# Patient Record
Sex: MALE | Race: WHITE | HISPANIC OR LATINO | Employment: PART TIME | URBAN - METROPOLITAN AREA
[De-identification: names, ages, dates, MRNs, and addresses within clinical notes are randomized per-mention and may not be internally consistent; named-entity substitution may affect disease eponyms.]

---

## 2017-01-04 ENCOUNTER — OFFICE VISIT (OUTPATIENT)
Dept: INTERNAL MEDICINE | Facility: CLINIC | Age: 42
End: 2017-01-04
Payer: COMMERCIAL

## 2017-01-04 ENCOUNTER — OFFICE VISIT (OUTPATIENT)
Dept: OPTOMETRY | Facility: CLINIC | Age: 42
End: 2017-01-04
Payer: COMMERCIAL

## 2017-01-04 VITALS
HEART RATE: 109 BPM | RESPIRATION RATE: 16 BRPM | BODY MASS INDEX: 33.49 KG/M2 | DIASTOLIC BLOOD PRESSURE: 69 MMHG | SYSTOLIC BLOOD PRESSURE: 133 MMHG | WEIGHT: 233.94 LBS | HEIGHT: 70 IN

## 2017-01-04 DIAGNOSIS — R55 SYNCOPE, UNSPECIFIED SYNCOPE TYPE: Primary | ICD-10-CM

## 2017-01-04 DIAGNOSIS — K52.9 CHRONIC DIARRHEA: ICD-10-CM

## 2017-01-04 DIAGNOSIS — H52.4 BILATERAL PRESBYOPIA: ICD-10-CM

## 2017-01-04 DIAGNOSIS — H04.123 BILATERAL DRY EYES: Primary | ICD-10-CM

## 2017-01-04 DIAGNOSIS — K42.9 UMBILICAL HERNIA WITHOUT OBSTRUCTION AND WITHOUT GANGRENE: ICD-10-CM

## 2017-01-04 DIAGNOSIS — Z76.89 ESTABLISHING CARE WITH NEW DOCTOR, ENCOUNTER FOR: ICD-10-CM

## 2017-01-04 DIAGNOSIS — Z00.00 ANNUAL PHYSICAL EXAM: ICD-10-CM

## 2017-01-04 PROCEDURE — 99214 OFFICE O/P EST MOD 30 MIN: CPT | Mod: S$GLB,,, | Performed by: HOSPITALIST

## 2017-01-04 PROCEDURE — 93005 ELECTROCARDIOGRAM TRACING: CPT | Mod: S$GLB,,, | Performed by: HOSPITALIST

## 2017-01-04 PROCEDURE — 1159F MED LIST DOCD IN RCRD: CPT | Mod: S$GLB,,, | Performed by: HOSPITALIST

## 2017-01-04 PROCEDURE — 99999 PR PBB SHADOW E&M-EST. PATIENT-LVL IV: CPT | Mod: PBBFAC,,, | Performed by: HOSPITALIST

## 2017-01-04 PROCEDURE — 99999 PR PBB SHADOW E&M-EST. PATIENT-LVL II: CPT | Mod: PBBFAC,,, | Performed by: OPTOMETRIST

## 2017-01-04 PROCEDURE — 93010 ELECTROCARDIOGRAM REPORT: CPT | Mod: S$GLB,,, | Performed by: INTERNAL MEDICINE

## 2017-01-04 PROCEDURE — 92004 COMPRE OPH EXAM NEW PT 1/>: CPT | Mod: S$GLB,,, | Performed by: OPTOMETRIST

## 2017-01-04 RX ORDER — TRAMADOL HYDROCHLORIDE 100 MG/1
100 TABLET, EXTENDED RELEASE ORAL DAILY PRN
COMMUNITY
End: 2017-05-01

## 2017-01-04 RX ORDER — GABAPENTIN 100 MG/1
100 CAPSULE ORAL DAILY PRN
COMMUNITY
End: 2017-04-24

## 2017-01-04 NOTE — PROGRESS NOTES
"Subjective:       Patient ID: Sanchez Drake is a 41 y.o. male.    Chief Complaint: Establish Care    HPI Comments: 40 y/o M with Traumatic brain injury in 2010 (with no neurologic consequences) presents for establishment of care. Patient also has several concerns as outlined below.     ESTABLISHMENT OF CARE:     Obesity screen: BMI 33  HTN screen: no hx  Lipid Screen : no lipid problems in the past  Diabetes screen : no hx   Alcohol Misuse: denies   Tobacco use: former smoker, quit 5 yrs ago  Vaccines: Influenza - deferred. Tetanus - 2015    SYNCOPE    Patient notes >5 yr hx of syncope that occurs during episodes of excessive laughing. Episodes have been becoming more frequent and more severe in quality. Most recent one occurred 2 weeks ago, and the one prior to that 1 mo ago. Patient notes "floating objects" with diaphoresis at first, that then progress to LOC. Wife witnessed these episodes and notes shaking of patient's head. Short-lived episodes, couple of mins. Associated with some SOB, but no CP or palpitations. Patient has no recollection after he losses consciousness, but there is no post-ictal state/confusion. Deny any tongue biting or loss of bladder/ bowel function. Denies any HA, CP, palpitations. No personal or fam hx of seizures, no hx of arrhythmias. As noted above, pt suffered TBI in 2010 after a metal object fell on him. Did rehab for 18 mo. No residual neuro deficits after that. However, patient states that syncopal episodes associated with laughter likely started after the TBI.     DIARRHEA    First started in 10/2016. Both watery stool and loose stool. 7-8 times per day. Stool becoming more pale in color with intermittent blood. However, wife and pt state that his anus has been becoming irritated and blood may be related to irritation. + diarrhea at night. No significant improvement with NPO. No exacerbation with certain foods. No recent outside travel (personally he is from [place] Denies personal hx or fam hx of IBS or IBD.     UMBILICAL HERNIA    Small, no pain/ distress. Patient would like referral to surgery.     Review of Systems   Constitutional: Negative for activity change, appetite change, fatigue, fever and unexpected weight change.   Cardiovascular: Negative for chest pain, palpitations and leg swelling.   Gastrointestinal: Positive for blood in stool and diarrhea. Negative for abdominal distention, abdominal pain, constipation, nausea, rectal pain and vomiting.   Genitourinary: Negative.    Musculoskeletal: Positive for back pain. Negative for arthralgias and myalgias.        Chronic but well controlled with PRN meds   Skin: Negative.    Neurological: Positive for syncope and light-headedness. Negative for dizziness, tremors, weakness, numbness and headaches.       Past Medical History   Diagnosis Date    Chronic back pain     TBI (traumatic brain injury) 2010     History reviewed. No pertinent past surgical history.    History reviewed. No pertinent family history.     Objective:       Visit Vitals    /69 (BP Location: Right arm, Patient Position: Sitting)    Pulse 109    Resp 16    Ht 5' 10" (1.778 m)    Wt 106.1 kg (233 lb 14.5 oz)    BMI 33.56 kg/m2     Physical Exam   Constitutional: He is oriented to person, place, and time. He appears well-developed and well-nourished. No distress.   HENT:   Head: Normocephalic and atraumatic.   Right Ear: External ear normal.   Left Ear: External ear normal.   Nose: Nose normal.   Mouth/Throat: Oropharynx is clear and moist. No oropharyngeal exudate.   Eyes: EOM are normal. Pupils are equal, round, and reactive to light. No scleral icterus.   Neck: Normal range of motion. Neck supple. No tracheal deviation present. No thyromegaly present.   Cardiovascular: Normal rate, regular rhythm, normal heart sounds and intact distal pulses.  Exam reveals no gallop and no friction rub.    No murmur heard.  Pulmonary/Chest: Effort " normal and breath sounds normal. No respiratory distress. He has no wheezes. He has no rales.   Abdominal: Soft. Bowel sounds are normal. He exhibits no distension. There is no tenderness. There is no rebound and no guarding. A hernia is present.   Small umbilical reducible hernia    Stool occult (-) but no stool was appreciated on rectal exam, anus with no bleeding or irritation    Musculoskeletal: Normal range of motion. He exhibits no edema or deformity.   Lymphadenopathy:     He has no cervical adenopathy.   Neurological: He is alert and oriented to person, place, and time. No cranial nerve deficit.   Skin: Skin is warm. No rash noted. He is not diaphoretic. No erythema. No pallor.   Psychiatric: He has a normal mood and affect.         In office EKG   Vent rate 89, ME int 142, QRS 84, QTc 435 ; sinus rhythm     Assessment:       1. Syncope, unspecified syncope type    2. Chronic diarrhea    3. Umbilical hernia without obstruction and without gangrene    4. Establishing care with new doctor, encounter for    5. Annual physical exam        Plan:       1. Syncope, unspecified syncope type  - ddx: vasovagal syncope vs situational syncope (exacerbated by laughter) vs neurologic syncope (given the hx of TBI). Less clinical suspicion for seizure episodes. Less clinical suspicion for cardiac etiology- such as arrhythmia given normal EKG or structural heart abnormalities given normal PE  - in office EKG unremarkable   - Ambulatory Referral to Neurology - for further eval  - deferring Head/ Brain imaging until Neurology eval  - recommended avoidance of triggers (excessive laughter)     2. Chronic diarrhea  - ddx: r/u celiac vs inflammatory diarrhea vs secretory diarrhea vs IBS vs IBD. Less concern for infectious proccess  - WBC, Stool; Future  - Fecal fat, qualitative; Future  - Electrolyte and Osmolality Panel, Feces; Future  - Occult blood x 1, stool; Future   - Ambulatory Referral to Gastroenterology  - TISSUE  TRANSGLUTAMINASE, IGA; Future    3. Umbilical hernia without obstruction and without gangrene  - Ambulatory Referral to General Surgery    4. Establishing care with new doctor, encounter for  - CBC auto differential; Future - eval for anemia in setting of some rectal bleeding  - Comprehensive metabolic panel; Future  - Lipid panel; Future    5. Annual physical exam  - CBC auto differential; Future  - Comprehensive metabolic panel; Future  - Lipid panel; Future    F/u in 3 mo    Discussed with Dr Nj Carbajal MD  Internal Medicine PGY-2  Pager [phone]

## 2017-01-04 NOTE — PATIENT INSTRUCTIONS
Thank you very much for coming in for your eye examination. It was a pleasure working with you today. Below is some information you might find helpful.     You can start wearing +1.00 or +1.25 over the counter reading glasses, whenever you feel like your eyes are getting tired.     PRESBYOPIA    Presbyopia is a condition in which the lens of the eye loses its ability to focus. The condition is associated with aging and is progressive (gets worse). People who have presbyopia have difficulty seeing objects close-up.    Causes, Incidence, and Risk Factors:  The focusing power of the eye depends on the elasticity of the lens. This elasticity is gradually lost as people age. The result is a slow decrease in the ability of the eye to focus on nearby objects.    People usually notice the condition around age 45, when they realize that they need to hold reading materials further away in order to focus on them. Presbyopia is a natural part of the aging process and affects everyone.    Symptoms:  * Decreased focusing ability for near objects   * Eyestrain   * Headache     Treatment:  Presbyopia can be corrected with glasses or contact lenses. In some cases, the addition of bifocals to an existing lens prescription is sufficient. As the ability to focus up close worsens, the prescription needs to be changed accordingly.    Around the age of 65, the eyes have usually lost most of the elasticity needed to focus up close. However, it will still be possible to read with the help of the appropriate prescription. Even so, you may find it necessary to hold reading materials further away, and you may require larger print and more light to read by.    People who do not need glasses for distance vision may only need half glasses or reading glasses.    Expectations (Prognosis):   Vision can be corrected with glasses or contact lenses.    Complications:  If uncorrected, progressive vision difficulty can cause problems with driving,  lifestyle, or work.    Prevention:   There is no proven prevention for presbyopia.    Lilibeth Lee, OD

## 2017-01-04 NOTE — MR AVS SNAPSHOT
[facility]  [address]  [facility] [address]  Phone: [phone]                  Sanchez AraujoLomeli   2017 9:40 AM   Office Visit    Description:  Male : 1975   Provider:  Lilibeth Lee OD   Department:  [facility]           Reason for Visit     Eye Exam                To Do List           Future Appointments        Provider Department Dept Phone    2017 9:40 AM Lilibeth Lee OD [facility] [phone]    2017 2:00 PM Liliam Carbajal MD [facility] [phone]      Goals (5 Years of Data)     None      Follow-Up and Disposition     Return in about 1 year (around 2018) for annual eye examination.      [facility] On Call     [facility] On Call Nurse [facility] Line -  Assistance  Registered nurses in the [facility] On Call Center provide clinical advisement, health education, appointment booking, and other advisory services.  Call for this free service at [phone].             Medications           Message regarding Medications     Verify the changes and/or additions to your medication regime listed below are the same as discussed with your clinician today.  If any of these changes or additions are incorrect, please notify your healthcare provider.             Verify that the below list of medications is an accurate representation of the medications you are currently taking.  If none reported, the list may be blank. If incorrect, please contact your healthcare provider. Carry this list with you in case of emergency.           Current Medications     baclofen (LIORESAL) 10 MG tablet Take 1 tablet (10 mg total) by mouth 3 (three) times daily as needed (muscle spasms).    gabapentin (NEURONTIN) 100 MG capsule Take 100 mg by mouth 2 (two) times daily.    tramadol (ULTRAM-ER) 100 MG Tb24 Take 100 mg by mouth once daily.           Clinical Reference Information           Allergies as of 2017     No  Known Allergies      Immunizations Administered on Date of Encounter - 1/4/2017     None      Instructions    Thank you very much for coming in for your eye examination. It was a pleasure working with you today. Below is some information you might find helpful.     You can start wearing +1.00 or +1.25 over the counter reading glasses, whenever you feel like your eyes are getting tired.     PRESBYOPIA    Presbyopia is a condition in which the lens of the eye loses its ability to focus. The condition is associated with aging and is progressive (gets worse). People who have presbyopia have difficulty seeing objects close-up.    Causes, Incidence, and Risk Factors:  The focusing power of the eye depends on the elasticity of the lens. This elasticity is gradually lost as people age. The result is a slow decrease in the ability of the eye to focus on nearby objects.    People usually notice the condition around age 45, when they realize that they need to hold reading materials further away in order to focus on them. Presbyopia is a natural part of the aging process and affects everyone.    Symptoms:  * Decreased focusing ability for near objects   * Eyestrain   * Headache     Treatment:  Presbyopia can be corrected with glasses or contact lenses. In some cases, the addition of bifocals to an existing lens prescription is sufficient. As the ability to focus up close worsens, the prescription needs to be changed accordingly.    Around the age of 65, the eyes have usually lost most of the elasticity needed to focus up close. However, it will still be possible to read with the help of the appropriate prescription. Even so, you may find it necessary to hold reading materials further away, and you may require larger print and more light to read by.    People who do not need glasses for distance vision may only need half glasses or reading glasses.    Expectations (Prognosis):   Vision can be corrected with glasses or contact  lenses.    Complications:  If uncorrected, progressive vision difficulty can cause problems with driving, lifestyle, or work.    Prevention:   There is no proven prevention for presbyopia.    Lilibeth Lee, RAFIQ

## 2017-01-04 NOTE — PROGRESS NOTES
HPI     Patient here today for comprehensive eye exam     Patient complains of when feeling tired and blurred near vision after a   few minutes of looking at cell phone (viewing distance about 40cm) x 3   months. These symptoms are not noticed with any other near tasks.    (+)drops naphcon 1 time a week for when eyes feel tired and look red    (-)pain  (-)flashes  (+)floaters OU has had for years none recently   (-)diplopia    Diabetic no  LBS n/a  No results found for: HGBA1C    OCULAR HISTORY  Last Eye Exam over 10 years ago   (-)eye surgery   (-)eye injury   (-)diagnosed or treated for any eye conditions or diseases none     FAMILY HISTORY  (-)Glaucoma none   (-)Macular Degeneration none          Last edited by Lilibeth Lee, OD on 1/4/2017  9:43 AM.         Assessment /Plan     For exam results, see Encounter Report.    Bilateral dry eyes   Recommended artificial tears prn, up to QID OU. Discontinue Naphcon.    Bilateral presbyopia   Explained nature of accommodation and presbyopia. Recommended pt start OTC readers prn. Distance glasses not needed.      RTC 1 yr

## 2017-01-04 NOTE — MR AVS SNAPSHOT
[facility] - [facility]  [address]  [facility] [address]  Phone: [phone]  Fax: [phone]                  Sanchez Pinoquez   2017 2:00 PM   Office Visit    Description:  Male : 1975   Provider:  Liliam Carbajal MD   Department:  [facility] - Internal Medicine           Reason for Visit     Establish Care           Diagnoses this Visit        Comments    Syncope, unspecified syncope type    -  Primary     Chronic diarrhea         Umbilical hernia without obstruction and without gangrene         Establishing care with new doctor, encounter for         Annual physical exam                To Do List           Goals (5 Years of Data)     None      Follow-Up and Disposition     Return in about 3 months (around 2017).      [facility] On Call     [facility] On Call Nurse Care Line -  Assistance  Registered nurses in the [facility] On Call Center provide clinical advisement, health education, appointment booking, and other advisory services.  Call for this free service at [phone].             Medications           Message regarding Medications     Verify the changes and/or additions to your medication regime listed below are the same as discussed with your clinician today.  If any of these changes or additions are incorrect, please notify your healthcare provider.             Verify that the below list of medications is an accurate representation of the medications you are currently taking.  If none reported, the list may be blank. If incorrect, please contact your healthcare provider. Carry this list with you in case of emergency.           Current Medications     gabapentin (NEURONTIN) 100 MG capsule Take 100 mg by mouth daily as needed.     tramadol (ULTRAM-ER) 100 MG Tb24 Take 100 mg by mouth daily as needed.     baclofen (LIORESAL) 10 MG tablet Take 1 tablet (10 mg total) by mouth 3 (three) times daily as needed (muscle spasms).           Clinical Reference  "Information           Vital Signs - Last Recorded  Most recent update: 1/4/2017  1:41 PM by Augusta Connolly MA    BP Pulse Resp Ht Wt BMI    133/69 (BP Location: Right arm, Patient Position: Sitting) 109 16 5' 10" (1.778 m) 106.1 kg (233 lb 14.5 oz) 33.56 kg/m2      Blood Pressure          Most Recent Value    BP  133/69      Allergies as of 1/4/2017     No Known Allergies      Immunizations Administered on Date of Encounter - 1/4/2017     None      Orders Placed During Today's Visit      Normal Orders This Visit    Ambulatory Referral to Gastroenterology     Ambulatory Referral to General Surgery     Ambulatory Referral to Neurology     IN OFFICE EKG 12-LEAD (to [place])     Future Labs/Procedures Expected by Expires    CBC auto differential  1/4/2017 3/5/2018    Comprehensive metabolic panel  1/4/2017 3/5/2018    Electrolyte and Osmolality Panel, Feces  1/4/2017 3/5/2018    Fecal fat, qualitative  1/4/2017 3/5/2018    Lipid panel  1/4/2017 3/5/2018    Occult blood x 1, stool  1/4/2017 1/4/2018    TISSUE TRANSGLUTAMINASE, IGA  1/4/2017 3/5/2018    WBC, Stool  1/4/2017 1/4/2018      "

## 2017-01-05 ENCOUNTER — LAB VISIT (OUTPATIENT)
Dept: LAB | Facility: HOSPITAL | Age: 42
End: 2017-01-05
Attending: HOSPITALIST
Payer: COMMERCIAL

## 2017-01-05 DIAGNOSIS — Z00.00 ANNUAL PHYSICAL EXAM: ICD-10-CM

## 2017-01-05 DIAGNOSIS — K52.9 CHRONIC DIARRHEA: ICD-10-CM

## 2017-01-05 DIAGNOSIS — Z76.89 ESTABLISHING CARE WITH NEW DOCTOR, ENCOUNTER FOR: ICD-10-CM

## 2017-01-05 LAB
ALBUMIN SERPL BCP-MCNC: 4 G/DL
ALP SERPL-CCNC: 78 U/L
ALT SERPL W/O P-5'-P-CCNC: 52 U/L
ANION GAP SERPL CALC-SCNC: 9 MMOL/L
AST SERPL-CCNC: 35 U/L
BASOPHILS # BLD AUTO: 0.07 K/UL
BASOPHILS NFR BLD: 1 %
BILIRUB SERPL-MCNC: 0.8 MG/DL
BUN SERPL-MCNC: 20 MG/DL
CALCIUM SERPL-MCNC: 8.7 MG/DL
CHLORIDE SERPL-SCNC: 106 MMOL/L
CHOLEST/HDLC SERPL: 6 {RATIO}
CO2 SERPL-SCNC: 23 MMOL/L
CREAT SERPL-MCNC: 0.9 MG/DL
DIFFERENTIAL METHOD: NORMAL
EOSINOPHIL # BLD AUTO: 0.1 K/UL
EOSINOPHIL NFR BLD: 1.5 %
ERYTHROCYTE [DISTWIDTH] IN BLOOD BY AUTOMATED COUNT: 13.8 %
EST. GFR  (AFRICAN AMERICAN): >60 ML/MIN/1.73 M^2
EST. GFR  (NON AFRICAN AMERICAN): >60 ML/MIN/1.73 M^2
GLUCOSE SERPL-MCNC: 91 MG/DL
HCT VFR BLD AUTO: 43.3 %
HDL/CHOLESTEROL RATIO: 16.6 %
HDLC SERPL-MCNC: 151 MG/DL
HDLC SERPL-MCNC: 25 MG/DL
HGB BLD-MCNC: 14.9 G/DL
LDLC SERPL CALC-MCNC: 101.8 MG/DL
LYMPHOCYTES # BLD AUTO: 2.8 K/UL
LYMPHOCYTES NFR BLD: 41.6 %
MCH RBC QN AUTO: 30.3 PG
MCHC RBC AUTO-ENTMCNC: 34.4 %
MCV RBC AUTO: 88 FL
MONOCYTES # BLD AUTO: 0.4 K/UL
MONOCYTES NFR BLD: 6.4 %
NEUTROPHILS # BLD AUTO: 3.3 K/UL
NEUTROPHILS NFR BLD: 49.2 %
NONHDLC SERPL-MCNC: 126 MG/DL
PLATELET # BLD AUTO: 211 K/UL
PMV BLD AUTO: 9.9 FL
POTASSIUM SERPL-SCNC: 4 MMOL/L
PROT SERPL-MCNC: 7.5 G/DL
RBC # BLD AUTO: 4.92 M/UL
SODIUM SERPL-SCNC: 138 MMOL/L
TRIGL SERPL-MCNC: 121 MG/DL
WBC # BLD AUTO: 6.75 K/UL
WBC #/AREA STL HPF: NORMAL /[HPF]

## 2017-01-05 PROCEDURE — 36415 COLL VENOUS BLD VENIPUNCTURE: CPT

## 2017-01-05 PROCEDURE — 80053 COMPREHEN METABOLIC PANEL: CPT

## 2017-01-05 PROCEDURE — 89055 LEUKOCYTE ASSESSMENT FECAL: CPT

## 2017-01-05 PROCEDURE — 80061 LIPID PANEL: CPT

## 2017-01-05 PROCEDURE — 85025 COMPLETE CBC W/AUTO DIFF WBC: CPT

## 2017-01-05 PROCEDURE — 83516 IMMUNOASSAY NONANTIBODY: CPT

## 2017-01-06 ENCOUNTER — TELEPHONE (OUTPATIENT)
Dept: INTERNAL MEDICINE | Facility: CLINIC | Age: 42
End: 2017-01-06

## 2017-01-06 ENCOUNTER — OFFICE VISIT (OUTPATIENT)
Dept: NEUROLOGY | Facility: CLINIC | Age: 42
End: 2017-01-06
Payer: COMMERCIAL

## 2017-01-06 VITALS
BODY MASS INDEX: 33.49 KG/M2 | WEIGHT: 233.94 LBS | HEIGHT: 70 IN | DIASTOLIC BLOOD PRESSURE: 68 MMHG | HEART RATE: 75 BPM | SYSTOLIC BLOOD PRESSURE: 106 MMHG

## 2017-01-06 DIAGNOSIS — R55 TRANSIENT LOSS OF CONSCIOUSNESS: Primary | ICD-10-CM

## 2017-01-06 LAB — TTG IGA SER IA-ACNC: 7 UNITS

## 2017-01-06 PROCEDURE — 99999 PR PBB SHADOW E&M-EST. PATIENT-LVL III: CPT | Mod: PBBFAC,,, | Performed by: PSYCHIATRY & NEUROLOGY

## 2017-01-06 PROCEDURE — 1159F MED LIST DOCD IN RCRD: CPT | Mod: S$GLB,,, | Performed by: PSYCHIATRY & NEUROLOGY

## 2017-01-06 PROCEDURE — 99205 OFFICE O/P NEW HI 60 MIN: CPT | Mod: S$GLB,,, | Performed by: PSYCHIATRY & NEUROLOGY

## 2017-01-06 NOTE — MR AVS SNAPSHOT
[facility]  [address] [facility] [address] [address]  Phone: [phone]  Fax: [phone]                  Sanchez Drake   2017 1:00 PM   Office Visit    Description:  Male : 1975   Provider:  Cornelius Gallego MD   Department:  [facility] - Neurology           Diagnoses this Visit        Comments    Transient loss of consciousness    -  Primary            To Do List           Future Appointments        Provider Department Dept Phone    2017 8:00 AM Abena Abbott MD [facility] Gastroenterology [phone]    2017 9:40 AM Cornelius Gallego MD [facility] Neurology [phone]    2017 11:15 AM Parminder Britton MD [facility] General Surgery [phone]    4/3/2017 3:30 PM Liliam Carbajal MD [facility] Internal Medicine [phone]      Goals (5 Years of Data)     None      [facility] On Call     [facility] On Call Nurse Care Line -  Assistance  Registered nurses in the [facility] On Call Center provide clinical advisement, health education, appointment booking, and other advisory services.  Call for this free service at [phone].             Medications           Message regarding Medications     Verify the changes and/or additions to your medication regime listed below are the same as discussed with your clinician today.  If any of these changes or additions are incorrect, please notify your healthcare provider.             Verify that the below list of medications is an accurate representation of the medications you are currently taking.  If none reported, the list may be blank. If incorrect, please contact your healthcare provider. Carry this list with you in case of emergency.           Current Medications     baclofen (LIORESAL) 10 MG tablet Take 1 tablet (10 mg total) by mouth 3 (three) times daily as needed (muscle spasms).    gabapentin (NEURONTIN) 100 MG capsule Take 100 mg by mouth daily as needed.     tramadol (ULTRAM-ER) 100 MG Tb24  "Take 100 mg by mouth daily as needed.            Clinical Reference Information           Vital Signs - Last Recorded  Most recent update: 1/6/2017 12:59 PM by Vivi Montes MA    BP Pulse Ht Wt BMI    106/68 75 5' 10" (1.778 m) 106.1 kg (233 lb 14.5 oz) 33.56 kg/m2      Blood Pressure          Most Recent Value    BP  106/68      Allergies as of 1/6/2017     No Known Allergies      Immunizations Administered on Date of Encounter - 1/6/2017     None      Orders Placed During Today's Visit     Future Labs/Procedures Expected by Expires    MRI Brain Without Contrast  1/6/2017 1/6/2018    EEG,extended monitoring,41-60 minutes  As directed 1/6/2018      "

## 2017-01-06 NOTE — TELEPHONE ENCOUNTER
Called patient regarding his labs. No signs of anemia or diabetes. HDL is low at 25, recommended diet and exercise. Stool studies are pending, however, preliminary show no WBCs in stool. Will notify patient of other stool studies when those are final    Liliam Carbajal MD  Internal Medicine PGY-2  Pager [phone]

## 2017-01-06 NOTE — LETTER
January 6, 2017      Liliam Carbajal MD  [address]  [facility] [address]           [facility]  [address]  [facility] [address]  Phone: [phone]  Fax: [phone]          Patient: Sanchez Drake   MR Number: [id number]   YOB: 1975   Date of Visit: 1/6/2017       Dear Dr. Liliam Carbajal:    Thank you for referring Sanchez Drake to me for evaluation. Attached you will find relevant portions of my assessment and plan of care.    If you have questions, please do not hesitate to call me. I look forward to following Sanchez Drake along with you.    Sincerely,    Cornelius Gallego MD    Enclosure  CC:  No Recipients    If you would like to receive this communication electronically, please contact [email or web address] or [phone] to request more information on [employer] access.    For providers and/or their staff who would like to refer a patient to [facility], please contact us through our one-stop-shop provider referral line, [facility] [place], at [phone].    If you feel you have received this communication in error or would no longer like to receive these types of communications, please e-mail [email or web address]

## 2017-01-06 NOTE — PROGRESS NOTES
Subjective:      Patient ID: Sanchez Drake is a 41 y.o. male.    HPI Comments: 41 year old man with mild TBI presents for episodes of syncope.    TBI occurred in 2010 when a prop wall fell on him, knocking him to the floor and causing him to hit his head on concrete.  He had loc for a few minutes and received sutures for scalp laceration but no fracture or hospital admit.  He had 18 months of PT for back and neck pain with gradual improvement over time, no surgeries, never noted any cognitive issues.  He does not take any medications on a daily basis at this time.  Since then he has noticed that he becomes light headed with very strong laughter, unsure if lightheaded with laughter before this.  About two months ago he had first of two episodes of loss of consciousness during strong laughter.  Wife reports eyes mostly closed and body limp, he will be minimally responsive for a few seconds without any confusion following, no incontinence.      The following portions of the patient's history were reviewed and updated as appropriate: allergies, current medications, past family history, past medical history, past social history, past surgical history and problem list.    Review of Systems   Constitutional: Negative for chills, fatigue and fever.   HENT: Negative for congestion, ear pain, facial swelling, hearing loss, tinnitus, trouble swallowing and voice change.    Eyes: Negative for photophobia and visual disturbance.   Respiratory: Negative for cough and shortness of breath.    Cardiovascular: Negative for chest pain and palpitations.   Gastrointestinal: Positive for diarrhea. Negative for constipation, nausea and vomiting.   Endocrine: Negative for cold intolerance and heat intolerance.   Genitourinary: Negative for difficulty urinating, frequency and urgency.   Musculoskeletal: Negative for back pain and myalgias.   Skin: Negative for color change.   Neurological: Positive for syncope. Negative for  dizziness, weakness, numbness and headaches.   Hematological: Negative for adenopathy.   Psychiatric/Behavioral: Negative for decreased concentration and dysphoric mood.       Objective:   Neurologic Exam     Mental Status   Oriented to person, place, and time.   Level of consciousness: alert    Cranial Nerves     CN II   Visual fields full to confrontation.     CN III, IV, VI   Pupils are equal, round, and reactive to light.  Extraocular motions are normal.     CN V   Facial sensation intact.     CN VII   Facial expression full, symmetric.     CN IX, X   Palate: symmetric    CN XI   Right trapezius strength: normal  Left trapezius strength: normal    CN XII   Tongue: not atrophic    Motor Exam   Muscle bulk: normal  Overall muscle tone: normal    Strength   Right neck flexion: 5/5  Left neck flexion: 5/5  Right neck extension: 5/5  Left neck extension: 5/5  Right deltoid: 5/5  Left deltoid: 5/5  Right biceps: 5/5  Left biceps: 5/5  Right triceps: 5/5  Left triceps: 5/5  Right wrist flexion: 5/5  Left wrist flexion: 5/5  Right wrist extension: 5/5  Left wrist extension: 5/5  Right interossei: 5/5  Left interossei: 5/5  Right abdominals: 5/5  Left abdominals: 5/5  Right iliopsoas: 5/5  Left iliopsoas: 5/5  Right quadriceps: 5/5  Left quadriceps: 5/5  Right hamstrin/5  Left hamstrin/5  Right glutei: 5/5  Left glutei: 5/5  Right anterior tibial: 5/5  Left anterior tibial: 5/5  Right posterior tibial: 5/5  Left posterior tibial: 5/5  Right peroneal: 5/5  Left peroneal: 5/5  Right gastroc: 5/5  Left gastroc: 5/5    Sensory Exam   Light touch normal.   Pinprick normal.     Gait, Coordination, and Reflexes     Gait  Gait: normal    Coordination   Romberg: negative  Finger to nose coordination: normal  Heel to shin coordination: normal  Tandem walking coordination: normal    Tremor   Resting tremor: absent    Reflexes   Right brachioradialis: 1+  Left brachioradialis: 1+  Right biceps: 1+  Left biceps: 1+  Right  triceps: 1+  Left triceps: 1+  Right patellar: 2+  Left patellar: 2+  Right achilles: 2+  Left achilles: 2+      Physical Exam   Constitutional: He is oriented to person, place, and time. He appears well-developed and well-nourished.   HENT:   Head: Normocephalic and atraumatic.   Eyes: EOM are normal. Pupils are equal, round, and reactive to light.   Neck: Normal range of motion.   Cardiovascular: Normal rate and regular rhythm.    Pulmonary/Chest: Effort normal and breath sounds normal.   Abdominal: Soft. Bowel sounds are normal.   Neurological: He is oriented to person, place, and time. He has a normal Finger-Nose-Finger Test, a normal Heel to Shin Test, a normal Romberg Test and a normal Tandem Gait Test. Gait normal.   Reflex Scores:       Tricep reflexes are 1+ on the right side and 1+ on the left side.       Bicep reflexes are 1+ on the right side and 1+ on the left side.       Brachioradialis reflexes are 1+ on the right side and 1+ on the left side.       Patellar reflexes are 2+ on the right side and 2+ on the left side.       Achilles reflexes are 2+ on the right side and 2+ on the left side.  Skin: Skin is warm and dry.   Psychiatric: He has a normal mood and affect.   Nursing note and vitals reviewed.      Assessment:     41 year old man with mild TBI presents for episodes of syncope in the context of strong laughter.  Etiology most likely vasovagal with possible exacerbation due to relative dehydration in the setting of recent diarrhea.  Given his history, I would prefer to rule out structural lesion that would predispose to autonomic dysfunction but he may benefit from cardiac evaluation if symptoms continue.    Plan:     -  MRI brain and EEG  -  Return in 6 weeks

## 2017-01-13 ENCOUNTER — OFFICE VISIT (OUTPATIENT)
Dept: CARDIOLOGY | Facility: CLINIC | Age: 42
End: 2017-01-13
Payer: COMMERCIAL

## 2017-01-13 VITALS
BODY MASS INDEX: 33.29 KG/M2 | HEART RATE: 79 BPM | HEIGHT: 70 IN | WEIGHT: 232.56 LBS | DIASTOLIC BLOOD PRESSURE: 66 MMHG | SYSTOLIC BLOOD PRESSURE: 126 MMHG

## 2017-01-13 DIAGNOSIS — R55 SYNCOPE, UNSPECIFIED SYNCOPE TYPE: Primary | ICD-10-CM

## 2017-01-13 DIAGNOSIS — K52.9 CHRONIC DIARRHEA: ICD-10-CM

## 2017-01-13 PROCEDURE — 99203 OFFICE O/P NEW LOW 30 MIN: CPT | Mod: S$GLB,,, | Performed by: INTERNAL MEDICINE

## 2017-01-13 PROCEDURE — 1159F MED LIST DOCD IN RCRD: CPT | Mod: S$GLB,,, | Performed by: INTERNAL MEDICINE

## 2017-01-13 PROCEDURE — 99999 PR PBB SHADOW E&M-EST. PATIENT-LVL III: CPT | Mod: PBBFAC,,, | Performed by: INTERNAL MEDICINE

## 2017-01-13 NOTE — Clinical Note
Thank you for referring Sanchez Drake for evaluation of syncope. Please see my note for details of this encounter. If you have any questions, please contact me.  Thank you again for the referral.

## 2017-01-13 NOTE — MR AVS SNAPSHOT
[facility] Card[facility]  [address]  [place] [address]  Phone: [phone]                  Sanchez Drake   2017 4:00 PM   Office Visit    Description:  Male : 1975   Provider:  Wilder Julio MD   Department:  [facility] Cardiology           Reason for Visit     Loss of Consciousness           Diagnoses this Visit        Comments    Syncope, unspecified syncope type    -  Primary     Chronic diarrhea                To Do List           Future Appointments        Provider Department Dept Phone    2017 6:45 AM [facility] MRI1 [facility] [phone]    2017 9:00 AM NEURODIAGNOSTICS, APPT [facility] [phone]    2017 8:00 AM Abena Abbott MD [facility] - Gastroenterology [phone]    2017 9:40 AM Cornelius Gallego MD [facility] - Neurology [phone]    2017 11:15 AM Parminder Britton MD [facility] - General Surgery [phone]      Goals (5 Years of Data)     None      Follow-Up and Disposition     Return if symptoms worsen or fail to improve.      [facility]     [facility] -  Assistance  Registered nurses in the [facility] provide clinical advisement, health education, appointment booking, and other advisory services.  Call for this free service at [phone].             Medications           Message regarding Medications     Verify the changes and/or additions to your medication regime listed below are the same as discussed with your clinician today.  If any of these changes or additions are incorrect, please notify your healthcare provider.             Verify that the below list of medications is an accurate representation of the medications you are currently taking.  If none reported, the list may be blank. If incorrect, please contact your healthcare provider. Carry this list with you in case of emergency.           Current Medications     baclofen  "(LIORESAL) 10 MG tablet Take 1 tablet (10 mg total) by mouth 3 (three) times daily as needed (muscle spasms).    gabapentin (NEURONTIN) 100 MG capsule Take 100 mg by mouth daily as needed.     tramadol (ULTRAM-ER) 100 MG Tb24 Take 100 mg by mouth daily as needed.            Clinical Reference Information           Vital Signs - Last Recorded  Most recent update: 1/13/2017  3:39 PM by Gena Haskins MA    BP Pulse Ht Wt BMI    126/66 (BP Location: Left arm, Patient Position: Sitting, BP Method: Automatic) 79 5' 10" (1.778 m) 105.5 kg (232 lb 9.4 oz) 33.37 kg/m2      Blood Pressure          Most Recent Value    Right Arm BP - Sitting  128/69    Left Arm BP - Sitting  126/66    BP  126/66      Allergies as of 1/13/2017     No Known Allergies      Immunizations Administered on Date of Encounter - 1/13/2017     None      "

## 2017-01-13 NOTE — PROGRESS NOTES
Chart has been dictated using voice recognition software.  It is not been reviewed carefully for any transcriptional errors due to this technology.   Subjective:   Patient ID:  Sanchez Drake is a 41 y.o. male who presents for evaluation of Loss of Consciousness      HPI: Patient presents for evaluation of syncope.  When patient laughs hard, he gets lightheaded and occasionally will have syncope, have a couple of seconds of tonic-clonic movement, and then wake up.  Will immediately regain normal thinking but has no recollection of what happened.  Patient denies any chest discomfort on exertion or at rest. Had chest pain last year that was not cardiac in origin.   Patient denies any dyspnea at rest or on exertion, orthopnea, PND, or edema.  Patient denies any palpitations.  No h/o heart disease.  Had traumatic brain injury 6 1/2 years ago.     Cardiac risk factors: HDL cholesterol less than 35, tobacco use    Past Medical History   Diagnosis Date    Chronic back pain     TBI (traumatic brain injury) 2010       History reviewed. No pertinent past surgical history.    Social History   Substance Use Topics    Smoking status: Former Smoker     Quit date: 1/1/2007    Smokeless tobacco: Never Used    Alcohol use Yes      Comment: occasional         Current Outpatient Prescriptions:     baclofen (LIORESAL) 10 MG tablet, Take 1 tablet (10 mg total) by mouth 3 (three) times daily as needed (muscle spasms)., Disp: 60 tablet, Rfl: 1    gabapentin (NEURONTIN) 100 MG capsule, Take 100 mg by mouth daily as needed. , Disp: , Rfl:     tramadol (ULTRAM-ER) 100 MG Tb24, Take 100 mg by mouth daily as needed. , Disp: , Rfl:     Review of patient's allergies indicates:  No Known Allergies    Review of Systems   Constitution: Positive for weight gain (40 pounds over past year). Negative for weight loss.   HENT: Negative for nosebleeds.    Eyes: Negative for vision loss in left eye and vision loss in right eye.  "  Cardiovascular: Negative for claudication.        As above   Respiratory: Negative for hemoptysis, shortness of breath, snoring, sputum production and wheezing.    Endocrine: Negative for polydipsia and polyuria.   Hematologic/Lymphatic: Does not bruise/bleed easily.   Musculoskeletal: Negative for myalgias.   Gastrointestinal: Positive for diarrhea and hematochezia. Negative for change in bowel habit, hematemesis, melena, nausea and vomiting.   Genitourinary: Negative for hematuria.   Neurological: Positive for aphonia. Negative for focal weakness and numbness.     Objective:   Physical Exam   Constitutional: He is oriented to person, place, and time. He appears well-developed and well-nourished.   /66 (BP Location: Left arm, Patient Position: Sitting, BP Method: Automatic)  Pulse 79  Ht 5' 10" (1.778 m)  Wt 105.5 kg (232 lb 9.4 oz)  BMI 33.37 kg/m2     Neck: Neck supple. No JVD present. Carotid bruit is not present.   Cardiovascular: Normal rate, regular rhythm, normal heart sounds and intact distal pulses.  Exam reveals no gallop and no friction rub.    No murmur heard.  Pulmonary/Chest: Effort normal and breath sounds normal. He has no wheezes. He has no rales.   Abdominal: Soft. Bowel sounds are normal. He exhibits no abdominal bruit. There is no hepatomegaly. There is no tenderness.   Musculoskeletal: He exhibits no edema.   Neurological: He is alert and oriented to person, place, and time. He has normal strength.   Skin: No cyanosis. Nails show no clubbing.       Lab Results   Component Value Date    WBC 6.75 01/05/2017    HGB 14.9 01/05/2017    HCT 43.3 01/05/2017    MCV 88 01/05/2017     01/05/2017       Lab Results   Component Value Date     01/05/2017    K 4.0 01/05/2017    BUN 20 01/05/2017    CREATININE 0.9 01/05/2017    GLU 91 01/05/2017    CHOL 151 01/05/2017    HDL 25 (L) 01/05/2017    LDLCALC 101.8 01/05/2017    TRIG 121 01/05/2017    CHOLHDL 16.6 (L) 01/05/2017    HGB 14.9 " 01/05/2017    HCT 43.3 01/05/2017     01/05/2017    INR 1.0 12/18/2015     ECG (04-Jan-2017): Normal sinus rhythm and was a normal ECG.   Assessment:     1. Syncope, unspecified syncope type    2. Chronic diarrhea      Patient has syncope of unknown etiology.  It is unlikely that his syncope is caused by a significant arrhythmia, unless the laughter is in some way creating increased vagotonia and the patient become severely bradycardic.  However, would suggest completing the neurologic workup at this time.  If no other cause for syncope becomes evident, referred patient to Dr. Villalpando for more detailed evaluation of the cardio-neurologic interaction.  Plan:     Sanchez was seen today for loss of consciousness.    Diagnoses and all orders for this visit:    Syncope, unspecified syncope type    Chronic diarrhea      Patient to return as needed if neurologic exam does not reveal cause of syncopal episodes area

## 2017-01-25 ENCOUNTER — HOSPITAL ENCOUNTER (OUTPATIENT)
Dept: NEUROLOGY | Facility: CLINIC | Age: 42
Discharge: HOME | End: 2017-01-25
Payer: COMMERCIAL

## 2017-01-25 ENCOUNTER — HOSPITAL ENCOUNTER (OUTPATIENT)
Dept: RADIOLOGY | Facility: HOSPITAL | Age: 42
Discharge: HOME | End: 2017-01-25
Attending: PSYCHIATRY & NEUROLOGY
Payer: COMMERCIAL

## 2017-01-25 DIAGNOSIS — R55 TRANSIENT LOSS OF CONSCIOUSNESS: ICD-10-CM

## 2017-01-25 PROCEDURE — 95819 PR EEG,W/AWAKE & ASLEEP RECORD: ICD-10-PCS | Mod: S$GLB,,, | Performed by: PSYCHIATRY & NEUROLOGY

## 2017-01-25 PROCEDURE — 70551 MRI BRAIN STEM W/O DYE: CPT | Mod: TC

## 2017-01-25 PROCEDURE — 95819 EEG AWAKE AND ASLEEP: CPT | Mod: S$GLB,,, | Performed by: PSYCHIATRY & NEUROLOGY

## 2017-01-25 PROCEDURE — 70551 MRI BRAIN STEM W/O DYE: CPT | Mod: 26,,, | Performed by: RADIOLOGY

## 2017-01-25 NOTE — PROCEDURES
ROUTINE ELECTROENCEPHALOGRAM REPORT      Sanchez Lorenzo  [id number]  1975    DATE OF SERVICE: 01/25/17    REQUESTING PROVIDER: Cornelius Gallego MD    REASON FOR CONSULT: 42yo M with hx of mild TBI, being evaluated for episodes of syncope    PRIOR EEG: none    MEDICATIONS:   Current Outpatient Prescriptions   Medication Sig    baclofen (LIORESAL) 10 MG tablet Take 1 tablet (10 mg total) by mouth 3 (three) times daily as needed (muscle spasms).    gabapentin (NEURONTIN) 100 MG capsule Take 100 mg by mouth daily as needed.     tramadol (ULTRAM-ER) 100 MG Tb24 Take 100 mg by mouth daily as needed.      No current facility-administered medications for this encounter.      METHODOLOGY   Electroencephalographic (EEG) recording is with electrodes placed according to the International 10-20 placement system.  Thirty two (32) channels of digital signal (sampling rate of 512/sec) including T1 and T2 was simultaneously recorded from the scalp and may include  EKG, EMG, and/or eye monitors.  Recording band pass was 0.1 to 512 hz.  Digital video recording of the patient is simultaneously recorded with the EEG.  The patient is instructed report clinical symptoms which may occur during the recording session.  EEG and video recording is stored and archived in digital format. Activation procedures which include photic stimulation, hyperventilation and instructing patients to perform simple task are done in selected patients.    The EEG is displayed on a monitor screen and can be reviewed using different montages.  Computer assisted analysis is employed to detect spike and electrographic seizure activity.   The entire record is submitted for computer analysis.  The entire recording is visually reviewed and the times identified by computer analysis as being spikes or seizures are reviewed again.  Compresses spectral analysis (CSA) is also performed on the activity recorded from each individual channel.  This is displayed  as a power display of frequencies from 0 to 30 Hz over time.   The CSA is reviewed looking for asymmetries in power between homologous areas of the scalp and then compared with the original EEG recording.     [employer] software was also utilized in the review of this study.  This software suite analyzes the EEG recording in multiple domains.  Coherence and rhythmicity is computed to identify EEG sections which may contain organized seizures.  Each channel undergoes analysis to detect presence of spike and sharp waves which have special and morphological characteristic of epileptic activity.  The routine EEG recording is converted from spacial into frequency domain.  This is then displayed comparing homologous areas to identify areas of significant asymmetry.  Algorithm to identify non-cortically generated artifact is used to separate eye movement, EMG and other artifact from the EEG    EEG FINGINGS  Background activity:   The background rhythm was characterized by 10-11 Hz alpha activity with a 11 Hz posterior dominant alpha rhythm at 30-70 microvolts.   Symmetry and continuity: The background was continuous and symmetric    Sleep:   Normal sleep transients including sleep spindles, K complexes, vertex waves and POSTS were seen.    Activation procedures:   Hyperventilation was performed with no abnormalities seen  Photic stimulation was performed with no abnormalities seen    Abnormal activity:   No epileptiform discharges, periodic discharges, lateralized rhythmic delta activity or electrographic seizures were seen.    IMPRESSION:   This is a normal awake and asleep routine EEG.    A normal EEG does not rule out seizures/epilepsy.    CLINICAL CORRELATION IS RECOMMENDED.    Onelia Murphy MD, CYNDI().  Neurology-Epilepsy.

## 2017-01-31 ENCOUNTER — TELEPHONE (OUTPATIENT)
Dept: GASTROENTEROLOGY | Facility: CLINIC | Age: 42
End: 2017-01-31

## 2017-02-01 ENCOUNTER — TELEPHONE (OUTPATIENT)
Dept: NEUROLOGY | Facility: CLINIC | Age: 42
End: 2017-02-01

## 2017-02-06 DIAGNOSIS — R55 SYNCOPE, UNSPECIFIED SYNCOPE TYPE: Primary | ICD-10-CM

## 2017-02-09 ENCOUNTER — HOSPITAL ENCOUNTER (EMERGENCY)
Facility: HOSPITAL | Age: 42
Discharge: HOME | End: 2017-02-09
Attending: EMERGENCY MEDICINE
Payer: COMMERCIAL

## 2017-02-09 ENCOUNTER — OFFICE VISIT (OUTPATIENT)
Dept: SURGERY | Facility: CLINIC | Age: 42
End: 2017-02-09
Payer: COMMERCIAL

## 2017-02-09 ENCOUNTER — PATIENT MESSAGE (OUTPATIENT)
Dept: ELECTROPHYSIOLOGY | Facility: CLINIC | Age: 42
End: 2017-02-09

## 2017-02-09 VITALS
DIASTOLIC BLOOD PRESSURE: 70 MMHG | TEMPERATURE: 99 F | BODY MASS INDEX: 30.49 KG/M2 | WEIGHT: 213 LBS | HEART RATE: 62 BPM | HEIGHT: 70 IN | RESPIRATION RATE: 14 BRPM | SYSTOLIC BLOOD PRESSURE: 112 MMHG | OXYGEN SATURATION: 100 %

## 2017-02-09 VITALS
BODY MASS INDEX: 30.49 KG/M2 | HEIGHT: 70 IN | HEART RATE: 82 BPM | DIASTOLIC BLOOD PRESSURE: 69 MMHG | WEIGHT: 213 LBS | TEMPERATURE: 98 F | SYSTOLIC BLOOD PRESSURE: 108 MMHG

## 2017-02-09 DIAGNOSIS — K42.9 UMBILICAL HERNIA WITHOUT OBSTRUCTION AND WITHOUT GANGRENE: Primary | ICD-10-CM

## 2017-02-09 LAB
ALBUMIN SERPL BCP-MCNC: 4.5 G/DL
ALP SERPL-CCNC: 95 U/L
ALT SERPL W/O P-5'-P-CCNC: 51 U/L
ANION GAP SERPL CALC-SCNC: 10 MMOL/L
AST SERPL-CCNC: 39 U/L
BASOPHILS # BLD AUTO: 0.03 K/UL
BASOPHILS NFR BLD: 0.3 %
BILIRUB SERPL-MCNC: 1 MG/DL
BUN SERPL-MCNC: 19 MG/DL
CALCIUM SERPL-MCNC: 9.4 MG/DL
CHLORIDE SERPL-SCNC: 106 MMOL/L
CO2 SERPL-SCNC: 24 MMOL/L
CREAT SERPL-MCNC: 1 MG/DL
DIFFERENTIAL METHOD: ABNORMAL
EOSINOPHIL # BLD AUTO: 0 K/UL
EOSINOPHIL NFR BLD: 0.3 %
ERYTHROCYTE [DISTWIDTH] IN BLOOD BY AUTOMATED COUNT: 13.4 %
EST. GFR  (AFRICAN AMERICAN): >60 ML/MIN/1.73 M^2
EST. GFR  (NON AFRICAN AMERICAN): >60 ML/MIN/1.73 M^2
GLUCOSE SERPL-MCNC: 119 MG/DL
HCT VFR BLD AUTO: 46 %
HGB BLD-MCNC: 16.3 G/DL
INR PPP: 1
LACTATE SERPL-SCNC: 1.1 MMOL/L
LYMPHOCYTES # BLD AUTO: 1.6 K/UL
LYMPHOCYTES NFR BLD: 15.8 %
MCH RBC QN AUTO: 31.2 PG
MCHC RBC AUTO-ENTMCNC: 35.4 %
MCV RBC AUTO: 88 FL
MONOCYTES # BLD AUTO: 0.4 K/UL
MONOCYTES NFR BLD: 4.2 %
NEUTROPHILS # BLD AUTO: 8.1 K/UL
NEUTROPHILS NFR BLD: 79.1 %
PLATELET # BLD AUTO: 207 K/UL
PMV BLD AUTO: 10 FL
POTASSIUM SERPL-SCNC: 4.1 MMOL/L
PROT SERPL-MCNC: 8.1 G/DL
PROTHROMBIN TIME: 10.8 SEC
RBC # BLD AUTO: 5.23 M/UL
SODIUM SERPL-SCNC: 140 MMOL/L
WBC # BLD AUTO: 10.26 K/UL

## 2017-02-09 PROCEDURE — 99285 EMERGENCY DEPT VISIT HI MDM: CPT | Mod: ,,, | Performed by: EMERGENCY MEDICINE

## 2017-02-09 PROCEDURE — 99203 OFFICE O/P NEW LOW 30 MIN: CPT | Mod: S$GLB,,, | Performed by: SURGERY

## 2017-02-09 PROCEDURE — 83605 ASSAY OF LACTIC ACID: CPT

## 2017-02-09 PROCEDURE — 96375 TX/PRO/DX INJ NEW DRUG ADDON: CPT

## 2017-02-09 PROCEDURE — 80053 COMPREHEN METABOLIC PANEL: CPT

## 2017-02-09 PROCEDURE — 25500020 PHARM REV CODE 255: Performed by: EMERGENCY MEDICINE

## 2017-02-09 PROCEDURE — 96374 THER/PROPH/DIAG INJ IV PUSH: CPT

## 2017-02-09 PROCEDURE — 99999 PR PBB SHADOW E&M-EST. PATIENT-LVL IV: CPT | Mod: PBBFAC,,, | Performed by: SURGERY

## 2017-02-09 PROCEDURE — 63600175 PHARM REV CODE 636 W HCPCS: Performed by: STUDENT IN AN ORGANIZED HEALTH CARE EDUCATION/TRAINING PROGRAM

## 2017-02-09 PROCEDURE — 85610 PROTHROMBIN TIME: CPT

## 2017-02-09 PROCEDURE — 99284 EMERGENCY DEPT VISIT MOD MDM: CPT | Mod: 25

## 2017-02-09 PROCEDURE — 96361 HYDRATE IV INFUSION ADD-ON: CPT

## 2017-02-09 PROCEDURE — 85025 COMPLETE CBC W/AUTO DIFF WBC: CPT

## 2017-02-09 PROCEDURE — 99285 EMERGENCY DEPT VISIT HI MDM: CPT

## 2017-02-09 PROCEDURE — 25000003 PHARM REV CODE 250: Performed by: STUDENT IN AN ORGANIZED HEALTH CARE EDUCATION/TRAINING PROGRAM

## 2017-02-09 RX ORDER — HYDROMORPHONE HYDROCHLORIDE 1 MG/ML
1 INJECTION, SOLUTION INTRAMUSCULAR; INTRAVENOUS; SUBCUTANEOUS
Status: COMPLETED | OUTPATIENT
Start: 2017-02-09 | End: 2017-02-09

## 2017-02-09 RX ORDER — DIPHENHYDRAMINE HYDROCHLORIDE 50 MG/ML
25 INJECTION INTRAMUSCULAR; INTRAVENOUS
Status: DISCONTINUED | OUTPATIENT
Start: 2017-02-09 | End: 2017-02-09

## 2017-02-09 RX ORDER — ONDANSETRON 2 MG/ML
4 INJECTION INTRAMUSCULAR; INTRAVENOUS
Status: COMPLETED | OUTPATIENT
Start: 2017-02-09 | End: 2017-02-09

## 2017-02-09 RX ADMIN — HYDROMORPHONE HYDROCHLORIDE 1 MG: 1 INJECTION, SOLUTION INTRAMUSCULAR; INTRAVENOUS; SUBCUTANEOUS at 08:02

## 2017-02-09 RX ADMIN — SODIUM CHLORIDE 1000 ML: 0.9 INJECTION, SOLUTION INTRAVENOUS at 08:02

## 2017-02-09 RX ADMIN — ONDANSETRON 4 MG: 2 INJECTION INTRAMUSCULAR; INTRAVENOUS at 08:02

## 2017-02-09 RX ADMIN — IOHEXOL 100 ML: 350 INJECTION, SOLUTION INTRAVENOUS at 09:02

## 2017-02-09 NOTE — ED PROVIDER NOTES
"Encounter Date: 2/9/2017    SCRIBE #1 NOTE: I, Lise Osei, am scribing for, and in the presence of,  Dr. Gastelum. I have scribed the following portions of the note - the Resident attestation.       History     Chief Complaint   Patient presents with    Abdominal Pain     "He started with abd pain at 6am and now the pain is severe and he is chivering" per wife. Pt states "I can't take the pain". Vomit x 2. Abd hernia history "and it is so painful"     Review of patient's allergies indicates:  No Known Allergies  HPI Comments: 40 y/o male w/ hx of umbilical hernia, back pain presents for severe sudden onset abdominal pain at 6AM this morning. Pt reports pain at the site of umbilical hernia and worst w/ movement. Pt reports normally being able to push his hernia back in but was unable to this morning. Denies any recent illness. Last BM yesterday. 2x episode of vomiting this morning. No recent fever. No prior episodes.     The history is provided by the patient.     Past Medical History   Diagnosis Date    Chronic back pain     Hernia     TBI (traumatic brain injury) 2010     Past Medical History Pertinent Negatives   Diagnosis Date Noted    COPD (chronic obstructive pulmonary disease) 2/9/2017    Coronary artery disease 2/9/2017    Deep vein thrombosis 2/9/2017    Diabetes mellitus type I 2/9/2017    Diabetes mellitus, type 2 2/9/2017    Hyperlipidemia 2/9/2017    Hypothyroidism 2/9/2017     History reviewed. No pertinent past surgical history.  Family History   Problem Relation Age of Onset    Diabetes Mother     Heart attack Neg Hx     Heart disease Neg Hx     Hypertension Neg Hx      Social History   Substance Use Topics    Smoking status: Former Smoker     Quit date: 1/1/2007    Smokeless tobacco: Never Used    Alcohol use Yes      Comment: occasional     Review of Systems   Constitutional: Negative for fever.   HENT: Negative for sore throat.    Respiratory: Negative for shortness of breath.  "   Cardiovascular: Negative for chest pain.   Gastrointestinal: Positive for abdominal pain.   Genitourinary: Negative for dysuria.   Musculoskeletal: Negative for back pain.   Skin: Negative for rash.   Neurological: Negative for weakness.   Hematological: Does not bruise/bleed easily.       Physical Exam   Initial Vitals   BP Pulse Resp Temp SpO2   02/09/17 0814 02/09/17 0814 02/09/17 0814 02/09/17 0814 02/09/17 0814   142/85 59 26 97.8 °F (36.6 °C) 99 %     Physical Exam    Nursing note and vitals reviewed.  Constitutional: He appears well-developed and well-nourished.   Moderate distress.    HENT:   Head: Normocephalic and atraumatic.   Eyes: Pupils are equal, round, and reactive to light.   Neck: Normal range of motion. Neck supple.   Cardiovascular: Normal rate, regular rhythm and normal heart sounds. Exam reveals no gallop and no friction rub.    No murmur heard.  Pulmonary/Chest: Breath sounds normal. No respiratory distress. He has no wheezes. He has no rhonchi. He has no rales.   Abdominal: He exhibits no distension.   Tenderness to palpation of the periumbilical area. Umbilical hernia that cannot be reduced easily.    Musculoskeletal: He exhibits no edema.   Neurological: He is alert and oriented to person, place, and time. He has normal strength. No sensory deficit.   Skin: Skin is warm and dry. No rash noted.         ED Course   Procedures  Labs Reviewed   CBC W/ AUTO DIFFERENTIAL - Abnormal; Notable for the following:        Result Value    MCH 31.2 (*)     Gran # 8.1 (*)     Gran% 79.1 (*)     Lymph% 15.8 (*)     All other components within normal limits   COMPREHENSIVE METABOLIC PANEL - Abnormal; Notable for the following:     Glucose 119 (*)     ALT 51 (*)     All other components within normal limits   PROTIME-INR   LACTIC ACID, PLASMA         Imaging Results         CT Abdomen Pelvis With Contrast (Final result) Result time:  02/09/17 10:41:17    Final result by Param Hurley MD (02/09/17 10:41:17)     Impression:      1. A small focal region of mesenteric stranding is seen just deep to the fat-containing umbilical hernia.  Appearance is nonspecific and may be secondary to reduction of the hernia or mild inflammation.  No evidence of  obstruction of the small and large bowel.    2.  Nonspecific enhancement of the rectal mucosa which may be secondary to infectious or inflammatory proctitis.    3.  Left paracentral disc extrusion at L5/S1, similar in appearance to prior MRI of 1/2015.  ______________________________________     Electronically signed by resident: ADORE MILLER MD  Date:     02/09/17  Time:    10:23            As the supervising and teaching physician, I personally reviewed the images and resident's interpretation and I agree with the findings.            Electronically signed by: FRANC HENSON MD  Date:     02/09/17  Time:    10:41     Narrative:    Procedure comments: The patient was surveyed from the lung bases through the pelvis after the administration of 100 cc Omni 350 IV contrast as well as oral contrast and data was reconstructed for coronal, sagittal, and axial images.    Comparison: MRI lumbar spine 01/12/2015.    Findings:  The lung bases demonstrate trace dependent atelectasis. No pleural effusion is seen.      The visualized portions of the heart appear normal.    The liver is normal in size and attenuation.  No focal hepatic abnormality is seen.  The gallbladder is unremarkable.  No intrahepatic or extrahepatic biliary ductal dilatation.    The stomach, spleen, pancreas, and adrenal glands are unremarkable.    The kidneys are normal in size and location.  They concentrate and excrete contrast appropriately.  No hydronephrosis is seen.  The ureters appear normal in course and caliber without evidence of ureteral dilatation. The urinary bladder and prostate are unremarkable.    The visualized loops of small and large bowel show no evidence of obstruction or inflammation.  The appendix is  unremarkable.  There is nonspecific mucosal enhancement of the rectum.  A small region of mesenteric stranding is seen deep to the umbilical hernia, without associated inflammatory changes of adjacent loops of bowel.  No ascites, free fluid, or intraperitoneal free air is noted.  There is no evidence of lymph node enlargement in the abdomen or pelvis.    The abdominal aorta is normal in course and caliber without significant atherosclerotic calcifications.    The osseous structures demonstrate no evidence of acute fracture or osseous destructive process.  Again noted is a left paracentral disc extrusion at L5/S1, similar in appearance to prior MRI lumbar spine of 1/2015.    The extraperitoneal soft tissues reveal a small fat-containing umbilical hernia.                   Medical Decision Making:   History:   Old Medical Records: I decided to obtain old medical records.  Independently Interpreted Test(s):   I have ordered and independently interpreted X-rays - see prior notes.  Clinical Tests:   Lab Tests: Ordered and Reviewed  Radiological Study: Ordered and Reviewed       APC / Resident Notes:   42 yo male w/ hx of umbilical hernia presents for evaluation of abdominal pain since this morning. No recent illness. No fever. Last BM yesterday. Moderate distress. VSS. RRR. LCTAB. Abd soft tender at umbilical area. Non reducible hernia. Ddx: Incarcerated hernia, appendicitis, SBO, constipation and perforation. Will treat w/ dilaudid, fluids,  and give zofran. Will attempt to reduce hernia when pt is comfortable. Will evaluate w/ CBC, CMP, Lactic acid, and CT abd  Kashif Nation MD  [facility] PGY1  02/09/2017 8:49 AM    Update 9:23 AM After pain medication, ice and trendelenburg position pt's hernia was reduced. Pt awaiting results of work up  Kashif Nation MD  [facility] PGY1  02/09/2017 9:24 AM    Update 10:55 AM Pt's  WBC not elevated. Lactic acid not elevated. No gross electrolyte abnormality. CT demonstrates some  mesenteric stranding. Will consult surgery for recommendations.  Kashif Nation MD  [facility] PGY1  02/09/2017 10:56 AM    Update 11:14 AM Discussed case w/ general surgery who recommended f/u in clinic next week. Most likely represents umbilical hernia. Return precautions given. Pt understands and agrees w/ treatment plan. Safe for discharge.  Kashif Nation MD  [facility] PGY1  02/09/2017 11:14 AM                 Scribe Attestation:   Scribe #1: I performed the above scribed service and the documentation accurately describes the services I performed. I attest to the accuracy of the note.    Attending Attestation:   Physician Attestation Statement for Resident:  As the supervising MD   Physician Attestation Statement: I have personally seen and examined this patient.   I agree with the above history. -: 41 year old  man presents for evaluation of severe central abdominal pain noted this morning.    As the supervising MD I agree with the above PE.    As the supervising MD I agree with the above treatment, course, plan, and disposition.  I was personally present during the critical portions of the procedure(s) performed by the resident and was immediately available in the ED to provide services and assistance as needed during the entire procedure.  I have reviewed and agree with the residents interpretation of the following: lab data and CT scans.          Physician Attestation for Scribe:  Physician Attestation Statement for Scribe #1: I, Dr. Gastelum, reviewed documentation, as scribed by Lise Osei in my presence, and it is both accurate and complete.                 ED Course     Clinical Impression:   The encounter diagnosis was Umbilical hernia without obstruction and without gangrene.    Disposition:   Disposition: Discharged  Condition: Stable       Kashif Nation MD  Resident  02/09/17 [id number]       Richard Gastelum MD  02/13/17 1010

## 2017-02-09 NOTE — PROGRESS NOTES
History & Physical    SUBJECTIVE:     History of Present Illness:  Patient is a 41 y.o. male presents with umbilical hernia.  He has noticed a bulge in his navel since last October.  After exercising today he noticed pain in the area and now the bulge is not reducible and the area is tender.  He does heavy lifting at work.  He went to the ER today and they sent him to my clinic.      Chief Complaint   Patient presents with    Consult     umbilical hernia       Review of patient's allergies indicates:  No Known Allergies    Current Outpatient Prescriptions   Medication Sig Dispense Refill    baclofen (LIORESAL) 10 MG tablet Take 1 tablet (10 mg total) by mouth 3 (three) times daily as needed (muscle spasms). 60 tablet 1    gabapentin (NEURONTIN) 100 MG capsule Take 100 mg by mouth daily as needed.       tramadol (ULTRAM-ER) 100 MG Tb24 Take 100 mg by mouth daily as needed.        No current facility-administered medications for this visit.        Past Medical History   Diagnosis Date    Chronic back pain     Hernia     TBI (traumatic brain injury) 2010     History reviewed. No pertinent past surgical history.  Family History   Problem Relation Age of Onset    Diabetes Mother     Heart attack Neg Hx     Heart disease Neg Hx     Hypertension Neg Hx      Social History   Substance Use Topics    Smoking status: Former Smoker     Quit date: 1/1/2007    Smokeless tobacco: Never Used    Alcohol use Yes      Comment: occasional        Review of Systems:  Review of Systems   Constitutional: Positive for unexpected weight change. Negative for fever.        Dieting he has lost 15 pounds   Respiratory: Negative for cough, chest tightness and shortness of breath.    Cardiovascular: Negative for chest pain.   Gastrointestinal: Negative for constipation, diarrhea, nausea and vomiting.   Genitourinary: Negative for difficulty urinating and dysuria.   Skin: Negative for rash and wound.   Neurological: Negative for  "seizures and headaches.   Hematological: Does not bruise/bleed easily.       OBJECTIVE:     Vital Signs (Most Recent)  Temp: 98.3 °F (36.8 °C) (02/09/17 1350)  Pulse: 82 (02/09/17 1350)  BP: 108/69 (02/09/17 1350)  5' 10" (1.778 m)  96.6 kg (213 lb)     Physical Exam:  Physical Exam   Constitutional: He is oriented to person, place, and time. He appears well-developed and well-nourished.   Neck: Normal range of motion. Neck supple.   Cardiovascular: Normal rate, regular rhythm and normal heart sounds.    Pulmonary/Chest: Effort normal and breath sounds normal.   Abdominal: Soft. Normal appearance and bowel sounds are normal. There is tenderness in the periumbilical area.       Neurological: He is alert and oriented to person, place, and time.   Skin: Skin is warm and dry.   Psychiatric: He has a normal mood and affect. His behavior is normal. Judgment and thought content normal.   Vitals reviewed.      Laboratory  CBC: Reviewed  CMP: Reviewed    Diagnostic Results:  CT: Reviewed    ASSESSMENT/PLAN:     Symptomatic umbilical hernia    PLAN:Plan     Open repair with possible mesh.  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       "

## 2017-02-09 NOTE — ED NOTES
Pt identifiers checked and correct.    LOC: The patient is awake, alert and aware of environment with an appropriate affect, the patient is oriented x 3 and speaking appropriately.   APPEARANCE: Patient resting comfortably and in no acute distress, patient is clean and well groomed, patient's clothing is properly fastened.   SKIN: The skin is warm and dry, color consistent with ethnicity, patient has normal skin turgor and moist mucus membranes, skin intact, no breakdown or bruising noted.   MUSCULOSKELETAL: Patient moving all extremities spontaneously, no obvious swelling or deformities noted.   RESPIRATORY: Airway is open and patent, respirations are spontaneous, patient has a normal effort and rate, no accessory muscle use noted.   CARDIAC: Patient has a normal rate and regular rhythm, no periphreal edema noted, capillary refill < 3 seconds.   ABDOMEN: Guarding and Firm and tender to palpation, + distention noted, active bowel sounds present in all four quadrants.   NEUROLOGIC: PERRL, 3 mm bilaterally, eyes open spontaneously, behavior appropriate to situation, follows commands, facial expression symmetrical, bilateral hand grasp equal and even, purposeful motor response noted, normal sensation in all extremities when touched with a finger.

## 2017-02-09 NOTE — DISCHARGE INSTRUCTIONS
Hernia (Adult)    A hernia can happen when there is a weakness or defect in the wall of the abdomen or groin. Intestines or nearby tissues may move from their usual location and push through the weakness in the wall. This can cause a hernia (bulge) you may see or feel.  Causes and Risk Factors   A hernia may be present at birth. Or it may be caused by the wear and tear of daily living. Certain factors can make a hernia more likely. These can include:  · Heavy lifting  · Straining, whether from lifting, movement, or constipation  · Chronic cough  · Injury to the abdominal wall  · Excess weight  · Pregnancy  · Prior surgery  · Older age  · Family history of hernia  Symptoms  Symptoms of a hernia may come on suddenly. Or they may appear slowly over time. Some common symptoms include:  · Bulge in the groin area, around the navel, or in the scrotum (the bulge may get bigger when you stand and go away when you lie down)  · Pain or pressure around the bulge  · Pain during activities such as lifting, coughing, or sneezing  · A feeling of weakness or pressure in the groin  · Pain or swelling in the scrotum  Types of hernias  There are different types of hernia. The type you have depends on its location:  · Inguinal: This type is in the groin or scrotum. It is more common in men.  · Femoral: This type is in the groin, upper thigh (where the leg bends), or labia. It is more common in women.  · Ventral: This type is in the abdominal wall.  · Umbilical: This type occurs around the navel (belly button).  · Incisional: This type occurs at the site of a previous surgery.  The condition of the hernia can help determine how urgently it needs to be treated.  · Reducible: It goes back in by itself, or it can be pushed back in.  · Irreducible: It cant be pushed back in.  · Incarcerated/Strangulated: The intestine is trapped (incarcerated). If this happens, you wont be able to push the bulge back in. If the incarcerated hernia isnt  treated, it may become strangulated. This means the area loses blood supply and the tissue may die. This requires emergency surgery! Treatment is needed right away!  In most cases, a hernia will not heal on its own. Surgery is usually needed to repair the defect in the abdominal wall or groin. Youll be told more about surgery, if needed.  If your symptoms are not severe, treatment may sometimes be delayed. In such cases, regular follow-up visits with the provider will be needed. Youll be asked to keep track of your symptoms and to watch for signs of more serious problems. You may also be given guidelines similar to the home care instructions below.  Home Care  To help keep a hernia from getting worse, you may be advised to:  · Avoid heavy lifting and straining as directed.  · Take steps to prevent constipation, such as eating more fiber and drinking more water. This may help reduce straining that can occur when having a bowel movement. Reducing straining may help keep your symptoms from getting worse.  · Maintain a healthy weight or lose excess weight. This can help reduce strain on abdominal muscles and tissues.  · Stop smoking. This can help prevent coughing that may also strain abdominal muscles and tissues.  Follow-up care  Follow up with your healthcare provider, or as directed. If imaging tests were done, they will be reviewed a doctor. You will be told the results and any new findings that may affect your care.  When to seek medical advice  Call your healthcare provider right away if any of these occur:  · Hernia hardens, swells, or grows larger  · Hernia can no longer be pushed back in  · Pain moves to the lower right abdomen (just below the waistline), or spreads to the back  Call 911  Call 911 right away if any of these occur:  · Nausea and vomiting  · Severe pain, redness, or tenderness in the area near the hernia  · Pain worsens quickly and doesnt get better  · Inability to have a bowel movement or  pass gas  · Fever of 100.4°F (38°C) or higher  · Trouble breathing  · Fainting  · Rapid heart rate  · Vomiting blood  · Large amounts of blood in stool  Date Last Reviewed: 6/9/2015  © 2036-6895 [employer]. [address] [place] [address], [place] [address]. All rights reserved. This information is not intended as a substitute for professional medical care. Always follow your healthcare professional's instructions.

## 2017-02-09 NOTE — LETTER
[facility] - General Surgery  [address]  [place] [address]  Phone: [phone] February 9, 2017      Liliam Carbajal MD  [address] [facility] [address]    Patient: Sanchez Lorenzo   MR Number: [id number]   YOB: 1975   Date of Visit: 2/9/2017     Dear Dr. Carbajal:    Thank you for referring Sanchez Lorenzo to me for evaluation. Below are the relevant portions of my assessment and plan of care.    Patient is a 41-year-old male presents with symptomatic umbilical hernia.    PLAN:  Open repair with possible mesh. 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    If you have questions, please do not hesitate to call me. I look forward to following Sanchez along with you.    Sincerely,      Nomi Robb MD   Section [facility] - General, Laparoscopic, Bariatric  Acute Care and Oncologic Surgery   - Surgical Weight Loss Program  [facility]    SOL/fransico

## 2017-02-09 NOTE — ED AVS SNAPSHOT
[facility]  [address] [address]               Sanchez Lorenzo   2017  8:15 AM   ED    Description:  Male : 1975   Department:  [facility]           Your Care was Coordinated By:     Provider Role From To    Richard Gastelum MD Attending Provider 17 0829 --    Kashif Nation MD Resident 17 08 --      Reason for Visit     Abdominal Pain           Diagnoses this Visit        Comments    Umbilical hernia without obstruction and without gangrene    -  Primary       ED Disposition     ED Disposition Condition Comment    Discharge             To Do List           Follow-up Information     Follow up with [facility].    Specialty:  Emergency Medicine    Why:  If symptoms worsen    Contact information:    [address] [facility] [id number] [phone]        Follow up with [facility]. Call in 1 week.    Specialty:  Surgery    Why:  Please make an appointment for evaluation of hernia    Contact information:    [address] [facility] [id number] [phone]      [facility]     [facility] -  Assistance  Registered nurses in the [facility] provide clinical advisement, health education, appointment booking, and other advisory services.  Call for this free service at [phone].             Medications           Message regarding Medications     Verify the changes and/or additions to your medication regime listed below are the same as discussed with your clinician today.  If any of these changes or additions are incorrect, please notify your healthcare provider.        These medications were administered today        Dose Freq    sodium chloride 0.9% bolus 1,000 mL 1,000 mL ED 1 Time    Sig: Inject 1,000 mLs into the vein ED 1 Time.    Class: Normal    Route: Intravenous    hydromorphone (PF)  "injection 1 mg 1 mg ED 1 Time    Sig: Inject 1 mL (1 mg total) into the vein ED 1 Time.    Class: Normal    Route: Intravenous    ondansetron injection 4 mg 4 mg ED 1 Time    Sig: Inject 4 mg into the vein ED 1 Time.    Class: Normal    Route: Intravenous    omnipaque 350 iohexol 100 mL 100 mL IMG once as needed    Sig: Inject 100 mLs into the vein ONCE PRN for contrast.    Class: Normal    Route: Intravenous           Verify that the below list of medications is an accurate representation of the medications you are currently taking.  If none reported, the list may be blank. If incorrect, please contact your healthcare provider. Carry this list with you in case of emergency.           Current Medications     baclofen (LIORESAL) 10 MG tablet Take 1 tablet (10 mg total) by mouth 3 (three) times daily as needed (muscle spasms).    gabapentin (NEURONTIN) 100 MG capsule Take 100 mg by mouth daily as needed.     tramadol (ULTRAM-ER) 100 MG Tb24 Take 100 mg by mouth daily as needed.            Clinical Reference Information           Your Vitals Were     BP Pulse Temp Resp Height Weight    142/85 (BP Location: Left arm, Patient Position: Sitting) 59 97.8 °F (36.6 °C) (Oral) 26 5' 10" (1.778 m) 96.6 kg (213 lb)    SpO2 BMI             99% 30.56 kg/m2         Allergies as of 2/9/2017     No Known Allergies      Immunizations Administered on Date of Encounter - 2/9/2017     None      ED Micro, Lab, POCT     Start Ordered       Status Ordering Provider    02/09/17 0831 02/09/17 0831  Protime-INR  STAT      Final result     02/09/17 0831 02/09/17 0831  Lactic acid, plasma  STAT      Final result     02/09/17 0830 02/09/17 0831  CBC auto differential  STAT      Final result     02/09/17 0830 02/09/17 0831  Comprehensive metabolic panel  STAT      Final result       ED Imaging Orders     Start Ordered       Status Ordering Provider    02/09/17 0835 02/09/17 0835  CT Abdomen Pelvis With Contrast  1 time imaging      Final result  "        Discharge Instructions         Hernia (Adult)    A hernia can happen when there is a weakness or defect in the wall of the abdomen or groin. Intestines or nearby tissues may move from their usual location and push through the weakness in the wall. This can cause a hernia (bulge) you may see or feel.  Causes and Risk Factors   A hernia may be present at birth. Or it may be caused by the wear and tear of daily living. Certain factors can make a hernia more likely. These can include:  · Heavy lifting  · Straining, whether from lifting, movement, or constipation  · Chronic cough  · Injury to the abdominal wall  · Excess weight  · Pregnancy  · Prior surgery  · Older age  · Family history of hernia  Symptoms  Symptoms of a hernia may come on suddenly. Or they may appear slowly over time. Some common symptoms include:  · Bulge in the groin area, around the navel, or in the scrotum (the bulge may get bigger when you stand and go away when you lie down)  · Pain or pressure around the bulge  · Pain during activities such as lifting, coughing, or sneezing  · A feeling of weakness or pressure in the groin  · Pain or swelling in the scrotum  Types of hernias  There are different types of hernia. The type you have depends on its location:  · Inguinal: This type is in the groin or scrotum. It is more common in men.  · Femoral: This type is in the groin, upper thigh (where the leg bends), or labia. It is more common in women.  · Ventral: This type is in the abdominal wall.  · Umbilical: This type occurs around the navel (belly button).  · Incisional: This type occurs at the site of a previous surgery.  The condition of the hernia can help determine how urgently it needs to be treated.  · Reducible: It goes back in by itself, or it can be pushed back in.  · Irreducible: It cant be pushed back in.  · Incarcerated/Strangulated: The intestine is trapped (incarcerated). If this happens, you wont be able to push the bulge back  in. If the incarcerated hernia isnt treated, it may become strangulated. This means the area loses blood supply and the tissue may die. This requires emergency surgery! Treatment is needed right away!  In most cases, a hernia will not heal on its own. Surgery is usually needed to repair the defect in the abdominal wall or groin. Youll be told more about surgery, if needed.  If your symptoms are not severe, treatment may sometimes be delayed. In such cases, regular follow-up visits with the provider will be needed. Youll be asked to keep track of your symptoms and to watch for signs of more serious problems. You may also be given guidelines similar to the home care instructions below.  Home Care  To help keep a hernia from getting worse, you may be advised to:  · Avoid heavy lifting and straining as directed.  · Take steps to prevent constipation, such as eating more fiber and drinking more water. This may help reduce straining that can occur when having a bowel movement. Reducing straining may help keep your symptoms from getting worse.  · Maintain a healthy weight or lose excess weight. This can help reduce strain on abdominal muscles and tissues.  · Stop smoking. This can help prevent coughing that may also strain abdominal muscles and tissues.  Follow-up care  Follow up with your healthcare provider, or as directed. If imaging tests were done, they will be reviewed a doctor. You will be told the results and any new findings that may affect your care.  When to seek medical advice  Call your healthcare provider right away if any of these occur:  · Hernia hardens, swells, or grows larger  · Hernia can no longer be pushed back in  · Pain moves to the lower right abdomen (just below the waistline), or spreads to the back  Call 911  Call 911 right away if any of these occur:  · Nausea and vomiting  · Severe pain, redness, or tenderness in the area near the hernia  · Pain worsens quickly and doesnt get  better  · Inability to have a bowel movement or pass gas  · Fever of 100.4°F (38°C) or higher  · Trouble breathing  · Fainting  · Rapid heart rate  · Vomiting blood  · Large amounts of blood in stool  Date Last Reviewed: 6/9/2015  © 1611-1066 [employer]. [address] [place] [address]. All rights reserved. This information is not intended as a substitute for professional medical care. Always follow your healthcare professional's instructions.          Your Scheduled Appointments     Feb 15, 2017  8:00 AM CST   EKG with EKG, APPT   Tam Manning - EKG ([facility] )    [address]  [place] [address] [phone]            Feb 15, 2017  8:40 AM CST   Consult with MD Tam Kent luis - Arrhythmia ([facility] )    [address]  [place] [address] [phone]            Feb 27, 2017  8:00 AM CST   New Patient with GHASSAN Hare - Gastroenterology ([facility] )    [address]  [place] [address] [phone]            Feb 27, 2017  9:40 AM CST   Consult with MD Tam Carlin luis - Neurology ([facility] )    [address]  [place] [address] [phone]            Feb 27, 2017 11:15 AM CST   New Patient with Parminder Britton MD   [facility] - General Surgery ([facility] )    [address]  [place] [address] [phone]              Smoking Cessation     If you would like to quit smoking:   You may be eligible for free services if you are a [place] resident and started smoking cigarettes before September 1, 1988.  Call the [employer]oking [employer]essation [employer] ([employer]) toll free at (030) 852-1045 or (717) 101-5657.   Call 4-800-QUIT-NOW if you do not meet the above criteria.             [facility] complies with applicable Federal civil rights laws and does not discriminate on the basis of race, color, national origin, age, disability, or sex.         Language Assistance Services     ATTENTION: Language assistance services are available, free of charge. Please call [phone].      ATENCIÓN: Si habla samyañol, tiene a oscar disposición servicios gratuitos de asistencia lingüística. Llame al [phone].     CHÚ Ý: N?u b?n nói Ti?ng Vi?t, có các d?ch v? h? tr? ngôn ng? mi?n phí dành cho b?n. G?i s? [phone].

## 2017-02-10 ENCOUNTER — TELEPHONE (OUTPATIENT)
Dept: SURGERY | Facility: CLINIC | Age: 42
End: 2017-02-10

## 2017-02-10 NOTE — TELEPHONE ENCOUNTER
Notified pt wife- surgery is scheduled for 1230 on 2/13/14. he needs to arrive to the 2nd floor [facility] in the hospital @ 1030. he should not eat or drink anything after midnight. Pt verbalizes understanding. Post op appointment reminder mailed.

## 2017-02-10 NOTE — TELEPHONE ENCOUNTER
----- Message from Cynthia Bowden sent at 2/10/2017  8:22 AM CST -----  Contact: Wife/yanni  Yanni  Called and stated they wants to know when to report for surgery and wants further instructions, Please call and advise. Yanni @ [phone] . Thanks :)

## 2017-02-11 ENCOUNTER — ANESTHESIA EVENT (OUTPATIENT)
Dept: SURGERY | Facility: HOSPITAL | Age: 42
End: 2017-02-11
Payer: COMMERCIAL

## 2017-02-12 NOTE — ANESTHESIA PREPROCEDURE EVALUATION
02/11/2017  Pre-operative evaluation for Procedure(s) (LRB):  REPAIR-HERNIA-UMBILICAL-OPEN w/ mesh (N/A)    Sanchez Lorenzo is a 41 y.o. male with a hx of chronic low back pain and TBI following a work incident several years ago, syncope with laughter pending work-up by cardiology, and umbilical hernia who is coming in for repair.     No previous airways on file.    Patient Active Problem List   Diagnosis    Chronic low back pain    Intervertebral disc extrusion (Left L5-S1)    Neural foraminal stenosis of lumbar spine (bilateral L5-S1, L>R)    Left lumbar radiculopathy    Transient loss of consciousness    Chronic diarrhea    Umbilical hernia without obstruction and without gangrene       Review of patient's allergies indicates:  No Known Allergies    No current facility-administered medications on file prior to encounter.      Current Outpatient Prescriptions on File Prior to Encounter   Medication Sig Dispense Refill    baclofen (LIORESAL) 10 MG tablet Take 1 tablet (10 mg total) by mouth 3 (three) times daily as needed (muscle spasms). 60 tablet 1    gabapentin (NEURONTIN) 100 MG capsule Take 100 mg by mouth daily as needed.       tramadol (ULTRAM-ER) 100 MG Tb24 Take 100 mg by mouth daily as needed.          No past surgical history on file.    Social History     Social History    Marital status:      Spouse name: N/A    Number of children: N/A    Years of education: N/A     Occupational History    self employed      Social History Main Topics    Smoking status: Former Smoker     Quit date: 1/1/2007    Smokeless tobacco: Never Used    Alcohol use Yes      Comment: occasional    Drug use: No    Sexual activity: Yes     Partners: Female     Other Topics Concern    Not on file     Social History Narrative    No narrative on file         Vital Signs Range (Last 24H):          CBC:   Recent Labs      02/09/17   0831   WBC  10.26   RBC  5.23   HGB  16.3   HCT  46.0   PLT  207   MCV  88   MCH  31.2*   MCHC  35.4       CMP:   Recent Labs      02/09/17   0831   NA  140   K  4.1   CL  106   CO2  24   BUN  19   CREATININE  1.0   GLU  119*   CALCIUM  9.4   ALBUMIN  4.5   PROT  8.1   ALKPHOS  95   ALT  51*   AST  39   BILITOT  1.0       INR  Recent Labs      02/09/17   0831   INR  1.0     EKG:  Vent. Rate : 089 BPM     Atrial Rate : 089 BPM     P-R Int : 142 ms          QRS Dur : 084 ms      QT Int : 358 ms       P-R-T Axes : 051 041 039 degrees     QTc Int : 435 ms    Normal sinus rhythm  Normal ECG  When compared with ECG of 18-DEC-2015 07:09,  No significant change was found  Confirmed by Wilder Julio MD ([id number]) on 1/5/2017 9:15:16 PM    Referred By:  DR LOPEZ           Confirmed By:Wilder Julio MD    2D Echo:  - None on file      [facility] Anesthesia Evaluation    I have reviewed the Patient Summary Reports.    I have reviewed the Nursing Notes.   I have reviewed the Medications.     Review of Systems  Anesthesia Hx:  No previous Anesthesia    EENT/Dental:EENT/Dental Normal   Cardiovascular:  Cardiovascular Normal     Pulmonary:  Pulmonary Normal    Renal/:  Renal/ Normal     Neurological:   Neuromuscular Disease,        Physical Exam  General:  Obesity, Well nourished    Airway/Jaw/Neck:  Airway Findings: Mouth Opening: Normal Tongue: Normal  General Airway Assessment: Adult  Mallampati: II  Improves to II with phonation.  TM Distance: Normal, at least 6 cm      Dental:  Dental Findings: In tact   Chest/Lungs:  Chest/Lungs Findings: Clear to auscultation     Heart/Vascular:  Heart Findings: Rate: Normal  Rhythm: Regular Rhythm  Sounds: Normal        Mental Status:  Mental Status Findings:  Cooperative, Alert and Oriented         Anesthesia Plan  Type of Anesthesia, risks & benefits discussed:  Anesthesia Type:  general  Patient's Preference:   Intra-op Monitoring Plan:   Intra-op Monitoring  Plan Comments:   Post Op Pain Control Plan:   Post Op Pain Control Plan Comments:   Induction:   IV  Beta Blocker:  Patient is not currently on a Beta-Blocker (No further documentation required).       Informed Consent: Patient understands risks and agrees with Anesthesia plan.  Questions answered. Anesthesia consent signed with patient.  ASA Score: 2     Day of Surgery Review of History & Physical:    H&P update referred to the surgeon.         Ready For Surgery From Anesthesia Perspective.

## 2017-02-13 ENCOUNTER — ANESTHESIA (OUTPATIENT)
Dept: SURGERY | Facility: HOSPITAL | Age: 42
End: 2017-02-13
Payer: COMMERCIAL

## 2017-02-13 ENCOUNTER — HOSPITAL ENCOUNTER (OUTPATIENT)
Facility: HOSPITAL | Age: 42
Discharge: HOME | End: 2017-02-13
Attending: SURGERY | Admitting: SURGERY
Payer: COMMERCIAL

## 2017-02-13 VITALS
TEMPERATURE: 98 F | SYSTOLIC BLOOD PRESSURE: 125 MMHG | HEART RATE: 70 BPM | BODY MASS INDEX: 30.49 KG/M2 | WEIGHT: 213 LBS | RESPIRATION RATE: 15 BRPM | DIASTOLIC BLOOD PRESSURE: 81 MMHG | HEIGHT: 70 IN | OXYGEN SATURATION: 98 %

## 2017-02-13 DIAGNOSIS — K42.9 UMBILICAL HERNIA: ICD-10-CM

## 2017-02-13 PROCEDURE — 25000003 PHARM REV CODE 250: Performed by: SURGERY

## 2017-02-13 PROCEDURE — 36000706: Performed by: SURGERY

## 2017-02-13 PROCEDURE — 63600175 PHARM REV CODE 636 W HCPCS

## 2017-02-13 PROCEDURE — 25000003 PHARM REV CODE 250: Performed by: ANESTHESIOLOGY

## 2017-02-13 PROCEDURE — 71000015 HC POSTOP RECOV 1ST HR: Performed by: SURGERY

## 2017-02-13 PROCEDURE — 88302 TISSUE EXAM BY PATHOLOGIST: CPT | Performed by: PATHOLOGY

## 2017-02-13 PROCEDURE — 88302 TISSUE EXAM BY PATHOLOGIST: CPT | Mod: 26,,, | Performed by: PATHOLOGY

## 2017-02-13 PROCEDURE — 27000221 HC OXYGEN, UP TO 24 HOURS

## 2017-02-13 PROCEDURE — 94761 N-INVAS EAR/PLS OXIMETRY MLT: CPT

## 2017-02-13 PROCEDURE — 36000707: Performed by: SURGERY

## 2017-02-13 PROCEDURE — 63600175 PHARM REV CODE 636 W HCPCS: Performed by: ANESTHESIOLOGY

## 2017-02-13 PROCEDURE — 37000008 HC ANESTHESIA 1ST 15 MINUTES: Performed by: SURGERY

## 2017-02-13 PROCEDURE — 63600175 PHARM REV CODE 636 W HCPCS: Performed by: NURSE ANESTHETIST, CERTIFIED REGISTERED

## 2017-02-13 PROCEDURE — 49585 PR REPAIR UMBILICAL HERN,5+Y/O,REDUC: CPT | Mod: ,,, | Performed by: SURGERY

## 2017-02-13 PROCEDURE — 71000033 HC RECOVERY, INTIAL HOUR: Performed by: SURGERY

## 2017-02-13 PROCEDURE — 37000009 HC ANESTHESIA EA ADD 15 MINS: Performed by: SURGERY

## 2017-02-13 PROCEDURE — D9220A PRA ANESTHESIA: Mod: CRNA,,, | Performed by: NURSE ANESTHETIST, CERTIFIED REGISTERED

## 2017-02-13 PROCEDURE — 25000003 PHARM REV CODE 250

## 2017-02-13 PROCEDURE — D9220A PRA ANESTHESIA: Mod: ANES,,, | Performed by: ANESTHESIOLOGY

## 2017-02-13 PROCEDURE — 71000039 HC RECOVERY, EACH ADD'L HOUR: Performed by: SURGERY

## 2017-02-13 RX ORDER — SODIUM CHLORIDE 0.9 % (FLUSH) 0.9 %
3 SYRINGE (ML) INJECTION EVERY 8 HOURS
Status: DISCONTINUED | OUTPATIENT
Start: 2017-02-13 | End: 2017-02-13 | Stop reason: HOSPADM

## 2017-02-13 RX ORDER — SUCCINYLCHOLINE CHLORIDE 20 MG/ML
INJECTION INTRAMUSCULAR; INTRAVENOUS
Status: DISCONTINUED | OUTPATIENT
Start: 2017-02-13 | End: 2017-02-13

## 2017-02-13 RX ORDER — SODIUM CHLORIDE 9 MG/ML
INJECTION, SOLUTION INTRAVENOUS CONTINUOUS PRN
Status: DISCONTINUED | OUTPATIENT
Start: 2017-02-13 | End: 2017-02-13

## 2017-02-13 RX ORDER — MIDAZOLAM HYDROCHLORIDE 1 MG/ML
INJECTION, SOLUTION INTRAMUSCULAR; INTRAVENOUS
Status: DISCONTINUED | OUTPATIENT
Start: 2017-02-13 | End: 2017-02-13

## 2017-02-13 RX ORDER — PROPOFOL 10 MG/ML
VIAL (ML) INTRAVENOUS
Status: DISCONTINUED | OUTPATIENT
Start: 2017-02-13 | End: 2017-02-13

## 2017-02-13 RX ORDER — OXYCODONE AND ACETAMINOPHEN 5; 325 MG/1; MG/1
TABLET ORAL
Qty: 51 TABLET | Refills: 0 | Status: SHIPPED | OUTPATIENT
Start: 2017-02-13 | End: 2017-05-04

## 2017-02-13 RX ORDER — LIDOCAINE HCL/PF 100 MG/5ML
SYRINGE (ML) INTRAVENOUS
Status: DISCONTINUED | OUTPATIENT
Start: 2017-02-13 | End: 2017-02-13

## 2017-02-13 RX ORDER — SODIUM CHLORIDE 0.9 % (FLUSH) 0.9 %
3 SYRINGE (ML) INJECTION
Status: DISCONTINUED | OUTPATIENT
Start: 2017-02-13 | End: 2017-02-13 | Stop reason: HOSPADM

## 2017-02-13 RX ORDER — POLYETHYLENE GLYCOL 3350 17 G/17G
17 POWDER, FOR SOLUTION ORAL DAILY
Qty: 119 G | Refills: 0 | Status: SHIPPED | OUTPATIENT
Start: 2017-02-13 | End: 2017-02-15

## 2017-02-13 RX ORDER — BUPIVACAINE HYDROCHLORIDE 2.5 MG/ML
INJECTION, SOLUTION EPIDURAL; INFILTRATION; INTRACAUDAL
Status: DISCONTINUED | OUTPATIENT
Start: 2017-02-13 | End: 2017-02-13 | Stop reason: HOSPADM

## 2017-02-13 RX ORDER — OXYCODONE HYDROCHLORIDE 5 MG/1
10 TABLET ORAL EVERY 4 HOURS PRN
Status: DISCONTINUED | OUTPATIENT
Start: 2017-02-13 | End: 2017-02-13 | Stop reason: HOSPADM

## 2017-02-13 RX ORDER — HYDROMORPHONE HYDROCHLORIDE 1 MG/ML
0.2 INJECTION, SOLUTION INTRAMUSCULAR; INTRAVENOUS; SUBCUTANEOUS EVERY 5 MIN PRN
Status: DISCONTINUED | OUTPATIENT
Start: 2017-02-13 | End: 2017-02-13 | Stop reason: HOSPADM

## 2017-02-13 RX ORDER — FENTANYL CITRATE 50 UG/ML
INJECTION, SOLUTION INTRAMUSCULAR; INTRAVENOUS
Status: DISCONTINUED | OUTPATIENT
Start: 2017-02-13 | End: 2017-02-13

## 2017-02-13 RX ORDER — OXYCODONE HYDROCHLORIDE 5 MG/1
TABLET ORAL
Status: COMPLETED
Start: 2017-02-13 | End: 2017-02-13

## 2017-02-13 RX ORDER — LIDOCAINE HYDROCHLORIDE 40 MG/ML
SOLUTION TOPICAL
Status: DISCONTINUED | OUTPATIENT
Start: 2017-02-13 | End: 2017-02-13

## 2017-02-13 RX ORDER — DIPHENHYDRAMINE HYDROCHLORIDE 50 MG/ML
25 INJECTION INTRAMUSCULAR; INTRAVENOUS EVERY 6 HOURS PRN
Status: DISCONTINUED | OUTPATIENT
Start: 2017-02-13 | End: 2017-02-13 | Stop reason: HOSPADM

## 2017-02-13 RX ORDER — ROCURONIUM BROMIDE 10 MG/ML
INJECTION, SOLUTION INTRAVENOUS
Status: DISCONTINUED | OUTPATIENT
Start: 2017-02-13 | End: 2017-02-13

## 2017-02-13 RX ORDER — ONDANSETRON 2 MG/ML
4 INJECTION INTRAMUSCULAR; INTRAVENOUS EVERY 12 HOURS PRN
Status: DISCONTINUED | OUTPATIENT
Start: 2017-02-13 | End: 2017-02-13 | Stop reason: HOSPADM

## 2017-02-13 RX ORDER — ONDANSETRON 2 MG/ML
INJECTION INTRAMUSCULAR; INTRAVENOUS
Status: DISCONTINUED | OUTPATIENT
Start: 2017-02-13 | End: 2017-02-13

## 2017-02-13 RX ORDER — HYDROMORPHONE HYDROCHLORIDE 1 MG/ML
INJECTION, SOLUTION INTRAMUSCULAR; INTRAVENOUS; SUBCUTANEOUS
Status: COMPLETED
Start: 2017-02-13 | End: 2017-02-13

## 2017-02-13 RX ORDER — OXYCODONE HYDROCHLORIDE 5 MG/1
5 TABLET ORAL EVERY 4 HOURS PRN
Status: DISCONTINUED | OUTPATIENT
Start: 2017-02-13 | End: 2017-02-13 | Stop reason: HOSPADM

## 2017-02-13 RX ADMIN — OXYCODONE HYDROCHLORIDE 10 MG: 5 TABLET ORAL at 06:02

## 2017-02-13 RX ADMIN — PROPOFOL 150 MG: 10 INJECTION, EMULSION INTRAVENOUS at 04:02

## 2017-02-13 RX ADMIN — HYDROMORPHONE HYDROCHLORIDE 0.2 MG: 1 INJECTION, SOLUTION INTRAMUSCULAR; INTRAVENOUS; SUBCUTANEOUS at 07:02

## 2017-02-13 RX ADMIN — LIDOCAINE HYDROCHLORIDE 3 ML: 40 SPRAY LARYNGEAL; TRANSTRACHEAL at 04:02

## 2017-02-13 RX ADMIN — SUCCINYLCHOLINE CHLORIDE 140 MG: 20 INJECTION, SOLUTION INTRAMUSCULAR; INTRAVENOUS at 04:02

## 2017-02-13 RX ADMIN — MIDAZOLAM HYDROCHLORIDE 2 MG: 1 INJECTION, SOLUTION INTRAMUSCULAR; INTRAVENOUS at 04:02

## 2017-02-13 RX ADMIN — LIDOCAINE HYDROCHLORIDE 50 MG: 20 INJECTION, SOLUTION INTRAVENOUS at 04:02

## 2017-02-13 RX ADMIN — Medication 2 G: at 04:02

## 2017-02-13 RX ADMIN — FENTANYL CITRATE 100 MCG: 50 INJECTION, SOLUTION INTRAMUSCULAR; INTRAVENOUS at 05:02

## 2017-02-13 RX ADMIN — ONDANSETRON 4 MG: 2 INJECTION INTRAMUSCULAR; INTRAVENOUS at 05:02

## 2017-02-13 RX ADMIN — ROCURONIUM BROMIDE 5 MG: 10 INJECTION, SOLUTION INTRAVENOUS at 04:02

## 2017-02-13 RX ADMIN — PROPOFOL 50 MG: 10 INJECTION, EMULSION INTRAVENOUS at 05:02

## 2017-02-13 RX ADMIN — FENTANYL CITRATE 100 MCG: 50 INJECTION, SOLUTION INTRAMUSCULAR; INTRAVENOUS at 04:02

## 2017-02-13 RX ADMIN — SODIUM CHLORIDE: 0.9 INJECTION, SOLUTION INTRAVENOUS at 04:02

## 2017-02-13 NOTE — IP AVS SNAPSHOT
[facility]  [address]  [facility] [address]  Phone: [phone]           Patient Discharge Instructions     Our goal is to set you up for success. This packet includes information on your condition, medications, and your home care. It will help you to care for yourself so you don't get sicker and need to go back to the hospital.     Please ask your nurse if you have any questions.        There are many details to remember when preparing to leave the hospital. Here is what you will need to do:    1. Take your medicine. If you are prescribed medications, review your Medication List in the following pages. You may have new medications to  at the pharmacy and others that you'll need to stop taking. Review the instructions for how and when to take your medications. Talk with your doctor or nurses if you are unsure of what to do.     2. Go to your follow-up appointments. Specific follow-up information is listed in the following pages. Your may be contacted by a transition nurse or clinical provider about future appointments. Be sure we have all of the phone numbers to reach you, if needed. Please contact your provider's office if you are unable to make an appointment.     3. Watch for warning signs. Your doctor or nurse will give you detailed warning signs to watch for and when to call for assistance. These instructions may also include educational information about your condition. If you experience any of warning signs to your health, call your doctor.               [facility]  Unless otherwise directed by your provider, please contact [facility], our nurse care line that is available for 24/7 assistance.     [phone] (toll-free)    Registered nurses in the [facility] provide clinical advisement, health education, appointment booking, and other advisory services.                    ** Verify the list of medication(s) below is accurate and up  to date. Carry this with you in case of emergency. If your medications have changed, please notify your healthcare provider.             Medication List      START taking these medications        Additional Info                      oxycodone-acetaminophen 5-325 mg per tablet   Commonly known as:  PERCOCET   Quantity:  51 tablet   Refills:  0    Instructions:  Take one to two tablets by mouth every four hours as needed for pain     Begin Date    AM    Noon    PM    Bedtime       polyethylene glycol 17 gram/dose powder   Commonly known as:  GLYCOLAX   Quantity:  119 g   Refills:  0   Dose:  17 g    Instructions:  Take 17 g by mouth once daily.     Begin Date    AM    Noon    PM    Bedtime         CONTINUE taking these medications        Additional Info                      gabapentin 100 MG capsule   Commonly known as:  NEURONTIN   Refills:  0   Dose:  100 mg    Instructions:  Take 100 mg by mouth daily as needed.     Begin Date    AM    Noon    PM    Bedtime       tramadol 100 MG Tb24   Commonly known as:  ULTRAM-ER   Refills:  0   Dose:  100 mg    Instructions:  Take 100 mg by mouth daily as needed.     Begin Date    AM    Noon    PM    Bedtime         STOP taking these medications     baclofen 10 MG tablet   Commonly known as:  LIORESAL            Where to Get Your Medications      These medications were sent to [facility]/pharmacy #[id number] - [place] - [address]  [address] [place] [address]     Phone:  [phone]     polyethylene glycol 17 gram/dose powder         You can get these medications from any pharmacy     Bring a paper prescription for each of these medications     oxycodone-acetaminophen 5-325 mg per tablet                  Please bring to all follow up appointments:    1. A copy of your discharge instructions.  2. All medicines you are currently taking in their original bottles.  3. Identification and insurance card.    Please arrive 15 minutes ahead of scheduled appointment time.    Please  call 24 hours in advance if you must reschedule your appointment and/or time.        Your Scheduled Appointments     Feb 15, 2017  7:30 AM CST   EKG with EKG, APPT   Tam Kerri - EKG ([facility] )    [address]  [place] [id number] [phone]            Feb 15, 2017  8:00 AM CST   Consult with MD Tam Kent - Arrhythmia ([facility] )    [address]  [place] [id number] [phone]            Feb 23, 2017  7:45 AM CST   Post OP with MD Tam Stevenson - General Surgery ([facility] )    [address]  [place] [id number] [phone]            Feb 27, 2017  8:00 AM CST   New Patient with GHASSAN Hare - Gastroenterology ([facility] )    [address]  [place] [id number] [phone]              Follow-up Information     Follow up with Nomi Robb MD. Schedule an appointment as soon as possible for a visit in 2 weeks.    Specialties:  General Surgery, Bariatrics    Why:  For wound re-check    Contact information:    [address] [facility] [id number] [phone]          Discharge Instructions     Future Orders    Activity as tolerated     Call MD for:  difficulty breathing, headache or visual disturbances     Call MD for:  extreme fatigue     Call MD for:  hives     Call MD for:  persistent dizziness or light-headedness     Call MD for:  persistent nausea and vomiting     Call MD for:  redness, tenderness, or signs of infection (pain, swelling, redness, odor or green/yellow discharge around incision site)     Call MD for:  severe uncontrolled pain     Call MD for:  temperature >100.4     Diet general     Questions:    Total calories:      Fat restriction, if any:      Protein restriction, if any:      Na restriction, if any:      Fluid restriction:      Additional restrictions:      Lifting restrictions     Comments:    No lifting greater than 10 pounds for 6 weeks  from day of surgery.  No pushing/pulling such as vacuuming or raking.  No straining, avoid constipation and take stool softeners as described and laxatives as needed.  No driving while on narcotics and until you can react quickly without pain.    Remove dressing in 48 hours     Comments:    Steri strips will fall off on their own.    Shower on day dressing removed (No bath)         Discharge Instructions         Hernia (Adult)    A hernia can happen when there is a weakness or defect in the wall of the abdomen or groin. Intestines or nearby tissues may move from their usual location and push through the weakness in the wall. This can cause a hernia (bulge) you may see or feel.  Causes and Risk Factors   A hernia may be present at birth. Or it may be caused by the wear and tear of daily living. Certain factors can make a hernia more likely. These can include:  · Heavy lifting  · Straining, whether from lifting, movement, or constipation  · Chronic cough  · Injury to the abdominal wall  · Excess weight  · Pregnancy  · Prior surgery  · Older age  · Family history of hernia  Symptoms  Symptoms of a hernia may come on suddenly. Or they may appear slowly over time. Some common symptoms include:  · Bulge in the groin area, around the navel, or in the scrotum (the bulge may get bigger when you stand and go away when you lie down)  · Pain or pressure around the bulge  · Pain during activities such as lifting, coughing, or sneezing  · A feeling of weakness or pressure in the groin  · Pain or swelling in the scrotum  Types of hernias  There are different types of hernia. The type you have depends on its location:  · Inguinal: This type is in the groin or scrotum. It is more common in men.  · Femoral: This type is in the groin, upper thigh (where the leg bends), or labia. It is more common in women.  · Ventral: This type is in the abdominal wall.  · Umbilical: This type occurs around the navel (belly button).  · Incisional:  This type occurs at the site of a previous surgery.  The condition of the hernia can help determine how urgently it needs to be treated.  · Reducible: It goes back in by itself, or it can be pushed back in.  · Irreducible: It cant be pushed back in.  · Incarcerated/Strangulated: The intestine is trapped (incarcerated). If this happens, you wont be able to push the bulge back in. If the incarcerated hernia isnt treated, it may become strangulated. This means the area loses blood supply and the tissue may die. This requires emergency surgery! Treatment is needed right away!  In most cases, a hernia will not heal on its own. Surgery is usually needed to repair the defect in the abdominal wall or groin. Youll be told more about surgery, if needed.  If your symptoms are not severe, treatment may sometimes be delayed. In such cases, regular follow-up visits with the provider will be needed. Youll be asked to keep track of your symptoms and to watch for signs of more serious problems. You may also be given guidelines similar to the home care instructions below.  Home Care  To help keep a hernia from getting worse, you may be advised to:  · Avoid heavy lifting and straining as directed.  · Take steps to prevent constipation, such as eating more fiber and drinking more water. This may help reduce straining that can occur when having a bowel movement. Reducing straining may help keep your symptoms from getting worse.  · Maintain a healthy weight or lose excess weight. This can help reduce strain on abdominal muscles and tissues.  · Stop smoking. This can help prevent coughing that may also strain abdominal muscles and tissues.    Follow-up care  Follow up with your healthcare provider, or as directed. If imaging tests were done, they will be reviewed a doctor. You will be told the results and any new findings that may affect your care.    When to seek medical advice  Call your healthcare provider right away if any of  "these occur:  · Hernia hardens, swells, or grows larger  · Hernia can no longer be pushed back in  · Pain moves to the lower right abdomen (just below the waistline), or spreads to the back    Call 911  Call 911 right away if any of these occur:  · Nausea and vomiting  · Severe pain, redness, or tenderness in the area near the hernia  · Pain worsens quickly and doesnt get better  · Inability to have a bowel movement or pass gas  · Fever of 100.4°F (38°C) or higher  · Trouble breathing  · Fainting  · Rapid heart rate  · Vomiting blood  · Large amounts of blood in stool              Primary Diagnosis     Your primary diagnosis was:  Hernia      Admission Information     Date & Time Provider Department CSN    2/13/2017 10:32 AM Nomi Robb MD [facility] [id number]      Care Providers     Provider Role Specialty Primary office phone    Nomi Robb MD Attending Provider General Surgery [phone]    Nomi Robb MD Surgeon  General Surgery [phone]      Your Vitals Were     BP Pulse Temp Resp Height Weight    115/70 57 97 °F (36.1 °C) (Oral) 12 5' 10" (1.778 m) 96.6 kg (213 lb)    SpO2 BMI             96% 30.56 kg/m2         Recent Lab Values     No lab values to display.      Pending Labs     Order Current Status    Specimen to Pathology - Surgery Collected (02/13/17 1720)      Allergies as of 2/13/2017     No Known Allergies      Advance Directives     An advance directive is a document which, in the event you are no longer able to make decisions for yourself, tells your healthcare team what kind of treatment you do or do not want to receive, or who you would like to make those decisions for you.  If you do not currently have an advance directive, [facility] encourages you to create one.  For more information call:  [phone]), [phone] ([phone]),  or log on to [email or web address].        Smoking Cessation     If you would like to quit " smoking:   You may be eligible for free services if you are a [place] resident and started smoking cigarettes before September 1, 1988.  Call the Smoking Cessation [employer] (SCT) toll free at (644) 059-3832 or (204) 818-9328.   Call 1-800-QUIT-NOW if you do not meet the above criteria.            Language Assistance Services     ATTENTION: Language assistance services are available, free of charge. Please call [phone].      ATENCIÓN: Si habla español, tiene a oscar disposición servicios gratuitos de asistencia lingüística. Llame al [phone].     CHÚ Ý: N?u b?n nói Ti?ng Vi?t, có các d?ch v? h? tr? ngôn ng? mi?n phí dành cho b?n. G?i s? [phone].         [facility] complies with applicable Federal civil rights laws and does not discriminate on the basis of race, color, national origin, age, disability, or sex.

## 2017-02-13 NOTE — OP NOTE
2/13/2017    PRE-OP DIAGNOSIS: Umbilical hernia without obstruction and without gangrene [K42.9]      POST-OP DIAGNOSIS: Post-Op Diagnosis Codes:     * Umbilical hernia without obstruction and without gangrene [K42.9]    Procedure(s):  REPAIR-HERNIA-UMBILICAL-OPEN    SURGEON: Surgeon(s) and Role:     * Lilibeth Santana MD - Resident - Assisting     * Nomi Robb MD - Primary    ANESTHESIA: General    ESTIMATED BLOOD LOSS: 5 mL.     FINDINGS: A subcentimeter diamter umbilical hernia defect repaired with suture     SPECIMENS:   Specimens     Start     Ordered    02/13/17 1720  Specimen to Pathology - Surgery  Once      02/13/17 1720           COMPLICATIONS: None.     INDICATIONS:  Sanchez Lomeli is a 41 y.o. male referred to General Surgery Clinic with a symptomatic umbilical hernia. Patient desired repair. We offered a repair and patient agreed to proceed. Patient did sign informed consent and expressed his understanding of the risks and benefits of surgery.     OPERATIVE PROCEDURE: The patient was identified in preoperative holding, brought back to the Operating Room. Patient was placed supine on the operating table and padded appropriately. Monitors were applied and a smooth induction of general endotracheal anesthesia. Patient abdomen was shaved with electric clippers and prepped and draped in the standard sterile surgical fashion. A timeout was performed and all team members present, agreed this was the correct procedure on the correct patient. We also confirmed administration of appropriate preoperative antibiotics.     Marcaine was administered. A vertical skin incision was made through the umbilicus with the scalpel. Subcutaneous tissue was divided with Bovie electrocautery and this dissection was carried down to the anterior abdominal wall fascia. This left us with an easily reducible umbilical hernia defect. We measured it at less than a centimeter in diameter. Bovie electrocautery was  used to take down a few adhesions of omentum from the anterior abdominal wall around the defect. We decided to repair it with two figure of eight 0 prolene. The site was irrigated and good hemostasis was obtained. The base of the umbilical stalk was tacked down to the fascia with a single buried interrupted 2-0 Vicryl suture. Zoraida fascia was reapproximated with buried interrupted 3-0 Vicryl sutures and the skin was closed with a running subcuticular 4-0 Vicryl suture. A sterile dressing was applied. The patient was extubated in the Operating Room and transported to the Recovery Room in stable condition. All sponge, instrument and needle counts were correct at the end of the case. Staff was present and scrubbed for the entire procedure.

## 2017-02-13 NOTE — BRIEF OP NOTE
[facility]  Brief Operative Note     SUMMARY     Surgery Date: 2/13/2017     Surgeon(s) and Role:     * Lilibeth Santana MD - Resident - Assisting     * Nomi Robb MD - Primary        Pre-op Diagnosis:  Umbilical hernia without obstruction and without gangrene [K42.9]    Post-op Diagnosis:  Post-Op Diagnosis Codes:     * Umbilical hernia without obstruction and without gangrene [K42.9]    Procedure(s) (LRB):  REPAIR-HERNIA-UMBILICAL-OPEN w/ mesh (N/A)    Anesthesia: General    Description of the findings of the procedure: Subcentimeter umbilical hernia repaired primarily    Findings/Key Components: as above    Estimated Blood Loss: * No values recorded between 2/13/2017  5:05 PM and 2/13/2017  5:30 PM *         Specimens:   Specimen (12h ago through future)    Start     Ordered    02/13/17 1720  Specimen to Pathology - Surgery  Once     Comments:  Pre-op Diagnosis: Umbilical hernia without obstruction and without gangrene [K42.9]    Post-op Diagnosis: same    Procedure(s):  REPAIR-HERNIA-UMBILICAL-OPEN w/ mesh     Number of specimens: 1    Name of specimens: 1. Hernia sac and contents, permanent in formalin    02/13/17 1720          Discharge Note    SUMMARY     Admit Date: 2/13/2017    Discharge Date and Time:  02/13/2017 5:31 PM    Hospital Course (synopsis of major diagnoses, care, treatment, and services provided during the course of the hospital stay): Pt tolerated procedure well and had an uncomplicated post op course.       Final Diagnosis: Post-Op Diagnosis Codes:     * Umbilical hernia without obstruction and without gangrene [K42.9]    Disposition: Home or Self Care    Follow Up/Patient Instructions:     Medications:  Reconciled Home Medications:   Current Discharge Medication List      START taking these medications    Details   oxycodone-acetaminophen (PERCOCET) 5-325 mg per tablet Take one to two tablets by mouth every four hours as needed for pain  Qty: 51 tablet, Refills:  0      polyethylene glycol (GLYCOLAX) 17 gram/dose powder Take 17 g by mouth once daily.  Qty: 119 g, Refills: 0         CONTINUE these medications which have NOT CHANGED    Details   gabapentin (NEURONTIN) 100 MG capsule Take 100 mg by mouth daily as needed.       tramadol (ULTRAM-ER) 100 MG Tb24 Take 100 mg by mouth daily as needed.          STOP taking these medications       baclofen (LIORESAL) 10 MG tablet Comments:   Reason for Stopping:               Discharge Procedure Orders  Diet general     Activity as tolerated     Shower on day dressing removed (No bath)     Lifting restrictions   Order Comments: No lifting greater than 10 pounds for 6 weeks from day of surgery.  No pushing/pulling such as vacuuming or raking.  No straining, avoid constipation and take stool softeners as described and laxatives as needed.  No driving while on narcotics and until you can react quickly without pain.     Call MD for:  temperature >100.4     Call MD for:  persistent nausea and vomiting     Call MD for:  severe uncontrolled pain     Call MD for:  difficulty breathing, headache or visual disturbances     Call MD for:  redness, tenderness, or signs of infection (pain, swelling, redness, odor or green/yellow discharge around incision site)     Call MD for:  hives     Call MD for:  persistent dizziness or light-headedness     Call MD for:  extreme fatigue     Remove dressing in 48 hours   Order Comments: Steri strips will fall off on their own.       Follow-up Information     Follow up with Nomi Robb MD. Schedule an appointment as soon as possible for a visit in 2 weeks.    Specialties:  General Surgery, Bariatrics    Why:  For wound re-check    Contact information:    [address]  [facility] [address]  [phone]

## 2017-02-14 ENCOUNTER — PATIENT MESSAGE (OUTPATIENT)
Dept: SURGERY | Facility: CLINIC | Age: 42
End: 2017-02-14

## 2017-02-14 NOTE — DISCHARGE INSTRUCTIONS
Hernia (Adult)    A hernia can happen when there is a weakness or defect in the wall of the abdomen or groin. Intestines or nearby tissues may move from their usual location and push through the weakness in the wall. This can cause a hernia (bulge) you may see or feel.  Causes and Risk Factors   A hernia may be present at birth. Or it may be caused by the wear and tear of daily living. Certain factors can make a hernia more likely. These can include:  · Heavy lifting  · Straining, whether from lifting, movement, or constipation  · Chronic cough  · Injury to the abdominal wall  · Excess weight  · Pregnancy  · Prior surgery  · Older age  · Family history of hernia  Symptoms  Symptoms of a hernia may come on suddenly. Or they may appear slowly over time. Some common symptoms include:  · Bulge in the groin area, around the navel, or in the scrotum (the bulge may get bigger when you stand and go away when you lie down)  · Pain or pressure around the bulge  · Pain during activities such as lifting, coughing, or sneezing  · A feeling of weakness or pressure in the groin  · Pain or swelling in the scrotum  Types of hernias  There are different types of hernia. The type you have depends on its location:  · Inguinal: This type is in the groin or scrotum. It is more common in men.  · Femoral: This type is in the groin, upper thigh (where the leg bends), or labia. It is more common in women.  · Ventral: This type is in the abdominal wall.  · Umbilical: This type occurs around the navel (belly button).  · Incisional: This type occurs at the site of a previous surgery.  The condition of the hernia can help determine how urgently it needs to be treated.  · Reducible: It goes back in by itself, or it can be pushed back in.  · Irreducible: It cant be pushed back in.  · Incarcerated/Strangulated: The intestine is trapped (incarcerated). If this happens, you wont be able to push the bulge back in. If the incarcerated hernia isnt  treated, it may become strangulated. This means the area loses blood supply and the tissue may die. This requires emergency surgery! Treatment is needed right away!  In most cases, a hernia will not heal on its own. Surgery is usually needed to repair the defect in the abdominal wall or groin. Youll be told more about surgery, if needed.  If your symptoms are not severe, treatment may sometimes be delayed. In such cases, regular follow-up visits with the provider will be needed. Youll be asked to keep track of your symptoms and to watch for signs of more serious problems. You may also be given guidelines similar to the home care instructions below.  Home Care  To help keep a hernia from getting worse, you may be advised to:  · Avoid heavy lifting and straining as directed.  · Take steps to prevent constipation, such as eating more fiber and drinking more water. This may help reduce straining that can occur when having a bowel movement. Reducing straining may help keep your symptoms from getting worse.  · Maintain a healthy weight or lose excess weight. This can help reduce strain on abdominal muscles and tissues.  · Stop smoking. This can help prevent coughing that may also strain abdominal muscles and tissues.    Follow-up care  Follow up with your healthcare provider, or as directed. If imaging tests were done, they will be reviewed a doctor. You will be told the results and any new findings that may affect your care.    When to seek medical advice  Call your healthcare provider right away if any of these occur:  · Hernia hardens, swells, or grows larger  · Hernia can no longer be pushed back in  · Pain moves to the lower right abdomen (just below the waistline), or spreads to the back    Call 911  Call 911 right away if any of these occur:  · Nausea and vomiting  · Severe pain, redness, or tenderness in the area near the hernia  · Pain worsens quickly and doesnt get better  · Inability to have a bowel movement  or pass gas  · Fever of 100.4°F (38°C) or higher  · Trouble breathing  · Fainting  · Rapid heart rate  · Vomiting blood  · Large amounts of blood in stool

## 2017-02-14 NOTE — ANESTHESIA RELEASE NOTE
"Anesthesia Release from PACU Note    Patient: Sanchez Lomeli    Procedure(s) Performed: Procedure(s) (LRB):  REPAIR-HERNIA-UMBILICAL-OPEN w/ mesh (N/A)    Anesthesia type: general    Post pain: Adequate analgesia    Post assessment: no apparent anesthetic complications    Last Vitals:   Visit Vitals    /78    Pulse (!) 57    Temp 35.9 °C (96.6 °F) (Axillary)    Resp 12    Ht 5' 10" (1.778 m)    Wt 96.6 kg (213 lb)    SpO2 97%    BMI 30.56 kg/m2       Post vital signs: stable    Level of consciousness: awake, alert  and oriented    Nausea/Vomiting: no nausea/no vomiting    Complications: none    Airway Patency: patent    Respiratory: unassisted, spontaneous ventilation, room air    Cardiovascular: stable and blood pressure at baseline    Hydration: euvolemic  "

## 2017-02-14 NOTE — ANESTHESIA POSTPROCEDURE EVALUATION
"Anesthesia Post Evaluation    Patient: Sanchez Lomeli    Procedure(s) Performed: Procedure(s) (LRB):  REPAIR-HERNIA-UMBILICAL-OPEN w/ mesh (N/A)    Final Anesthesia Type: general  Patient location during evaluation: PACU  Patient participation: Yes- Able to Participate  Level of consciousness: awake and alert and oriented  Post-procedure vital signs: reviewed and stable  Pain management: adequate  Airway patency: patent  PONV status at discharge: No PONV  Anesthetic complications: no      Cardiovascular status: blood pressure returned to baseline and hemodynamically stable  Respiratory status: spontaneous ventilation, unassisted and room air  Hydration status: euvolemic  Follow-up not needed.        Visit Vitals    /78    Pulse (!) 57    Temp 36.1 °C (97 °F) (Oral)    Resp 12    Ht 5' 10" (1.778 m)    Wt 96.6 kg (213 lb)    SpO2 97%    BMI 30.56 kg/m2       Pain/Tasha Score: Pain Assessment Performed: Yes (2/13/2017  5:49 PM)  Presence of Pain: non-verbal indicators absent (2/13/2017  5:49 PM)  Pain Rating Prior to Med Admin: 4 (2/13/2017  6:32 PM)  Tasha Score: 8 (2/13/2017  5:49 PM)      "

## 2017-02-15 ENCOUNTER — INITIAL CONSULT (OUTPATIENT)
Dept: ELECTROPHYSIOLOGY | Facility: CLINIC | Age: 42
End: 2017-02-15
Payer: COMMERCIAL

## 2017-02-15 ENCOUNTER — HOSPITAL ENCOUNTER (OUTPATIENT)
Dept: CARDIOLOGY | Facility: CLINIC | Age: 42
Discharge: HOME | End: 2017-02-15
Payer: COMMERCIAL

## 2017-02-15 VITALS
SYSTOLIC BLOOD PRESSURE: 123 MMHG | HEART RATE: 60 BPM | WEIGHT: 213 LBS | BODY MASS INDEX: 30.49 KG/M2 | HEIGHT: 70 IN | DIASTOLIC BLOOD PRESSURE: 73 MMHG

## 2017-02-15 DIAGNOSIS — R55 SITUATIONAL SYNCOPE: Primary | ICD-10-CM

## 2017-02-15 DIAGNOSIS — R55 SYNCOPE, UNSPECIFIED SYNCOPE TYPE: ICD-10-CM

## 2017-02-15 PROCEDURE — 99999 PR PBB SHADOW E&M-EST. PATIENT-LVL III: CPT | Mod: PBBFAC,,, | Performed by: INTERNAL MEDICINE

## 2017-02-15 PROCEDURE — 99245 OFF/OP CONSLTJ NEW/EST HI 55: CPT | Mod: S$GLB,,, | Performed by: INTERNAL MEDICINE

## 2017-02-15 PROCEDURE — 93000 ELECTROCARDIOGRAM COMPLETE: CPT | Mod: S$GLB,,, | Performed by: INTERNAL MEDICINE

## 2017-02-15 NOTE — LETTER
February 18, 2017      Cornelius Gallego MD  [address]  [facility] [address]           [facility] - Arrhythmia  [address]  [facility] [address]  Phone: [phone]  Fax: [phone]          Patient: Sanchez Lomeli   MR Number: [id number]   YOB: 1975   Date of Visit: 2/15/2017       Dear Dr. Cornelius Gallego:    Thank you for referring Sanchez Lomeli to me for evaluation. Attached you will find relevant portions of my assessment and plan of care.    If you have questions, please do not hesitate to call me. I look forward to following Sanchez Lomeli along with you.    Sincerely,    Zana Villalpando MD    Enclosure  CC:  No Recipients    If you would like to receive this communication electronically, please contact [email or web address] or [phone] to request more information on [employer] access.    For providers and/or their staff who would like to refer a patient to [facility], please contact us through our one-stop-shop provider referral line, [facility], at [phone].    If you feel you have received this communication in error or would no longer like to receive these types of communications, please e-mail [email or web address]

## 2017-02-15 NOTE — PROGRESS NOTES
"  Subjective:   Patient ID:  Sanchez Lomeli is a 41 y.o. male     Chief complaint:Loss of Consciousness      HPI  From Dr Gallego's last note (amended in bold by details I just added during the interview today with karely and his wife):  TBI occurred in 2010 when a prop wall fell on him, knocking him to the floor and causing him to hit his head on concrete. He had loc for a few minutes and received sutures for scalp laceration but no fracture or hospital admit. He had 18 months of PT for back and neck pain with gradual improvement over time, no surgeries, never noted any cognitive issues. He does not take any medications on a daily basis at this time.  In the past, even prior to 2010,  he has noticed that he becomes light headed with very strong laughter, unsure if lightheaded with laughter before this. About two months ago he had first of two episodes of loss of consciousness during strong laughter. Wife reports eyes mostly closed and body limp,he tilted to the side - he was sitting- and started twitching for a few secs,  he will be minimally responsive for a few seconds without any confusion following, no incontinence. This does not happen with other triggers (like coughing, roughhousing with his kids, tight collars,needles). Heat does make hm dizzy a bit when he works outdoors for a while. He has been pulling on the side of the road for several years now when he laughs. He usually has warnings ("sees stars") but not before the 2 syncopal spells. He in face had been laughing only for 5 to 10 sec. He generally also feels a bit dyspneic when  He laughs hard -- not these 2 times. 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   Once at age 14 -15 when he had chicken pox-- he was standing at a an am meeting and he passed out.  No smoking , minimal alcohol -- maybe a beer a month.   Had umbilical hernia repair on Monday. No dizziness after.  I have reviewed the actual image of the ECG tracing obtained today and it shows " NSR with normal intervals-- rate 60 bpm.     Current Outpatient Prescriptions   Medication Sig    gabapentin (NEURONTIN) 100 MG capsule Take 100 mg by mouth daily as needed.     oxycodone-acetaminophen (PERCOCET) 5-325 mg per tablet Take one to two tablets by mouth every four hours as needed for pain    tramadol (ULTRAM-ER) 100 MG Tb24 Take 100 mg by mouth daily as needed.      No current facility-administered medications for this visit.      Review of Systems   Constitution: Negative for decreased appetite, weakness, malaise/fatigue, weight gain and weight loss.   HENT: Negative for headaches.    Eyes: Negative for blurred vision.   Cardiovascular: Positive for syncope. Negative for chest pain, claudication, cyanosis, dyspnea on exertion, irregular heartbeat, leg swelling, near-syncope, orthopnea and palpitations.   Respiratory: Negative for cough, shortness of breath, sleep disturbances due to breathing, snoring and wheezing.    Endocrine: Negative for heat intolerance.   Hematologic/Lymphatic: Does not bruise/bleed easily.   Musculoskeletal: Negative for muscle weakness and myalgias.   Gastrointestinal: Negative for melena, nausea and vomiting.   Genitourinary: Negative for nocturia.   Neurological: Positive for tremors. Negative for excessive daytime sleepiness, dizziness and light-headedness.   Psychiatric/Behavioral: Negative for depression, memory loss and substance abuse. The patient does not have insomnia and is not nervous/anxious.        Objective:   Physical Exam   Constitutional: He is oriented to person, place, and time. He appears well-developed and well-nourished.   HENT:   Head: Normocephalic and atraumatic.   Right Ear: External ear normal.   Left Ear: External ear normal.   Eyes: Conjunctivae are normal. Pupils are equal, round, and reactive to light. Right eye exhibits no discharge. Left eye exhibits no discharge. Right conjunctiva is not injected. Left conjunctiva has no hemorrhage.   Neck:  "Neck supple. No JVD present. No thyromegaly present.   Cardiovascular: Normal rate, regular rhythm, normal heart sounds and intact distal pulses.  PMI is not displaced.  Exam reveals no gallop, no friction rub, no midsystolic click and no opening snap.    No murmur heard.  Pulses:       Carotid pulses are 2+ on the right side, and 2+ on the left side.       Radial pulses are 2+ on the right side, and 2+ on the left side.        Dorsalis pedis pulses are 2+ on the right side, and 2+ on the left side.        Posterior tibial pulses are 2+ on the right side, and 2+ on the left side.   Pulmonary/Chest: Effort normal and breath sounds normal. No respiratory distress. He has no wheezes. He has no rales. He exhibits no tenderness.   Abdominal: Soft. Normal appearance. He exhibits no pulsatile liver. There is no hepatomegaly. There is no tenderness. There is no rebound and no guarding.   Musculoskeletal: Normal range of motion. He exhibits no edema or tenderness.        Right knee: He exhibits no swelling.        Left knee: He exhibits no swelling.        Right ankle: He exhibits no swelling.        Left ankle: He exhibits no swelling.        Right lower leg: He exhibits no swelling.        Left lower leg: He exhibits no swelling.        Right foot: There is no swelling.        Left foot: There is no swelling.   Neurological: He is alert and oriented to person, place, and time. He has normal strength and normal reflexes. No cranial nerve deficit. Coordination normal.   Skin: Skin is warm, dry and intact. No rash noted. He is not diaphoretic. No cyanosis.   Psychiatric: He has a normal mood and affect. His behavior is normal.   Nursing note and vitals reviewed.    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 The former is more likley and is treated simply with avoidance techniques (we discussed those with pat and wife today) . The latter, would benefit from a PPM implant. Will eval with a beeper/Zio patch  1. Situational syncope        Plan:    Will try to reproduce symptoms during monitoring   CSM in clinic was neg  Orders Placed This Encounter   Procedures    Holter Monitor - 3-14 Day Adult (zio patch)     Standing Status:   Future     Standing Expiration Date:   2/18/2018     Return in about 2 months (around 4/15/2017), or if symptoms worsen or fail to improve.  Medications Discontinued During This Encounter   Medication Reason    polyethylene glycol (GLYCOLAX) 17 gram/dose powder Patient no longer taking     New Prescriptions    No medications on file     Modified Medications    No medications on file

## 2017-02-15 NOTE — MR AVS SNAPSHOT
[facility]  [address]  [facility] [address]  Phone: [phone]  Fax: [phone]                  Sanchez Lomeli   2/15/2017 8:00 AM   Initial consult    Description:  Male : 1975   Provider:  Zana Villalpando MD   Department:  [facility]           Reason for Visit     Loss of Consciousness           Diagnoses this Visit        Comments    Transient loss of consciousness    -  Primary            To Do List           Future Appointments        Provider Department Dept Phone    2017 8:00 AM GHASSAN Hare [facility] - Gastroenterology [phone]    3/2/2017 8:15 AM MD Tam [facility] - General Surgery [phone]    4/10/2017 8:30 AM EXTENDED HOLTER, [facility] - Arrhythmia [phone]    2017 8:45 AM EKG, APPT [facility] - [phone]    2017 9:20 AM Zana Villalpando MD [facility] - Arrhythmia [phone]      Goals (5 Years of Data)     None      [facility] On Call     [facility] On Call Nurse Care Line -  Assistance  Registered nurses in the [facility] On Call Center provide clinical advisement, health education, appointment booking, and other advisory services.  Call for this free service at [phone].             Medications           Message regarding Medications     Verify the changes and/or additions to your medication regime listed below are the same as discussed with your clinician today.  If any of these changes or additions are incorrect, please notify your healthcare provider.        STOP taking these medications     polyethylene glycol (GLYCOLAX) 17 gram/dose powder Take 17 g by mouth once daily.           Verify that the below list of medications is an accurate representation of the medications you are currently taking.  If none reported, the list may be blank. If incorrect, please contact your healthcare provider. Carry this list with you in case of emergency.           Current  "Medications     gabapentin (NEURONTIN) 100 MG capsule Take 100 mg by mouth daily as needed.     oxycodone-acetaminophen (PERCOCET) 5-325 mg per tablet Take one to two tablets by mouth every four hours as needed for pain    tramadol (ULTRAM-ER) 100 MG Tb24 Take 100 mg by mouth daily as needed.            Clinical Reference Information           Your Vitals Were     BP Pulse Height Weight BMI    123/73 60 5' 10" (1.778 m) 96.6 kg (213 lb) 30.56 kg/m2      Blood Pressure          Most Recent Value    BP  123/73      Allergies as of 2/15/2017     No Known Allergies      Immunizations Administered on Date of Encounter - 2/15/2017     None      Language Assistance Services     ATTENTION: Language assistance services are available, free of charge. Please call [phone].      ATENCIÓN: Si hui tyson, tiene a oscar disposición servicios gratuitos de asistencia lingüística. Llame al [phone].     MICHELLE Ý: N?u b?n nói Ti?ng Vi?t, có các d?ch v? h? tr? ngôn ng? mi?n phí dành cho b?n. G?i s? [phone].         [facility] complies with applicable Federal civil rights laws and does not discriminate on the basis of race, color, national origin, age, disability, or sex.        "

## 2017-03-02 ENCOUNTER — OFFICE VISIT (OUTPATIENT)
Dept: SURGERY | Facility: CLINIC | Age: 42
End: 2017-03-02
Payer: COMMERCIAL

## 2017-03-02 VITALS
SYSTOLIC BLOOD PRESSURE: 103 MMHG | BODY MASS INDEX: 29.78 KG/M2 | WEIGHT: 208 LBS | HEIGHT: 70 IN | HEART RATE: 61 BPM | TEMPERATURE: 99 F | DIASTOLIC BLOOD PRESSURE: 66 MMHG

## 2017-03-02 DIAGNOSIS — Z09 POSTOP CHECK: Primary | ICD-10-CM

## 2017-03-02 PROBLEM — K42.9 UMBILICAL HERNIA: Status: RESOLVED | Noted: 2017-02-13 | Resolved: 2017-03-02

## 2017-03-02 PROBLEM — K42.9 UMBILICAL HERNIA WITHOUT OBSTRUCTION AND WITHOUT GANGRENE: Status: RESOLVED | Noted: 2017-01-04 | Resolved: 2017-03-02

## 2017-03-02 PROCEDURE — 99024 POSTOP FOLLOW-UP VISIT: CPT | Mod: S$GLB,,, | Performed by: SURGERY

## 2017-03-02 PROCEDURE — 99999 PR PBB SHADOW E&M-EST. PATIENT-LVL III: CPT | Mod: PBBFAC,,, | Performed by: SURGERY

## 2017-03-02 RX ORDER — GABAPENTIN 300 MG/1
300 CAPSULE ORAL NIGHTLY
Refills: 1 | COMMUNITY
Start: 2017-02-20 | End: 2017-04-24

## 2017-03-02 NOTE — PROGRESS NOTES
"Sanchez Lomeli is a 41 y.o. male patient.   1. Postop check      Past Medical History:   Diagnosis Date    Chronic back pain     Hernia     TBI (traumatic brain injury) 2010     No past surgical history pertinent negatives on file.  Scheduled Meds:  Continuous Infusions:  PRN Meds:    Review of patient's allergies indicates:  No Known Allergies  There are no hospital problems to display for this patient.    Blood pressure 103/66, pulse 61, temperature 98.9 °F (37.2 °C), height 5' 10" (1.778 m), weight 94.3 kg (208 lb).    Subjective S/p primary repair of umbilical hernia with no complaints.  Objective Wound clear, no hernias.   Assessment & Plan He is happy with his procedure.  Light duty one more month and rtc prn.       Nomi Robb MD  3/2/2017  "

## 2017-03-02 NOTE — LETTER
[facility] - General Surgery  [address]  [place] [address]  Phone: [phone] March 2, 2017      Liliam Carbajal MD  [address] [facility] [address]    Patient: Sanchez Lomeli   MR Number: [id number]   YOB: 1975   Date of Visit: 3/2/2017     Dear Dr. Carbajal:    Thank you for referring Sanchez Lomeli to me for evaluation. Below are the relevant portions of my assessment and plan of care.    Sanchez Lomeli is a 41-year-old male patient status post primary repair of umbilical hernia with no complaints.  1. Postop check      PLAN: He is happy with his procedure. Light duty one more month and RTC PRN.     If you have questions, please do not hesitate to call me. I look forward to following Sanchez along with you.    Sincerely,      Nomi Robb MD   Section Head - General, Laparoscopic, Bariatric  Acute Care and Oncologic Surgery   - Surgical Weight Loss Program  [facility]    [email or web address]

## 2017-04-06 ENCOUNTER — PATIENT MESSAGE (OUTPATIENT)
Dept: RESEARCH | Facility: HOSPITAL | Age: 42
End: 2017-04-06

## 2017-04-10 ENCOUNTER — CLINICAL SUPPORT (OUTPATIENT)
Dept: ELECTROPHYSIOLOGY | Facility: CLINIC | Age: 42
End: 2017-04-10
Payer: COMMERCIAL

## 2017-04-10 DIAGNOSIS — R55 SITUATIONAL SYNCOPE: ICD-10-CM

## 2017-04-10 PROCEDURE — 0296T HOLTER MONITOR - 3-14 DAY ADULT: CPT | Mod: ,,, | Performed by: INTERNAL MEDICINE

## 2017-04-10 PROCEDURE — 0298T HOLTER MONITOR - 3-14 DAY ADULT: CPT | Mod: ,,, | Performed by: INTERNAL MEDICINE

## 2017-04-24 ENCOUNTER — HOSPITAL ENCOUNTER (OUTPATIENT)
Dept: RADIOLOGY | Facility: HOSPITAL | Age: 42
Discharge: HOME | End: 2017-04-24
Attending: PHYSICAL MEDICINE & REHABILITATION
Payer: COMMERCIAL

## 2017-04-24 ENCOUNTER — OFFICE VISIT (OUTPATIENT)
Dept: PHYSICAL MEDICINE AND REHAB | Facility: CLINIC | Age: 42
End: 2017-04-24
Payer: COMMERCIAL

## 2017-04-24 VITALS
DIASTOLIC BLOOD PRESSURE: 85 MMHG | SYSTOLIC BLOOD PRESSURE: 124 MMHG | HEART RATE: 105 BPM | BODY MASS INDEX: 30.3 KG/M2 | WEIGHT: 211.63 LBS | HEIGHT: 70 IN

## 2017-04-24 DIAGNOSIS — M54.41 CHRONIC MIDLINE LOW BACK PAIN WITH RIGHT-SIDED SCIATICA: ICD-10-CM

## 2017-04-24 DIAGNOSIS — M51.36 DDD (DEGENERATIVE DISC DISEASE), LUMBAR: ICD-10-CM

## 2017-04-24 DIAGNOSIS — G89.29 CHRONIC MIDLINE LOW BACK PAIN WITH RIGHT-SIDED SCIATICA: Primary | ICD-10-CM

## 2017-04-24 DIAGNOSIS — M54.16 RIGHT LUMBAR RADICULOPATHY: ICD-10-CM

## 2017-04-24 DIAGNOSIS — G89.29 CHRONIC MIDLINE LOW BACK PAIN WITH RIGHT-SIDED SCIATICA: ICD-10-CM

## 2017-04-24 DIAGNOSIS — M54.41 CHRONIC MIDLINE LOW BACK PAIN WITH RIGHT-SIDED SCIATICA: Primary | ICD-10-CM

## 2017-04-24 PROCEDURE — 99214 OFFICE O/P EST MOD 30 MIN: CPT | Mod: S$GLB,,, | Performed by: PHYSICAL MEDICINE & REHABILITATION

## 2017-04-24 PROCEDURE — 72100 X-RAY EXAM L-S SPINE 2/3 VWS: CPT | Mod: 26,,, | Performed by: RADIOLOGY

## 2017-04-24 PROCEDURE — 99999 PR PBB SHADOW E&M-EST. PATIENT-LVL III: CPT | Mod: PBBFAC,,, | Performed by: PHYSICAL MEDICINE & REHABILITATION

## 2017-04-24 PROCEDURE — 1160F RVW MEDS BY RX/DR IN RCRD: CPT | Mod: S$GLB,,, | Performed by: PHYSICAL MEDICINE & REHABILITATION

## 2017-04-24 PROCEDURE — 72100 X-RAY EXAM L-S SPINE 2/3 VWS: CPT | Mod: TC

## 2017-04-24 RX ORDER — MELOXICAM 15 MG/1
15 TABLET ORAL DAILY
Start: 2017-04-24 | End: 2017-09-11 | Stop reason: SDUPTHER

## 2017-04-24 RX ORDER — GABAPENTIN 600 MG/1
600 TABLET ORAL 3 TIMES DAILY
Qty: 90 TABLET | Refills: 2 | Status: SHIPPED | OUTPATIENT
Start: 2017-04-24 | End: 2018-11-27

## 2017-04-24 RX ORDER — METHYLPREDNISOLONE 4 MG/1
TABLET ORAL
Qty: 1 PACKAGE | Refills: 0 | Status: SHIPPED | OUTPATIENT
Start: 2017-04-24 | End: 2017-05-01

## 2017-04-24 RX ORDER — HYDROCODONE BITARTRATE AND ACETAMINOPHEN 10; 325 MG/1; MG/1
1 TABLET ORAL 3 TIMES DAILY PRN
Qty: 90 TABLET | Refills: 0 | Status: SHIPPED | OUTPATIENT
Start: 2017-04-24 | End: 2017-05-19 | Stop reason: SDUPTHER

## 2017-04-24 NOTE — PROGRESS NOTES
Subjective:       Patient ID: Sanchez Lomeli is a 41 y.o. male.    Chief Complaint: No chief complaint on file.    HPI    HISTORY OF PRESENT ILLNESS:  Mr. Lomeli is a 41-year-old  male who is   followed up in the Physical Medicine Clinic for chronic low back pain with   history of lumbar radiculopathy.  His last visit to the clinic was on   02/13/2017.  He did not have any significant radicular pain.  He was maintained   on meloxicam, p.r.n. baclofen and p.r.n. tramadol.    The patient is coming today to the clinic due to recent exacerbation of his back   pain.  About three weeks ago, the patient's back pain got worse.  He denies any   preceding trauma, injury or heavy lifting.  It got progressively worse.  He had   to take the last week off from work.    His back pain is in the lower lumbar spine and in his right upper buttocks   region.  He has occasional shooting pain to the right foot with numbness and   tingling.  He also has occasional similar sensations in his testicular areas.    His pain is aggravated by prolonged sitting or standing.  It is better with   lying flat on his back.  His maximum pain is 10/10 and minimum 3-4/10.  Today,   it is 8-9/10.  The patient denies any significant clear weakness, but does drag   his right foot at times due to pain.  He denies any bowel or bladder   incontinence.    He is currently taking meloxicam 15 mg p.o. daily.  He takes gabapentin 300 mg   capsules, three capsules twice per day.  He takes hydrocodone/APAP 7.5/325   p.r.n., usually three times per day.  He reports suboptimal relief with this   regimen.      MS/HN  dd: 04/24/2017 16:46:38 (CDT)  td: 04/25/2017 11:04:22 (CDT)  Doc ID   #[id number]  Job ID #[id number]    CC:           Review of Systems   Eyes: Negative for visual disturbance.   Respiratory: Negative for shortness of breath.    Cardiovascular: Negative for chest pain.   Gastrointestinal: Negative for blood in stool, constipation, nausea and  vomiting.   Genitourinary: Negative for difficulty urinating.   Musculoskeletal: Positive for back pain. Negative for arthralgias (knees), gait problem and neck pain.   Neurological: Negative for dizziness and headaches.   Psychiatric/Behavioral: Positive for sleep disturbance. Negative for behavioral problems.       Objective:      Physical Exam   Constitutional: He appears well-developed and well-nourished. No distress.   HENT:   Head: Normocephalic and atraumatic.   Neck: Normal range of motion.   Musculoskeletal:   BLE:  ROM:full.  Strength:      RLE: 5-/5 at hip flexion, 5- knee extension, 5 ankle DF/PF, EHL.     LLE:  5/5 at hip flexion, knee extension, ankle DF/PF, EHL.  Sensation to pinprick:     RLE: intact.      LLE: intact.   SLR:      RLE: -ve at 70 deg.      LLE: -ve at 70 deg.   MARK:     RLE: -ve.      LLE: -ve.    +ve mild tenderness over lumbar spine.    Heel walking: WNL.  Toe walking: WNL.  Gait: antalgic on RLE.   Neurological: He is alert.   Skin: Skin is warm. No rash noted.   Psychiatric: He has a normal mood and affect. His behavior is normal.   Vitals reviewed.            Assessment:       1. Chronic midline low back pain with right-sided sciatica    2. DDD (degenerative disc disease), lumbar    3. Right lumbar radiculopathy        Plan:     - X-Ray Lumbar Spine Ap And Lateral; Future  - Start methylPREDNISolone (MEDROL DOSEPACK) 4 mg tablet; use as directed  - Continue gabapentin (NEURONTIN) 600 MG tablet; Take 1 tablet (600 mg total) by mouth 3 (three) times daily.  - Increase hydrocodone-acetaminophen 10-325mg (NORCO)  mg Tab; Take 1 tablet by mouth 3 (three) times daily as needed.  - Continue meloxicam (MOBIC) 15 MG tablet; Take 1 tablet (15 mg total) by mouth once daily (after finishing medrol dose pack).  - The patient was encouraged to adopt a regular Home Exercise Program, and provided with printouts.  - Return in about 1 month (around 5/24/2017).      This was a 25 minute  visit, 50% of which was spent educating the patient about the diagnosis and the treatment plan.

## 2017-04-24 NOTE — MR AVS SNAPSHOT
[facility]  [address] [facility] [address]  Phone: [phone]                  Sanchez Lomeli   2017 4:20 PM   Office Visit    Description:  Male : 1975   Provider:  Edward Liu MD   Department:  [facility]           Diagnoses this Visit        Comments    Chronic midline low back pain with right-sided sciatica    -  Primary     DDD (degenerative disc disease), lumbar         Right lumbar radiculopathy                To Do List           Future Appointments        Provider Department Dept Phone    2017 8:45 AM EKG, APPT [facility] [phone]    2017 9:20 AM Zana Villalpando MD [facility] Arrhythmia [phone]    2017 8:00 AM Augustus Stein PA-C [facility] Gastroenterology [phone]    2017 9:30 AM Bhupinder Washington Jr., MD [facility] Urology 4th Floor [phone]      Goals (5 Years of Data)     None      Follow-Up and Disposition     Return in about 1 month (around 2017).       These Medications        Disp Refills Start End    methylPREDNISolone (MEDROL DOSEPACK) 4 mg tablet 1 Package 0 2017 5/15/2017    use as directed    Pharmacy: [facility]/pharmacy #[id number] - [place] - [address] Ph #: [phone]       gabapentin (NEURONTIN) 600 MG tablet 90 tablet 2 2017    Take 1 tablet (600 mg total) by mouth 3 (three) times daily. - Oral    Pharmacy: [facility]/pharmacy #[id number] - [place] - [address] Ph #: [phone]       hydrocodone-acetaminophen 10-325mg (NORCO)  mg Tab 90 tablet 0 2017    Take 1 tablet by mouth 3 (three) times daily as needed. - Oral    Pharmacy: [facility]/pharmacy #[id number] - [place] - [address] Ph #: [phone]       meloxicam (MOBIC) 15 MG tablet   2017     Take 1 tablet (15 mg total) by mouth once daily. - Oral    Pharmacy: [facility]/pharmacy #[id number] - [place] - [address] Ph #: [phone]          [facility]     [facility] - 24/7 Assistance  Unless otherwise directed by your provider, please contact [facility], our nurse care line that is available for 24/7 assistance.     Registered nurses in the [facility] provide: appointment scheduling, clinical advisement, health education, and other advisory services.  Call: [phone] (toll free)               Medications           Message regarding Medications     Verify the changes and/or additions to your medication regime listed below are the same as discussed with your clinician today.  If any of these changes or additions are incorrect, please notify your healthcare provider.        START taking these NEW medications        Refills    methylPREDNISolone (MEDROL DOSEPACK) 4 mg tablet 0    Sig: use as directed    Class: Normal    gabapentin (NEURONTIN) 600 MG tablet 2    Sig: Take 1 tablet (600 mg total) by mouth 3 (three) times daily.    Class: Normal    Route: Oral    hydrocodone-acetaminophen 10-325mg (NORCO)  mg Tab 0    Sig: Take 1 tablet by mouth 3 (three) times daily as needed.    Class: Normal    Route: Oral    meloxicam (MOBIC) 15 MG tablet     Sig: Take 1 tablet (15 mg total) by mouth once daily.    Class: No Print    Route: Oral      STOP taking these medications     gabapentin (NEURONTIN) 100 MG capsule Take 100 mg by mouth daily as needed.     gabapentin (NEURONTIN) 300 MG capsule Take 300 mg by mouth every evening.    hydrocodone-acetaminophen 7.5-325mg (NORCO) 7.5-325 mg per tablet Take 1 tablet by mouth 3 (three) times daily as needed for Pain.    hydrocodone-acetaminophen 5-325mg (NORCO) 5-325 mg per tablet Take 1 tablet by mouth every 4 (four) hours as needed for Pain.    gabapentin (NEURONTIN) 300 MG capsule Take 1 capsule (300 mg total) by mouth every evening.    gabapentin (NEURONTIN) 300 MG capsule Take 1 capsule (300 mg total) by mouth every evening. In 1 week, if no relief, may increase  "dose to twice per day.  In another week, if no relief, may increase dose to 3 times per day.           Verify that the below list of medications is an accurate representation of the medications you are currently taking.  If none reported, the list may be blank. If incorrect, please contact your healthcare provider. Carry this list with you in case of emergency.           Current Medications     gabapentin (NEURONTIN) 600 MG tablet Take 1 tablet (600 mg total) by mouth 3 (three) times daily.    hydrocodone-acetaminophen 10-325mg (NORCO)  mg Tab Take 1 tablet by mouth 3 (three) times daily as needed.    meloxicam (MOBIC) 15 MG tablet Take 1 tablet (15 mg total) by mouth once daily.    methylPREDNISolone (MEDROL DOSEPACK) 4 mg tablet use as directed    oxycodone-acetaminophen (PERCOCET) 5-325 mg per tablet Take one to two tablets by mouth every four hours as needed for pain    tramadol (ULTRAM-ER) 100 MG Tb24 Take 100 mg by mouth daily as needed.            Clinical Reference Information           Your Vitals Were     BP Pulse Height Weight BMI    124/85 105 5' 10" (1.778 m) 96 kg (211 lb 10.3 oz) 30.37 kg/m2      Blood Pressure          Most Recent Value    BP  124/85      Allergies as of 4/24/2017     No Known Allergies      Immunizations Administered on Date of Encounter - 4/24/2017     None      Orders Placed During Today's Visit     Future Labs/Procedures Expected by Expires    X-Ray Lumbar Spine Ap And Lateral  4/24/2017 4/24/2018      Instructions      Neck/Back Pain [General]    Both neck and back pain are usually caused by injury to the muscles or ligaments of the spine. Sometimes the disks that separate each bone of the spine may cause pain by putting pressure on a nearby nerve. Back and neck pain may appear after a sudden twisting/bending force (such as in a car accident), or sometimes after a simple awkward movement. In either case, muscle spasm is often present and adds to the pain.  Acute neck and " back pain usually gets better in one to two weeks. Pain related to disk disease, arthritis in the spinal joints or spinal stenosis (narrowing of the spinal canal) can become chronic and last for months or years.  Home Care:  · FOR NECK PAIN: Use a comfortable pillow that supports the head and keeps the spine in a neutral position. The position of the head should not be tilted forward or backward.  · FOR BACK PAIN: You may need to stay in bed the first few days. But, as soon as possible, begin sitting or walking to avoid problems with prolonged bed rest (muscle weakness, worsening back stiffness and pain, blood clots in the legs).  · When in bed, try to find a position of comfort. A firm mattress is best. Try lying flat on your back with pillows under your knees. You can also try lying on your side with your knees bent up towards your chest and a pillow between your knees.  · Avoid prolonged sitting. This puts more stress on the lower back than standing or walking.  · During the first two days after injury, apply an ICE PACK to the painful area for 20 minutes every 2-4 hours. This will reduce swelling and pain. HEAT (hot shower, hot bath or heating pad) works well for muscle spasm. You can start with ice, then switch to heat after two days. Some patients feel best alternating ice and heat treatments. Use the one method that feels the best to you.  · You may use acetaminophen (Tylenol) or ibuprofen (Motrin, Advil) to control pain, unless another pain medicine was prescribed. [NOTE: If you have chronic liver or kidney disease or ever had a stomach ulcer or GI bleeding, talk with your doctor before using these medicines.]  · Be aware of safe lifting methods and do not lift anything over 15 pounds until all the pain is gone.  Follow Up  with your physician or this facility if your symptoms do not start to improve after one week. Physical therapy or further tests may be needed.  [NOTE: If X-rays were taken, they will be  reviewed by a radiologist. You will be notified of any new findings that may affect your care.]  Get Prompt Medical Attention  if any of the following occur:  · Pain becomes worse or spreads into your arms or legs  · Weakness, numbness or pain in one or both arms or legs  · Loss of bowel or bladder control  · Numbness in the groin area  · Difficulty walking  · Fever of 100.4ºF (38ºC) or higher, or as directed by your healthcare provider  © 9347-2370 [facility], [address] [place] [address]. All rights reserved. This information is not intended as a substitute for professional medical care. Always follow your healthcare professional's instructions.    Back Exercises: Back Press  Do this exercise on your hands and knees. Keep your knees under your hips and your hands under your shoulders. Keep your spine in a neutral position (not arched or sagging). Be sure to maintain your necks natural curve.  · Tighten your abdominal and buttocks muscles to press your back upward. Let your head drop slightly.  · Hold for 5 seconds. Return to starting position.  · Repeat 5 times.     © 6611-7660 [facility], [address] [place] [address]. All rights reserved. This information is not intended as a substitute for professional medical care. Always follow your healthcare professional's instructions.    Back Exercises: Back Release  Do this exercise on your hands and knees. Keep your knees under your hips and your hands under your shoulders. Keep your spine in a neutral position (not arched or sagging). Be sure to maintain your necks natural curve.  · Relax your abdominal and buttocks muscles, lift your head, and let your back sag. Be sure to keep your weight evenly distributed. Dont sit back on your hips.   · Hold for 5 seconds.  · Return to starting position.  · Repeat 5 times.  © 2000-2013 [facility], [address] [place] [address]. All rights reserved. This information is not  intended as a substitute for professional medical care. Always follow your healthcare professional's instructions.    Back Exercises: Bridge  The Bridge exercise strengthens your abdominal, buttocks, and hamstring muscles. This helps keep your back stable and aligned when you walk.  · Lie on the floor with your back and palms flat. Bend your knees. Keep your feet flat on the floor.  · Contract your abdominal and buttocks muscles. Slowly lift your buttocks off the floor until there is a straight line from your knees to your shoulders.  · Hold for 5  seconds. Repeat 10 times.    © 6436-0695 [place] [address]. All rights reserved. This information is not intended as a substitute for professional medical care. Always follow your healthcare professional's instructions.    Back Exercises: Elbow Press    To start, lie face down on your stomach, feet slightly apart, forehead on the floor. Breathe deeply. You should feel comfortable and relaxed in this position.  · Press up on your forearms. Keep your abdomen and hips on the floor.  · Hold for 20 seconds. Lower slowly.  · Repeat 2 times.  · Return to starting position.  © 5964-5159 [place] [address]. All rights reserved. This information is not intended as a substitute for professional medical care. Always follow your healthcare professional's instructions.    Back Exercises: Pelvic Tilt  To start, lie on your back with your knees bent and feet flat on the floor. Dont press your neck or lower back to the floor. Breathe deeply. You should feel comfortable and relaxed in this position.  · Tighten your abdomen and buttocks, and press your lower back toward the floor. This should be a small, subtle movement.  · Hold for 5 seconds. Release.  · Repeat 5 times.         © 2000-2013 [facility], [address] [place] [address]. All rights reserved. This information is not intended as a  substitute for professional medical care. Always follow your healthcare professional's instructions.    Back Exercises: Partial Curl-Ups          To start, lie on your back with your knees bent and feet flat on the floor. Dont press your neck or lower back to the floor. Breathe deeply. You should feel comfortable and relaxed in this position.  · Cross your arms loosely.  · Tighten your abdomen and curl FPC up, keeping your head in line with your shoulders.  · Hold for 5 seconds. Uncurl to lie down.  · Repeat 5 times.   © 6368-0452 [facility], [address] [place] [address]. All rights reserved. This information is not intended as a substitute for professional medical care. Always follow your healthcare professional's instructions.    Back Exercises: Lower Back Stretch                            To start, sit in a chair with your feet flat on the floor. Shift your weight slightly forward to avoid rounding your back. Relax, and keep your ears, shoulders, and hips aligned.  · Sit with your feet well apart.  · Bend forward and touch the floor with the backs of your hands. Relax and let your body drop.  · Hold for 20 seconds. Return to starting position.  · Repeat 2 times.   © 1096-3792 [facility], [address] [place] [address]. All rights reserved. This information is not intended as a substitute for professional medical care. Always follow your healthcare professional's instructions.    Back Exercises: Seated Rotation      To start, sit in a chair with your feet flat on the floor. Shift your weight slightly forward to avoid rounding your back. Relax, and keep your ears, shoulders, and hips aligned.  · Fold your arms, elbows just below shoulder height.  · Turn from the waist with hips forward. Turn your head last.  · Hold for a count of 5. Return to starting position.  · Repeat 5 times on one side. Then switch sides.  © 0092-9177 [facility], [address] [place] [address]  [address]. All rights reserved. This information is not intended as a substitute for professional medical care. Always follow your healthcare professional's instructions.    Back Exercises: Side Stretch  To start, sit in a chair with your feet flat on the floor. Shift your weight slightly forward to avoid rounding your back. Relax. Keep your ears, shoulders, and hips aligned.  · Stretch your right arm overhead.  · Slowly bend to the left. Dont twist your torso.  · Hold for 20 seconds. Return to starting position.  · Repeat 2 times. Then, switch to the other side.  © 2743-3453 [facility], [address] [place] [address]. All rights reserved. This information is not intended as a substitute for professional medical care. Always follow your healthcare professional's instructions         Language Assistance Services     ATTENTION: Language assistance services are available, free of charge. Please call [phone].      ATENCIÓN: Si habla español, tiene a oscar disposición servicios gratuitos de asistencia lingüística. Llame al [phone].     MICHELLE Ý: N?u b?n nói Ti?ng Vi?t, có các d?ch v? h? tr? ngôn ng? mi?n phí dành cho b?n. G?i s? [phone].         [facility] complies with applicable Federal civil rights laws and does not discriminate on the basis of race, color, national origin, age, disability, or sex.

## 2017-04-24 NOTE — PATIENT INSTRUCTIONS
Neck/Back Pain [General]    Both neck and back pain are usually caused by injury to the muscles or ligaments of the spine. Sometimes the disks that separate each bone of the spine may cause pain by putting pressure on a nearby nerve. Back and neck pain may appear after a sudden twisting/bending force (such as in a car accident), or sometimes after a simple awkward movement. In either case, muscle spasm is often present and adds to the pain.  Acute neck and back pain usually gets better in one to two weeks. Pain related to disk disease, arthritis in the spinal joints or spinal stenosis (narrowing of the spinal canal) can become chronic and last for months or years.  Home Care:  · FOR NECK PAIN: Use a comfortable pillow that supports the head and keeps the spine in a neutral position. The position of the head should not be tilted forward or backward.  · FOR BACK PAIN: You may need to stay in bed the first few days. But, as soon as possible, begin sitting or walking to avoid problems with prolonged bed rest (muscle weakness, worsening back stiffness and pain, blood clots in the legs).  · When in bed, try to find a position of comfort. A firm mattress is best. Try lying flat on your back with pillows under your knees. You can also try lying on your side with your knees bent up towards your chest and a pillow between your knees.  · Avoid prolonged sitting. This puts more stress on the lower back than standing or walking.  · During the first two days after injury, apply an ICE PACK to the painful area for 20 minutes every 2-4 hours. This will reduce swelling and pain. HEAT (hot shower, hot bath or heating pad) works well for muscle spasm. You can start with ice, then switch to heat after two days. Some patients feel best alternating ice and heat treatments. Use the one method that feels the best to you.  · You may use acetaminophen (Tylenol) or ibuprofen (Motrin, Advil) to control pain, unless another pain medicine was  prescribed. [NOTE: If you have chronic liver or kidney disease or ever had a stomach ulcer or GI bleeding, talk with your doctor before using these medicines.]  · Be aware of safe lifting methods and do not lift anything over 15 pounds until all the pain is gone.  Follow Up  with your physician or this facility if your symptoms do not start to improve after one week. Physical therapy or further tests may be needed.  [NOTE: If X-rays were taken, they will be reviewed by a radiologist. You will be notified of any new findings that may affect your care.]  Get Prompt Medical Attention  if any of the following occur:  · Pain becomes worse or spreads into your arms or legs  · Weakness, numbness or pain in one or both arms or legs  · Loss of bowel or bladder control  · Numbness in the groin area  · Difficulty walking  · Fever of 100.4ºF (38ºC) or higher, or as directed by your healthcare provider  © 3418-2447 [facility], [address] [place] [address]. All rights reserved. This information is not intended as a substitute for professional medical care. Always follow your healthcare professional's instructions.    Back Exercises: Back Press  Do this exercise on your hands and knees. Keep your knees under your hips and your hands under your shoulders. Keep your spine in a neutral position (not arched or sagging). Be sure to maintain your necks natural curve.  · Tighten your abdominal and buttocks muscles to press your back upward. Let your head drop slightly.  · Hold for 5 seconds. Return to starting position.  · Repeat 5 times.     © 1470-2331 [facility], [address] [place] [address]. All rights reserved. This information is not intended as a substitute for professional medical care. Always follow your healthcare professional's instructions.    Back Exercises: Back Release  Do this exercise on your hands and knees. Keep your knees under your hips and your hands under your shoulders. Keep  your spine in a neutral position (not arched or sagging). Be sure to maintain your necks natural curve.  · Relax your abdominal and buttocks muscles, lift your head, and let your back sag. Be sure to keep your weight evenly distributed. Dont sit back on your hips.   · Hold for 5 seconds.  · Return to starting position.  · Repeat 5 times.  © 1951-2566 [facility] [place] [address]. All rights reserved. This information is not intended as a substitute for professional medical care. Always follow your healthcare professional's instructions.    Back Exercises: Bridge  The Bridge exercise strengthens your abdominal, buttocks, and hamstring muscles. This helps keep your back stable and aligned when you walk.  · Lie on the floor with your back and palms flat. Bend your knees. Keep your feet flat on the floor.  · Contract your abdominal and buttocks muscles. Slowly lift your buttocks off the floor until there is a straight line from your knees to your shoulders.  · Hold for 5  seconds. Repeat 10 times.    © 0055-0175 [place] [address]. All rights reserved. This information is not intended as a substitute for professional medical care. Always follow your healthcare professional's instructions.    Back Exercises: Elbow Press    To start, lie face down on your stomach, feet slightly apart, forehead on the floor. Breathe deeply. You should feel comfortable and relaxed in this position.  · Press up on your forearms. Keep your abdomen and hips on the floor.  · Hold for 20 seconds. Lower slowly.  · Repeat 2 times.  · Return to starting position.  © 0165-1047 [facility], [address] [place] [address]. All rights reserved. This information is not intended as a substitute for professional medical care. Always follow your healthcare professional's instructions.    Back Exercises: Pelvic Tilt  To start, lie on your back with your knees bent and  feet flat on the floor. Dont press your neck or lower back to the floor. Breathe deeply. You should feel comfortable and relaxed in this position.  · Tighten your abdomen and buttocks, and press your lower back toward the floor. This should be a small, subtle movement.  · Hold for 5 seconds. Release.  · Repeat 5 times.         © 8730-9151 [facility], [address] [place] [address]. All rights reserved. This information is not intended as a substitute for professional medical care. Always follow your healthcare professional's instructions.    Back Exercises: Partial Curl-Ups          To start, lie on your back with your knees bent and feet flat on the floor. Dont press your neck or lower back to the floor. Breathe deeply. You should feel comfortable and relaxed in this position.  · Cross your arms loosely.  · Tighten your abdomen and curl senior living up, keeping your head in line with your shoulders.  · Hold for 5 seconds. Uncurl to lie down.  · Repeat 5 times.   © 0297-7400 [facility], [address] [place] [address]. All rights reserved. This information is not intended as a substitute for professional medical care. Always follow your healthcare professional's instructions.    Back Exercises: Lower Back Stretch                            To start, sit in a chair with your feet flat on the floor. Shift your weight slightly forward to avoid rounding your back. Relax, and keep your ears, shoulders, and hips aligned.  · Sit with your feet well apart.  · Bend forward and touch the floor with the backs of your hands. Relax and let your body drop.  · Hold for 20 seconds. Return to starting position.  · Repeat 2 times.   © 0313-1932 [facility], [address] [place] [address]. All rights reserved. This information is not intended as a substitute for professional medical care. Always follow your healthcare professional's instructions.    Back Exercises: Seated Rotation      To  start, sit in a chair with your feet flat on the floor. Shift your weight slightly forward to avoid rounding your back. Relax, and keep your ears, shoulders, and hips aligned.  · Fold your arms, elbows just below shoulder height.  · Turn from the waist with hips forward. Turn your head last.  · Hold for a count of 5. Return to starting position.  · Repeat 5 times on one side. Then switch sides.  © 5510-5076 [facility], [address] [place] [address]. All rights reserved. This information is not intended as a substitute for professional medical care. Always follow your healthcare professional's instructions.    Back Exercises: Side Stretch  To start, sit in a chair with your feet flat on the floor. Shift your weight slightly forward to avoid rounding your back. Relax. Keep your ears, shoulders, and hips aligned.  · Stretch your right arm overhead.  · Slowly bend to the left. Dont twist your torso.  · Hold for 20 seconds. Return to starting position.  · Repeat 2 times. Then, switch to the other side.  © 9815-4430 [facility], [address] [place] [address]. All rights reserved. This information is not intended as a substitute for professional medical care. Always follow your healthcare professional's instructions

## 2017-04-26 ENCOUNTER — HOSPITAL ENCOUNTER (OUTPATIENT)
Dept: CARDIOLOGY | Facility: CLINIC | Age: 42
Discharge: HOME | End: 2017-04-26
Payer: COMMERCIAL

## 2017-04-26 ENCOUNTER — PATIENT MESSAGE (OUTPATIENT)
Dept: ELECTROPHYSIOLOGY | Facility: CLINIC | Age: 42
End: 2017-04-26

## 2017-04-26 ENCOUNTER — OFFICE VISIT (OUTPATIENT)
Dept: ELECTROPHYSIOLOGY | Facility: CLINIC | Age: 42
End: 2017-04-26
Payer: COMMERCIAL

## 2017-04-26 VITALS
BODY MASS INDEX: 30.71 KG/M2 | WEIGHT: 214.5 LBS | HEART RATE: 71 BPM | SYSTOLIC BLOOD PRESSURE: 128 MMHG | HEIGHT: 70 IN | DIASTOLIC BLOOD PRESSURE: 81 MMHG

## 2017-04-26 DIAGNOSIS — R55 SYNCOPE, UNSPECIFIED SYNCOPE TYPE: ICD-10-CM

## 2017-04-26 DIAGNOSIS — R55 SITUATIONAL SYNCOPE: Primary | ICD-10-CM

## 2017-04-26 PROCEDURE — 1160F RVW MEDS BY RX/DR IN RCRD: CPT | Mod: S$GLB,,, | Performed by: INTERNAL MEDICINE

## 2017-04-26 PROCEDURE — 99214 OFFICE O/P EST MOD 30 MIN: CPT | Mod: S$GLB,,, | Performed by: INTERNAL MEDICINE

## 2017-04-26 PROCEDURE — 99999 PR PBB SHADOW E&M-EST. PATIENT-LVL III: CPT | Mod: PBBFAC,,, | Performed by: INTERNAL MEDICINE

## 2017-04-26 PROCEDURE — 93000 ELECTROCARDIOGRAM COMPLETE: CPT | Mod: S$GLB,,, | Performed by: INTERNAL MEDICINE

## 2017-04-26 NOTE — PROGRESS NOTES
Subjective:   Patient ID:  Sanchez Lomeli is a 41 y.o. male     Chief complaint: laugh syncope    HPI  Background:  I saw him for Sx c/w laugh syncope and we gave him a Zio patch to differentiate between pause related laugh syncope and hypotension related laugh syncope as a a mechanism.    Update since then:  Zio patch results not back yet.   I have reviewed the actual image of the ECG tracing obtained today and it shows NSR with normal intervals  He did not have a full episode while he was with the Zio patch.   He did have a laughing episode once but no attendant syncope, N/S.   His gets N/S once or twice a month and he passed out completely only once.     Current Outpatient Prescriptions   Medication Sig    gabapentin (NEURONTIN) 600 MG tablet Take 1 tablet (600 mg total) by mouth 3 (three) times daily.    hydrocodone-acetaminophen 10-325mg (NORCO)  mg Tab Take 1 tablet by mouth 3 (three) times daily as needed.    meloxicam (MOBIC) 15 MG tablet Take 1 tablet (15 mg total) by mouth once daily.    methylPREDNISolone (MEDROL DOSEPACK) 4 mg tablet use as directed    oxycodone-acetaminophen (PERCOCET) 5-325 mg per tablet Take one to two tablets by mouth every four hours as needed for pain    tramadol (ULTRAM-ER) 100 MG Tb24 Take 100 mg by mouth daily as needed.     theophylline 400 mg 24 hr capsule Take 400 mg by mouth once daily.     No current facility-administered medications for this visit.      Review of Systems   Constitution: Negative for decreased appetite, weakness, malaise/fatigue, weight gain and weight loss.   HENT: Negative for headaches.    Eyes: Negative for blurred vision.   Cardiovascular: Negative for chest pain, claudication, cyanosis, dyspnea on exertion, irregular heartbeat, leg swelling, near-syncope, orthopnea and palpitations.   Respiratory: Negative for cough, shortness of breath, sleep disturbances due to breathing, snoring and wheezing.    Endocrine: Negative for  heat intolerance.   Hematologic/Lymphatic: Does not bruise/bleed easily.   Musculoskeletal: Negative for muscle weakness and myalgias.   Gastrointestinal: Negative for melena, nausea and vomiting.   Genitourinary: Negative for nocturia.   Neurological: Positive for dizziness. Negative for excessive daytime sleepiness and light-headedness.        Very little   Psychiatric/Behavioral: Negative for depression, memory loss and substance abuse. The patient does not have insomnia and is not nervous/anxious.        Objective:   Physical Exam   Constitutional: He is oriented to person, place, and time. He appears well-developed and well-nourished.   HENT:   Head: Normocephalic and atraumatic.   Right Ear: External ear normal.   Left Ear: External ear normal.   Eyes: Conjunctivae are normal. Pupils are equal, round, and reactive to light. Right eye exhibits no discharge. Left eye exhibits no discharge. Right conjunctiva is not injected. Left conjunctiva has no hemorrhage.   Neck: Neck supple. No JVD present. No thyromegaly present.   Cardiovascular: Normal rate, regular rhythm, normal heart sounds and intact distal pulses.  PMI is not displaced.  Exam reveals no gallop, no friction rub, no midsystolic click and no opening snap.    No murmur heard.  Pulses:       Carotid pulses are 2+ on the right side, and 2+ on the left side.       Radial pulses are 2+ on the right side, and 2+ on the left side.        Dorsalis pedis pulses are 2+ on the right side, and 2+ on the left side.        Posterior tibial pulses are 2+ on the right side, and 2+ on the left side.   Pulmonary/Chest: Effort normal and breath sounds normal. No respiratory distress. He has no wheezes. He has no rales. He exhibits no tenderness.   Abdominal: Soft. Normal appearance. He exhibits no pulsatile liver. There is no hepatomegaly. There is no tenderness. There is no rebound and no guarding.   Musculoskeletal: Normal range of motion. He exhibits no edema or  "tenderness.        Right knee: He exhibits no swelling.        Left knee: He exhibits no swelling.        Right ankle: He exhibits no swelling.        Left ankle: He exhibits no swelling.        Right lower leg: He exhibits no swelling.        Left lower leg: He exhibits no swelling.        Right foot: There is no swelling.        Left foot: There is no swelling.   Neurological: He is alert and oriented to person, place, and time. He has normal strength and normal reflexes. No cranial nerve deficit. Coordination normal.   Skin: Skin is warm, dry and intact. No rash noted. He is not diaphoretic. No cyanosis.   Psychiatric: He has a normal mood and affect. His behavior is normal.   Nursing note and vitals reviewed.    /81  Pulse 71  Ht 5' 10" (1.778 m)  Wt 97.3 kg (214 lb 8.1 oz)  BMI 30.78 kg/m2     Assessment:      1. Situational syncope        Plan:    We will give him a Rxc trial with aminophylline.  Orders Placed This Encounter   Procedures    THEOPHYLLINE LEVEL     Standing Status:   Future     Standing Expiration Date:   6/25/2018     Return in about 6 months (around 10/26/2017), or if symptoms worsen or fail to improve, for darius cardenas.  There are no discontinued medications.  New Prescriptions    THEOPHYLLINE 400 MG 24 HR CAPSULE    Take 400 mg by mouth once daily.     Modified Medications    No medications on file              "

## 2017-04-26 NOTE — MR AVS SNAPSHOT
[facility]  [address]  [facility] [address]  Phone: [phone]  Fax: [phone]                  Sanchez Lomeli   2017 9:20 AM   Office Visit    Description:  Male : 1975   Provider:  Zana Villalpando MD   Department:  [facility]           Reason for Visit     Loss of Consciousness           Diagnoses this Visit        Comments    Situational syncope    -  Primary            To Do List           Future Appointments        Provider Department Dept Phone    2017 8:00 AM GHASSAN [facility] - Gastroenterology [phone]    2017 9:30 AM MD [facility] - [facility]rology 4th Floor [phone]    5/10/2017 7:15 AM LAB, SAME DAY [facility] [phone]    2017 4:20 PM Edward Liu MD [facility]-Physical Med [facility] Re[facility] [phone]      Goals (5 Years of Data)     None      Follow-Up and Disposition     Return in about 6 months (around 10/26/2017), or if symptoms worsen or fail to improve, for darius cardenas.    Follow-up and Disposition History       These Medications        Disp Refills Start End    theophylline 400 mg 24 hr capsule 30 capsule 11 2017    Take 400 mg by mouth once daily. - Oral    Pharmacy: [id number] - [place] - [address]  #: [phone]         [facility]     [facility] Care Line -  Assistance  Unless otherwise directed by your provider, please contact [facility], our nurse care line that is available for  assistance.     Registered nurses in the [facility] provide: appointment scheduling, clinical advisement, health education, and other advisory services.  Call: [phone] (toll free)               Medications           Message regarding Medications     Verify the changes and/or additions to your medication regime listed below are the same as discussed with your clinician today.  If any  "of these changes or additions are incorrect, please notify your healthcare provider.        START taking these NEW medications        Refills    theophylline 400 mg 24 hr capsule 11    Sig: Take 400 mg by mouth once daily.    Class: Normal    Route: Oral           Verify that the below list of medications is an accurate representation of the medications you are currently taking.  If none reported, the list may be blank. If incorrect, please contact your healthcare provider. Carry this list with you in case of emergency.           Current Medications     gabapentin (NEURONTIN) 600 MG tablet Take 1 tablet (600 mg total) by mouth 3 (three) times daily.    hydrocodone-acetaminophen 10-325mg (NORCO)  mg Tab Take 1 tablet by mouth 3 (three) times daily as needed.    meloxicam (MOBIC) 15 MG tablet Take 1 tablet (15 mg total) by mouth once daily.    methylPREDNISolone (MEDROL DOSEPACK) 4 mg tablet use as directed    oxycodone-acetaminophen (PERCOCET) 5-325 mg per tablet Take one to two tablets by mouth every four hours as needed for pain    theophylline 400 mg 24 hr capsule Take 400 mg by mouth once daily.    tramadol (ULTRAM-ER) 100 MG Tb24 Take 100 mg by mouth daily as needed.            Clinical Reference Information           Your Vitals Were     BP Pulse Height Weight BMI    128/81 71 5' 10" (1.778 m) 97.3 kg (214 lb 8.1 oz) 30.78 kg/m2      Blood Pressure          Most Recent Value    BP  128/81      Allergies as of 4/26/2017     No Known Allergies      Immunizations Administered on Date of Encounter - 4/26/2017     None      Orders Placed During Today's Visit     Future Labs/Procedures Expected by Expires    THEOPHYLLINE LEVEL  5/10/2017 6/25/2018      Language Assistance Services     ATTENTION: Language assistance services are available, free of charge. Please call [phone].      ATENCIÓN: Si habla español, tiene a oscar disposición servicios gratuitos de asistencia lingüística. Llame al " [phone].     MICHELLE Ý: N?u b?n nói Ti?ng Vi?t, có các d?ch v? h? tr? ngôn ng? mi?n phí dành cho b?n. G?i s? [phone].         [facility] complies with applicable Federal civil rights laws and does not discriminate on the basis of race, color, national origin, age, disability, or sex.

## 2017-05-01 ENCOUNTER — OFFICE VISIT (OUTPATIENT)
Dept: GASTROENTEROLOGY | Facility: CLINIC | Age: 42
End: 2017-05-01
Payer: COMMERCIAL

## 2017-05-01 ENCOUNTER — OFFICE VISIT (OUTPATIENT)
Dept: UROLOGY | Facility: CLINIC | Age: 42
End: 2017-05-01
Payer: COMMERCIAL

## 2017-05-01 ENCOUNTER — PATIENT MESSAGE (OUTPATIENT)
Dept: PHYSICAL MEDICINE AND REHAB | Facility: CLINIC | Age: 42
End: 2017-05-01

## 2017-05-01 ENCOUNTER — PATIENT MESSAGE (OUTPATIENT)
Dept: GASTROENTEROLOGY | Facility: CLINIC | Age: 42
End: 2017-05-01

## 2017-05-01 VITALS
WEIGHT: 216.06 LBS | SYSTOLIC BLOOD PRESSURE: 123 MMHG | HEIGHT: 70 IN | BODY MASS INDEX: 30.93 KG/M2 | DIASTOLIC BLOOD PRESSURE: 74 MMHG | HEART RATE: 60 BPM

## 2017-05-01 VITALS
BODY MASS INDEX: 30.93 KG/M2 | WEIGHT: 216.06 LBS | SYSTOLIC BLOOD PRESSURE: 122 MMHG | DIASTOLIC BLOOD PRESSURE: 83 MMHG | HEART RATE: 69 BPM | HEIGHT: 70 IN

## 2017-05-01 DIAGNOSIS — R19.7 DIARRHEA OF PRESUMED INFECTIOUS ORIGIN: ICD-10-CM

## 2017-05-01 DIAGNOSIS — K62.5 RECTAL BLEEDING: Primary | ICD-10-CM

## 2017-05-01 DIAGNOSIS — M51.36 DDD (DEGENERATIVE DISC DISEASE), LUMBAR: ICD-10-CM

## 2017-05-01 DIAGNOSIS — G89.29 CHRONIC MIDLINE LOW BACK PAIN WITH RIGHT-SIDED SCIATICA: Primary | ICD-10-CM

## 2017-05-01 DIAGNOSIS — N47.1 PHIMOSIS: Primary | ICD-10-CM

## 2017-05-01 DIAGNOSIS — K62.89 RECTAL PAIN: ICD-10-CM

## 2017-05-01 DIAGNOSIS — M54.16 RIGHT LUMBAR RADICULOPATHY: ICD-10-CM

## 2017-05-01 DIAGNOSIS — M54.41 CHRONIC MIDLINE LOW BACK PAIN WITH RIGHT-SIDED SCIATICA: Primary | ICD-10-CM

## 2017-05-01 PROCEDURE — 1160F RVW MEDS BY RX/DR IN RCRD: CPT | Mod: S$GLB,,, | Performed by: PHYSICIAN ASSISTANT

## 2017-05-01 PROCEDURE — 1160F RVW MEDS BY RX/DR IN RCRD: CPT | Mod: S$GLB,,, | Performed by: UROLOGY

## 2017-05-01 PROCEDURE — 99204 OFFICE O/P NEW MOD 45 MIN: CPT | Mod: S$GLB,,, | Performed by: PHYSICIAN ASSISTANT

## 2017-05-01 PROCEDURE — 99999 PR PBB SHADOW E&M-EST. PATIENT-LVL III: CPT | Mod: PBBFAC,,, | Performed by: PHYSICIAN ASSISTANT

## 2017-05-01 PROCEDURE — 99999 PR PBB SHADOW E&M-EST. PATIENT-LVL III: CPT | Mod: PBBFAC,,, | Performed by: UROLOGY

## 2017-05-01 PROCEDURE — 99214 OFFICE O/P EST MOD 30 MIN: CPT | Mod: S$GLB,,, | Performed by: UROLOGY

## 2017-05-01 NOTE — LETTER
May 1, 2017      Liliam Carbajal MD  [address] [facility] [address]           [facility] - Gastroenterology  [address]  [facility] [address]  Phone: [phone]  Fax: [phone]          Patient: Sanchez Lomeli   MR Number: [id number]   YOB: 1975   Date of Visit: 5/1/2017       Dear Dr. Liliam Carbajal:    Thank you for referring Sanchez Lomeli to me for evaluation. Attached you will find relevant portions of my assessment and plan of care.    If you have questions, please do not hesitate to call me. I look forward to following Sanchez Lomeli along with you.    Sincerely,    GHASSAN Hare  CC:  No Recipients    If you would like to receive this communication electronically, please contact [email or web address] or [phone] to request more information on [email or web address] access.    For providers and/or their staff who would like to refer a patient to [facility], please contact us through our one-stop-shop provider referral line, [facility], at [phone].    If you feel you have received this communication in error or would no longer like to receive these types of communications, please e-mail [email or web address]

## 2017-05-01 NOTE — PROGRESS NOTES
[facility] Consultation Note    Reason for Consult:  The primary encounter diagnosis was Rectal bleeding. Diagnoses of Diarrhea of presumed infectious origin and Rectal pain were also pertinent to this visit.    PCP:   Liliam Carbajal   [address] [facility] [address]    Referring MD:  Liliam Carbajal Md  [address] [place] [address]    HPI:  This is a 41 y.o. male here for evaluation of diarrhea and rectal bleeding.  He is here today with his wife who helped translate. He does speak some english.  He reports having intermittent diarrhea x 3yrs  Associated abdominal pain after BM, intermittently  Intermittently seeing blood on the tissue with wiping  Intermittent rectal pain after BM  Normal bowel habits for him is 3 BM daily  +gas  No international travel, antibiotics, or food poisoning prior to symptoms starting    Recent went to [place], but symptoms had started prior to this    Food triggers - evaporated milk and whole milk    No Fhx of GI cancers or IBD    Drinks 1 gallon of water  No artifical sweeteners    ROS:  Constitutional: No fevers, chills, no weight loss  ENT: No allergies  CV: No chest pain  Pulm: No cough, No shortness of breath  Ophtho: No vision changes  GI: see HPI  Derm: No rash  Heme: No lymphadenopathy, No bruising  MSK: No arthritis  : No dysuria, No hematuria  Endo: No hot or cold intolerance  Neuro: No syncope, No seizure  Psych: No anxiety, No depression    Medical History:  has a past medical history of Chronic back pain; Chronic diarrhea; Hernia; and TBI (traumatic brain injury) (2010).    Surgical History:  has a past surgical history that includes Hernia repair (2017).    Family History: family history includes Diabetes in his mother. There is no history of Heart attack, Heart disease, Hypertension, Colon cancer, or Esophageal cancer..     Social History:  reports that he quit smoking about 10 years ago. He has never used smokeless  "tobacco. He reports that he drinks alcohol. He reports that he does not use illicit drugs.    Review of patient's allergies indicates:  No Known Allergies    Current Outpatient Prescriptions on File Prior to Visit   Medication Sig Dispense Refill    gabapentin (NEURONTIN) 600 MG tablet Take 1 tablet (600 mg total) by mouth 3 (three) times daily. 90 tablet 2    hydrocodone-acetaminophen 10-325mg (NORCO)  mg Tab Take 1 tablet by mouth 3 (three) times daily as needed. 90 tablet 0    meloxicam (MOBIC) 15 MG tablet Take 1 tablet (15 mg total) by mouth once daily.      oxycodone-acetaminophen (PERCOCET) 5-325 mg per tablet Take one to two tablets by mouth every four hours as needed for pain 51 tablet 0    theophylline 400 mg 24 hr capsule Take 400 mg by mouth once daily. 30 capsule 11    [DISCONTINUED] methylPREDNISolone (MEDROL DOSEPACK) 4 mg tablet use as directed 1 Package 0    [DISCONTINUED] tramadol (ULTRAM-ER) 100 MG Tb24 Take 100 mg by mouth daily as needed.        No current facility-administered medications on file prior to visit.          Objective Findings:    Vital Signs:  /83  Pulse 69  Ht 5' 10" (1.778 m)  Wt 98 kg (216 lb 0.8 oz)  BMI 31 kg/m2  Body mass index is 31 kg/(m^2).    Physical Exam:  General Appearance: Well appearing in no acute distress  Head:   Normocephalic, without obvious abnormality  Eyes:    No scleral icterus  ENT: Neck supple, Lips, mucosa, and tongue normal  Lungs: CTA bilaterally in anterior and posterior fields, no wheezes, no crackles.  Heart:  Regular rate and rhythm, S1, S2 normal, no murmurs heard  Abdomen: Soft, non tender, non distended with positive bowel sounds in all four quadrants. Extremities: no edema  Skin: No rash  Neurologic: AAO x 3      Labs:  Lab Results   Component Value Date    WBC 10.26 02/09/2017    HGB 16.3 02/09/2017    HCT 46.0 02/09/2017     02/09/2017    CHOL 151 01/05/2017    TRIG 121 01/05/2017    HDL 25 (L) 01/05/2017    ALT " 51 (H) 02/09/2017    AST 39 02/09/2017     02/09/2017    K 4.1 02/09/2017     02/09/2017    CREATININE 1.0 02/09/2017    BUN 19 02/09/2017    CO2 24 02/09/2017    INR 1.0 02/09/2017       Imaging:    Endoscopy:    none  Assessment:  1. Rectal bleeding    2. Diarrhea of presumed infectious origin    3. Rectal pain       Intermittent diarrhea and rectal bleeding x 3 yrs.    Recommendations:  1. Schedule colonoscopy to rule out malignancies, ulcers, and sources of bleeding    2. Stool studies to rule out infection    3. Labs to rule out inflammation and thyroid disease    4. Start a probiotic and fiber supplement    5. Advised making note of food triggers    If unremarkable advise avoiding lactose.    Return in about 2 months (around 7/1/2017).      Order summary:  Orders Placed This Encounter    Stool culture    Stool Exam-Ova,Cysts,Parasites    Stool Exam-Ova,Cysts,Parasites    Stool Exam-Ova,Cysts,Parasites    Giardia / Cryptosporidum, EIA    Giardia / Cryptosporidum, EIA    Giardia / Cryptosporidum, EIA    C-reactive protein    TSH    Case request GI: COLONOSCOPY         Thank you so much for allowing me to participate in the care of Sanchez Ordonezjudy Stein PA-C

## 2017-05-01 NOTE — PATIENT INSTRUCTIONS
Start a probiotic    Try starting a fiber supplement, start once daily, then increase on a biweekly basis

## 2017-05-01 NOTE — MR AVS SNAPSHOT
[facility]  [address]  [place] [address]  Phone: [phone]  Fax: [phone]                  Sanchez Lomeli   2017 8:00 AM   Office Visit    Description:  Male : 1975   Provider:  Augustus Stein PA-C   Department:  [facility]           Reason for Visit     GI Problem     Diarrhea           Diagnoses this Visit        Comments    Rectal bleeding    -  Primary     Diarrhea of presumed infectious origin         Colon cancer screening                To Do List           Future Appointments        Provider Department Dept Phone    2017 8:40 AM LAB, APPOINTMENT [place] [facility] [phone]    2017 9:30 AM Bhupinder Washington Jr., MD [facility] 4th Floor [phone]    5/10/2017 7:15 AM LAB, SAME DAY [facility] [phone]    2017 4:20 PM Edward Liu MD [facility] [phone]    7/3/2017 10:00 AM Augustus Stein PA-C [facility] [phone]      Goals (5 Years of Data)     None      [facility]     [facility] Care Line -  Assistance  Unless otherwise directed by your provider, please contact [facility], our nurse care line that is available for  assistance.     Registered nurses in the [facility] provide: appointment scheduling, clinical advisement, health education, and other advisory services.  Call: [phone] (toll free)               Medications           Message regarding Medications     Verify the changes and/or additions to your medication regime listed below are the same as discussed with your clinician today.  If any of these changes or additions are incorrect, please notify your healthcare provider.             Verify that the below list of medications is an accurate representation of the medications you are currently taking.  If none reported, the list may be blank.  "If incorrect, please contact your healthcare provider. Carry this list with you in case of emergency.           Current Medications     gabapentin (NEURONTIN) 600 MG tablet Take 1 tablet (600 mg total) by mouth 3 (three) times daily.    hydrocodone-acetaminophen 10-325mg (NORCO)  mg Tab Take 1 tablet by mouth 3 (three) times daily as needed.    meloxicam (MOBIC) 15 MG tablet Take 1 tablet (15 mg total) by mouth once daily.    methylPREDNISolone (MEDROL DOSEPACK) 4 mg tablet use as directed    oxycodone-acetaminophen (PERCOCET) 5-325 mg per tablet Take one to two tablets by mouth every four hours as needed for pain    theophylline 400 mg 24 hr capsule Take 400 mg by mouth once daily.    tramadol (ULTRAM-ER) 100 MG Tb24 Take 100 mg by mouth daily as needed.            Clinical Reference Information           Your Vitals Were     BP Pulse Height Weight BMI    122/83 69 5' 10" (1.778 m) 98 kg (216 lb 0.8 oz) 31 kg/m2      Blood Pressure          Most Recent Value    BP  122/83      Allergies as of 5/1/2017     No Known Allergies      Immunizations Administered on Date of Encounter - 5/1/2017     None      Orders Placed During Today's Visit      Normal Orders This Visit    Case request GI: COLONOSCOPY     Future Labs/Procedures Expected by Expires    C-reactive protein  5/1/2017 6/30/2018    Giardia / Cryptosporidum, EIA  5/1/2017 5/1/2018    Giardia / Cryptosporidum, EIA  5/1/2017 5/1/2018    Giardia / Cryptosporidum, EIA  5/1/2017 5/1/2018    Stool culture  5/1/2017 5/1/2018    Stool Exam-Ova,Cysts,Parasites  5/1/2017 5/1/2018    Stool Exam-Ova,Cysts,Parasites  5/1/2017 5/1/2018    Stool Exam-Ova,Cysts,Parasites  5/1/2017 5/1/2018      Instructions    Start a probiotic    Try starting a fiber supplement, start once daily, then increase on a biweekly basis       Language Assistance Services     ATTENTION: Language assistance services are available, free of charge. Please call [phone].      ATENCIÓN: Si " habla español, tiene a oscar disposición servicios gratuitos de asistencia lingüística. Lledie al [phone].     CHÚ Ý: N?u b?n nói Ti?ng Vi?t, có các d?ch v? h? tr? ngôn ng? mi?n phí dành cho b?n. G?i s? [phone].         [facility] complies with applicable Federal civil rights laws and does not discriminate on the basis of race, color, national origin, age, disability, or sex.

## 2017-05-01 NOTE — PROGRESS NOTES
Subjective:       Patient ID: Sanchez Lomeli is a 41 y.o. male.    Chief Complaint: testicular/scrotal pain. (annual check up pt had a ct/scan on 02/09/2017.)    HPI patient is here for evaluation of possible circumcision.  He has mild phimosis.  He also has significant curvature of his penis that bothers his wife.  No history of penile trauma.  He is also seeing someone about a herniated disc and he feels that the pain radiates to his no lower urinary tract symptoms.  No fever chills nausea or vomiting.  He is not a diabetic    Past Medical History:   Diagnosis Date    Chronic back pain     Chronic diarrhea     Hernia     TBI (traumatic brain injury) 2010       Past Surgical History:   Procedure Laterality Date    HERNIA REPAIR  2017    umbilical       Family History   Problem Relation Age of Onset    Diabetes Mother     Heart attack Neg Hx     Heart disease Neg Hx     Hypertension Neg Hx     Colon cancer Neg Hx     Esophageal cancer Neg Hx        Social History     Social History    Marital status:      Spouse name: N/A    Number of children: N/A    Years of education: N/A     Occupational History    self employed      Social History Main Topics    Smoking status: Former Smoker     Quit date: 1/1/2007    Smokeless tobacco: Never Used    Alcohol use Yes      Comment: occasional    Drug use: No    Sexual activity: Yes     Partners: Female     Other Topics Concern    Not on file     Social History Narrative       Allergies:  Review of patient's allergies indicates no known allergies.    Medications:    Current Outpatient Prescriptions:     gabapentin (NEURONTIN) 600 MG tablet, Take 1 tablet (600 mg total) by mouth 3 (three) times daily., Disp: 90 tablet, Rfl: 2    hydrocodone-acetaminophen 10-325mg (NORCO)  mg Tab, Take 1 tablet by mouth 3 (three) times daily as needed., Disp: 90 tablet, Rfl: 0    meloxicam (MOBIC) 15 MG tablet, Take 1 tablet (15 mg total) by mouth once  daily., Disp: , Rfl:     theophylline 400 mg 24 hr capsule, Take 400 mg by mouth once daily., Disp: 30 capsule, Rfl: 11    oxycodone-acetaminophen (PERCOCET) 5-325 mg per tablet, Take one to two tablets by mouth every four hours as needed for pain, Disp: 51 tablet, Rfl: 0    Review of Systems   Constitutional: Negative for activity change, appetite change, chills, diaphoresis, fatigue, fever and unexpected weight change.   HENT: Negative for congestion, dental problem, hearing loss, mouth sores, postnasal drip, rhinorrhea, sinus pressure and trouble swallowing.    Eyes: Negative for pain, discharge and itching.   Respiratory: Negative for apnea, cough, choking, chest tightness, shortness of breath and wheezing.    Cardiovascular: Negative for chest pain, palpitations and leg swelling.   Gastrointestinal: Negative for abdominal distention, abdominal pain, anal bleeding, blood in stool, constipation, diarrhea, nausea, rectal pain and vomiting.   Endocrine: Negative for polydipsia and polyuria.   Genitourinary: Negative for decreased urine volume, difficulty urinating, discharge, dysuria, enuresis, flank pain, frequency, genital sores, hematuria, penile pain, penile swelling, scrotal swelling, testicular pain and urgency.   Musculoskeletal: Negative for arthralgias, back pain and myalgias.   Skin: Negative for color change, rash and wound.   Neurological: Negative for dizziness, syncope, speech difficulty, light-headedness and headaches.   Hematological: Negative for adenopathy. Does not bruise/bleed easily.   Psychiatric/Behavioral: Negative for behavioral problems, confusion, hallucinations and sleep disturbance.       Objective:      Physical Exam   Constitutional: He appears well-developed.   HENT:   Head: Normocephalic.   Cardiovascular: Normal rate.    Pulmonary/Chest: Effort normal.   Abdominal: Soft.   Genitourinary: Prostate normal.   Genitourinary Comments: 20 mg per time.  Patient has mild phimosis    I  cannot feel any María's plaques   Neurological: He is alert.   Skin: Skin is warm.     Psychiatric: He has a normal mood and affect.       Assessment:       1. Phimosis        Plan:       Sanchez was seen today for testicular/scrotal pain..    Diagnoses and all orders for this visit:    Phimosis  -     Ambulatory consult to Urology        we'll schedule a circumcision: The patient is ready however like to having see Dr. Binh Hall for possible María's.  If no plaques or abnormalities are found then I'm happy to do circumcision.  I've asked him to take a picture of his

## 2017-05-03 ENCOUNTER — PATIENT MESSAGE (OUTPATIENT)
Dept: PHYSICAL MEDICINE AND REHAB | Facility: CLINIC | Age: 42
End: 2017-05-03

## 2017-05-04 ENCOUNTER — OFFICE VISIT (OUTPATIENT)
Dept: UROLOGY | Facility: CLINIC | Age: 42
End: 2017-05-04
Payer: COMMERCIAL

## 2017-05-04 VITALS
BODY MASS INDEX: 31.25 KG/M2 | HEART RATE: 75 BPM | WEIGHT: 218.25 LBS | SYSTOLIC BLOOD PRESSURE: 149 MMHG | DIASTOLIC BLOOD PRESSURE: 84 MMHG | HEIGHT: 70 IN

## 2017-05-04 DIAGNOSIS — N48.6 PEYRONIE'S DISEASE: Primary | ICD-10-CM

## 2017-05-04 PROCEDURE — 99214 OFFICE O/P EST MOD 30 MIN: CPT | Mod: S$GLB,,, | Performed by: UROLOGY

## 2017-05-04 PROCEDURE — 99999 PR PBB SHADOW E&M-EST. PATIENT-LVL III: CPT | Mod: PBBFAC,,, | Performed by: UROLOGY

## 2017-05-04 PROCEDURE — 1160F RVW MEDS BY RX/DR IN RCRD: CPT | Mod: S$GLB,,, | Performed by: UROLOGY

## 2017-05-04 NOTE — PROGRESS NOTES
CHIEF COMPLAINT:    Mr. Elliot Lomeli is a 41 y.o. male presenting for a consultation at the request of Dr. Washington. Patient presents with peyronie's disease.    PRESENTING ILLNESS:    Sanchez Lomeli is a 41 y.o. male who c/o peyronie's disease.  It has been present for > 5 years.  He has ~ 45-50 degrees of curve to the L.  It is not difficult to penetrate, but sex is painful for his wife.  He denies ED.    He has nocturia x 2-3.  Good FOS.  No hematuria.  No dysuria.     REVIEW OF SYSTEMS:    Patient denies bleeding diathesis, chills, decreased size/force of stream, dysuria, fever, flank pain, frequency or urgency, hematuria, hesitancy, intermittency or feeling of incomplete emptying, stones, stress or urgency incontinence, TB or genitourinary trauma and urethral discharge.  Sanchez Lomeli denies any history of headache, blurred vision, fever, nausea, vomiting, chills, abdominal pain, bleeding per rectum, cough, SOB, recent loss of consciousness, recent mental status changes, seizures, dizziness, or upper or lower extremity weakness.    WILLIAN  1. 5  2. 5  3. 5  4. 5  5. 5      PATIENT HISTORY:    Past Medical History:   Diagnosis Date    Chronic back pain     Chronic diarrhea     Hernia     TBI (traumatic brain injury) 2010       Past Surgical History:   Procedure Laterality Date    HERNIA REPAIR  2017    umbilical       Family History   Problem Relation Age of Onset    Diabetes Mother     Heart attack Neg Hx     Heart disease Neg Hx     Hypertension Neg Hx     Colon cancer Neg Hx     Esophageal cancer Neg Hx        Social History     Social History    Marital status:      Spouse name: N/A    Number of children: N/A    Years of education: N/A     Occupational History    self employed      Social History Main Topics    Smoking status: Former Smoker     Quit date: 1/1/2007    Smokeless tobacco: Never Used    Alcohol use Yes      Comment: occasional    Drug use: No     Sexual activity: Yes     Partners: Female     Other Topics Concern    Not on file     Social History Narrative       Allergies:  Review of patient's allergies indicates no known allergies.    Medications:    Current Outpatient Prescriptions:     gabapentin (NEURONTIN) 600 MG tablet, Take 1 tablet (600 mg total) by mouth 3 (three) times daily., Disp: 90 tablet, Rfl: 2    hydrocodone-acetaminophen 10-325mg (NORCO)  mg Tab, Take 1 tablet by mouth 3 (three) times daily as needed., Disp: 90 tablet, Rfl: 0    meloxicam (MOBIC) 15 MG tablet, Take 1 tablet (15 mg total) by mouth once daily., Disp: , Rfl:     theophylline 400 mg 24 hr capsule, Take 400 mg by mouth once daily., Disp: 30 capsule, Rfl: 11    PHYSICAL EXAMINATION:    The patient generally appears in good health, is appropriately interactive, and is in no apparent distress.     Eyes: anicteric sclerae, moist conjunctivae; no lid-lag; PERRLA     HENT: Atraumatic; oropharynx clear with moist mucous membranes and no mucosal ulcerations;normal hard and soft palate.  No evidence of lymphadenopathy.    Neck: Trachea midline.  No thyromegaly.    Musculoskeletal: No abnormal gait.    Skin: No lesions.    Mental: Cooperative with normal affect.  Is oriented to time, place, and person.    Neuro: Grossly intact.    Chest: Normal inspiratory effort.   No accessory muscles.  No audible wheezes.  Respirations symmetric on inspiration and expiration.    Heart: Regular rhythm.      Abdomen:  Soft, non-tender. No masses or organomegaly. Bladder is not palpable. No evidence of flank discomfort. No evidence of inguinal hernia.    Genitourinary: The penis is not circumcised with a plaque c/w peyronie's on the shaft. The urethral meatus is normal. The testes, epididymides, and cord structures are normal in size and contour bilaterally. The scrotum is normal in size and contour.    Extremities: No clubbing, cyanosis, or edema      LABS:      No results found for: PSA,  PSADIAG, PSATOTAL, PSAFREE, PSAFREEPCT    IMPRESSION:    Encounter Diagnoses   Name Primary?    Peyronie's disease Yes         PLAN:    1. Will get a doppler u/s of the penis and plan for repair. I  have explained the risk, benefits, and alternatives of the procedure in detail. The patient voices understanding and all questions have been answered. The patient agrees to proceed as planned.    2. I spent 25 minutes with the patient of which more than half was spent in direct consultation with the patient in regards to our treatment and plan.       Copy to:

## 2017-05-04 NOTE — PATIENT INSTRUCTIONS
What Is Peyronies Disease?  A slight natural curve to the erect penis is usually normal. But curvature that causes pain and difficulty with intercourse is a problem. The development of painful curvature is called Peyronies disease. Peyronies disease is due to a plaque (scar) that forms inside the penis.    Your Penile Anatomy  The shaft (body) of the penis consists of the corpora cavernosa, two columns of spongy tissue. During an erection, this tissue fills with blood, swells, and becomes rigid, creating an erection. A dense sheath of elastic tissue called the tunica surrounds the corpora. This tissue stretches as the penis becomes erect.    Painful Curvature  Peyronies disease occurs when a plaque (scar) develops on the fibrous sheath of tissue surrounding the corpora. The scar can form on any part of the penis, but often is found on the top or bottom. The scarred area of the tunica loses its elasticity, and so doesnt stretch when the corpora swell. Because the tunica doesnt stretch in that area, the erect penis curves in the direction of the scar.  Possible Causes   No one is sure just what causes the plaque. It may be the result of an injury to the erect penis or a blow to the groin. The plaque may also occur because of a problem with your immune system, which normally helps your body fight disease. Or the plaque may form for other, still unknown, reasons. It is certain, however, that Peyronies disease is not caused by sexually transmitted diseases and is not cancer.    Symptoms of Peyronies Disease  · Curvature of the penis during erection (which may interfere with intercourse)  · Pain during erection  · Soft erections  · Shortening or narrowing of the penis  A hard area is usually felt below the skin of the penis in the area of the plaque.  © 7709-3331 [employer], [address] [place] [address], [place] [address]. All rights reserved. This information is not intended as a substitute for professional  medical care. Always follow your healthcare professional's instructions.

## 2017-05-04 NOTE — MR AVS SNAPSHOT
[facility] - Urology 4th Floor  [address]  [facility] [address]  Phone: [phone]                  Sanchez Lomeli   2017 2:45 PM   Office Visit    Description:  Male : 1975   Provider:  Binh Hlal MD   Department:  [facility] - Urolog 4th Floor           Diagnoses this Visit        Comments    Peyronie's disease    -  Primary            To Do List           Future Appointments        Provider Department Dept Phone    2017 2:45 PM Binh Hall MD [facility] Urolog 4th Floor [phone]    5/10/2017 7:15 AM LAB, SAME DAY [facility] [phone]    2017 4:20 PM Edward Liu MD [facility] [phone]    7/3/2017 10:00 AM Augustus Stein PA-C [facility] - Gastroenterology [phone]      Goals (5 Years of Data)     None      [facility]     [facility] Care [facility] -  Assistance  Unless otherwise directed by your provider, please contact [facility], our nurse care line that is available for  assistance.     Registered nurses in the [facility] provide: appointment scheduling, clinical advisement, health education, and other advisory services.  Call: [phone] (toll free)               Medications           Message regarding Medications     Verify the changes and/or additions to your medication regime listed below are the same as discussed with your clinician today.  If any of these changes or additions are incorrect, please notify your healthcare provider.        STOP taking these medications     oxycodone-acetaminophen (PERCOCET) 5-325 mg per tablet Take one to two tablets by mouth every four hours as needed for pain           Verify that the below list of medications is an accurate representation of the medications you are currently taking.  If none reported, the list may be blank. If incorrect, please contact your healthcare provider. Carry this list with you in  "case of emergency.           Current Medications     gabapentin (NEURONTIN) 600 MG tablet Take 1 tablet (600 mg total) by mouth 3 (three) times daily.    hydrocodone-acetaminophen 10-325mg (NORCO)  mg Tab Take 1 tablet by mouth 3 (three) times daily as needed.    meloxicam (MOBIC) 15 MG tablet Take 1 tablet (15 mg total) by mouth once daily.    theophylline 400 mg 24 hr capsule Take 400 mg by mouth once daily.           Clinical Reference Information           Your Vitals Were     BP Pulse Height Weight BMI    149/84 (BP Location: Right arm, Patient Position: Sitting, BP Method: Automatic) 75 5' 10" (1.778 m) 99 kg (218 lb 4.1 oz) 31.32 kg/m2      Blood Pressure          Most Recent Value    BP  (!)  149/84      Allergies as of 5/4/2017     No Known Allergies      Immunizations Administered on Date of Encounter - 5/4/2017     None      Orders Placed During Today's Visit     Future Labs/Procedures Expected by Expires    Ultrasound of Penis Arterial Venous Flow  As directed 5/4/2018      Instructions    What Is Peyronies Disease?  A slight natural curve to the erect penis is usually normal. But curvature that causes pain and difficulty with intercourse is a problem. The development of painful curvature is called Peyronies disease. Peyronies disease is due to a plaque (scar) that forms inside the penis.    Your Penile Anatomy  The shaft (body) of the penis consists of the corpora cavernosa, two columns of spongy tissue. During an erection, this tissue fills with blood, swells, and becomes rigid, creating an erection. A dense sheath of elastic tissue called the tunica surrounds the corpora. This tissue stretches as the penis becomes erect.    Painful Curvature  Peyronies disease occurs when a plaque (scar) develops on the fibrous sheath of tissue surrounding the corpora. The scar can form on any part of the penis, but often is found on the top or bottom. The scarred area of the tunica loses its elasticity, and " so doesnt stretch when the corpora swell. Because the tunica doesnt stretch in that area, the erect penis curves in the direction of the scar.  Possible Causes   No one is sure just what causes the plaque. It may be the result of an injury to the erect penis or a blow to the groin. The plaque may also occur because of a problem with your immune system, which normally helps your body fight disease. Or the plaque may form for other, still unknown, reasons. It is certain, however, that Peyronies disease is not caused by sexually transmitted diseases and is not cancer.    Symptoms of Peyronies Disease  · Curvature of the penis during erection (which may interfere with intercourse)  · Pain during erection  · Soft erections  · Shortening or narrowing of the penis  A hard area is usually felt below the skin of the penis in the area of the plaque.  © 8555-6043 [employer] [facility], [address] [place] [address]. All rights reserved. This information is not intended as a substitute for professional medical care. Always follow your healthcare professional's instructions.         Language Assistance Services     ATTENTION: Language assistance services are available, free of charge. Please call [phone].      ATENCIÓN: Si habla español, tiene a oscar disposición servicios gratuitos de asistencia lingüística. Llame al [phone].     MICHELLE Ý: N?u b?n nói Ti?ng Vi?t, có các d?ch v? h? tr? ngôn ng? mi?n phí dành cho b?n. G?i s? [phone].         [facility] - Urology 4th Floor complies with applicable Federal civil rights laws and does not discriminate on the basis of race, color, national origin, age, disability, or sex.

## 2017-05-05 ENCOUNTER — LAB VISIT (OUTPATIENT)
Dept: LAB | Facility: HOSPITAL | Age: 42
End: 2017-05-05
Attending: PHYSICIAN ASSISTANT
Payer: COMMERCIAL

## 2017-05-05 ENCOUNTER — TELEPHONE (OUTPATIENT)
Dept: GASTROENTEROLOGY | Facility: CLINIC | Age: 42
End: 2017-05-05

## 2017-05-05 ENCOUNTER — PATIENT MESSAGE (OUTPATIENT)
Dept: PHYSICAL MEDICINE AND REHAB | Facility: CLINIC | Age: 42
End: 2017-05-05

## 2017-05-05 ENCOUNTER — TELEPHONE (OUTPATIENT)
Dept: PHYSICAL MEDICINE AND REHAB | Facility: CLINIC | Age: 42
End: 2017-05-05

## 2017-05-05 DIAGNOSIS — R19.7 DIARRHEA OF PRESUMED INFECTIOUS ORIGIN: ICD-10-CM

## 2017-05-05 PROCEDURE — 87427 SHIGA-LIKE TOXIN AG IA: CPT

## 2017-05-05 PROCEDURE — 87329 GIARDIA AG IA: CPT | Mod: 91

## 2017-05-05 PROCEDURE — 87045 FECES CULTURE AEROBIC BACT: CPT

## 2017-05-05 PROCEDURE — 87046 STOOL CULTR AEROBIC BACT EA: CPT | Mod: 59

## 2017-05-05 PROCEDURE — 87209 SMEAR COMPLEX STAIN: CPT | Mod: 91

## 2017-05-05 NOTE — TELEPHONE ENCOUNTER
----- Message from Selena Gandhi sent at 5/5/2017 11:16 AM CDT -----  Contact: rex (wife) @ [phone]  pts wife is calling concerning the message pt received on the song concerning a mri for pt.  pls call.

## 2017-05-05 NOTE — TELEPHONE ENCOUNTER
Received call from lab (Sudarshan). Third stool specimen received was mislabeled and has been discarded.    New orders will need to be placed.

## 2017-05-07 LAB
CRYPTOSP AG STL QL IA: NEGATIVE
CRYPTOSP AG STL QL IA: NEGATIVE
E COLI SXT1 STL QL IA: NEGATIVE
E COLI SXT2 STL QL IA: NEGATIVE
G LAMBLIA AG STL QL IA: NEGATIVE
G LAMBLIA AG STL QL IA: NEGATIVE

## 2017-05-08 ENCOUNTER — PATIENT MESSAGE (OUTPATIENT)
Dept: GASTROENTEROLOGY | Facility: CLINIC | Age: 42
End: 2017-05-08

## 2017-05-08 LAB
BACTERIA STL CULT: NORMAL
O+P STL TRI STN: NORMAL
O+P STL TRI STN: NORMAL

## 2017-05-09 DIAGNOSIS — K62.5 RECTAL BLEEDING: Primary | ICD-10-CM

## 2017-05-09 RX ORDER — SODIUM, POTASSIUM,MAG SULFATES 17.5-3.13G
SOLUTION, RECONSTITUTED, ORAL ORAL
Qty: 1 BOTTLE | Refills: 0 | Status: ON HOLD | OUTPATIENT
Start: 2017-05-09 | End: 2017-05-29 | Stop reason: HOSPADM

## 2017-05-10 ENCOUNTER — PATIENT MESSAGE (OUTPATIENT)
Dept: GASTROENTEROLOGY | Facility: CLINIC | Age: 42
End: 2017-05-10

## 2017-05-10 ENCOUNTER — TELEPHONE (OUTPATIENT)
Dept: ELECTROPHYSIOLOGY | Facility: CLINIC | Age: 42
End: 2017-05-10

## 2017-05-10 NOTE — TELEPHONE ENCOUNTER
Pt unavailable. I spoke with his wife and we discuss Zio Patch results in detail. She verbalized understanding and denied further questions, needs, and concerns.

## 2017-05-10 NOTE — TELEPHONE ENCOUNTER
----- Message from Nannette Guadarrama RN sent at 5/10/2017 12:05 PM CDT -----      ----- Message -----     From: Zana Villalpando MD     Sent: 5/8/2017   8:07 PM       To: Nannette Guadarrama RN    Please see comments below and call patient.  In general you do not need to route back to me.  Looks OK -- no events

## 2017-05-12 ENCOUNTER — PATIENT MESSAGE (OUTPATIENT)
Dept: ELECTROPHYSIOLOGY | Facility: CLINIC | Age: 42
End: 2017-05-12

## 2017-05-12 ENCOUNTER — PATIENT MESSAGE (OUTPATIENT)
Dept: GASTROENTEROLOGY | Facility: CLINIC | Age: 42
End: 2017-05-12

## 2017-05-12 ENCOUNTER — PATIENT MESSAGE (OUTPATIENT)
Dept: PHYSICAL MEDICINE AND REHAB | Facility: CLINIC | Age: 42
End: 2017-05-12

## 2017-05-15 ENCOUNTER — HOSPITAL ENCOUNTER (OUTPATIENT)
Dept: RADIOLOGY | Facility: HOSPITAL | Age: 42
Discharge: HOME | End: 2017-05-15
Attending: PHYSICAL MEDICINE & REHABILITATION
Payer: COMMERCIAL

## 2017-05-15 DIAGNOSIS — M54.16 RIGHT LUMBAR RADICULOPATHY: ICD-10-CM

## 2017-05-15 DIAGNOSIS — M51.36 DDD (DEGENERATIVE DISC DISEASE), LUMBAR: ICD-10-CM

## 2017-05-15 DIAGNOSIS — M54.41 CHRONIC MIDLINE LOW BACK PAIN WITH RIGHT-SIDED SCIATICA: ICD-10-CM

## 2017-05-15 DIAGNOSIS — G89.29 CHRONIC MIDLINE LOW BACK PAIN WITH RIGHT-SIDED SCIATICA: ICD-10-CM

## 2017-05-15 PROCEDURE — 72148 MRI LUMBAR SPINE W/O DYE: CPT | Mod: 26,,, | Performed by: RADIOLOGY

## 2017-05-15 PROCEDURE — 72148 MRI LUMBAR SPINE W/O DYE: CPT | Mod: TC

## 2017-05-16 ENCOUNTER — PATIENT MESSAGE (OUTPATIENT)
Dept: PHYSICAL MEDICINE AND REHAB | Facility: CLINIC | Age: 42
End: 2017-05-16

## 2017-05-16 DIAGNOSIS — M51.36 DDD (DEGENERATIVE DISC DISEASE), LUMBAR: ICD-10-CM

## 2017-05-16 DIAGNOSIS — M48.061 NEURAL FORAMINAL STENOSIS OF LUMBAR SPINE: ICD-10-CM

## 2017-05-16 DIAGNOSIS — M54.16 RIGHT LUMBAR RADICULOPATHY: ICD-10-CM

## 2017-05-16 DIAGNOSIS — G89.29 CHRONIC MIDLINE LOW BACK PAIN WITH RIGHT-SIDED SCIATICA: Primary | ICD-10-CM

## 2017-05-16 DIAGNOSIS — M54.41 CHRONIC MIDLINE LOW BACK PAIN WITH RIGHT-SIDED SCIATICA: Primary | ICD-10-CM

## 2017-05-16 DIAGNOSIS — M48.061 LUMBAR STENOSIS: ICD-10-CM

## 2017-05-17 ENCOUNTER — PATIENT MESSAGE (OUTPATIENT)
Dept: GASTROENTEROLOGY | Facility: CLINIC | Age: 42
End: 2017-05-17

## 2017-05-18 ENCOUNTER — PATIENT MESSAGE (OUTPATIENT)
Dept: PHYSICAL MEDICINE AND REHAB | Facility: CLINIC | Age: 42
End: 2017-05-18

## 2017-05-19 DIAGNOSIS — M54.41 CHRONIC MIDLINE LOW BACK PAIN WITH RIGHT-SIDED SCIATICA: ICD-10-CM

## 2017-05-19 DIAGNOSIS — G89.29 CHRONIC MIDLINE LOW BACK PAIN WITH RIGHT-SIDED SCIATICA: ICD-10-CM

## 2017-05-19 RX ORDER — HYDROCODONE BITARTRATE AND ACETAMINOPHEN 10; 325 MG/1; MG/1
1 TABLET ORAL 3 TIMES DAILY PRN
Qty: 90 TABLET | Refills: 0 | Status: ON HOLD | OUTPATIENT
Start: 2017-05-19 | End: 2017-07-28 | Stop reason: ALTCHOICE

## 2017-05-19 NOTE — TELEPHONE ENCOUNTER
Oneal Liu,     I have already made my appointment to the pain clinic but unfortunately the soonest they can see me is on May 30th and I have ran out of the pain medicine. Can you please send a refill to my pharmacy of hydrocodon-Acetaminophin ?     Thank you Sanchez       Last filled 4/24/17  Last appointment 4/24/17

## 2017-05-22 ENCOUNTER — PATIENT MESSAGE (OUTPATIENT)
Dept: PHYSICAL MEDICINE AND REHAB | Facility: CLINIC | Age: 42
End: 2017-05-22

## 2017-05-25 ENCOUNTER — HOSPITAL ENCOUNTER (EMERGENCY)
Facility: HOSPITAL | Age: 42
Discharge: HOME | End: 2017-05-25
Attending: EMERGENCY MEDICINE
Payer: OTHER MISCELLANEOUS

## 2017-05-25 ENCOUNTER — TELEPHONE (OUTPATIENT)
Dept: ELECTROPHYSIOLOGY | Facility: CLINIC | Age: 42
End: 2017-05-25

## 2017-05-25 VITALS
WEIGHT: 210 LBS | DIASTOLIC BLOOD PRESSURE: 99 MMHG | RESPIRATION RATE: 16 BRPM | TEMPERATURE: 99 F | SYSTOLIC BLOOD PRESSURE: 139 MMHG | HEART RATE: 83 BPM | BODY MASS INDEX: 30.06 KG/M2 | HEIGHT: 70 IN | OXYGEN SATURATION: 97 %

## 2017-05-25 DIAGNOSIS — Z77.098 CHEMICAL EXPOSURE OF EYE: Primary | ICD-10-CM

## 2017-05-25 LAB — POCT GLUCOSE: 66 MG/DL (ref 70–110)

## 2017-05-25 PROCEDURE — 99283 EMERGENCY DEPT VISIT LOW MDM: CPT | Mod: 25

## 2017-05-25 PROCEDURE — 82962 GLUCOSE BLOOD TEST: CPT

## 2017-05-25 PROCEDURE — 99283 EMERGENCY DEPT VISIT LOW MDM: CPT | Mod: ,,, | Performed by: EMERGENCY MEDICINE

## 2017-05-25 PROCEDURE — 25000003 PHARM REV CODE 250: Performed by: EMERGENCY MEDICINE

## 2017-05-25 RX ORDER — PROPARACAINE HYDROCHLORIDE 5 MG/ML
1 SOLUTION/ DROPS OPHTHALMIC
Status: COMPLETED | OUTPATIENT
Start: 2017-05-25 | End: 2017-05-25

## 2017-05-25 RX ORDER — ERYTHROMYCIN 5 MG/G
OINTMENT OPHTHALMIC
Qty: 1 TUBE | Refills: 0 | Status: SHIPPED | OUTPATIENT
Start: 2017-05-25 | End: 2017-05-30 | Stop reason: ALTCHOICE

## 2017-05-25 RX ADMIN — FLUORESCEIN SODIUM 1 STRIP: 1 STRIP OPHTHALMIC at 12:05

## 2017-05-25 RX ADMIN — PROPARACAINE HYDROCHLORIDE 1 DROP: 5 SOLUTION/ DROPS OPHTHALMIC at 12:05

## 2017-05-25 NOTE — TELEPHONE ENCOUNTER
----- Message from Nannette Guadarrama RN sent at 5/24/2017  5:02 PM CDT -----      ----- Message -----  From: Zana Villalpando MD  Sent: 5/17/2017   9:53 PM  To: Nannette Guadarrama RN    Please see comments below and call patient.  In general you do not need to route back to me.  If he can laugh w/o near syncope, keep same . If still Sxc, would increase dose to 600 a day

## 2017-05-25 NOTE — ED NOTES
Bed: CONS 01  Expected date: 5/25/17  Expected time: 12:27 PM  Means of arrival:   Comments:  Intake 2

## 2017-05-25 NOTE — ED NOTES
"The patient came to the ER today with c/o right eye pain that occurred when a grease or oil splashed in his eye while at work. A paramedic at work flushed the eye and then the pt went to urgent care, from urgent care was referred to ER. Pt reports blurred vision, light sensitivity, and states he still feels "particles inside" his eye.   "

## 2017-05-25 NOTE — ED PROVIDER NOTES
Encounter Date: 5/25/2017    SCRIBE #1 NOTE: I, Cristobal Spear, am scribing for, and in the presence of,  Dr. Doshi . I have scribed the entire note.       History     Chief Complaint   Patient presents with    Eye Problem     Pt had chemical splash to right eye.  Sent from urgent care.      Review of patient's allergies indicates:  No Known Allergies  Time patient was seen by the provider: 12:13 PM    The patient is a 41 y.o. male with hx of: chronic back pain and a hernia that presents to the ED with a complaint of right eye pain. Pt states he was working with a machine at work when the machine exploded and liquid fell into his eye 4 hours ago. He states it feels like particles are inside and that it itches. He was sent to Urgent Care where they flushed his eye with a sterile solution. They determined that it may have been a chemical splash and was told to come to the ED. He endorses light sensitivity, but has no other complaints.       The history is provided by the patient.     Past Medical History:   Diagnosis Date    Chronic back pain     Chronic diarrhea     Hernia     TBI (traumatic brain injury) 2010     Past Surgical History:   Procedure Laterality Date    COLONOSCOPY N/A 5/29/2017    Procedure: COLONOSCOPY;  Surgeon: Sukhi Scanlon MD;  Location: [facility] ([address] [place] [address]);  Service: Endoscopy;  Laterality: N/A;    HERNIA REPAIR  2017    umbilical     Family History   Problem Relation Age of Onset    Diabetes Mother     Heart attack Neg Hx     Heart disease Neg Hx     Hypertension Neg Hx     Colon cancer Neg Hx     Esophageal cancer Neg Hx      Social History   Substance Use Topics    Smoking status: Former Smoker     Quit date: 1/1/2007    Smokeless tobacco: Never Used    Alcohol use Yes      Comment: occasional     Review of Systems   Constitutional: Negative for fever.   HENT: Negative for sore throat.    Eyes: Positive for photophobia, pain, redness and itching.   Respiratory: Negative  for shortness of breath.    Cardiovascular: Negative for chest pain.   Gastrointestinal: Negative for nausea.   Genitourinary: Negative for dysuria.   Musculoskeletal: Negative for back pain.   Skin: Negative for rash.   Neurological: Negative for weakness.   Hematological: Does not bruise/bleed easily.       Physical Exam     Initial Vitals [05/25/17 1131]   BP Pulse Resp Temp SpO2   (!) 139/99 83 16 98.7 °F (37.1 °C) 97 %     Physical Exam    Nursing note and vitals reviewed.  Constitutional: He appears well-developed and well-nourished.   HENT:   Head: Normocephalic and atraumatic.   Eyes: Pupils are equal, round, and reactive to light.   Right eye injected, there is no foreign body seen, fluorescein uptake is negative. There is no injection in left eye.     Neck: Normal range of motion. Neck supple.   Neurological: He is alert and oriented to person, place, and time.   Psychiatric: He has a normal mood and affect. His behavior is normal. Judgment and thought content normal.         ED Course   Procedures  Labs Reviewed   POCT GLUCOSE - Abnormal; Notable for the following:        Result Value    POCT Glucose 66 (*)     All other components within normal limits             Medical Decision Making:   History:   Old Medical Records: I decided to obtain old medical records.  Old Records Summarized: other records.       <> Summary of Records: Pt presents with chronic back pain, a hernia, and chronic diarrhea. Pt has no previous ED visits.   Initial Assessment:   Pt who had an unidentified substance splash into his right eye at work 4 hours ago presents with right eye pain and redness.Fluorescein was negative. Pt was flushed with Luis Enrique lens. Will check PH and have pt follow-up with ophthalmology.             Scribe Attestation:   Scribe #1: I performed the above scribed service and the documentation accurately describes the services I performed. I attest to the accuracy of the note.    Attending Attestation:            Physician Attestation for Scribe:  Physician Attestation Statement for Scribe #1: I, Dr. Doshi, reviewed documentation, as scribed by Cristobal Spear  in my presence, and it is both accurate and complete.         Attending ED Notes:   2:26PM     PH of eye before irrigation was between 7 and 8. Pt was irrigated with 1 liter through Luis Enrique lens. There were small blackish particles that were flushed out. Eye lid was flipped with no evidence of foreign body  Will continue to irrigate until water is clear and have follow up with ophthalmology and discharge with Erythromycin.            ED Course     Clinical Impression:   The encounter diagnosis was Chemical exposure of eye.          Lorrie Doshi MD  05/31/17 0059

## 2017-05-25 NOTE — TELEPHONE ENCOUNTER
Please see previous note. Discussed with Pt in detail. He states he still has issues with near syncope. Will increase Theophylline to 600 mg daily per Dr. Villalpando. Pharmacy confirmed. He is gong to start prescription tomorrow. Denied further questions, needs, and concerns.

## 2017-05-26 ENCOUNTER — OFFICE VISIT (OUTPATIENT)
Dept: OPHTHALMOLOGY | Facility: CLINIC | Age: 42
End: 2017-05-26
Payer: OTHER MISCELLANEOUS

## 2017-05-26 ENCOUNTER — PATIENT MESSAGE (OUTPATIENT)
Dept: OPHTHALMOLOGY | Facility: CLINIC | Age: 42
End: 2017-05-26

## 2017-05-26 DIAGNOSIS — H10.211 ACUTE CHEMICAL CONJUNCTIVITIS OF RIGHT EYE: Primary | ICD-10-CM

## 2017-05-26 PROCEDURE — 99999 PR PBB SHADOW E&M-EST. PATIENT-LVL II: CPT | Mod: PBBFAC,,, | Performed by: OPHTHALMOLOGY

## 2017-05-26 PROCEDURE — 92002 INTRM OPH EXAM NEW PATIENT: CPT | Mod: S$GLB,,, | Performed by: OPHTHALMOLOGY
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
## 2017-05-29 ENCOUNTER — ANESTHESIA EVENT (OUTPATIENT)
Dept: ENDOSCOPY | Facility: HOSPITAL | Age: 42
End: 2017-05-29
Payer: COMMERCIAL

## 2017-05-29 ENCOUNTER — SURGERY (OUTPATIENT)
Age: 42
End: 2017-05-29

## 2017-05-29 ENCOUNTER — ANESTHESIA (OUTPATIENT)
Dept: ENDOSCOPY | Facility: HOSPITAL | Age: 42
End: 2017-05-29
Payer: COMMERCIAL

## 2017-05-29 ENCOUNTER — HOSPITAL ENCOUNTER (OUTPATIENT)
Facility: HOSPITAL | Age: 42
Discharge: HOME | End: 2017-05-29
Attending: INTERNAL MEDICINE | Admitting: INTERNAL MEDICINE
Payer: COMMERCIAL

## 2017-05-29 VITALS
RESPIRATION RATE: 16 BRPM | DIASTOLIC BLOOD PRESSURE: 73 MMHG | HEIGHT: 70 IN | BODY MASS INDEX: 30.06 KG/M2 | HEART RATE: 64 BPM | OXYGEN SATURATION: 97 % | SYSTOLIC BLOOD PRESSURE: 112 MMHG | WEIGHT: 210 LBS | TEMPERATURE: 98 F

## 2017-05-29 VITALS — RESPIRATION RATE: 14 BRPM

## 2017-05-29 DIAGNOSIS — Z12.11 SCREENING FOR COLON CANCER: ICD-10-CM

## 2017-05-29 DIAGNOSIS — Z12.11 COLON CANCER SCREENING: Primary | ICD-10-CM

## 2017-05-29 PROCEDURE — 25000003 PHARM REV CODE 250: Performed by: NURSE ANESTHETIST, CERTIFIED REGISTERED

## 2017-05-29 PROCEDURE — 45380 COLONOSCOPY AND BIOPSY: CPT

## 2017-05-29 PROCEDURE — 37000009 HC ANESTHESIA EA ADD 15 MINS: Performed by: INTERNAL MEDICINE

## 2017-05-29 PROCEDURE — 37000008 HC ANESTHESIA 1ST 15 MINUTES: Performed by: INTERNAL MEDICINE

## 2017-05-29 PROCEDURE — D9220A PRA ANESTHESIA: Mod: ANES,,, | Performed by: ANESTHESIOLOGY

## 2017-05-29 PROCEDURE — D9220A PRA ANESTHESIA: Mod: CRNA,,, | Performed by: NURSE ANESTHETIST, CERTIFIED REGISTERED

## 2017-05-29 PROCEDURE — 45378 DIAGNOSTIC COLONOSCOPY: CPT | Performed by: INTERNAL MEDICINE

## 2017-05-29 PROCEDURE — 88305 TISSUE EXAM BY PATHOLOGIST: CPT | Performed by: PATHOLOGY

## 2017-05-29 PROCEDURE — 25000003 PHARM REV CODE 250: Performed by: INTERNAL MEDICINE

## 2017-05-29 PROCEDURE — 88305 TISSUE EXAM BY PATHOLOGIST: CPT | Mod: 26,,, | Performed by: PATHOLOGY

## 2017-05-29 PROCEDURE — 45380 COLONOSCOPY AND BIOPSY: CPT | Mod: ,,, | Performed by: INTERNAL MEDICINE

## 2017-05-29 PROCEDURE — 63600175 PHARM REV CODE 636 W HCPCS: Performed by: NURSE ANESTHETIST, CERTIFIED REGISTERED

## 2017-05-29 RX ORDER — SODIUM CHLORIDE 9 MG/ML
INJECTION, SOLUTION INTRAVENOUS CONTINUOUS
Status: DISCONTINUED | OUTPATIENT
Start: 2017-05-29 | End: 2017-05-29 | Stop reason: HOSPADM

## 2017-05-29 RX ORDER — PROPOFOL 10 MG/ML
INJECTION, EMULSION INTRAVENOUS
Status: DISCONTINUED | OUTPATIENT
Start: 2017-05-29 | End: 2017-05-29

## 2017-05-29 RX ORDER — LIDOCAINE HCL/PF 100 MG/5ML
SYRINGE (ML) INTRAVENOUS
Status: DISCONTINUED | OUTPATIENT
Start: 2017-05-29 | End: 2017-05-29

## 2017-05-29 RX ORDER — PROPOFOL 10 MG/ML
INJECTION, EMULSION INTRAVENOUS CONTINUOUS PRN
Status: DISCONTINUED | OUTPATIENT
Start: 2017-05-29 | End: 2017-05-29

## 2017-05-29 RX ADMIN — PROPOFOL 100 MG: 10 INJECTION, EMULSION INTRAVENOUS at 01:05

## 2017-05-29 RX ADMIN — PROPOFOL 30 MG: 10 INJECTION, EMULSION INTRAVENOUS at 01:05

## 2017-05-29 RX ADMIN — PROPOFOL 150 MCG/KG/MIN: 10 INJECTION, EMULSION INTRAVENOUS at 01:05

## 2017-05-29 RX ADMIN — SODIUM CHLORIDE: 0.9 INJECTION, SOLUTION INTRAVENOUS at 01:05

## 2017-05-29 RX ADMIN — LIDOCAINE HYDROCHLORIDE 80 MG: 20 INJECTION, SOLUTION INTRAVENOUS at 01:05

## 2017-05-29 NOTE — PATIENT INSTRUCTIONS
Discharge Summary/Instructions for after Colonoscopy with   Biopsy/Polypectomy  Patient Name: Sanchez Lomeli  Patient MRN: [id number]  Patient YOB: 1975  Monday, May 29, 2017  Sukhi Scanlon MD  RESTRICTIONS ON ACTIVITY:  - Do not drive a car or operate machinery until the day after the procedure.      - The following day: return to full activity including work.  - For  3 days: No heavy lifting, straining or running.  - Diet: Eat and drink normally unless instructed otherwise.  TREATMENT FOR COMMON SIDE EFFECTS:  - Mild abdominal pain and bloating or excessive gas: rest, eat lightly and   use a heating pad.  SYMPTOMS TO WATCH FOR AND REPORT TO YOUR PHYSICIAN:  1. Severe abdominal pain.  2. Fever within 24 hours after a procedure.  3. A large amount of rectal bleeding. (A small amount of blood from the   rectum is not serious, especially if hemorrhoids are present.  4. Because air was put into your colon during the procedure, expelling large   amounts of air from your rectum is normal.  5. You may not have a bowel movement for 1-3 days because of the colonoscopy   prep.  This is normal.  6. Go directly to the emergency room if you notice any of the following:   Chills and/or fever over 101   Persistent vomiting   Severe abdominal pain, other than gas cramps   Severe chest pain   Black, tarry stools   Any bleeding - exceeding one tablespoon  Your doctor recommends these additional instructions:  If any biopsies were performed, my office will call you in 5 to 6 business   days with any results.  You have a contact number available for emergencies.  The signs and symptoms   of potential delayed complications were discussed with you.  You may return   to normal activities tomorrow.  Written discharge instructions were   provided to you.   You are being discharged to home.   Resume your previous diet.   Continue your present medications.   We are waiting for your pathology results.   Your physician has  recommended a repeat colonoscopy at age 50 (please   contact the clinic prior to your 50th birthday to schedule) for screening   purposes.  If you have any questions or problems, please call your physician - Sukhi Scanlon MD at Work:  [phone].    LAB RESULTS: [phone]  [facility], EMERGENCY ROOM PHONE NUMBER: [phone]  IF A COMPLICATION OR EMERGENCY SITUATION ARISES AND YOU ARE UNABLE TO REACH   YOUR PHYSICIAN - GO TO THE EMERGENCY ROOM.  Sukhi Scanlon MD  5/29/2017 1:51:49 PM  This report has been verified and signed electronically.

## 2017-05-29 NOTE — ANESTHESIA POSTPROCEDURE EVALUATION
"Anesthesia Post Evaluation    Patient: Sanchez Lomeli    Procedure(s) Performed: Procedure(s) (LRB):  COLONOSCOPY (N/A)    Final Anesthesia Type: general  Patient location during evaluation: PACU  Patient participation: Yes- Able to Participate  Level of consciousness: awake and alert  Post-procedure vital signs: reviewed and stable  Pain management: adequate  Airway patency: patent  PONV status at discharge: No PONV  Anesthetic complications: no      Cardiovascular status: blood pressure returned to baseline and stable  Respiratory status: unassisted  Hydration status: euvolemic  Follow-up not needed.        Visit Vitals  /73   Pulse 64   Temp 36.9 °C (98.4 °F)   Resp 16   Ht 5' 10" (1.778 m)   Wt 95.3 kg (210 lb)   SpO2 97%   BMI 30.13 kg/m²       Pain/Tasha Score: Pain Assessment Performed: Yes (5/29/2017  1:12 PM)  Presence of Pain: denies (5/29/2017  2:00 PM)  Tasha Score: 10 (5/29/2017  2:30 PM)      "

## 2017-05-29 NOTE — INTERVAL H&P NOTE
The patient has been examined and the H&P has been reviewed:    There is no interval changes since last encounter.    Colonoscopy: Diarrhea, Rectal bleeding  Sedation: GA  ASA: Per anesthesia  Mallampati: Per anesthesia      Endoscopy risks, benefits and alternative options discussed and understood by patient/family.          Active Hospital Problems    Diagnosis  POA    Screening for colon cancer [Z12.11]  Not Applicable      Resolved Hospital Problems    Diagnosis Date Resolved POA   No resolved problems to display.

## 2017-05-29 NOTE — ANESTHESIA PREPROCEDURE EVALUATION
05/29/2017  Sanchez Lomeli is a 41 y.o., male.    Anesthesia Evaluation    I have reviewed the Patient Summary Reports.        Review of Systems  Anesthesia Hx:  No problems with previous Anesthesia    Social:  Non-Smoker    Hematology/Oncology:  Hematology Normal   Oncology Normal     EENT/Dental:EENT/Dental Normal   Cardiovascular:  Cardiovascular Normal     Pulmonary:  Pulmonary Normal    Renal/:  Renal/ Normal     Hepatic/GI:  Hepatic/GI Normal    Musculoskeletal:  Musculoskeletal Normal    Neurological:   Neuromuscular Disease, (Lumbar radiculopathy)    Endocrine:  Endocrine Normal    Dermatological:  Skin Normal    Psych:  Psychiatric Normal           Physical Exam  General:  Well nourished    Airway/Jaw/Neck:  Airway Findings: Mouth Opening: Normal Tongue: Normal  General Airway Assessment: Adult  Mallampati: II  Improves to II with phonation.  TM Distance: Normal, at least 6 cm  Jaw/Neck Findings:  Neck ROM: Normal ROM      Dental:  Dental Findings: In tact   Chest/Lungs:  Chest/Lungs Findings: Clear to auscultation, Normal Respiratory Rate     Heart/Vascular:  Heart Findings: Rate: Normal  Rhythm: Regular Rhythm  Sounds: Normal             Anesthesia Plan  Type of Anesthesia, risks & benefits discussed:  Anesthesia Type:  general  Patient's Preference: General  Intra-op Monitoring Plan:   Intra-op Monitoring Plan Comments:   Post Op Pain Control Plan:   Post Op Pain Control Plan Comments:   Induction:   IV  Beta Blocker:  Patient is not currently on a Beta-Blocker (No further documentation required).       Informed Consent: Patient understands risks and agrees with Anesthesia plan.  Questions answered. Anesthesia consent signed with patient.  ASA Score: 2     Day of Surgery Review of History & Physical: I have interviewed and examined the patient. I have reviewed the patient's H&P  dated:  There are no significant changes.          Ready For Surgery From Anesthesia Perspective.

## 2017-05-29 NOTE — H&P (VIEW-ONLY)
[facility] Consultation Note    Reason for Consult:  The primary encounter diagnosis was Rectal bleeding. Diagnoses of Diarrhea of presumed infectious origin and Rectal pain were also pertinent to this visit.    PCP:   Liliam Carbajal   [address] [facility] [address]    Referring MD:  Liliam Carbajal Md  [address] [place] [address]    HPI:  This is a 41 y.o. male here for evaluation of diarrhea and rectal bleeding.  He is here today with his wife who helped translate. He does speak some english.  He reports having intermittent diarrhea x 3yrs  Associated abdominal pain after BM, intermittently  Intermittently seeing blood on the tissue with wiping  Intermittent rectal pain after BM  Normal bowel habits for him is 3 BM daily  +gas  No international travel, antibiotics, or food poisoning prior to symptoms starting    Recent went to [place], but symptoms had started prior to this    Food triggers - evaporated milk and whole milk    No Fhx of GI cancers or IBD    Drinks 1 gallon of water  No artifical sweeteners    ROS:  Constitutional: No fevers, chills, no weight loss  ENT: No allergies  CV: No chest pain  Pulm: No cough, No shortness of breath  Ophtho: No vision changes  GI: see HPI  Derm: No rash  Heme: No lymphadenopathy, No bruising  MSK: No arthritis  : No dysuria, No hematuria  Endo: No hot or cold intolerance  Neuro: No syncope, No seizure  Psych: No anxiety, No depression    Medical History:  has a past medical history of Chronic back pain; Chronic diarrhea; Hernia; and TBI (traumatic brain injury) (2010).    Surgical History:  has a past surgical history that includes Hernia repair (2017).    Family History: family history includes Diabetes in his mother. There is no history of Heart attack, Heart disease, Hypertension, Colon cancer, or Esophageal cancer..     Social History:  reports that he quit smoking about 10 years ago. He has never used smokeless  "tobacco. He reports that he drinks alcohol. He reports that he does not use illicit drugs.    Review of patient's allergies indicates:  No Known Allergies    Current Outpatient Prescriptions on File Prior to Visit   Medication Sig Dispense Refill    gabapentin (NEURONTIN) 600 MG tablet Take 1 tablet (600 mg total) by mouth 3 (three) times daily. 90 tablet 2    hydrocodone-acetaminophen 10-325mg (NORCO)  mg Tab Take 1 tablet by mouth 3 (three) times daily as needed. 90 tablet 0    meloxicam (MOBIC) 15 MG tablet Take 1 tablet (15 mg total) by mouth once daily.      oxycodone-acetaminophen (PERCOCET) 5-325 mg per tablet Take one to two tablets by mouth every four hours as needed for pain 51 tablet 0    theophylline 400 mg 24 hr capsule Take 400 mg by mouth once daily. 30 capsule 11    [DISCONTINUED] methylPREDNISolone (MEDROL DOSEPACK) 4 mg tablet use as directed 1 Package 0    [DISCONTINUED] tramadol (ULTRAM-ER) 100 MG Tb24 Take 100 mg by mouth daily as needed.        No current facility-administered medications on file prior to visit.          Objective Findings:    Vital Signs:  /83  Pulse 69  Ht 5' 10" (1.778 m)  Wt 98 kg (216 lb 0.8 oz)  BMI 31 kg/m2  Body mass index is 31 kg/(m^2).    Physical Exam:  General Appearance: Well appearing in no acute distress  Head:   Normocephalic, without obvious abnormality  Eyes:    No scleral icterus  ENT: Neck supple, Lips, mucosa, and tongue normal  Lungs: CTA bilaterally in anterior and posterior fields, no wheezes, no crackles.  Heart:  Regular rate and rhythm, S1, S2 normal, no murmurs heard  Abdomen: Soft, non tender, non distended with positive bowel sounds in all four quadrants. Extremities: no edema  Skin: No rash  Neurologic: AAO x 3      Labs:  Lab Results   Component Value Date    WBC 10.26 02/09/2017    HGB 16.3 02/09/2017    HCT 46.0 02/09/2017     02/09/2017    CHOL 151 01/05/2017    TRIG 121 01/05/2017    HDL 25 (L) 01/05/2017    ALT " 51 (H) 02/09/2017    AST 39 02/09/2017     02/09/2017    K 4.1 02/09/2017     02/09/2017    CREATININE 1.0 02/09/2017    BUN 19 02/09/2017    CO2 24 02/09/2017    INR 1.0 02/09/2017       Imaging:    Endoscopy:    none  Assessment:  1. Rectal bleeding    2. Diarrhea of presumed infectious origin    3. Rectal pain       Intermittent diarrhea and rectal bleeding x 3 yrs.    Recommendations:  1. Schedule colonoscopy to rule out malignancies, ulcers, and sources of bleeding    2. Stool studies to rule out infection    3. Labs to rule out inflammation and thyroid disease    4. Start a probiotic and fiber supplement    5. Advised making note of food triggers    If unremarkable advise avoiding lactose.    Return in about 2 months (around 7/1/2017).      Order summary:  Orders Placed This Encounter    Stool culture    Stool Exam-Ova,Cysts,Parasites    Stool Exam-Ova,Cysts,Parasites    Stool Exam-Ova,Cysts,Parasites    Giardia / Cryptosporidum, EIA    Giardia / Cryptosporidum, EIA    Giardia / Cryptosporidum, EIA    C-reactive protein    TSH    Case request GI: COLONOSCOPY         Thank you so much for allowing me to participate in the care of Sanchez Ordonezjudy Stein PA-C

## 2017-05-29 NOTE — TRANSFER OF CARE
"Anesthesia Transfer of Care Note    Patient: Sanchez Lomeli    Procedure(s) Performed: Procedure(s) (LRB):  COLONOSCOPY (N/A)    Patient location: GI    Anesthesia Type: general    Transport from OR: Transported from OR on room air with adequate spontaneous ventilation    Post pain: adequate analgesia    Post assessment: no apparent anesthetic complications    Post vital signs: stable    Level of consciousness: sedated, awake, alert and oriented    Nausea/Vomiting: no nausea/vomiting    Complications: none    Transfer of care protocol was followed      Last vitals:   Visit Vitals  /73 (BP Location: Left arm, Patient Position: Lying, BP Method: Automatic)   Pulse 72   Temp 36.9 °C (98.5 °F) (Oral)   Resp 16   Ht 5' 10" (1.778 m)   Wt 95.3 kg (210 lb)   SpO2 98%   BMI 30.13 kg/m²     "

## 2017-05-30 ENCOUNTER — OFFICE VISIT (OUTPATIENT)
Dept: PAIN MEDICINE | Facility: CLINIC | Age: 42
End: 2017-05-30
Attending: ANESTHESIOLOGY
Payer: COMMERCIAL

## 2017-05-30 VITALS
HEIGHT: 70 IN | BODY MASS INDEX: 30.74 KG/M2 | DIASTOLIC BLOOD PRESSURE: 81 MMHG | TEMPERATURE: 99 F | RESPIRATION RATE: 18 BRPM | SYSTOLIC BLOOD PRESSURE: 116 MMHG | WEIGHT: 214.75 LBS | HEART RATE: 84 BPM

## 2017-05-30 DIAGNOSIS — M54.16 LEFT LUMBAR RADICULOPATHY: Primary | ICD-10-CM

## 2017-05-30 DIAGNOSIS — M48.061 NEURAL FORAMINAL STENOSIS OF LUMBAR SPINE: ICD-10-CM

## 2017-05-30 DIAGNOSIS — M47.26 OSTEOARTHRITIS OF SPINE WITH RADICULOPATHY, LUMBAR REGION: ICD-10-CM

## 2017-05-30 PROCEDURE — 99999 PR PBB SHADOW E&M-EST. PATIENT-LVL III: CPT | Mod: PBBFAC,,, | Performed by: ANESTHESIOLOGY

## 2017-05-30 PROCEDURE — 99245 OFF/OP CONSLTJ NEW/EST HI 55: CPT | Mod: S$GLB,,, | Performed by: ANESTHESIOLOGY

## 2017-05-30 NOTE — LETTER
May 30, 2017      Edward Liu MD  [address]  [facility] [address]           [facility]  [address]  [facility] [address]  Phone: [phone]  Fax: [phone]          Patient: Sanchez Lomeli   MR Number: [id number]   YOB: 1975   Date of Visit: 5/30/2017       Dear Dr. Edward Liu:    Thank you for referring Sanchez Lomeli to me for evaluation. Attached you will find relevant portions of my assessment and plan of care.    If you have questions, please do not hesitate to call me. I look forward to following Sanchez Lomeli along with you.    Sincerely,    Emerita Santillan MD    Enclosure  CC:  No Recipients    If you would like to receive this communication electronically, please contact [email or web address] or [phone] to request more information on [email or web address] access.    For providers and/or their staff who would like to refer a patient to [facility], please contact us through our one-stop-shop provider referral line, [facility] [place], at [phone].    If you feel you have received this communication in error or would no longer like to receive these types of communications, please e-mail [email or web address]

## 2017-05-30 NOTE — PROGRESS NOTES
Subjective:      Patient ID: Sanchez Lomeli is a 41 y.o. male.    Chief Complaint: Back Pain (Dr. Liu)    Referred by: Edward Liu MD     Pain Scales  Best: 3/10  Worst: 8/10  Usually: 6/10  Today: 7/10    Back Pain   Pertinent negatives include no chest pain, fever, headaches or weight loss.     Mr. Lomeli is a 41-year-old male who was referred here by Dr. Liu for chronic low back pain with history of lumbar radiculopathy.  His pain began began in 2011 after lifting weights at the gym and started to get worse in 2014. His pain is localized to the lower lumbar spine and in his right upper buttocks region. He denies any trauma to the area and the pain is getting progressively worse. He has occasional shooting pain into the posterior leg, more often on the left than the right, down into the foot with numbness and tingling.  He also has occasional similar sensations in his testicular area. His pain is aggravated by prolonged sitting or standing.  It is better with lying flat on his back.  His maximum pain is 10/10 and minimum 3-4/10.  Today, it is 7/10.  The patient denies any significant clear weakness, but does drag his right foot at times due to pain.  He denies any bowel or bladder incontinence. He takes gabapentin 600mg, 600mg, 1200mg.  He takes hydrocodone/APAP 10/325 p.r.n., usually four times per day.  He reports suboptimal relief with this regimen. He completed PT in 2014 which did not improve his pain. He is no longer taking meloxicam 15 mg p.o. Daily or tramadol.      MRI from 5/3/17 revealed Lumbar spondylosis most prominent at L5-S1 level with central and left paracentral disc extrusion resulting in moderate central canal stenosis and moderate bilateral neuroforaminal narrowing.     Past Medical History:   Diagnosis Date    Chronic back pain     Chronic diarrhea     Hernia     TBI (traumatic brain injury) 2010       Past Surgical History:   Procedure Laterality Date     HERNIA REPAIR  2017    umbilical     Review of patient's allergies indicates:  No Known Allergies    Current Outpatient Prescriptions   Medication Sig Dispense Refill    hydrocodone-acetaminophen 10-325mg (NORCO)  mg Tab Take 1 tablet by mouth 3 (three) times daily as needed. 90 tablet 0    meloxicam (MOBIC) 15 MG tablet Take 1 tablet (15 mg total) by mouth once daily.      theophylline (THEODUR) 300 mg 24 hr capsule Take 2 tablets (600 mg total) daily. 30 capsule 11    gabapentin (NEURONTIN) 600 MG tablet Take 1 tablet (600 mg total) by mouth 3 (three) times daily. 90 tablet 2     No current facility-administered medications for this visit.        Family History   Problem Relation Age of Onset    Diabetes Mother     Heart attack Neg Hx     Heart disease Neg Hx     Hypertension Neg Hx     Colon cancer Neg Hx     Esophageal cancer Neg Hx        Social History     Social History    Marital status:      Spouse name: N/A    Number of children: N/A    Years of education: N/A     Occupational History    self employed      Social History Main Topics    Smoking status: Former Smoker     Quit date: 1/1/2007    Smokeless tobacco: Never Used    Alcohol use Yes      Comment: occasional    Drug use: No    Sexual activity: Yes     Partners: Female     Other Topics Concern    Not on file     Social History Narrative    No narrative on file     Review of Systems   Constitution: Negative for chills, fever, malaise/fatigue, weight gain and weight loss.   HENT: Negative for ear pain, headaches and hoarse voice.    Eyes: Negative for blurred vision, pain and visual disturbance.   Cardiovascular: Negative for chest pain, dyspnea on exertion and irregular heartbeat.   Respiratory: Negative for cough, shortness of breath and wheezing.    Endocrine: Negative for cold intolerance and heat intolerance.   Hematologic/Lymphatic: Negative for adenopathy and bleeding problem. Does not bruise/bleed easily.  "  Skin: Negative for color change, itching and rash.   Musculoskeletal: Positive for back pain and stiffness.   Gastrointestinal: Negative for change in bowel habit, diarrhea, hematemesis, hematochezia, melena and vomiting.   Genitourinary: Negative for flank pain, frequency, hematuria and urgency.   Neurological: Negative for difficulty with concentration, dizziness, loss of balance and seizures.   Psychiatric/Behavioral: Negative for altered mental status, depression and suicidal ideas. The patient is not nervous/anxious.    Allergic/Immunologic: Negative for HIV exposure.         Objective:      /81   Pulse 84   Temp 98.6 °F (37 °C) (Oral)   Resp 18   Ht 5' 10" (1.778 m)   Wt 97.4 kg (214 lb 11.7 oz)   BMI 30.81 kg/m²   Normocephalic.  Atraumatic.  Affect appropriate.  Breathing unlabored.  Extra ocular muscles intact.      General Musculoskeletal Exam   Gait: antalgic     Right Ankle/Foot Exam     Tests   Heel Walk: able to perform  Tiptoe Walk: able to perform    Left Ankle/Foot Exam     Tests   Heel Walk: able to perform  Tiptoe Walk: able to perform  Back (L-Spine & T-Spine) / Neck (C-Spine) Exam     Spinal Sensation   Right Side Sensation  L-Spine Level: normal  Left Side Sensation  L-Spine Level: normal    Back (L-Spine & T-Spine) Tests   Left Side Tests  Straight leg raise:       Supine SLR:  50 degrees      Muscle Strength   Right Lower Extremity   Hip Abduction: 5/5   Hip Flexion: 5/5   Quadriceps:  5/5   Hamstrin/5   Anterior tibial:  5/5/5  Gastrocsoleus:  5/5/5  EHL:  5/5  Left Lower Extremity   Hip Abduction: 5/5   Hip Flexion: 5/5   Quadriceps:  5/5   Hamstrin/5   Anterior tibial:  5/5/5   Gastrocsoleus:  5/5/5  EHL:  5/5    Reflexes     Left Side  Quadriceps:  2+  Achilles:  2+  Babinski Sign:  absent  Ankle Clonus:  absent    Right Side   Quadriceps:  2+  Achilles:  2+  Babinski Sign:  absent  Ankle Clonus:  absent        MRI L-spine 5/3/17  There is straightening of the " normal lumbar lordosis which may be positional.  Lumbar spine alignment is otherwise within normal limits.  The vertebral body heights are well maintained, with no fracture.  No marrow signal abnormality suspicious for an infiltrative process.      The conus is normal in appearance, and terminates at the T12-L1 level.  The adjacent soft tissue structures show no significant abnormalities.      There are findings of multi-level lumbar spondylosis, as below:    L1-L2: There is no focal disc herniation. No significant central canal or neural foraminal narrowing.    L2-L3: There is no focal disc herniation. No significant central canal or neural foraminal narrowing.    L3-L4: There is no focal disc herniation. No significant central canal or neural foraminal narrowing.    L4-L5: Minimal circumferential disc bulge and bilateral facet hypertrophy resulting in mild bilateral neuroforaminal without significant central canal stenosis.    L5-S1: Circumferential disc bulge with superimposed central and left paracentral disc extrusion resulting in moderate central canal stenosis and moderate bilateral neuroforaminal narrowing.     Impression   Lumbar spondylosis most prominent at L5-S1 level with central and left paracentral disc extrusion resulting in moderate central canal stenosis and moderate bilateral neuroforaminal narrowing.      Assessment:       Encounter Diagnoses   Name Primary?    Left lumbar radiculopathy Yes    Neural foraminal stenosis of lumbar spine (bilateral L5-S1, L>R)     Osteoarthritis of spine with radiculopathy, lumbar region          Plan:       Sanchez was seen today for back pain.    Diagnoses and all orders for this visit:    Left lumbar radiculopathy    Neural foraminal stenosis of lumbar spine (bilateral L5-S1, L>R)    Osteoarthritis of spine with radiculopathy, lumbar region         1. L-spine MRI imaging personally reviewed with patient   2. Left L5 and S1 transforaminal HEMA  3. Will plan to  repeat injection if less than 3 months of relief. .   4. Patient advised to seek emergency care if he begins to lose bowel or bladder control.   5. Patient advised on weaning off medications if pain is resolved with injections.     Rony Tripp, DO     [facility], PGY-2      I have personally taken the history and examined this patient and agree with the resident's note as stated above.

## 2017-06-02 ENCOUNTER — DOCUMENTATION ONLY (OUTPATIENT)
Dept: UROLOGY | Facility: HOSPITAL | Age: 42
End: 2017-06-02

## 2017-06-02 ENCOUNTER — TELEPHONE (OUTPATIENT)
Dept: UROLOGY | Facility: CLINIC | Age: 42
End: 2017-06-02

## 2017-06-02 ENCOUNTER — TELEPHONE (OUTPATIENT)
Dept: GASTROENTEROLOGY | Facility: CLINIC | Age: 42
End: 2017-06-02

## 2017-06-02 ENCOUNTER — HOSPITAL ENCOUNTER (OUTPATIENT)
Dept: UROLOGY | Facility: HOSPITAL | Age: 42
Discharge: HOME | End: 2017-06-02
Attending: UROLOGY
Payer: COMMERCIAL

## 2017-06-02 VITALS
HEIGHT: 70 IN | WEIGHT: 218 LBS | HEART RATE: 63 BPM | BODY MASS INDEX: 31.21 KG/M2 | SYSTOLIC BLOOD PRESSURE: 115 MMHG | RESPIRATION RATE: 16 BRPM | DIASTOLIC BLOOD PRESSURE: 69 MMHG | TEMPERATURE: 98 F

## 2017-06-02 DIAGNOSIS — N48.6 PEYRONIE DISEASE: Primary | ICD-10-CM

## 2017-06-02 DIAGNOSIS — N48.6 PEYRONIE'S DISEASE: ICD-10-CM

## 2017-06-02 PROCEDURE — 93980 PENILE VASCULAR STUDY: CPT | Mod: 26,59,, | Performed by: UROLOGY

## 2017-06-02 PROCEDURE — 54235 NJX CORPORA CAVERNOSA RX AGT: CPT

## 2017-06-02 PROCEDURE — 54235 NJX CORPORA CAVERNOSA RX AGT: CPT | Mod: 59,,, | Performed by: UROLOGY

## 2017-06-02 PROCEDURE — 93981 PENILE VASCULAR STUDY: CPT | Mod: 26,59,, | Performed by: UROLOGY

## 2017-06-02 RX ORDER — PAPAVERINE HYDROCHLORIDE 30 MG/ML
90 INJECTION INTRAMUSCULAR; INTRAVENOUS ONCE
Status: COMPLETED | OUTPATIENT
Start: 2017-06-02 | End: 2017-06-02

## 2017-06-02 RX ADMIN — PAPAVERINE HYDROCHLORIDE 90 MG: 30 INJECTION INTRAMUSCULAR; INTRAVENOUS at 11:06

## 2017-06-02 NOTE — PATIENT INSTRUCTIONS
After your Doppler US Procedure/PEP injection:    You may resume your usual diet, medications and activities following the procedure. Some side effects may occur that usually do not need medical attention. These side effects may go away during treatment as your body adjusts to the medicine. Contact your [facility] at [phone] if any of the following side effects continue or become extremely bothersome or if you have any questions.    Bruising or bleeding at place of injection   Burning (mild) along penis   Swelling at place of injection    You may also reach a urology resident on call after regular clinic hours and on weekends at [phone]. Papaverine injected into the penis may cause tingling at the tip of the penis. This is no cause for concern.

## 2017-06-02 NOTE — PROGRESS NOTES
After Elliot Lomeli's doppler, we discussed surgical options for his peyronie's.  We discussed IPP, Xiaflex, plication, and incision and grafting as well as the indications for each.  We discussed these in conjunction with his ultrasound findings.  He was given the chance to ask questions.  Alternative treatments were discussed as well.  He would like to proceed with plication and circumcision.    We discussed the risks and benefits of penile plication for Peyronie's disease.  We discussed penile shortening, ED, infection, pain, failure to improve the curve, and decreased sensation amongst other risks.  He was given the chance to ask questions, and alternatives were discussed.    The patient is scheduled for a circumcision.  The risks and benefits were explained to the patient in detail.  The risks include but are not limited to bleeding, infection, pain,  fistula formation (hole in the urine tube), meatal stenosis (ulceration/scarring at the tip of the penis, skin bridge, removal of too much or too little skin, buried penis, penile swelling of discomfort, infection of incision or bleeding (usually mild) that may result in a hospital treatment.  The Patient was given the alternatives of observation and medical therapy as well. He was given the chance to ask questions.  The patient understands these risks and has agreed to proceed with surgery.

## 2017-06-02 NOTE — PROCEDURES
Date: 06/02/2017     Surgeon: Rafael     Assistant: None     Procedure performed: 1. Doppler ultrasound of the penis (complete and follow up)          2. Intracavernosal injection of pharmacologic agent   Blood loss: None     Specimen: None     Procedure in detail:   The risks, benefits, complications, treatment options, and expected outcomes were discussed with the patient. The patient concurred with the proposed plan, giving informed consent.     In the flaccid state the patient's cavernosal artery was found with the doppler ultrasound. This was first done on the R and then on the L. His peak systolic velocity was 11.0 cm/s on the R and 8.91 cm/s  on the L.  End diastolic velocity was 0.87 cm/s on the R and 0.74 cm on the L.   The resistive index was 0.92 on the R and 0.92 on the L.    Using standard sterile technique, the patient was then injected with 90 mg of papaverine. A full erection was achieved.  He had approximately 50 degree curvature to the L.    The cavernosal arteries were then measured again with the doppler ultrasound. His peak systolic velocity was 15.7  cm/s on the R and 14.5 cm/s on the L. End diastolic velocity was 3.09 cm/s on the R and 3.96 cm/s on the L.   The resistive index was 0.80 on the R and 0.73 on the L.    He tolerated the procedure well without complication. He will be observed to ensure detumescence. He will be discharged home in stable condition and follow up to discuss his treatment options in the next 1-2 weeks.

## 2017-06-02 NOTE — TELEPHONE ENCOUNTER
----- Message from Sukhi Scanlon MD sent at 6/2/2017  4:22 PM CDT -----  Colon biopsies are normal.

## 2017-06-05 ENCOUNTER — TELEPHONE (OUTPATIENT)
Dept: ENDOSCOPY | Facility: HOSPITAL | Age: 42
End: 2017-06-05

## 2017-06-12 ENCOUNTER — TELEPHONE (OUTPATIENT)
Dept: ELECTROPHYSIOLOGY | Facility: CLINIC | Age: 42
End: 2017-06-12

## 2017-06-12 ENCOUNTER — HOSPITAL ENCOUNTER (OUTPATIENT)
Facility: OTHER | Age: 42
Discharge: HOME | End: 2017-06-12
Attending: ANESTHESIOLOGY | Admitting: ANESTHESIOLOGY
Payer: COMMERCIAL

## 2017-06-12 ENCOUNTER — SURGERY (OUTPATIENT)
Age: 42
End: 2017-06-12

## 2017-06-12 VITALS
SYSTOLIC BLOOD PRESSURE: 122 MMHG | RESPIRATION RATE: 18 BRPM | DIASTOLIC BLOOD PRESSURE: 80 MMHG | TEMPERATURE: 98 F | OXYGEN SATURATION: 94 % | WEIGHT: 210 LBS | HEART RATE: 67 BPM | HEIGHT: 70 IN | BODY MASS INDEX: 30.06 KG/M2

## 2017-06-12 DIAGNOSIS — M54.16 LEFT LUMBAR RADICULOPATHY: Primary | ICD-10-CM

## 2017-06-12 DIAGNOSIS — R55 SITUATIONAL SYNCOPE: Primary | ICD-10-CM

## 2017-06-12 DIAGNOSIS — M51.36 DDD (DEGENERATIVE DISC DISEASE), LUMBAR: ICD-10-CM

## 2017-06-12 PROBLEM — M51.369 DDD (DEGENERATIVE DISC DISEASE), LUMBAR: Status: ACTIVE | Noted: 2017-06-12

## 2017-06-12 PROCEDURE — 64483 NJX AA&/STRD TFRM EPI L/S 1: CPT | Performed by: ANESTHESIOLOGY

## 2017-06-12 PROCEDURE — 25000003 PHARM REV CODE 250: Performed by: ANESTHESIOLOGY

## 2017-06-12 PROCEDURE — 25500020 PHARM REV CODE 255: Performed by: ANESTHESIOLOGY

## 2017-06-12 PROCEDURE — 63600175 PHARM REV CODE 636 W HCPCS: Performed by: ANESTHESIOLOGY

## 2017-06-12 PROCEDURE — 64484 NJX AA&/STRD TFRM EPI L/S EA: CPT | Mod: LT,,, | Performed by: ANESTHESIOLOGY

## 2017-06-12 PROCEDURE — 64483 NJX AA&/STRD TFRM EPI L/S 1: CPT | Mod: LT,,, | Performed by: ANESTHESIOLOGY

## 2017-06-12 PROCEDURE — 64484 NJX AA&/STRD TFRM EPI L/S EA: CPT | Performed by: ANESTHESIOLOGY

## 2017-06-12 RX ORDER — LIDOCAINE HYDROCHLORIDE 10 MG/ML
INJECTION, SOLUTION EPIDURAL; INFILTRATION; INTRACAUDAL; PERINEURAL
Status: DISCONTINUED | OUTPATIENT
Start: 2017-06-12 | End: 2017-06-12 | Stop reason: HOSPADM

## 2017-06-12 RX ORDER — SODIUM CHLORIDE 9 MG/ML
500 INJECTION, SOLUTION INTRAVENOUS CONTINUOUS
Status: DISCONTINUED | OUTPATIENT
Start: 2017-06-12 | End: 2017-06-12 | Stop reason: HOSPADM

## 2017-06-12 RX ORDER — DEXAMETHASONE SODIUM PHOSPHATE 100 MG/10ML
INJECTION INTRAMUSCULAR; INTRAVENOUS
Status: DISCONTINUED | OUTPATIENT
Start: 2017-06-12 | End: 2017-06-12 | Stop reason: HOSPADM

## 2017-06-12 RX ADMIN — LIDOCAINE HYDROCHLORIDE 10 ML: 10 INJECTION, SOLUTION EPIDURAL; INFILTRATION; INTRACAUDAL; PERINEURAL at 08:06

## 2017-06-12 RX ADMIN — IOHEXOL 50 ML: 300 INJECTION, SOLUTION INTRAVENOUS at 08:06

## 2017-06-12 RX ADMIN — DEXAMETHASONE SODIUM PHOSPHATE 10 MG: 10 INJECTION INTRAMUSCULAR; INTRAVENOUS at 08:06

## 2017-06-12 RX ADMIN — SODIUM BICARBONATE 5 ML: 0.2 INJECTION, SOLUTION INTRAVENOUS at 08:06

## 2017-06-12 NOTE — OP NOTE
Lumbosacral Transforaminal Epidural Steroid Injection    Date of Service: 06/12/2017    PCP: Liliam Carbajal MD    Time-out taken to identify patient and procedure side prior to starting the procedure.   I attest that I have reviewed the patient's home medications prior to the procedure and no contraindication have been identified. I  re-evaluated the patient after the patient was positioned for the procedure in the procedure room immediately before the procedural time-out. The vital signs are current and represent the current state of the patient which has not significantly changed since the preprocedure assessment.                                                           PROCEDURE: Left L5 and S1 transforaminal epidural steroid injection under fluoroscopy    REASON FOR PROCEDURE: Left radiculopathy    PHYSICIAN: Emerita Santillan MD  ASSISTANTS:CECILLE Roth MD    MEDICATIONS INJECTED:  Preservative-free dexamethasone 10mg, Xylocaine 1% MPF 3-5ml. 3ml per level. Preservative free, sterile normal saline is used to get larger volume as needed.  LOCAL ANESTHETIC INJECTED:  Xylocaine 1% 9ml with Sodium Bicarbonate 1ml. 3ml per site.    SEDATION MEDICATIONS: None  ESTIMATED BLOOD LOSS:  None.    COMPLICATIONS:  None.    TECHNIQUE:   Laying in a prone position, the patient was prepped and draped in the usual sterile fashion using ChloraPrep and fenestrated drape.  The area to be injected was determined under fluoroscopic guidance.  Local anesthetic was given by raising a wheel and going down to the hub of a 27-gauge 1.25 inch needle.  The 3.5inch 22-gauge spinal needle was introduced towards the transverse process of each above named nerve root level.  The needle was walked medially then hinged into the neural foramen.  Omnipaque was injected to confirm appropriate placement and that there was no vascular runoff.  The medication was then injected after applying negative pressure. The patient tolerated the procedure  well.    PAIN BEFORE THE PROCEDURE: 6/10.    PAIN AFTER THE PROCEDURE: 0/10.    The patient was monitored after the procedure.  Patient was given post procedure and discharge instructions to follow at home.  We will see the patient back in two weeks or the patient may call to inform of status. The patient was discharged in a stable condition.

## 2017-06-12 NOTE — DISCHARGE INSTRUCTIONS

## 2017-06-13 NOTE — TELEPHONE ENCOUNTER
----- Message from Maryana Deshpande RN sent at 5/31/2017 10:05 AM CDT -----      ----- Message -----  From: Zana Villalpando MD  Sent: 5/30/2017   8:05 PM  To: Shelbi Craft RN    10 days-- trough  ----- Message -----  From: Shelbi Craft RN  Sent: 5/25/2017   6:07 PM  To: Zana Villalpando MD    Pt is going to start 600 MG of Theophylline tomorrow. When should we repeat level?  ----- Message -----  From: Nannette Guadarrama RN  Sent: 5/24/2017   5:02 PM  To: Shelbi Craft RN        ----- Message -----  From: Zana Villalpando MD  Sent: 5/17/2017   9:53 PM  To: Nannette Guadarrama RN    Please see comments below and call patient.  In general you do not need to route back to me.  If he can laugh w/o near syncope, keep same . If still Sxc, would increase dose to 600 a day

## 2017-06-19 ENCOUNTER — PATIENT MESSAGE (OUTPATIENT)
Dept: PAIN MEDICINE | Facility: CLINIC | Age: 42
End: 2017-06-19

## 2017-06-20 NOTE — TELEPHONE ENCOUNTER
Please review and advise. Treatment plan as follows: . L-spine MRI imaging personally reviewed with patient   2. Left L5 and S1 transforaminal HEMA  3. Will plan to repeat injection if less than 3 months of relief. .   4. Patient advised to seek emergency care if he begins to lose bowel or bladder control.   5. Patient advised on weaning off medications if pain is resolved with injections.     Patient had Left TF HEMA @ L5 and S1 on 06/12/2017

## 2017-06-22 ENCOUNTER — PATIENT MESSAGE (OUTPATIENT)
Dept: PAIN MEDICINE | Facility: OTHER | Age: 42
End: 2017-06-22

## 2017-06-22 ENCOUNTER — TELEPHONE (OUTPATIENT)
Dept: PAIN MEDICINE | Facility: CLINIC | Age: 42
End: 2017-06-22

## 2017-06-22 NOTE — TELEPHONE ENCOUNTER
----- Message from Mery Briseno sent at 6/22/2017 11:24 AM CDT -----  _  1st Request  _  2nd Request  _  3rd Request        Who: patient wife    Why: they are calling to check on the status of pt scheduling a second procedure. They were told that someone from your dept would be contacting him. Please call pt     What Number to Call Back:[phone]    When to Expect a call back: (Before the end of the day)   -- if the call is after 12:00, the call back will be tomorrow.

## 2017-07-09 ENCOUNTER — HOSPITAL ENCOUNTER (EMERGENCY)
Facility: HOSPITAL | Age: 42
Discharge: HOME | End: 2017-07-09
Attending: EMERGENCY MEDICINE
Payer: COMMERCIAL

## 2017-07-09 VITALS
WEIGHT: 225 LBS | RESPIRATION RATE: 18 BRPM | HEIGHT: 70 IN | HEART RATE: 66 BPM | OXYGEN SATURATION: 97 % | BODY MASS INDEX: 32.21 KG/M2 | DIASTOLIC BLOOD PRESSURE: 75 MMHG | TEMPERATURE: 98 F | SYSTOLIC BLOOD PRESSURE: 129 MMHG

## 2017-07-09 DIAGNOSIS — N50.819 TESTICULAR PAIN: Primary | ICD-10-CM

## 2017-07-09 DIAGNOSIS — N20.0 RENAL STONE: ICD-10-CM

## 2017-07-09 LAB
ALBUMIN SERPL BCP-MCNC: 3.9 G/DL
ALP SERPL-CCNC: 86 U/L
ALT SERPL W/O P-5'-P-CCNC: 24 U/L
ANION GAP SERPL CALC-SCNC: 9 MMOL/L
AST SERPL-CCNC: 20 U/L
BACTERIA #/AREA URNS AUTO: ABNORMAL /HPF
BASOPHILS # BLD AUTO: 0.04 K/UL
BASOPHILS NFR BLD: 0.4 %
BILIRUB SERPL-MCNC: 1 MG/DL
BILIRUB UR QL STRIP: NEGATIVE
BUN SERPL-MCNC: 18 MG/DL
CALCIUM SERPL-MCNC: 8.7 MG/DL
CHLORIDE SERPL-SCNC: 109 MMOL/L
CLARITY UR REFRACT.AUTO: ABNORMAL
CO2 SERPL-SCNC: 22 MMOL/L
COLOR UR AUTO: YELLOW
CREAT SERPL-MCNC: 0.9 MG/DL
DIFFERENTIAL METHOD: ABNORMAL
EOSINOPHIL # BLD AUTO: 0.1 K/UL
EOSINOPHIL NFR BLD: 1.3 %
ERYTHROCYTE [DISTWIDTH] IN BLOOD BY AUTOMATED COUNT: 13.1 %
EST. GFR  (AFRICAN AMERICAN): >60 ML/MIN/1.73 M^2
EST. GFR  (NON AFRICAN AMERICAN): >60 ML/MIN/1.73 M^2
GLUCOSE SERPL-MCNC: 101 MG/DL
GLUCOSE UR QL STRIP: NEGATIVE
HCT VFR BLD AUTO: 43.2 %
HGB BLD-MCNC: 15.3 G/DL
HGB UR QL STRIP: ABNORMAL
HYALINE CASTS UR QL AUTO: 0 /LPF
KETONES UR QL STRIP: ABNORMAL
LEUKOCYTE ESTERASE UR QL STRIP: NEGATIVE
LYMPHOCYTES # BLD AUTO: 2.7 K/UL
LYMPHOCYTES NFR BLD: 27.5 %
MCH RBC QN AUTO: 31.8 PG
MCHC RBC AUTO-ENTMCNC: 35.4 %
MCV RBC AUTO: 90 FL
MICROSCOPIC COMMENT: ABNORMAL
MONOCYTES # BLD AUTO: 0.6 K/UL
MONOCYTES NFR BLD: 6.2 %
NEUTROPHILS # BLD AUTO: 6.4 K/UL
NEUTROPHILS NFR BLD: 64.3 %
NITRITE UR QL STRIP: NEGATIVE
PH UR STRIP: 5 [PH] (ref 5–8)
PLATELET # BLD AUTO: 181 K/UL
PMV BLD AUTO: 9.4 FL
POTASSIUM SERPL-SCNC: 3.8 MMOL/L
PROT SERPL-MCNC: 7.2 G/DL
PROT UR QL STRIP: ABNORMAL
RBC # BLD AUTO: 4.81 M/UL
RBC #/AREA URNS AUTO: 27 /HPF (ref 0–4)
SODIUM SERPL-SCNC: 140 MMOL/L
SP GR UR STRIP: 1.02 (ref 1–1.03)
URATE CRY UR QL COMP ASSIST: ABNORMAL
URN SPEC COLLECT METH UR: ABNORMAL
UROBILINOGEN UR STRIP-ACNC: NEGATIVE EU/DL
WBC # BLD AUTO: 9.97 K/UL
WBC #/AREA URNS AUTO: 0 /HPF (ref 0–5)

## 2017-07-09 PROCEDURE — 81001 URINALYSIS AUTO W/SCOPE: CPT

## 2017-07-09 PROCEDURE — 96374 THER/PROPH/DIAG INJ IV PUSH: CPT

## 2017-07-09 PROCEDURE — 99285 EMERGENCY DEPT VISIT HI MDM: CPT | Mod: ,,, | Performed by: PHYSICIAN ASSISTANT

## 2017-07-09 PROCEDURE — 25000003 PHARM REV CODE 250: Performed by: PHYSICIAN ASSISTANT

## 2017-07-09 PROCEDURE — 85025 COMPLETE CBC W/AUTO DIFF WBC: CPT

## 2017-07-09 PROCEDURE — 99285 EMERGENCY DEPT VISIT HI MDM: CPT | Mod: 25

## 2017-07-09 PROCEDURE — 87086 URINE CULTURE/COLONY COUNT: CPT

## 2017-07-09 PROCEDURE — 63600175 PHARM REV CODE 636 W HCPCS: Performed by: PHYSICIAN ASSISTANT

## 2017-07-09 PROCEDURE — 96361 HYDRATE IV INFUSION ADD-ON: CPT

## 2017-07-09 PROCEDURE — 96375 TX/PRO/DX INJ NEW DRUG ADDON: CPT

## 2017-07-09 PROCEDURE — 80053 COMPREHEN METABOLIC PANEL: CPT

## 2017-07-09 PROCEDURE — 81003 URINALYSIS AUTO W/O SCOPE: CPT

## 2017-07-09 PROCEDURE — 96376 TX/PRO/DX INJ SAME DRUG ADON: CPT

## 2017-07-09 PROCEDURE — 25000003 PHARM REV CODE 250

## 2017-07-09 PROCEDURE — 63600175 PHARM REV CODE 636 W HCPCS: Performed by: EMERGENCY MEDICINE

## 2017-07-09 RX ORDER — OXYCODONE AND ACETAMINOPHEN 10; 325 MG/1; MG/1
1 TABLET ORAL EVERY 6 HOURS PRN
Qty: 15 TABLET | Refills: 0 | Status: SHIPPED | OUTPATIENT
Start: 2017-07-09 | End: 2017-07-12 | Stop reason: SDUPTHER

## 2017-07-09 RX ORDER — HYDROMORPHONE HYDROCHLORIDE 1 MG/ML
1 INJECTION, SOLUTION INTRAMUSCULAR; INTRAVENOUS; SUBCUTANEOUS
Status: COMPLETED | OUTPATIENT
Start: 2017-07-09 | End: 2017-07-09

## 2017-07-09 RX ORDER — KETOROLAC TROMETHAMINE 30 MG/ML
10 INJECTION, SOLUTION INTRAMUSCULAR; INTRAVENOUS
Status: COMPLETED | OUTPATIENT
Start: 2017-07-09 | End: 2017-07-09

## 2017-07-09 RX ORDER — OXYCODONE AND ACETAMINOPHEN 10; 325 MG/1; MG/1
1 TABLET ORAL ONCE
Status: COMPLETED | OUTPATIENT
Start: 2017-07-09 | End: 2017-07-09

## 2017-07-09 RX ORDER — HYOSCYAMINE SULFATE 0.12 MG/1
0.12 TABLET SUBLINGUAL
Status: COMPLETED | OUTPATIENT
Start: 2017-07-09 | End: 2017-07-09

## 2017-07-09 RX ORDER — FENTANYL CITRATE 50 UG/ML
25 INJECTION, SOLUTION INTRAMUSCULAR; INTRAVENOUS
Status: COMPLETED | OUTPATIENT
Start: 2017-07-09 | End: 2017-07-09

## 2017-07-09 RX ORDER — ONDANSETRON 2 MG/ML
4 INJECTION INTRAMUSCULAR; INTRAVENOUS
Status: COMPLETED | OUTPATIENT
Start: 2017-07-09 | End: 2017-07-09

## 2017-07-09 RX ORDER — TAMSULOSIN HYDROCHLORIDE 0.4 MG/1
0.4 CAPSULE ORAL DAILY
Qty: 10 CAPSULE | Refills: 0 | Status: ON HOLD | OUTPATIENT
Start: 2017-07-09 | End: 2017-07-28 | Stop reason: SDUPTHER

## 2017-07-09 RX ORDER — OXYCODONE AND ACETAMINOPHEN 10; 325 MG/1; MG/1
1 TABLET ORAL EVERY 6 HOURS PRN
Qty: 18 TABLET | Refills: 0 | Status: SHIPPED | OUTPATIENT
Start: 2017-07-09 | End: 2017-07-09 | Stop reason: SDUPTHER

## 2017-07-09 RX ADMIN — ONDANSETRON 4 MG: 2 INJECTION INTRAMUSCULAR; INTRAVENOUS at 09:07

## 2017-07-09 RX ADMIN — HYOSCYAMINE SULFATE 0.12 MG: 0.12 TABLET ORAL at 03:07

## 2017-07-09 RX ADMIN — FENTANYL CITRATE 25 MCG: 50 INJECTION, SOLUTION INTRAMUSCULAR; INTRAVENOUS at 09:07

## 2017-07-09 RX ADMIN — OXYCODONE HYDROCHLORIDE AND ACETAMINOPHEN 1 TABLET: 10; 325 TABLET ORAL at 06:07

## 2017-07-09 RX ADMIN — KETOROLAC TROMETHAMINE 10 MG: 30 INJECTION, SOLUTION INTRAMUSCULAR at 09:07

## 2017-07-09 RX ADMIN — HYDROMORPHONE HYDROCHLORIDE 1 MG: 1 INJECTION, SOLUTION INTRAMUSCULAR; INTRAVENOUS; SUBCUTANEOUS at 03:07

## 2017-07-09 RX ADMIN — ONDANSETRON 4 MG: 2 INJECTION INTRAMUSCULAR; INTRAVENOUS at 03:07

## 2017-07-09 RX ADMIN — SODIUM CHLORIDE 1000 ML: 0.9 INJECTION, SOLUTION INTRAVENOUS at 09:07

## 2017-07-09 NOTE — ED NOTES
Pt resting with eyes closed, arouses to verbal stimuli, reports relief of back and abd pain, remains with testicular pain rated 2/10.  Wife at bedside, siderails up x 2, call light in reach.

## 2017-07-09 NOTE — HPI
41 year old male with a history of peyronie's disease and chronic testalgia, presents to the ER complaining of acute scrotal pain. Reports nausea, vomiting. Denies hematuria, dysuria, fevers, chills, flank pain. Reports diffuse non-specific abdominal pain. Voiding well without issues, but has not voided since presentation. Takes chronic pain medication at home. No history of UTIs, epididymitis/orchitis, stones, gross hematuria,  malignancy.    Scrotal US performed, normal. Patient currently with no testicular pain or scrotal pain. Only complaint currently is his abdominal pain.

## 2017-07-09 NOTE — ED PROVIDER NOTES
"Encounter Date: 7/9/2017    SCRIBE #1 NOTE: I, Nicole Harper, am scribing for, and in the presence of,  Dr. Gastelum. I have scribed the following portions of the note - the APC attestation.       History     Chief Complaint   Patient presents with    Testicle Pain     41-year-old male with a PMH significant for Peyronie's disease, TBI, chronic back pain, hernia, chronic diarrhea presents to the ED with complaints of testicular pain.  Patient's wife reports that the patient woke up at approximately 2 AM with sudden bilateral testicular pain that has progressively worsened over the past several hours.  He describes the pain as a "squeezing".  Pain radiates to the lower abdomen.  Associated symptoms include nausea and vomiting, chills.  He denies measured fever, dysuria, difficulty urinating, penile discharge.          Review of patient's allergies indicates:  No Known Allergies  Past Medical History:   Diagnosis Date    Chronic back pain     Chronic diarrhea     Hernia     TBI (traumatic brain injury) 2010     Past Surgical History:   Procedure Laterality Date    COLONOSCOPY N/A 5/29/2017    Procedure: COLONOSCOPY;  Surgeon: Sukhi Scanlon MD;  Location: [address]);  Service: Endoscopy;  Laterality: N/A;    HERNIA REPAIR  2017    umbilical     Family History   Problem Relation Age of Onset    Diabetes Mother     Heart attack Neg Hx     Heart disease Neg Hx     Hypertension Neg Hx     Colon cancer Neg Hx     Esophageal cancer Neg Hx      Social History   Substance Use Topics    Smoking status: Former Smoker     Quit date: 1/1/2007    Smokeless tobacco: Never Used    Alcohol use Yes      Comment: occasional     Review of Systems   Constitutional: Positive for chills. Negative for fever.   HENT: Negative for sore throat.    Respiratory: Negative for shortness of breath.    Cardiovascular: Negative for chest pain.   Gastrointestinal: Positive for abdominal pain, nausea and vomiting. "   Genitourinary: Positive for testicular pain. Negative for discharge and scrotal swelling.   Musculoskeletal: Negative for myalgias.   Skin: Negative for rash.   Neurological: Negative for syncope.   Psychiatric/Behavioral: Negative for confusion.       Physical Exam     Initial Vitals [07/09/17 0854]   BP Pulse Resp Temp SpO2   (!) 180/90 66 17 98.9 °F (37.2 °C) 99 %      MAP       120         Physical Exam    Nursing note and vitals reviewed.  Constitutional: He appears well-developed and well-nourished.  Non-toxic appearance. He does not appear ill. No distress.   Patient appears in significant discomfort secondary to pain   HENT:   Head: Normocephalic and atraumatic.   Neck: Normal range of motion. Neck supple.   Cardiovascular: Normal rate and regular rhythm. Exam reveals no gallop, no distant heart sounds and no friction rub.    No murmur heard.  Pulmonary/Chest: Effort normal and breath sounds normal. No accessory muscle usage. No tachypnea. No respiratory distress. He has no decreased breath sounds. He has no wheezes. He has no rhonchi. He has no rales.   Abdominal: Soft. Normal appearance and bowel sounds are normal. He exhibits no distension. There is generalized tenderness.   There is diffuse abdominal tenderness slightly more pronounced in the lower abdomen.   Genitourinary: Penis normal. Right testis shows no swelling. Left testis shows no swelling. Uncircumcised. No discharge found.   Genitourinary Comments: Testicles are high-riding bilaterally.  Mild to moderate tenderness to palpation.  No significant testicular or scrotal swelling.   Musculoskeletal: Normal range of motion.   Neurological: He is alert and oriented to person, place, and time.   Skin: Skin is warm and dry. No rash noted. No pallor.   Psychiatric: He has a normal mood and affect. His behavior is normal. Judgment and thought content normal.         ED Course   Procedures  Labs Reviewed   COMPREHENSIVE METABOLIC PANEL - Abnormal;  Notable for the following:        Result Value    CO2 22 (*)     All other components within normal limits   CBC W/ AUTO DIFFERENTIAL - Abnormal; Notable for the following:     MCH 31.8 (*)     All other components within normal limits   URINALYSIS, REFLEX TO URINE CULTURE - Abnormal; Notable for the following:     Appearance, UA Cloudy (*)     Protein, UA 1+ (*)     Ketones, UA Trace (*)     Occult Blood UA 2+ (*)     All other components within normal limits    Narrative:     Preferred Collection Type->Urine, Clean Catch   URINALYSIS MICROSCOPIC - Abnormal; Notable for the following:     RBC, UA 27 (*)     All other components within normal limits    Narrative:     Preferred Collection Type->Urine, Clean Catch   CULTURE, URINE   CULTURE, URINE    Narrative:     Preferred Collection Type->Urine, Clean Catch  add on [id number] urine culture per Richard Gastelum MD  07/09/2017    17:59          Imaging Results          X-Ray Abdomen AP 1 View (Final result)  Result time 07/09/17 17:50:48    Final result by Harsh Joshua MD (07/09/17 17:50:48)                 Impression:        No evidence of bowel obstruction.  Calcification proximal RIGHT ureter as noted on CT of 7/9/17 at      Electronically signed by: Dr. Harsh Joshua MD  Date:     07/09/17  Time:    17:50              Narrative:    SUPINE ABDOMEN:      Comparison: CT examination of 07/09/2017    Findings:     The bowel gas pattern is non-specific.Calcification identified level of the RIGHT transverse process of L3, elongated, measuring 1.4 x 0.6 cm.  Pelvic phleboliths.  No airspace consolidation at the lung bases.No acute bony abnormality.No abnormal soft tissue masses.                             CT Renal Stone Study ABD Pelvis WO (Final result)  Result time 07/09/17 16:27:33    Final result by Marysol Sanchez MD (07/09/17 16:27:33)                 Impression:      1. 1.5 cm obstructing calculus at the right ureteropelvic junction with associated  "right renal enlargement, hydronephrosis, and perinephric stranding.  2.  2 small nonobstructing left renal calculi.  3.  Stable hepatosplenomegaly.  4.  Stable left paracentral disc extrusion at L5-S1.      Electronically signed by: MALINDA MORELAND MD  Date:     07/09/17  Time:    16:27              Narrative:    CT RENAL STONE STUDY ABD PELVIS WO.  Clinical History provided for exam:" Testicular pain".     Routine CT of the abdomen and pelvis was performed without oral or intravenous contrast for detection of renal stones.  This decreases the sensitivity for detection of lesions of the solid organs, vascular structures, and bowel.      Comparison: 2/9/17.    Findings:  There is mild atelectasis at both lung bases.      The visualized portions of the heart appear normal.    There is a 1.5 cm calculus at the right ureteropelvic junction with resulting right renal enlargement, hydronephrosis, and perinephric stranding.  There are 2 small, 3 mm nonobstructing left renal calculi, one in the midportion and the other at the lower pole.  There is no left-sided hydronephrosis or perinephric stranding.  No focal renal abnormalities are seen.    The liver is mildly enlarged with a maximum sagittal diameter of 19.1 cm.  The spleen is also enlarged with a maximum diameter of 16.3 cm.  There are no focal hepatic or splenic abnormalities. The gallbladder shows no evidence of stones or pericholecystic fluid.  There is no intra-or extrahepatic biliary ductal dilatation.    The stomach, spleen, pancreas, and adrenal glands are unremarkable.    The bladder is not well-distended but its outlines are smooth.  The prostate is not enlarged.    The visualized loops of small and large bowel show no evidence of obstruction or inflammation.  There are a few colonic diverticula.    There is no ascites, free fluid, or intraperitoneal free air. Mesenteric stranding deep to the umbilicus on the previous examination of 2/9/17 is no longer " identified.     There is no evidence of lymph node enlargement in the abdomen or pelvis.    The abdominal aorta is normal in course and caliber without significant atherosclerotic calcification.    The osseous structures demonstrate are intact without evidence of acute fracture or osseous destructive process.  Left paracentral disc extrusion at L5-S1 is unchanged when compared to the previous CT.    The extraperitoneal soft tissues are unremarkable.                             US Scrotum And Testicles (Final result)  Result time 07/09/17 11:14:31    Final result by Marysol Moreland MD (07/09/17 11:14:31)                 Impression:        Small bilateral epididymal head cysts.    Small left-sided hydrocele.    No significant detrimental change since 5/12/14.  ______________________________________     Electronically signed by resident: NIKKY GALAVIZ MD  Date:     07/09/17  Time:    11:07            As the supervising and teaching physician, I personally reviewed the images and resident's interpretation and I agree with the findings.            Electronically signed by: MARYSOL MORELAND MD  Date:     07/09/17  Time:    11:14              Narrative:    Comparison: Ultrasound scrotum and testes 5/12/14.    Findings:    The testicles are normal in size bilaterally with a homogeneous echotexture without focal abnormality. The right testicle measures 4.5 x 2.4 x 3.1 cm and the left testicle measures 4.0 x 2.5 x 3.2 cm. There are small bilateral epididymal head cysts or spermatoceles, measuring 2 mm on the right and 4 mm and 3 mm on the left. There is no evidence of hyperemia within the epididymides or testes. Arterial and venous flow is documented to the testes bilaterally with nothing to suggest torsion.  There is a small left hydrocele.  There is no evidence ofvaricocele bilaterally.                                   Medical Decision Making:   History:   Old Medical Records: I decided to obtain old medical  records.  Differential Diagnosis:   Testicular torsion, epididymitis, orchitis, cellulitis, abscess, varicocele, renal stone  Clinical Tests:   Lab Tests: Ordered and Reviewed  Radiological Study: Ordered and Reviewed  Other:   I have discussed this case with another health care provider.       APC / Resident Notes:   41-year-old male presents for evaluation of bilateral testicular pain of acute onset early this morning. He is hypertensive at 180/90. VItals otherwise WNL. There is diffuse abdominal tenderness slightly more pronounced in the lower abdomen. Testicles seem high-lying, without significant tenderness or swelling.     US of the testicles and scrotum reveals small bilat epididymal cysts and a left small hydrocele, no other acute findings to explain patient's pain. UA with 27 RBCs. CT renal study revealed a 1.5cm obstructing stone with hydronephrosis and perinephric stranding.     Urology was initially consulted prior to US results. They did not feel that patient's pain was related to testicular torsion. However, after CT renal study revealed a large obstructing stone, they were again consulted. They feel that given patient's significant improvement with oral medications in the ED, he is stable for discharge today with close follow up and further management of stone. ED warnings and return precautions given.      I have reviewed the patient's records and discussed this case with my supervising physician.         Scribe Attestation:   Scribe #1: I performed the above scribed service and the documentation accurately describes the services I performed. I attest to the accuracy of the note.    Attending Attestation:     Physician Attestation Statement for NP/PA:   I discussed this assessment and plan of this patient with the NP/PA, but I did not personally examine the patient. The face to face encounter was performed by the NP/PA.    Other NP/PA Attestation Additions:    History of Present Illness: 41 y.o. man  presents for evaluation of worsening testicular pain which began earlier this morning.          Physician Attestation for Scribe:  Physician Attestation Statement for Scribe #1: I, Dr. Gastelum, reviewed documentation, as scribed by Nicole Harper in my presence, and it is both accurate and complete.                 ED Course     Clinical Impression:   The primary encounter diagnosis was Testicular pain. A diagnosis of Renal stone was also pertinent to this visit.    Disposition:   Disposition: Discharged  Condition: Stable                        Maryana Thurston PA-C  07/12/17 1058

## 2017-07-09 NOTE — ED TRIAGE NOTES
"Pt with right flank pain that radiates to bilat testicles.  Pain started suddenly at 2am when pt was sleeping.  Pt states that he has been having back pain related to a "disc problem".  Pt denies recent injury.  Pt denies dysuria.  "

## 2017-07-09 NOTE — SUBJECTIVE & OBJECTIVE
Past Medical History:   Diagnosis Date    Chronic back pain     Chronic diarrhea     Hernia     TBI (traumatic brain injury) 2010       Past Surgical History:   Procedure Laterality Date    COLONOSCOPY N/A 5/29/2017    Procedure: COLONOSCOPY;  Surgeon: Sukhi Scanlon MD;  Location: [facility] ([address] [place] [address]);  Service: Endoscopy;  Laterality: N/A;    HERNIA REPAIR  2017    umbilical       Review of patient's allergies indicates:  No Known Allergies    Family History     Problem Relation (Age of Onset)    Diabetes Mother          Social History Main Topics    Smoking status: Former Smoker     Quit date: 1/1/2007    Smokeless tobacco: Never Used    Alcohol use Yes      Comment: occasional    Drug use: No    Sexual activity: Yes     Partners: Female       Review of Systems   Constitutional: Negative for chills and fever.   HENT: Negative for sore throat and trouble swallowing.    Eyes: Negative for visual disturbance.   Respiratory: Negative for shortness of breath.    Cardiovascular: Negative for chest pain.   Gastrointestinal: Positive for abdominal pain and nausea. Negative for abdominal distention, constipation and diarrhea.   Genitourinary: Positive for testicular pain. Negative for decreased urine volume, dysuria, flank pain, frequency, hematuria, nocturia and penile pain.   Musculoskeletal: Positive for back pain (chronic). Negative for arthralgias.   Skin: Negative for rash.   Neurological: Negative for dizziness.   Hematological: Negative for adenopathy.   Psychiatric/Behavioral: Negative for confusion.       Objective:     Temp:  [97.9 °F (36.6 °C)-98.9 °F (37.2 °C)] 97.9 °F (36.6 °C)  Pulse:  [56-66] 56  Resp:  [16-17] 16  SpO2:  [97 %-99 %] 97 %  BP: (149-180)/(87-92) 149/92     Body mass index is 32.28 kg/m².            Drains          No matching active lines, drains, or airways          Physical Exam   Constitutional: He is oriented to person, place, and time. He appears well-developed and  well-nourished. No distress.   HENT:   Head: Normocephalic and atraumatic.   Eyes: Pupils are equal, round, and reactive to light.   Neck: Normal range of motion.   Cardiovascular: Normal rate.    Pulmonary/Chest: Effort normal. No respiratory distress.   Abdominal: Soft. He exhibits no distension. There is tenderness (mild non-specific, diffuse, no peritoneal signs). There is no rebound, no guarding and no CVA tenderness.   Genitourinary: Penis normal. Right testis shows no mass, no swelling and no tenderness. Right testis is descended. Left testis shows no mass, no swelling and no tenderness. Left testis is descended. Uncircumcised. No phimosis.   Musculoskeletal: Normal range of motion.   Neurological: He is alert and oriented to person, place, and time.   Skin: Skin is warm and dry.         Significant Labs:    BMP:    Recent Labs  Lab 07/09/17  0918      K 3.8      CO2 22*   BUN 18   CREATININE 0.9   CALCIUM 8.7       CBC:    Recent Labs  Lab 07/09/17  0918   WBC 9.97   HGB 15.3   HCT 43.2          Urine Studies: No results for input(s): COLORU, APPEARANCEUA, PHUR, SPECGRAV, PROTEINUA, GLUCUA, KETONESU, BILIRUBINUA, OCCULTUA, NITRITE, UROBILINOGEN, LEUKOCYTESUR, RBCUA, WBCUA, BACTERIA, SQUAMEPITHEL, HYALINECASTS in the last 168 hours.    Invalid input(s): WRIGHTSUR  All pertinent labs results from the past 24 hours have been reviewed.    Significant Imaging:  U/S: I have reviewed all results within the past 24 hours and my personal findings are:  no acute abnormalities, no epididymitis or orchitis, no mass, normal flow. Stable small left hydrocele and bilateral epididymal cysts

## 2017-07-09 NOTE — ED NOTES
Ct called about delays in ct, ct states patient will be taken asap, patient and family updated, plan of care discussed    This patient is scheduled for a follow up with OLMAN Almazan on 3/22/17 with a CTA Head prior at [facility]. I spoke with Irene in  park and she will add this to the discharge instructions. Letter/Form sent

## 2017-07-09 NOTE — ED NOTES
LOC: The patient is awake, alert and aware of environment with an appropriate affect, the patient is oriented x 3 and speaking appropriately (Arabic speaking with broken).  APPEARANCE: Patient appears  uncomfortably, patient is clean and well groomed, patient's clothing is properly fastened.  SKIN: The skin is warm and dry, color consistent with ethnicity, patient has normal skin turgor and moist mucus membranes, skin intact, no breakdown or bruising noted.  MUSCULOSKELETAL: Patient moving all extremities spontaneously, no obvious swelling or deformities noted.  RESPIRATORY: Airway is open and patent, respirations are spontaneous, patient has a normal effort and rate, no accessory muscle use noted.  ABDOMEN: Soft and tender to low abd to palpation, no distention noted.  NEUROLOGIC:  facial expression is symmetrical, patient moving all extremities spontaneously, normal sensation in all extremities when touched with a finger.  Follows all commands appropriately.

## 2017-07-09 NOTE — CONSULTS
[facility]  Urology  Consult Note    Patient Name: Sanchez Lomeli  MRN: [id number]  Admission Date: 7/9/2017  Hospital Length of Stay: 0   Code Status: No Order   Attending Provider: Richard Oliveros MD   Consulting Provider: Jose Luis Barrera MD  Primary Care Physician: Liliam Carbajal MD  Principal Problem:<principal problem not specified>    Inpatient consult to Urology  Consult performed by: JOSE LUIS BARRERA  Consult ordered by: RICHARD OLIVEROS          Subjective:     HPI:  41 year old male with a history of peyronie's disease and chronic testalgia, presents to the ER complaining of acute scrotal pain. Reports nausea, vomiting. Denies hematuria, dysuria, fevers, chills, flank pain. Reports diffuse non-specific abdominal pain. Voiding well without issues, but has not voided since presentation. Takes chronic pain medication at home. No history of UTIs, epididymitis/orchitis, stones, gross hematuria,  malignancy.    Scrotal US performed, normal. Patient currently with no testicular pain or scrotal pain. Only complaint currently is his abdominal pain.    Past Medical History:   Diagnosis Date    Chronic back pain     Chronic diarrhea     Hernia     TBI (traumatic brain injury) 2010       Past Surgical History:   Procedure Laterality Date    COLONOSCOPY N/A 5/29/2017    Procedure: COLONOSCOPY;  Surgeon: Sukhi Scanlon MD;  Location: [address];  Service: Endoscopy;  Laterality: N/A;    HERNIA REPAIR  2017    umbilical       Review of patient's allergies indicates:  No Known Allergies    Family History     Problem Relation (Age of Onset)    Diabetes Mother          Social History Main Topics    Smoking status: Former Smoker     Quit date: 1/1/2007    Smokeless tobacco: Never Used    Alcohol use Yes      Comment: occasional    Drug use: No    Sexual activity: Yes     Partners: Female       Review of Systems   Constitutional: Negative for chills and fever.    HENT: Negative for sore throat and trouble swallowing.    Eyes: Negative for visual disturbance.   Respiratory: Negative for shortness of breath.    Cardiovascular: Negative for chest pain.   Gastrointestinal: Positive for abdominal pain and nausea. Negative for abdominal distention, constipation and diarrhea.   Genitourinary: Positive for testicular pain. Negative for decreased urine volume, dysuria, flank pain, frequency, hematuria, nocturia and penile pain.   Musculoskeletal: Positive for back pain (chronic). Negative for arthralgias.   Skin: Negative for rash.   Neurological: Negative for dizziness.   Hematological: Negative for adenopathy.   Psychiatric/Behavioral: Negative for confusion.       Objective:     Temp:  [97.9 °F (36.6 °C)-98.9 °F (37.2 °C)] 97.9 °F (36.6 °C)  Pulse:  [56-66] 56  Resp:  [16-17] 16  SpO2:  [97 %-99 %] 97 %  BP: (149-180)/(87-92) 149/92     Body mass index is 32.28 kg/m².            Drains          No matching active lines, drains, or airways          Physical Exam   Constitutional: He is oriented to person, place, and time. He appears well-developed and well-nourished. No distress.   HENT:   Head: Normocephalic and atraumatic.   Eyes: Pupils are equal, round, and reactive to light.   Neck: Normal range of motion.   Cardiovascular: Normal rate.    Pulmonary/Chest: Effort normal. No respiratory distress.   Abdominal: Soft. He exhibits no distension. There is tenderness (mild non-specific, diffuse, no peritoneal signs). There is no rebound, no guarding and no CVA tenderness.   Genitourinary: Penis normal. Right testis shows no mass, no swelling and no tenderness. Right testis is descended. Left testis shows no mass, no swelling and no tenderness. Left testis is descended. Uncircumcised. No phimosis.   Musculoskeletal: Normal range of motion.   Neurological: He is alert and oriented to person, place, and time.   Skin: Skin is warm and dry.         Significant Labs:    BMP:    Recent  Labs  Lab 07/09/17 0918      K 3.8      CO2 22*   BUN 18   CREATININE 0.9   CALCIUM 8.7       CBC:    Recent Labs  Lab 07/09/17 0918   WBC 9.97   HGB 15.3   HCT 43.2          Urine Studies: No results for input(s): COLORU, APPEARANCEUA, PHUR, SPECGRAV, PROTEINUA, GLUCUA, KETONESU, BILIRUBINUA, OCCULTUA, NITRITE, UROBILINOGEN, LEUKOCYTESUR, RBCUA, WBCUA, BACTERIA, SQUAMEPITHEL, HYALINECASTS in the last 168 hours.    Invalid input(s): WRIGHTSUR  All pertinent labs results from the past 24 hours have been reviewed.    Significant Imaging:  U/S: I have reviewed all results within the past 24 hours and my personal findings are:  no acute abnormalities, no epididymitis or orchitis, no mass, normal flow. Stable small left hydrocele and bilateral epididymal cysts                    Assessment and Plan:     No notes have been filed under this hospital service.  Service: Urology      VTE Risk Mitigation     None          Thank you for your consult. I will sign off. Please contact us if you have any additional questions.    Jose Luis Garcia MD  Urology  [facility]

## 2017-07-10 NOTE — PROGRESS NOTES
Notified of CT RSS. Patient with 1.5 cm right UPJ stone with hydronephrosis. Pain well controlled with PO pain medication. No indications for urgent stent placement. Patient will follow-up with Dr. Washington for ESWL.    Jose Luis Garcia MD

## 2017-07-11 LAB — BACTERIA UR CULT: NO GROWTH

## 2017-07-12 ENCOUNTER — TELEPHONE (OUTPATIENT)
Dept: UROLOGY | Facility: CLINIC | Age: 42
End: 2017-07-12

## 2017-07-12 ENCOUNTER — OFFICE VISIT (OUTPATIENT)
Dept: UROLOGY | Facility: CLINIC | Age: 42
End: 2017-07-12
Payer: COMMERCIAL

## 2017-07-12 ENCOUNTER — PATIENT MESSAGE (OUTPATIENT)
Dept: UROLOGY | Facility: CLINIC | Age: 42
End: 2017-07-12

## 2017-07-12 VITALS
BODY MASS INDEX: 30.21 KG/M2 | DIASTOLIC BLOOD PRESSURE: 88 MMHG | HEIGHT: 70 IN | SYSTOLIC BLOOD PRESSURE: 140 MMHG | TEMPERATURE: 98 F | WEIGHT: 211 LBS | HEART RATE: 70 BPM

## 2017-07-12 DIAGNOSIS — R11.0 NAUSEA: ICD-10-CM

## 2017-07-12 DIAGNOSIS — R10.9 FLANK PAIN: ICD-10-CM

## 2017-07-12 DIAGNOSIS — N20.0 KIDNEY STONE: Primary | ICD-10-CM

## 2017-07-12 DIAGNOSIS — N50.819 TESTALGIA: ICD-10-CM

## 2017-07-12 LAB
BILIRUB SERPL-MCNC: NORMAL MG/DL
BLOOD URINE, POC: 50
COLOR, POC UA: YELLOW
GLUCOSE UR QL STRIP: NORMAL
KETONES UR QL STRIP: NORMAL
LEUKOCYTE ESTERASE URINE, POC: NORMAL
NITRITE, POC UA: NORMAL
PH, POC UA: 5
PROTEIN, POC: NORMAL
SPECIFIC GRAVITY, POC UA: 1.01
UROBILINOGEN, POC UA: NORMAL

## 2017-07-12 PROCEDURE — 99999 PR PBB SHADOW E&M-EST. PATIENT-LVL IV: CPT | Mod: PBBFAC,,, | Performed by: NURSE PRACTITIONER

## 2017-07-12 PROCEDURE — 81002 URINALYSIS NONAUTO W/O SCOPE: CPT | Mod: S$GLB,,, | Performed by: NURSE PRACTITIONER

## 2017-07-12 PROCEDURE — 99214 OFFICE O/P EST MOD 30 MIN: CPT | Mod: 25,S$GLB,, | Performed by: NURSE PRACTITIONER

## 2017-07-12 RX ORDER — OXYCODONE AND ACETAMINOPHEN 10; 325 MG/1; MG/1
1 TABLET ORAL EVERY 6 HOURS PRN
Qty: 18 TABLET | Refills: 0 | Status: ON HOLD | OUTPATIENT
Start: 2017-07-12 | End: 2017-07-28 | Stop reason: SDUPTHER

## 2017-07-12 RX ORDER — ONDANSETRON 8 MG/1
8 TABLET, ORALLY DISINTEGRATING ORAL EVERY 6 HOURS PRN
Qty: 15 TABLET | Refills: 0 | Status: SHIPPED | OUTPATIENT
Start: 2017-07-12 | End: 2017-09-11

## 2017-07-12 NOTE — PROGRESS NOTES
"Subjective:       Patient ID: Sanchez Lomeli is a 41 y.o. male.    Chief Complaint: Nephrolithiasis (right side)      HPI: Sanchez Lomeli is a 41 y.o. White male who presents today for evaluation and management of kidney stones.  Last seen in the clinic with Dr. Hall and Dr. Washington.     He presented to the ED on 7/9/17 with complaints of bilateral testicular pain.  Patient's wife reports that the patient woke up at approximately 2 AM that morning with sudden bilateral testicular pain that has progressively worsened over the past several hours.  He describes the pain as a "squeezing".  Pain radiates to the lower abdomen.  Associated symptoms include nausea and vomiting, chills.  He denies measured fever, dysuria, difficulty urinating, penile discharge. He was dc'ed with flomax and percocet. CTRSS showed 1.5 cm stone in R UPJ w/ hydro. He was seen with Dr. Garcia at the ER who discussed follow-up with Dr. Washington for ESWL.    The patient reports today his pain is an 11/10. He reports a constant pressing pain to his right upper abdomen that radiates to his right lower back. He takes the percocets every 6 hours that help to ease the pain for that time, but then pain returns when it is time for the next pill. Nothing worsens the pain. He reports chills and nausea. Denies fever and vomiting. No urinary complaints. Denies difficulty urinating, dysuria, and hematuria. Reports constipation. He presents today to discuss scheduling an intervention to remove the stone.     Stone Hx:  Ever seen a urologist before? Yes, Dr. Washington and Dr. Hall  Previous stone episode? no    CTRSS 7/9/17- 1.5 cm obstructing calculus at the right ureteropelvic junction with associated right renal enlargement, hydronephrosis, and perinephric stranding.  2 small nonobstructing left renal calculi.  KUB 7/9/17- Calcification proximal RIGHT ureter     Review of patient's allergies indicates:  No Known Allergies    Current " Outpatient Prescriptions   Medication Sig Dispense Refill    meloxicam (MOBIC) 15 MG tablet Take 1 tablet (15 mg total) by mouth once daily.      oxycodone-acetaminophen (PERCOCET)  mg per tablet Take 1 tablet by mouth every 6 (six) hours as needed for Pain. 18 tablet 0    gabapentin (NEURONTIN) 600 MG tablet Take 1 tablet (600 mg total) by mouth 3 (three) times daily. 90 tablet 2    hydrocodone-acetaminophen 10-325mg (NORCO)  mg Tab Take 1 tablet by mouth 3 (three) times daily as needed. 90 tablet 0    ondansetron (ZOFRAN-ODT) 8 MG TbDL Take 1 tablet (8 mg total) by mouth every 6 (six) hours as needed (nausea). 15 tablet 0    tamsulosin (FLOMAX) 0.4 mg Cp24 Take 1 capsule (0.4 mg total) by mouth once daily. 10 capsule 0     No current facility-administered medications for this visit.        Past Medical History:   Diagnosis Date    Chronic back pain     Chronic diarrhea     Hernia     TBI (traumatic brain injury) 2010       Past Surgical History:   Procedure Laterality Date    COLONOSCOPY N/A 5/29/2017    Procedure: COLONOSCOPY;  Surgeon: Sukhi Scanlon MD;  Location: [address]);  Service: Endoscopy;  Laterality: N/A;    HERNIA REPAIR  2017    umbilical       Family History   Problem Relation Age of Onset    Diabetes Mother     Heart attack Neg Hx     Heart disease Neg Hx     Hypertension Neg Hx     Colon cancer Neg Hx     Esophageal cancer Neg Hx          Review of Systems     Review of Systems   Constitutional: Positive for chills. Negative for fever.   HENT: Negative for facial swelling.    Eyes: Positive for redness. Negative for visual disturbance.   Respiratory: Negative for chest tightness and shortness of breath.    Cardiovascular: Negative for chest pain and leg swelling.   Gastrointestinal: Positive for abdominal pain, constipation and nausea. Negative for vomiting.   Genitourinary: Positive for flank pain and testicular pain. Negative for decreased urine volume,  difficulty urinating, dysuria, frequency, hematuria and urgency.   Musculoskeletal: Negative for gait problem.   Neurological: Negative for dizziness, weakness and headaches.   Psychiatric/Behavioral: Negative for agitation and confusion.         Objective:     Vitals:    07/12/17 0808   BP: (!) 140/88   Pulse: 70   Temp: 98 °F (36.7 °C)     Physical Exam     Physical Exam   Nursing note and vitals reviewed.  Constitutional: He is oriented to person, place, and time. He appears well-developed and well-nourished.   HENT:   Head: Normocephalic.   Eyes: Conjunctivae are normal.   Neck: Neck supple.   Cardiovascular: Normal rate.    Pulmonary/Chest: Effort normal.   Abdominal: Soft. He exhibits no distension and no mass. There is tenderness. There is guarding. There is no rebound.   Musculoskeletal: Normal range of motion.   Neurological: He is alert and oriented to person, place, and time.   Skin: Skin is warm and dry.     Psychiatric: He has a normal mood and affect. His behavior is normal. Judgment and thought content normal.      UA today was + blood and no infection. Spec grav 1.015 and ph 5.     Lab Results   Component Value Date    CREATININE 0.9 07/09/2017     Lab Results   Component Value Date    EGFRNONAA >60.0 07/09/2017     Lab Results   Component Value Date    ESTGFRAFRICA >60.0 07/09/2017         Assessment:       1. Kidney stone    2. Testalgia    3. Nausea    4. Flank pain        Plan:     Sanchez was seen today for nephrolithiasis.    Diagnoses and all orders for this visit:    Kidney stone  -     POCT urine dipstick without microscope  -     oxycodone-acetaminophen (PERCOCET)  mg per tablet; Take 1 tablet by mouth every 6 (six) hours as needed for Pain.    Testalgia    Nausea  -     ondansetron (ZOFRAN-ODT) 8 MG TbDL; Take 1 tablet (8 mg total) by mouth every 6 (six) hours as needed (nausea).    Flank pain  -     oxycodone-acetaminophen (PERCOCET)  mg per tablet; Take 1 tablet by mouth every 6  (six) hours as needed for Pain.      Discussed kidney stones.  Diet modifications; educational materials.  Continue good water intake of at least 2.5 liters daily;   Discussed CTRSS and KUB  Discussed side effects, indications, and MOA for zofran and percocet. Prescription sent to the pharmacy and percocet handed to the pt. Pt verbalized understanding.  Will discuss with Dr. Washington and notify wife after discussion.      ++Call his wife Yanni to schedule and after discussion with Dr. Washington    Get prompt medical attention if any of the following occur:  · Severe pain that returns and not relieved by pain medicines  · Repeated vomiting or unable to keep down fluids  · Weakness, dizziness or fainting  · Fever of 101.4ºF (38ºC) or higher, or as directed by your healthcare provider  · Blood clots in urine  · Foul smelling or cloudy urine  · Unable to pass urine for 8 hours or increasing bladder pressure  ·   I encouraged him or any of him family members to call or email me with questions and/or concerns.

## 2017-07-12 NOTE — PATIENT INSTRUCTIONS
Kidney Stone, Passed    A kidney stone (nephrolithiasis) starts as tiny crystals that form inside the kidney where urine is made. Most kidney stones enlarge to about 1/8 to 1/4 inch in size before leaving the kidney and moving toward the bladder. The sharp, cramping pain and nausea/vomiting you had was the stone moving through the ureter (the narrow tube joining the kidney to the bladder). Once the stone reaches your bladder, the pain stops. Pain may start again as the stone passes through the bladder and out through the urethra. There are 4 types of kidney stones. Eighty percent are calcium stones--mostly calcium oxalate but also some with calcium phosphate. The other 3 types include uric acid stones, struvite stones (from a preceding infection), and rarely, cystine stones.  Home care  The following guidelines will help you care for yourself at home:  · Drink plenty of fluids. This increases urine flow and reduces the chance that a new stone will form. Healthy adults (no heart/liver/kidney disease) who have had a kidney stone should drink 12, 8-ounce glasses of fluids per day. Most of this should be water. The goal is to produce 1.5 to 2 quarts of almost colorless urine per 24 hours.  · Unless another NSAID (non-steroidal anti-inflammatory drug) was given, you may take ibuprofen or naproxen in addition to any narcotic pain medicine your healthcare provider prescribes. If you have chronic liver or kidney disease or ever had a stomach ulcer or GI bleeding, talk with your healthcare provider before using these medicines.  · Collecting the stone: If you were given a strainer, urinate into a jar then pour the urine through the strainer and into the toilet. Keep doing this for 24 hours after your pain stops. Save any stone that you find in the strainer and bring it to your healthcare provider for analysis. If you do not collect a stone, a 24-hour urine specimen can be done at a later time by your healthcare provider to  figure out the cause of your stone.  Prevention  Each year, there is a chance that a new stone will form (50% chance over the next 5 to 7 years). The risk is highest if you have a family history of kidney stones or have certain chronic illnesses such as hypertension, obesity, or diabetes. However, there are lifestyle and dietary changes that you can make to reduce the risk of getting another kidney stone.  Most kidney stones are made of calcium. The following advice can help you prevent calcium stones. If you dont know the type of stone you have, follow this advice until the healthcare provider determines the cause of your stone.  Things that help:  · The most important thing you can do is to drink plenty of fluids each day, as described above.  · Certain foods, such as wheat, rice, rye, barley, and beans, contain phytate. Phytate is a compound that may lower the risk of recurrence of any type of stone.  · Eat more fruits and vegetables, especially those high in potassium.  · Eat foods high in natural citrate like fruit and fruit juices (using low sugar).  · Low calcium contributes to calcium type kidney stones. Eat a normal calcium diet and talk with your healthcare provider if you are taking calcium supplements. It may be detrimental to lower your calcium intake. New research shows that eating calcium-rich and oxalate-rich foods together lowers your risk of stones. This is because eating these foods together binds the minerals in the stomach and intestines before they can reach the kidneys.  · Limit salt intake to 2 grams (1 teaspoon) per day. Use limited amounts when cooking, and dont add salt at the table. Processed and canned foods are usually high in salt.  · Spinach, rhubarb, peanuts, cashews, almonds, grapefruit, and grapefruit juice are all high oxalate foods and should be reduced, or eaten with calcium-rich foods. These foods include dairy, dark leafy greens, soy products, calcium-enriched foods, and  others.  · Reduce the amount of animal meat and high protein foods in your diet. This may lower your risk of uric acid stones.  · Avoid excess sugar (sucrose) and fructose (sweetener in many soft drinks) in your diet.  · If you take vitamin C as a supplement, don't take more than 1,000 milligrams (mg) per day.  · A dietitian or your healthcare provider can give you ideas on how to change your diet to prevent more kidney stones.  Follow-up care  Follow up with your healthcare provider, or as advised. Even if you don't collect the kidney stone, it is possible to analyze a 24-hour urine collection for the cause of this stone. Discuss this with your healthcare provider.  If you had an X-ray, CT scan, or other diagnostic test, you will be told of any findings that may affect your care.  Call 911  Call 911 if you have any of these:  · Weakness, dizziness, or fainting  When to seek medical advice  Call your healthcare provider if any of the these occur:  · Severe pain that returns and not relieved by pain medicines  · Repeated vomiting or unable to keep down fluids  · Fever of 101.4ºF (38ºC) or higher, or as directed by your healthcare provider  · Blood clots in urine  · Foul smelling or cloudy urine  · Unable to pass urine for 8 hours or increasing bladder pressure  Date Last Reviewed: 10/1/2016  © 5379-5971 [employer]. [address] [place] [address], [place] [address]. All rights reserved. This information is not intended as a substitute for professional medical care. Always follow your healthcare professional's instructions.

## 2017-07-13 ENCOUNTER — TELEPHONE (OUTPATIENT)
Dept: UROLOGY | Facility: CLINIC | Age: 42
End: 2017-07-13

## 2017-07-13 NOTE — TELEPHONE ENCOUNTER
Called pt's wife to confirm 9:15am arrival time for procedure. Gave pt's wife NPO instructions and gave pt's wife opportunity to ask questions. Pt's wife verbalized understanding.

## 2017-07-14 ENCOUNTER — HOSPITAL ENCOUNTER (OUTPATIENT)
Facility: HOSPITAL | Age: 42
Discharge: HOME | End: 2017-07-14
Attending: UROLOGY | Admitting: UROLOGY
Payer: COMMERCIAL

## 2017-07-14 ENCOUNTER — ANESTHESIA (OUTPATIENT)
Dept: SURGERY | Facility: HOSPITAL | Age: 42
End: 2017-07-14
Payer: COMMERCIAL

## 2017-07-14 ENCOUNTER — SURGERY (OUTPATIENT)
Age: 42
End: 2017-07-14

## 2017-07-14 ENCOUNTER — ANESTHESIA EVENT (OUTPATIENT)
Dept: SURGERY | Facility: HOSPITAL | Age: 42
End: 2017-07-14
Payer: COMMERCIAL

## 2017-07-14 VITALS
HEART RATE: 58 BPM | SYSTOLIC BLOOD PRESSURE: 136 MMHG | TEMPERATURE: 99 F | OXYGEN SATURATION: 100 % | WEIGHT: 207 LBS | DIASTOLIC BLOOD PRESSURE: 56 MMHG | RESPIRATION RATE: 18 BRPM | BODY MASS INDEX: 29.63 KG/M2 | HEIGHT: 70 IN

## 2017-07-14 DIAGNOSIS — N20.1 URETERAL STONE: ICD-10-CM

## 2017-07-14 DIAGNOSIS — N20.0 NEPHROLITHIASIS: ICD-10-CM

## 2017-07-14 PROCEDURE — 63600175 PHARM REV CODE 636 W HCPCS: Performed by: NURSE ANESTHETIST, CERTIFIED REGISTERED

## 2017-07-14 PROCEDURE — 37000008 HC ANESTHESIA 1ST 15 MINUTES: Performed by: UROLOGY

## 2017-07-14 PROCEDURE — 37000009 HC ANESTHESIA EA ADD 15 MINS: Performed by: UROLOGY

## 2017-07-14 PROCEDURE — 25000003 PHARM REV CODE 250

## 2017-07-14 PROCEDURE — C2617 STENT, NON-COR, TEM W/O DEL: HCPCS | Performed by: UROLOGY

## 2017-07-14 PROCEDURE — 50590 FRAGMENTING OF KIDNEY STONE: CPT | Mod: RT,,, | Performed by: UROLOGY

## 2017-07-14 PROCEDURE — 52332 CYSTOSCOPY AND TREATMENT: CPT | Mod: 51,RT,, | Performed by: UROLOGY

## 2017-07-14 PROCEDURE — 63600175 PHARM REV CODE 636 W HCPCS: Performed by: UROLOGY

## 2017-07-14 PROCEDURE — 71000039 HC RECOVERY, EACH ADD'L HOUR: Performed by: UROLOGY

## 2017-07-14 PROCEDURE — C1758 CATHETER, URETERAL: HCPCS | Performed by: UROLOGY

## 2017-07-14 PROCEDURE — 36000706: Performed by: UROLOGY

## 2017-07-14 PROCEDURE — C1769 GUIDE WIRE: HCPCS | Performed by: UROLOGY

## 2017-07-14 PROCEDURE — 71000033 HC RECOVERY, INTIAL HOUR: Performed by: UROLOGY

## 2017-07-14 PROCEDURE — 25000003 PHARM REV CODE 250: Performed by: NURSE ANESTHETIST, CERTIFIED REGISTERED

## 2017-07-14 PROCEDURE — 36000707: Performed by: UROLOGY

## 2017-07-14 PROCEDURE — 63600175 PHARM REV CODE 636 W HCPCS: Performed by: ANESTHESIOLOGY

## 2017-07-14 PROCEDURE — 25000003 PHARM REV CODE 250: Performed by: UROLOGY

## 2017-07-14 PROCEDURE — 71000015 HC POSTOP RECOV 1ST HR: Performed by: UROLOGY

## 2017-07-14 PROCEDURE — D9220A PRA ANESTHESIA: Mod: ANES,,, | Performed by: ANESTHESIOLOGY

## 2017-07-14 PROCEDURE — 25000003 PHARM REV CODE 250: Performed by: ANESTHESIOLOGY

## 2017-07-14 PROCEDURE — D9220A PRA ANESTHESIA: Mod: CRNA,,, | Performed by: NURSE ANESTHETIST, CERTIFIED REGISTERED

## 2017-07-14 PROCEDURE — 63600175 PHARM REV CODE 636 W HCPCS

## 2017-07-14 PROCEDURE — 76000 FLUOROSCOPY <1 HR PHYS/QHP: CPT | Mod: 26,59,, | Performed by: UROLOGY

## 2017-07-14 DEVICE — STENT URETERAL UNIV 6FR 28CM: Type: IMPLANTABLE DEVICE | Site: URETER | Status: FUNCTIONAL

## 2017-07-14 RX ORDER — ONDANSETRON 8 MG/1
8 TABLET, ORALLY DISINTEGRATING ORAL ONCE
Status: COMPLETED | OUTPATIENT
Start: 2017-07-14 | End: 2017-07-14

## 2017-07-14 RX ORDER — LIDOCAINE HYDROCHLORIDE 10 MG/ML
1 INJECTION, SOLUTION EPIDURAL; INFILTRATION; INTRACAUDAL; PERINEURAL ONCE
Status: DISCONTINUED | OUTPATIENT
Start: 2017-07-14 | End: 2017-07-14 | Stop reason: HOSPADM

## 2017-07-14 RX ORDER — MEPERIDINE HYDROCHLORIDE 50 MG/ML
INJECTION INTRAMUSCULAR; INTRAVENOUS; SUBCUTANEOUS
Status: DISCONTINUED
Start: 2017-07-14 | End: 2017-07-14 | Stop reason: HOSPADM

## 2017-07-14 RX ORDER — OXYCODONE AND ACETAMINOPHEN 5; 325 MG/1; MG/1
1 TABLET ORAL EVERY 4 HOURS PRN
Qty: 30 TABLET | Refills: 0 | Status: ON HOLD | OUTPATIENT
Start: 2017-07-14 | End: 2017-07-28 | Stop reason: SDUPTHER

## 2017-07-14 RX ORDER — OXYCODONE AND ACETAMINOPHEN 10; 325 MG/1; MG/1
1 TABLET ORAL EVERY 4 HOURS PRN
Status: DISCONTINUED | OUTPATIENT
Start: 2017-07-14 | End: 2017-07-14 | Stop reason: HOSPADM

## 2017-07-14 RX ORDER — CEFAZOLIN SODIUM 2 G/50ML
2 SOLUTION INTRAVENOUS
Status: COMPLETED | OUTPATIENT
Start: 2017-07-14 | End: 2017-07-14

## 2017-07-14 RX ORDER — MIDAZOLAM HYDROCHLORIDE 1 MG/ML
INJECTION, SOLUTION INTRAMUSCULAR; INTRAVENOUS
Status: DISCONTINUED | OUTPATIENT
Start: 2017-07-14 | End: 2017-07-14

## 2017-07-14 RX ORDER — MIDAZOLAM HYDROCHLORIDE 1 MG/ML
INJECTION INTRAMUSCULAR; INTRAVENOUS
Status: DISCONTINUED
Start: 2017-07-14 | End: 2017-07-14 | Stop reason: HOSPADM

## 2017-07-14 RX ORDER — FENTANYL CITRATE 50 UG/ML
INJECTION, SOLUTION INTRAMUSCULAR; INTRAVENOUS
Status: DISCONTINUED
Start: 2017-07-14 | End: 2017-07-14 | Stop reason: HOSPADM

## 2017-07-14 RX ORDER — KETAMINE HYDROCHLORIDE 100 MG/ML
INJECTION, SOLUTION INTRAMUSCULAR; INTRAVENOUS
Status: DISCONTINUED
Start: 2017-07-14 | End: 2017-07-14 | Stop reason: HOSPADM

## 2017-07-14 RX ORDER — ONDANSETRON 8 MG/1
TABLET, ORALLY DISINTEGRATING ORAL
Status: COMPLETED
Start: 2017-07-14 | End: 2017-07-14

## 2017-07-14 RX ORDER — KETAMINE HCL IN 0.9 % NACL 50 MG/5 ML
SYRINGE (ML) INTRAVENOUS
Status: DISCONTINUED | OUTPATIENT
Start: 2017-07-14 | End: 2017-07-14

## 2017-07-14 RX ORDER — PROPOFOL 10 MG/ML
VIAL (ML) INTRAVENOUS CONTINUOUS PRN
Status: DISCONTINUED | OUTPATIENT
Start: 2017-07-14 | End: 2017-07-14

## 2017-07-14 RX ORDER — SODIUM CHLORIDE 9 MG/ML
INJECTION, SOLUTION INTRAVENOUS CONTINUOUS
Status: DISCONTINUED | OUTPATIENT
Start: 2017-07-14 | End: 2017-07-14 | Stop reason: HOSPADM

## 2017-07-14 RX ORDER — HYDROMORPHONE HYDROCHLORIDE 1 MG/ML
INJECTION, SOLUTION INTRAMUSCULAR; INTRAVENOUS; SUBCUTANEOUS
Status: COMPLETED
Start: 2017-07-14 | End: 2017-07-14

## 2017-07-14 RX ORDER — HYOSCYAMINE SULFATE 0.12 MG/1
0.12 TABLET SUBLINGUAL EVERY 4 HOURS PRN
Status: DISCONTINUED | OUTPATIENT
Start: 2017-07-14 | End: 2017-07-14 | Stop reason: HOSPADM

## 2017-07-14 RX ORDER — PROPOFOL 10 MG/ML
VIAL (ML) INTRAVENOUS
Status: DISCONTINUED | OUTPATIENT
Start: 2017-07-14 | End: 2017-07-14

## 2017-07-14 RX ORDER — LIDOCAINE HCL/PF 100 MG/5ML
SYRINGE (ML) INTRAVENOUS
Status: DISCONTINUED | OUTPATIENT
Start: 2017-07-14 | End: 2017-07-14

## 2017-07-14 RX ORDER — ONDANSETRON 2 MG/ML
INJECTION INTRAMUSCULAR; INTRAVENOUS
Status: DISCONTINUED | OUTPATIENT
Start: 2017-07-14 | End: 2017-07-14

## 2017-07-14 RX ORDER — MEPERIDINE HYDROCHLORIDE 50 MG/ML
12.5 INJECTION INTRAMUSCULAR; INTRAVENOUS; SUBCUTANEOUS ONCE AS NEEDED
Status: COMPLETED | OUTPATIENT
Start: 2017-07-14 | End: 2017-07-14

## 2017-07-14 RX ORDER — OXYCODONE AND ACETAMINOPHEN 5; 325 MG/1; MG/1
1 TABLET ORAL EVERY 4 HOURS PRN
Status: DISCONTINUED | OUTPATIENT
Start: 2017-07-14 | End: 2017-07-14 | Stop reason: HOSPADM

## 2017-07-14 RX ORDER — HYDROMORPHONE HYDROCHLORIDE 2 MG/ML
0.2 INJECTION, SOLUTION INTRAMUSCULAR; INTRAVENOUS; SUBCUTANEOUS EVERY 5 MIN PRN
Status: DISCONTINUED | OUTPATIENT
Start: 2017-07-14 | End: 2017-07-14 | Stop reason: HOSPADM

## 2017-07-14 RX ORDER — ONDANSETRON 4 MG/1
4 TABLET, ORALLY DISINTEGRATING ORAL ONCE
Status: DISCONTINUED | OUTPATIENT
Start: 2017-07-14 | End: 2017-07-14

## 2017-07-14 RX ORDER — FENTANYL CITRATE 50 UG/ML
INJECTION, SOLUTION INTRAMUSCULAR; INTRAVENOUS
Status: DISCONTINUED | OUTPATIENT
Start: 2017-07-14 | End: 2017-07-14

## 2017-07-14 RX ORDER — LIDOCAINE HYDROCHLORIDE 10 MG/ML
1 INJECTION, SOLUTION EPIDURAL; INFILTRATION; INTRACAUDAL; PERINEURAL ONCE
Status: COMPLETED | OUTPATIENT
Start: 2017-07-14 | End: 2017-07-14

## 2017-07-14 RX ADMIN — ONDANSETRON 4 MG: 2 INJECTION INTRAMUSCULAR; INTRAVENOUS at 03:07

## 2017-07-14 RX ADMIN — MIDAZOLAM HYDROCHLORIDE 2 MG: 1 INJECTION, SOLUTION INTRAMUSCULAR; INTRAVENOUS at 02:07

## 2017-07-14 RX ADMIN — FENTANYL CITRATE 50 MCG: 50 INJECTION, SOLUTION INTRAMUSCULAR; INTRAVENOUS at 02:07

## 2017-07-14 RX ADMIN — FENTANYL CITRATE 50 MCG: 50 INJECTION, SOLUTION INTRAMUSCULAR; INTRAVENOUS at 03:07

## 2017-07-14 RX ADMIN — CEFAZOLIN SODIUM 2 G: 2 SOLUTION INTRAVENOUS at 02:07

## 2017-07-14 RX ADMIN — LIDOCAINE HYDROCHLORIDE 10 MG: 10 INJECTION, SOLUTION EPIDURAL; INFILTRATION; INTRACAUDAL; PERINEURAL at 10:07

## 2017-07-14 RX ADMIN — PROPOFOL 150 MCG/KG/MIN: 10 INJECTION, EMULSION INTRAVENOUS at 02:07

## 2017-07-14 RX ADMIN — SODIUM CHLORIDE: 0.9 INJECTION, SOLUTION INTRAVENOUS at 10:07

## 2017-07-14 RX ADMIN — ONDANSETRON 8 MG: 8 TABLET, ORALLY DISINTEGRATING ORAL at 10:07

## 2017-07-14 RX ADMIN — SODIUM CHLORIDE, SODIUM GLUCONATE, SODIUM ACETATE, POTASSIUM CHLORIDE, MAGNESIUM CHLORIDE, SODIUM PHOSPHATE, DIBASIC, AND POTASSIUM PHOSPHATE: .53; .5; .37; .037; .03; .012; .00082 INJECTION, SOLUTION INTRAVENOUS at 03:07

## 2017-07-14 RX ADMIN — MEPERIDINE HYDROCHLORIDE 12.5 MG: 50 INJECTION INTRAMUSCULAR; INTRAVENOUS; SUBCUTANEOUS at 05:07

## 2017-07-14 RX ADMIN — HYDROMORPHONE HYDROCHLORIDE 0.2 MG: 1 INJECTION, SOLUTION INTRAMUSCULAR; INTRAVENOUS; SUBCUTANEOUS at 10:07

## 2017-07-14 RX ADMIN — LIDOCAINE HYDROCHLORIDE 50 MG: 20 INJECTION, SOLUTION INTRAVENOUS at 02:07

## 2017-07-14 RX ADMIN — OXYCODONE HYDROCHLORIDE AND ACETAMINOPHEN 1 TABLET: 10; 325 TABLET ORAL at 04:07

## 2017-07-14 RX ADMIN — PROPOFOL 75 MG: 10 INJECTION, EMULSION INTRAVENOUS at 02:07

## 2017-07-14 RX ADMIN — HYDROMORPHONE HYDROCHLORIDE 0.2 MG: 2 INJECTION, SOLUTION INTRAMUSCULAR; INTRAVENOUS; SUBCUTANEOUS at 10:07

## 2017-07-14 RX ADMIN — Medication 20 MG: at 02:07

## 2017-07-14 NOTE — ANESTHESIA RELEASE NOTE
Anesthesia Release from PACU Note    Patient: Sanchez Zarate    Procedure(s) Performed: Procedure(s) (LRB):  LITHOTRIPSY-EXTRACORPOREAL SHOCK WAVE (Right)  CYSTOSCOPY WITH STENT PLACEMENT (Right)    Anesthesia type: general    Post pain: Adequate analgesia    Post assessment: no apparent anesthetic complications and tolerated procedure well    Last Vitals:   Vitals:    07/14/17 1730   BP: (!) 143/85   Pulse: (!) 57   Resp: 18   Temp:          Post vital signs: stable    Level of consciousness: awake and alert     Nausea/Vomiting: no nausea/no vomiting    Complications: none    Airway Patency: patent    Respiratory: unassisted    Cardiovascular: stable and blood pressure at baseline    Hydration: euvolemic

## 2017-07-14 NOTE — DISCHARGE SUMMARY
[facility]  Discharge Note  Short Stay    Admit Date: 7/14/2017    Discharge Date and Time: 07/14/2017 3:37 PM     Attending Physician: Bhupinder Washington Jr., MD     Discharge Provider: Joes Luis Garcia    Diagnoses:  Active Hospital Problems    Diagnosis  POA    Nephrolithiasis [N20.0]  Yes      Resolved Hospital Problems    Diagnosis Date Resolved POA   No resolved problems to display.       Discharged Condition: good    Hospital Course: Patient was admitted for an outpatient procedure, cystoscopy with right JJ ureteral stent placement (with strings) and ESWL, and tolerated the procedure well with no complications.    Final Diagnoses: Same as principal problem.    Disposition: Home or Self Care    Follow up/Patient Instructions:    Medications:  Reconciled Home Medications:   Current Discharge Medication List      START taking these medications    Details   oxycodone-acetaminophen (PERCOCET) 5-325 mg per tablet Take 1 tablet by mouth every 4 (four) hours as needed for Pain.  Qty: 30 tablet, Refills: 0         CONTINUE these medications which have NOT CHANGED    Details   ondansetron (ZOFRAN-ODT) 8 MG TbDL Take 1 tablet (8 mg total) by mouth every 6 (six) hours as needed (nausea).  Qty: 15 tablet, Refills: 0    Associated Diagnoses: Nausea      oxycodone-acetaminophen (PERCOCET)  mg per tablet Take 1 tablet by mouth every 6 (six) hours as needed for Pain.  Qty: 18 tablet, Refills: 0    Associated Diagnoses: Kidney stone; Flank pain      gabapentin (NEURONTIN) 600 MG tablet Take 1 tablet (600 mg total) by mouth 3 (three) times daily.  Qty: 90 tablet, Refills: 2    Associated Diagnoses: Chronic midline low back pain with right-sided sciatica; Right lumbar radiculopathy      hydrocodone-acetaminophen 10-325mg (NORCO)  mg Tab Take 1 tablet by mouth 3 (three) times daily as needed.  Qty: 90 tablet, Refills: 0    Associated Diagnoses: Chronic midline low back pain with right-sided sciatica       meloxicam (MOBIC) 15 MG tablet Take 1 tablet (15 mg total) by mouth once daily.    Associated Diagnoses: Chronic midline low back pain with right-sided sciatica      tamsulosin (FLOMAX) 0.4 mg Cp24 Take 1 capsule (0.4 mg total) by mouth once daily.  Qty: 10 capsule, Refills: 0             Discharge Procedure Orders  X-Ray Abdomen AP 1 View   Standing Status: Future  Standing Exp. Date: 07/14/18   Order Specific Question Answer Comments   Reason for Exam: nephrolithiasis      Diet general     Activity as tolerated     Other restrictions (specify):   Order Comments: Do not lift greater than 10 lbs for at least 2 weeks.     Call MD for:  persistent nausea and vomiting or diarrhea     Call MD for:  severe uncontrolled pain     Call MD for:  redness, tenderness, or signs of infection (pain, swelling, redness, odor or green/yellow discharge around incision site)     Call MD for:   Order Comments: Fever > 101, unable to urinate     No dressing needed     Leave dressing on - Keep it clean, dry, and intact until clinic visit   Order Comments: You have a stent in place with strings. Strings are secured to the top of your penis with clear tape. DO NOT REMOVE. You will have x-ray Wednesday 7/19/17 and Dr. Washington will give you instructions for your stent.       Follow-up Information     Bhupinder Washington Jr, MD On 7/19/2017.    Specialty:  Urology  Contact information:  [address]  [facility] [address]  [phone]                   Discharge Procedure Orders (must include Diet, Follow-up, Activity):    Discharge Procedure Orders (must include Diet, Follow-up, Activity)  X-Ray Abdomen AP 1 View   Standing Status: Future  Standing Exp. Date: 07/14/18   Order Specific Question Answer Comments   Reason for Exam: nephrolithiasis      Diet general     Activity as tolerated     Other restrictions (specify):   Order Comments: Do not lift greater than 10 lbs for at least 2 weeks.     Call MD for:  persistent nausea and vomiting  or diarrhea     Call MD for:  severe uncontrolled pain     Call MD for:  redness, tenderness, or signs of infection (pain, swelling, redness, odor or green/yellow discharge around incision site)     Call MD for:   Order Comments: Fever > 101, unable to urinate     No dressing needed     Leave dressing on - Keep it clean, dry, and intact until clinic visit   Order Comments: You have a stent in place with strings. Strings are secured to the top of your penis with clear tape. DO NOT REMOVE. You will have x-ray Wednesday 7/19/17 and Dr. Washington will give you instructions for your stent.

## 2017-07-14 NOTE — DISCHARGE INSTRUCTIONS
You have a stent in place with strings. Strings are secured to the top of your penis with clear tape. DO NOT REMOVE. You will follow-up with x-ray on 7/19/17 and Dr. Washington will give further instructions for stent.    [facility] post-op instructions:  You may see some blood in your urine while the stone fragments are passing and a few days afterward. Do not be alarmed, even if the urine was clear for a while. Push fluids and refrain from strenuous activity until clearing occurs. If you have difficulty passing clots or don't improve, call us. You can also try sitting in a warm tub of water to help to urinate if needed. Avoid medications such as Aspirin, Advil, Motrin, Plavix, or Coumadin, which thin the blood and cause bleeding.  If you have never had your stones analyzed, strain all urine and bring stone fragments with you to your next appointment.  Diet:  You may return to your normal diet immediately. Alcohol, spicy foods, acidy foods and drinks with caffeine may cause irritation or frequency and should be used in moderation. To keep your urine flowing freely and to avoid constipation, drink plenty of fluids during the day (8-10 glasses).  Activity:  While the kidney is healing do not engage in strenuous activity. If you are active, you may see more blood in the urine. We would suggest cutting down your activity under these circumstances until the bleeding has stopped, perhaps two weeks.  Bowels:  It is important to keep your bowels regular during the postoperative period. Straining with bowel movements can cause bleeding. A bowel movement every other day is reasonable. Use a mild over-the-counter laxative if needed, such as Milk of Magnesia 2-3 tablespoons, or 1-2 Dulcolax tablets. Call if you continue to have problems. Narcotics can worsen constipation; if you had been taking narcotics for pain, before, during or after your surgery, you may be constipated. Ditropan for bladder spasms may also cause  constipation.  Problems you should report to us:  1. Fevers over 101.5 degrees Fahrenheit.   2. Inability to urinate.   3. Drug reactions (hives, rash, nausea, vomiting, diarrhea)   4. Severe burning or pain with urination that is not improving.   You will also have some burning with urination. This is normal after stone therapy and is also expected while the stent is in place. This burning should be mild or moderate and should improve over time. Severe burning, especially when it is not improving, can be a sign of infection.  Follow-up  After the stone has been fragmented you will likely need an x-ray prior to next clinic appointment    Bring stone fragments with you to your next appt.     In some select cases it is important to not leave the string on the stent.  The stent is usually removed 1-4 weeks after treatment.    Call Urology at [phone] with any problems

## 2017-07-14 NOTE — TRANSFER OF CARE
"Anesthesia Transfer of Care Note    Patient: Sanchez Zarate    Procedure(s) Performed: Procedure(s) (LRB):  LITHOTRIPSY-EXTRACORPOREAL SHOCK WAVE (Right)  CYSTOSCOPY WITH STENT PLACEMENT (Right)    Patient location: PACU    Anesthesia Type: general    Transport from OR: Transported from OR on 6-10 L/min O2 by face mask with adequate spontaneous ventilation    Post pain: adequate analgesia    Post assessment: no apparent anesthetic complications    Post vital signs: stable    Level of consciousness: awake    Nausea/Vomiting: no nausea/vomiting    Complications: none    Transfer of care protocol was followed      Last vitals:   Visit Vitals  /87 (BP Location: Left arm, Patient Position: Lying, BP Method: Automatic)   Pulse (!) 56   Temp 36.7 °C (98.1 °F) (Oral)   Resp 20   Ht 5' 10" (1.778 m)   Wt 93.9 kg (207 lb)   SpO2 100%   BMI 29.70 kg/m²     "

## 2017-07-14 NOTE — OP NOTE
[facility] Note    Date: 07/14/2017    Pre-Op Diagnosis: Right ureteral stone    Op Diagnosis: same    Procedure(s) Performed:    1. Cystoscopy with right ureteral JJ stent placement  2. Right ESWL    Specimen(s): None    Staff Surgeon: Bhupinder Washington MD    Assistant Surgeon: Jose Luis Garcia MD    Anesthesia: Monitored Local Anesthesia with Sedation    Indications: Sanchez Zarate is a 41 y.o. male with right ureteral stone presents for right JJ ureteral stent placement with ESWL..    Findings:    1. Right proximal ureteral stone radiopaque  2. Right 6 x 28 JJ ureteral stent with strings placed  3. Right ESWL performed - 3000 shocks administered to radiopaque right proximal ureteral stone    Estimated Blood Loss: min    Drains:  6 Telugu x 28 cm right JJ ureteral stent with strings    Procedure in Detail:  After risks, benefits and possible complications of the procedure were explained, the patient elected to undergo the procedure and informed consent was obtained. All questions were answered in the jillian-operative area. The patient was transferred to the cystoscopy suite and placed on the fluoroscopy table in the supine position.  SCDs were applied and working. Time out was performed, jillian-procedural antibiotics were given. Anesthesia was administered.  After adequate anesthesia the patient was placed in dorsal lithotomy position and prepped and draped in the usual sterile fashion.     A rigid cystoscope in a 22 Fr sheath was introduced into the patients bladder per urethra. This passed easily.  The entire urethra was visualized and revealed no strictures or masses.  Cystoscopy was performed which showed the right and left ureteral orifices in the normal anatomic position.  There were no bladder tumors, no trabeculations, and no stones.      Our attention was turned to the patient's right ureteral orifice.  A guide wire was advanced up the right ureteral orifice to the level of the stone. This would  not pass beyond the stone. A 5 F open ended ureteral catheter was placed over the wire to the level of the stone. A mixture of saline and lidocaine jelly was injected just distal to the stone. A glide wire was then placed and advanced beyond the stone to the renal pelvis.     We then passed a 6 Fr x 28 cm JJ ureteral stent with strings over the wire to the level of the renal pelvis under direct vision as well as flouroscopy. The guide wire was removed.  A 180 degree coil was observed in the renal pelvis as well as the bladder using fluoroscopy.  A 180 degree coil was also seen using direct visualization in the bladder.     The patient was then transported to the lithotripsy table. The patient's Right-sided stone was aligned on the X-Y- and Z axis.  MAC anesthesia was administered.  When the patient was adequately sedated, 3000 thousand shocks were administered at a rate of 120 shocks per second, beginning at 16 KV of power and advanced to 26 KV. Intermittent fluoroscopy was used to monitor fragmentation of the stone.    At the end of the procedure the stone the stone appeared to have responded well.      The patient tolerated the procedure well and was transferred to the PACU in stable condition.      Plan: The patient will follow up with Dr. Washington on Wednesday 7/19/17 with a KUB.  The patient was given prescriptions for percocet and will continue his flomax.  The patient will strain all urine and bring any fragments to the follow up appt for stone analysis.      Jose Luis Garcia MD

## 2017-07-14 NOTE — H&P (VIEW-ONLY)
"Subjective:       Patient ID: Sanchez Lomeli is a 41 y.o. male.    Chief Complaint: Nephrolithiasis (right side)      HPI: Sanchez Lomeli is a 41 y.o. White male who presents today for evaluation and management of kidney stones.  Last seen in the clinic with Dr. Hall and Dr. Washington.     He presented to the ED on 7/9/17 with complaints of bilateral testicular pain.  Patient's wife reports that the patient woke up at approximately 2 AM that morning with sudden bilateral testicular pain that has progressively worsened over the past several hours.  He describes the pain as a "squeezing".  Pain radiates to the lower abdomen.  Associated symptoms include nausea and vomiting, chills.  He denies measured fever, dysuria, difficulty urinating, penile discharge. He was dc'ed with flomax and percocet. CTRSS showed 1.5 cm stone in R UPJ w/ hydro. He was seen with Dr. Garcia at the ER who discussed follow-up with Dr. Washington for ESWL.    The patient reports today his pain is an 11/10. He reports a constant pressing pain to his right upper abdomen that radiates to his right lower back. He takes the percocets every 6 hours that help to ease the pain for that time, but then pain returns when it is time for the next pill. Nothing worsens the pain. He reports chills and nausea. Denies fever and vomiting. No urinary complaints. Denies difficulty urinating, dysuria, and hematuria. Reports constipation. He presents today to discuss scheduling an intervention to remove the stone.     Stone Hx:  Ever seen a urologist before? Yes, Dr. Washington and Dr. Hall  Previous stone episode? no    CTRSS 7/9/17- 1.5 cm obstructing calculus at the right ureteropelvic junction with associated right renal enlargement, hydronephrosis, and perinephric stranding.  2 small nonobstructing left renal calculi.  KUB 7/9/17- Calcification proximal RIGHT ureter     Review of patient's allergies indicates:  No Known Allergies    Current " Outpatient Prescriptions   Medication Sig Dispense Refill    meloxicam (MOBIC) 15 MG tablet Take 1 tablet (15 mg total) by mouth once daily.      oxycodone-acetaminophen (PERCOCET)  mg per tablet Take 1 tablet by mouth every 6 (six) hours as needed for Pain. 18 tablet 0    gabapentin (NEURONTIN) 600 MG tablet Take 1 tablet (600 mg total) by mouth 3 (three) times daily. 90 tablet 2    hydrocodone-acetaminophen 10-325mg (NORCO)  mg Tab Take 1 tablet by mouth 3 (three) times daily as needed. 90 tablet 0    ondansetron (ZOFRAN-ODT) 8 MG TbDL Take 1 tablet (8 mg total) by mouth every 6 (six) hours as needed (nausea). 15 tablet 0    tamsulosin (FLOMAX) 0.4 mg Cp24 Take 1 capsule (0.4 mg total) by mouth once daily. 10 capsule 0     No current facility-administered medications for this visit.        Past Medical History:   Diagnosis Date    Chronic back pain     Chronic diarrhea     Hernia     TBI (traumatic brain injury) 2010       Past Surgical History:   Procedure Laterality Date    COLONOSCOPY N/A 5/29/2017    Procedure: COLONOSCOPY;  Surgeon: Sukhi Scanlon MD;  Location: [address]);  Service: Endoscopy;  Laterality: N/A;    HERNIA REPAIR  2017    umbilical       Family History   Problem Relation Age of Onset    Diabetes Mother     Heart attack Neg Hx     Heart disease Neg Hx     Hypertension Neg Hx     Colon cancer Neg Hx     Esophageal cancer Neg Hx          Review of Systems     Review of Systems   Constitutional: Positive for chills. Negative for fever.   HENT: Negative for facial swelling.    Eyes: Positive for redness. Negative for visual disturbance.   Respiratory: Negative for chest tightness and shortness of breath.    Cardiovascular: Negative for chest pain and leg swelling.   Gastrointestinal: Positive for abdominal pain, constipation and nausea. Negative for vomiting.   Genitourinary: Positive for flank pain and testicular pain. Negative for decreased urine volume,  difficulty urinating, dysuria, frequency, hematuria and urgency.   Musculoskeletal: Negative for gait problem.   Neurological: Negative for dizziness, weakness and headaches.   Psychiatric/Behavioral: Negative for agitation and confusion.         Objective:     Vitals:    07/12/17 0808   BP: (!) 140/88   Pulse: 70   Temp: 98 °F (36.7 °C)     Physical Exam     Physical Exam   Nursing note and vitals reviewed.  Constitutional: He is oriented to person, place, and time. He appears well-developed and well-nourished.   HENT:   Head: Normocephalic.   Eyes: Conjunctivae are normal.   Neck: Neck supple.   Cardiovascular: Normal rate.    Pulmonary/Chest: Effort normal.   Abdominal: Soft. He exhibits no distension and no mass. There is tenderness. There is guarding. There is no rebound.   Musculoskeletal: Normal range of motion.   Neurological: He is alert and oriented to person, place, and time.   Skin: Skin is warm and dry.     Psychiatric: He has a normal mood and affect. His behavior is normal. Judgment and thought content normal.      UA today was + blood and no infection. Spec grav 1.015 and ph 5.     Lab Results   Component Value Date    CREATININE 0.9 07/09/2017     Lab Results   Component Value Date    EGFRNONAA >60.0 07/09/2017     Lab Results   Component Value Date    ESTGFRAFRICA >60.0 07/09/2017         Assessment:       1. Kidney stone    2. Testalgia    3. Nausea    4. Flank pain        Plan:     Sanchez was seen today for nephrolithiasis.    Diagnoses and all orders for this visit:    Kidney stone  -     POCT urine dipstick without microscope  -     oxycodone-acetaminophen (PERCOCET)  mg per tablet; Take 1 tablet by mouth every 6 (six) hours as needed for Pain.    Testalgia    Nausea  -     ondansetron (ZOFRAN-ODT) 8 MG TbDL; Take 1 tablet (8 mg total) by mouth every 6 (six) hours as needed (nausea).    Flank pain  -     oxycodone-acetaminophen (PERCOCET)  mg per tablet; Take 1 tablet by mouth every 6  (six) hours as needed for Pain.      Discussed kidney stones.  Diet modifications; educational materials.  Continue good water intake of at least 2.5 liters daily;   Discussed CTRSS and KUB  Discussed side effects, indications, and MOA for zofran and percocet. Prescription sent to the pharmacy and percocet handed to the pt. Pt verbalized understanding.  Will discuss with Dr. Washington and notify wife after discussion.      ++Call his wife Yanni to schedule and after discussion with Dr. Washington    Get prompt medical attention if any of the following occur:  · Severe pain that returns and not relieved by pain medicines  · Repeated vomiting or unable to keep down fluids  · Weakness, dizziness or fainting  · Fever of 101.4ºF (38ºC) or higher, or as directed by your healthcare provider  · Blood clots in urine  · Foul smelling or cloudy urine  · Unable to pass urine for 8 hours or increasing bladder pressure  ·   I encouraged him or any of him family members to call or email me with questions and/or concerns.

## 2017-07-14 NOTE — ANESTHESIA POSTPROCEDURE EVALUATION
"Anesthesia Post Evaluation    Patient: Sanchez Zarate    Procedure(s) Performed: Procedure(s) (LRB):  LITHOTRIPSY-EXTRACORPOREAL SHOCK WAVE (Right)  CYSTOSCOPY WITH STENT PLACEMENT (Right)    Final Anesthesia Type: general  Patient location during evaluation: PACU  Patient participation: Yes- Able to Participate  Level of consciousness: awake and alert  Post-procedure vital signs: reviewed and stable  Pain management: adequate  Airway patency: patent  PONV status at discharge: No PONV  Anesthetic complications: no      Cardiovascular status: blood pressure returned to baseline  Respiratory status: unassisted, spontaneous ventilation and room air  Hydration status: euvolemic  Follow-up not needed.        Visit Vitals  BP (!) 143/85 (BP Location: Left arm, Patient Position: Lying, BP Method: Automatic)   Pulse (!) 57   Temp 36.8 °C (98.2 °F) (Temporal)   Resp 18   Ht 5' 10" (1.778 m)   Wt 93.9 kg (207 lb)   SpO2 100%   BMI 29.70 kg/m²       Pain/Tasha Score: Pain Assessment Performed: Yes (7/14/2017  4:10 PM)  Presence of Pain: non-verbal indicators absent (7/14/2017  4:10 PM)  Pain Rating Prior to Med Admin: 4 (7/14/2017  5:11 PM)  Tasha Score: 7 (7/14/2017  4:10 PM)      "

## 2017-07-14 NOTE — ANESTHESIA PREPROCEDURE EVALUATION
07/14/2017  Sanchez Zarate is a 41 y.o., male.  Pre-operative evaluation for Procedure(s) (LRB):  LITHOTRIPSY-EXTRACORPOREAL SHOCK WAVE (Right)    Review of patient's allergies indicates:  No Known Allergies    No current facility-administered medications on file prior to encounter.      Current Outpatient Prescriptions on File Prior to Encounter   Medication Sig Dispense Refill    gabapentin (NEURONTIN) 600 MG tablet Take 1 tablet (600 mg total) by mouth 3 (three) times daily. 90 tablet 2    hydrocodone-acetaminophen 10-325mg (NORCO)  mg Tab Take 1 tablet by mouth 3 (three) times daily as needed. 90 tablet 0    meloxicam (MOBIC) 15 MG tablet Take 1 tablet (15 mg total) by mouth once daily.      ondansetron (ZOFRAN-ODT) 8 MG TbDL Take 1 tablet (8 mg total) by mouth every 6 (six) hours as needed (nausea). 15 tablet 0    oxycodone-acetaminophen (PERCOCET)  mg per tablet Take 1 tablet by mouth every 6 (six) hours as needed for Pain. 18 tablet 0    tamsulosin (FLOMAX) 0.4 mg Cp24 Take 1 capsule (0.4 mg total) by mouth once daily. 10 capsule 0       Patient Active Problem List   Diagnosis    Chronic low back pain    Intervertebral disc extrusion (Left L5-S1)    Neural foraminal stenosis of lumbar spine (bilateral L5-S1, L>R)    Left lumbar radiculopathy    Situational syncope    Chronic diarrhea    Peyronie's disease    Screening for colon cancer    DDD (degenerative disc disease), lumbar    Testalgia    Nephrolithiasis       Past Surgical History:   Procedure Laterality Date    COLONOSCOPY N/A 5/29/2017    Procedure: COLONOSCOPY;  Surgeon: Sukhi Scanlon MD;  Location: [facility] ([address] [place] [address]);  Service: Endoscopy;  Laterality: N/A;    HERNIA REPAIR  2017    umbilical    LUMBAR EPIDURAL INJECTION      with steroid           No results for input(s): HCT in the last 72 hours.  No results  for input(s): PLT in the last 72 hours.  No results for input(s): K in the last 72 hours.  No results for input(s): CREATININE in the last 72 hours.  No results for input(s): GLU in the last 72 hours.  Invalid input(s): PT                    Anesthesia Evaluation         Review of Systems  Anesthesia Hx:  No problems with previous Anesthesia    Social:  Non-Smoker, Alcohol Use    Hematology/Oncology:  Hematology Normal   Oncology Normal     Cardiovascular:   Denies Hypertension.  Denies MI.    Denies Angina. 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   Pulmonary:  Pulmonary Normal  Denies COPD.  Denies Asthma.  Denies Shortness of breath.    Hepatic/GI:   Denies Liver Disease.    Neurological:   Denies TIA. Denies CVA. Denies Seizures. Had 5 minutes loss of consciusness after mild head injurn,  No long term effectsl   Endocrine:   Denies Diabetes.        Physical Exam  General:  Well nourished    Airway/Jaw/Neck:  Airway Findings: Mouth Opening: Normal Tongue: Normal  General Airway Assessment: Adult, Average  Mallampati: II  TM Distance: Normal, at least 6 cm  Jaw/Neck Findings:  Neck ROM: Normal ROM       Chest/Lungs:  Chest/Lungs Findings: Clear to auscultation     Heart/Vascular:  Heart Findings: Rate: Normal  Rhythm: Regular Rhythm  Sounds: Normal        Mental Status:  Mental Status Findings:  Cooperative, Alert and Oriented         Anesthesia Plan  Type of Anesthesia, risks & benefits discussed:  Anesthesia Type:  general  Patient's Preference:   Intra-op Monitoring Plan:   Intra-op Monitoring Plan Comments:   Post Op Pain Control Plan:   Post Op Pain Control Plan Comments: As per surgeon's plan  Induction:   IV  Beta Blocker:  Patient is not currently on a Beta-Blocker (No further documentation required).       Informed Consent: Patient understands risks and agrees with Anesthesia plan.  Questions answered. Anesthesia consent signed with patient.  ASA Score: 2     Day of  Surgery Review of History & Physical:    H&P update referred to the surgeon.         Ready For Surgery From Anesthesia Perspective.     Present Prior to Hospital Arrival?: No; Placement Date: 02/13/17; Placement Time: 1646; Method of Intubation: Direct laryngoscopy; Inserted by: CRNA; Airway Device: Endotracheal Tube; Mask Ventilation: Easy; Intubated: Postinduction; Blade: Viramontes #2; Airway Device Size: 7.5; Style: Cuffed; Cuff Inflation: Minimal occlusive pressure; Inflation Amount: 8; Placement Verified By: Auscultation, Capnometry, ETT Condensation; Grade: Grade I; Complicating Factors: None; Intubation Findings: Positive EtCO2, Bilateral breath sounds, Atraumatic/Condition of teeth unchanged;  Depth of Insertion: 21; Securment: Teeth; Complications: None; Breath Sounds: Equal Bilateral; Insertion Attempts: 1; Removal Date: 02/13/17;  Removal Time: 1747

## 2017-07-14 NOTE — INTERVAL H&P NOTE
The patient has been examined and the H&P has been reviewed:    I concur with the findings and no changes have occurred since H&P was written.     Patient continues to endorse right flank pain, testicular pain. He has had nausea and vomiting but no fevers.   UA pos for blood, negative for nitrites and leukocytes.     Anesthesia/Surgery risks, benefits and alternative options discussed and understood by patient/family.          Active Hospital Problems    Diagnosis  POA    Nephrolithiasis [N20.0]  Yes      Resolved Hospital Problems    Diagnosis Date Resolved POA   No resolved problems to display.

## 2017-07-20 ENCOUNTER — OFFICE VISIT (OUTPATIENT)
Dept: UROLOGY | Facility: CLINIC | Age: 42
End: 2017-07-20
Payer: COMMERCIAL

## 2017-07-20 ENCOUNTER — PATIENT MESSAGE (OUTPATIENT)
Dept: UROLOGY | Facility: CLINIC | Age: 42
End: 2017-07-20

## 2017-07-20 ENCOUNTER — HOSPITAL ENCOUNTER (OUTPATIENT)
Dept: RADIOLOGY | Facility: HOSPITAL | Age: 42
Discharge: HOME | End: 2017-07-20
Payer: COMMERCIAL

## 2017-07-20 VITALS
BODY MASS INDEX: 30.71 KG/M2 | HEART RATE: 68 BPM | WEIGHT: 214.5 LBS | HEIGHT: 70 IN | SYSTOLIC BLOOD PRESSURE: 123 MMHG | DIASTOLIC BLOOD PRESSURE: 73 MMHG

## 2017-07-20 DIAGNOSIS — N20.0 NEPHROLITHIASIS: ICD-10-CM

## 2017-07-20 DIAGNOSIS — N20.1 URETERAL STONE: ICD-10-CM

## 2017-07-20 DIAGNOSIS — Z98.890 POST-OPERATIVE STATE: Primary | ICD-10-CM

## 2017-07-20 DIAGNOSIS — N20.0 KIDNEY STONES: Primary | ICD-10-CM

## 2017-07-20 PROCEDURE — 74000 XR ABDOMEN AP 1 VIEW: CPT | Mod: TC

## 2017-07-20 PROCEDURE — 99024 POSTOP FOLLOW-UP VISIT: CPT | Mod: S$GLB,,, | Performed by: UROLOGY

## 2017-07-20 PROCEDURE — 74000 XR ABDOMEN AP 1 VIEW: CPT | Mod: 26,,, | Performed by: RADIOLOGY

## 2017-07-20 PROCEDURE — 99999 PR PBB SHADOW E&M-EST. PATIENT-LVL III: CPT | Mod: PBBFAC,,, | Performed by: UROLOGY

## 2017-07-20 PROCEDURE — 82365 CALCULUS SPECTROSCOPY: CPT

## 2017-07-20 NOTE — PROGRESS NOTES
Subjective:       Patient ID: Sanchez Zarate is a 41 y.o. male.    Chief Complaint: Post-op Evaluation (07/14/17 s/p eswl(right) double jj stent placement.)   patient is status post right double-J stent placement right renal ESWL.  He feels well and is not having any problems with the stent.  On KUB I can't definitely see any stones.  He does have an area in his kidney that obscured by bowel gas.  1 to get him scheduled for cystoscopy double-J stent removal and send off the stone fragments that he has brought in  HPI    Past Medical History:   Diagnosis Date    Chronic back pain     Chronic diarrhea     Hernia     TBI (traumatic brain injury) 2010    fake wall fell on head (no brain or skull injury per pt)       Past Surgical History:   Procedure Laterality Date    COLONOSCOPY N/A 5/29/2017    Procedure: COLONOSCOPY;  Surgeon: Sukhi Scanlon MD;  Location: [facility] ([address] [place] [address]);  Service: Endoscopy;  Laterality: N/A;    HERNIA REPAIR  2017    umbilical    LUMBAR EPIDURAL INJECTION      with steroid       Family History   Problem Relation Age of Onset    Diabetes Mother     Heart attack Neg Hx     Heart disease Neg Hx     Hypertension Neg Hx     Colon cancer Neg Hx     Esophageal cancer Neg Hx        Social History     Social History    Marital status:      Spouse name: N/A    Number of children: N/A    Years of education: N/A     Occupational History    self employed      Social History Main Topics    Smoking status: Former Smoker     Quit date: 1/1/2007    Smokeless tobacco: Never Used    Alcohol use Yes      Comment: occasional    Drug use: No    Sexual activity: Yes     Partners: Female     Other Topics Concern    Not on file     Social History Narrative    No narrative on file       Allergies:  Review of patient's allergies indicates no known allergies.    Medications:    Current Outpatient Prescriptions:     meloxicam (MOBIC) 15 MG tablet, Take 1 tablet (15 mg total) by mouth once  daily., Disp: , Rfl:     ondansetron (ZOFRAN-ODT) 8 MG TbDL, Take 1 tablet (8 mg total) by mouth every 6 (six) hours as needed (nausea)., Disp: 15 tablet, Rfl: 0    oxycodone-acetaminophen (PERCOCET)  mg per tablet, Take 1 tablet by mouth every 6 (six) hours as needed for Pain., Disp: 18 tablet, Rfl: 0    oxycodone-acetaminophen (PERCOCET) 5-325 mg per tablet, Take 1 tablet by mouth every 4 (four) hours as needed for Pain., Disp: 30 tablet, Rfl: 0    tamsulosin (FLOMAX) 0.4 mg Cp24, Take 1 capsule (0.4 mg total) by mouth once daily., Disp: 10 capsule, Rfl: 0    gabapentin (NEURONTIN) 600 MG tablet, Take 1 tablet (600 mg total) by mouth 3 (three) times daily., Disp: 90 tablet, Rfl: 2    hydrocodone-acetaminophen 10-325mg (NORCO)  mg Tab, Take 1 tablet by mouth 3 (three) times daily as needed., Disp: 90 tablet, Rfl: 0    Review of Systems    Objective:      Physical Exam    Assessment:       1. Post-operative state        Plan:       Sanchez was seen today for post-op evaluation.    Diagnoses and all orders for this visit:    Post-operative state  -     Cystoscopy w/ Stent Removal; Future        pt has string on stent and will remove    RTC 2 weeks w kub

## 2017-07-23 ENCOUNTER — PATIENT MESSAGE (OUTPATIENT)
Dept: UROLOGY | Facility: CLINIC | Age: 42
End: 2017-07-23

## 2017-07-23 ENCOUNTER — HOSPITAL ENCOUNTER (EMERGENCY)
Facility: HOSPITAL | Age: 42
Discharge: HOME | End: 2017-07-23
Attending: EMERGENCY MEDICINE
Payer: COMMERCIAL

## 2017-07-23 VITALS
TEMPERATURE: 98 F | DIASTOLIC BLOOD PRESSURE: 69 MMHG | WEIGHT: 209 LBS | HEIGHT: 70 IN | OXYGEN SATURATION: 100 % | BODY MASS INDEX: 29.92 KG/M2 | HEART RATE: 68 BPM | RESPIRATION RATE: 16 BRPM | SYSTOLIC BLOOD PRESSURE: 106 MMHG

## 2017-07-23 DIAGNOSIS — R10.9 FLANK PAIN: ICD-10-CM

## 2017-07-23 DIAGNOSIS — R10.9 ABDOMINAL PAIN: ICD-10-CM

## 2017-07-23 LAB
ALBUMIN SERPL BCP-MCNC: 3.9 G/DL
ALP SERPL-CCNC: 77 U/L
ALT SERPL W/O P-5'-P-CCNC: 23 U/L
ANION GAP SERPL CALC-SCNC: 9 MMOL/L
AST SERPL-CCNC: 17 U/L
BASOPHILS # BLD AUTO: 0.04 K/UL
BASOPHILS NFR BLD: 0.4 %
BILIRUB SERPL-MCNC: 0.6 MG/DL
BILIRUB UR QL STRIP: NEGATIVE
BUN SERPL-MCNC: 16 MG/DL
CALCIUM SERPL-MCNC: 8.9 MG/DL
CHLORIDE SERPL-SCNC: 106 MMOL/L
CLARITY UR REFRACT.AUTO: CLEAR
CO2 SERPL-SCNC: 23 MMOL/L
COLOR UR AUTO: YELLOW
CREAT SERPL-MCNC: 1 MG/DL
DIFFERENTIAL METHOD: ABNORMAL
EOSINOPHIL # BLD AUTO: 0 K/UL
EOSINOPHIL NFR BLD: 0.4 %
ERYTHROCYTE [DISTWIDTH] IN BLOOD BY AUTOMATED COUNT: 12.7 %
EST. GFR  (AFRICAN AMERICAN): >60 ML/MIN/1.73 M^2
EST. GFR  (NON AFRICAN AMERICAN): >60 ML/MIN/1.73 M^2
GLUCOSE SERPL-MCNC: 87 MG/DL
GLUCOSE UR QL STRIP: NEGATIVE
HCT VFR BLD AUTO: 42.5 %
HGB BLD-MCNC: 15.5 G/DL
HGB UR QL STRIP: ABNORMAL
KETONES UR QL STRIP: NEGATIVE
LEUKOCYTE ESTERASE UR QL STRIP: ABNORMAL
LIPASE SERPL-CCNC: 36 U/L
LYMPHOCYTES # BLD AUTO: 1.7 K/UL
LYMPHOCYTES NFR BLD: 19.2 %
MCH RBC QN AUTO: 32.1 PG
MCHC RBC AUTO-ENTMCNC: 36.5 G/DL
MCV RBC AUTO: 88 FL
MICROSCOPIC COMMENT: NORMAL
MONOCYTES # BLD AUTO: 0.5 K/UL
MONOCYTES NFR BLD: 5.7 %
NEUTROPHILS # BLD AUTO: 6.7 K/UL
NEUTROPHILS NFR BLD: 74.1 %
NITRITE UR QL STRIP: NEGATIVE
PH UR STRIP: 5 [PH] (ref 5–8)
PLATELET # BLD AUTO: 222 K/UL
PMV BLD AUTO: 9.5 FL
POTASSIUM SERPL-SCNC: 3.7 MMOL/L
PROT SERPL-MCNC: 7.3 G/DL
PROT UR QL STRIP: NEGATIVE
RBC # BLD AUTO: 4.83 M/UL
RBC #/AREA URNS AUTO: 2 /HPF (ref 0–4)
SODIUM SERPL-SCNC: 138 MMOL/L
SP GR UR STRIP: 1.02 (ref 1–1.03)
SQUAMOUS #/AREA URNS AUTO: 0 /HPF
URN SPEC COLLECT METH UR: ABNORMAL
UROBILINOGEN UR STRIP-ACNC: NEGATIVE EU/DL
WBC # BLD AUTO: 8.99 K/UL
WBC #/AREA URNS AUTO: 4 /HPF (ref 0–5)

## 2017-07-23 PROCEDURE — 85025 COMPLETE CBC W/AUTO DIFF WBC: CPT

## 2017-07-23 PROCEDURE — 96376 TX/PRO/DX INJ SAME DRUG ADON: CPT

## 2017-07-23 PROCEDURE — 81001 URINALYSIS AUTO W/SCOPE: CPT

## 2017-07-23 PROCEDURE — 96374 THER/PROPH/DIAG INJ IV PUSH: CPT

## 2017-07-23 PROCEDURE — 80053 COMPREHEN METABOLIC PANEL: CPT

## 2017-07-23 PROCEDURE — 99284 EMERGENCY DEPT VISIT MOD MDM: CPT | Mod: ,,, | Performed by: EMERGENCY MEDICINE

## 2017-07-23 PROCEDURE — 83690 ASSAY OF LIPASE: CPT

## 2017-07-23 PROCEDURE — 25000003 PHARM REV CODE 250: Performed by: EMERGENCY MEDICINE

## 2017-07-23 PROCEDURE — 63600175 PHARM REV CODE 636 W HCPCS: Performed by: STUDENT IN AN ORGANIZED HEALTH CARE EDUCATION/TRAINING PROGRAM

## 2017-07-23 PROCEDURE — 99285 EMERGENCY DEPT VISIT HI MDM: CPT | Mod: 25

## 2017-07-23 RX ORDER — HYDROMORPHONE HYDROCHLORIDE 1 MG/ML
0.5 INJECTION, SOLUTION INTRAMUSCULAR; INTRAVENOUS; SUBCUTANEOUS
Status: COMPLETED | OUTPATIENT
Start: 2017-07-23 | End: 2017-07-23

## 2017-07-23 RX ORDER — TAMSULOSIN HYDROCHLORIDE 0.4 MG/1
0.4 CAPSULE ORAL DAILY
Qty: 10 CAPSULE | Refills: 0 | Status: SHIPPED | OUTPATIENT
Start: 2017-07-23 | End: 2017-09-11

## 2017-07-23 RX ORDER — OXYCODONE AND ACETAMINOPHEN 10; 325 MG/1; MG/1
1 TABLET ORAL EVERY 4 HOURS PRN
Qty: 10 TABLET | Refills: 0 | Status: ON HOLD | OUTPATIENT
Start: 2017-07-23 | End: 2017-07-28 | Stop reason: SDUPTHER

## 2017-07-23 RX ORDER — CIPROFLOXACIN 500 MG/1
500 TABLET ORAL 2 TIMES DAILY
Qty: 20 TABLET | Refills: 0 | Status: ON HOLD | OUTPATIENT
Start: 2017-07-23 | End: 2017-07-28 | Stop reason: HOSPADM

## 2017-07-23 RX ORDER — ONDANSETRON 4 MG/1
4 TABLET, FILM COATED ORAL EVERY 8 HOURS PRN
Qty: 12 TABLET | Refills: 0 | Status: SHIPPED | OUTPATIENT
Start: 2017-07-23 | End: 2017-09-11

## 2017-07-23 RX ORDER — HYDROMORPHONE HYDROCHLORIDE 1 MG/ML
0.5 INJECTION, SOLUTION INTRAMUSCULAR; INTRAVENOUS; SUBCUTANEOUS
Status: DISCONTINUED | OUTPATIENT
Start: 2017-07-23 | End: 2017-07-23

## 2017-07-23 RX ORDER — OXYCODONE AND ACETAMINOPHEN 10; 325 MG/1; MG/1
2 TABLET ORAL ONCE
Status: COMPLETED | OUTPATIENT
Start: 2017-07-23 | End: 2017-07-23

## 2017-07-23 RX ADMIN — HYDROMORPHONE HYDROCHLORIDE 0.5 MG: 1 INJECTION, SOLUTION INTRAMUSCULAR; INTRAVENOUS; SUBCUTANEOUS at 10:07

## 2017-07-23 RX ADMIN — OXYCODONE HYDROCHLORIDE AND ACETAMINOPHEN 2 TABLET: 10; 325 TABLET ORAL at 02:07

## 2017-07-23 NOTE — ED TRIAGE NOTES
Seen here just before the 14th and dx with kidney stone on R with subsequent lithotripsy on 14th.  On the 20 the stent was removed and now he is in increasing pain.  +N/V

## 2017-07-23 NOTE — ED PROVIDER NOTES
Encounter Date: 7/23/2017       History     Chief Complaint   Patient presents with    Abdominal Pain     r flank pain, had lithotripsy on 14, had stent and removed on 20th, more pain now     Mr. Zarate is a 42 yo M with no significant PMHx who presents with complaint of RUQ pain x3 days. Pt states [place] [facility] [place] with similar complaints where he was diagnosed with kidney stone. He states a stent was placed and he was discharged where hs presented on 7/14 for scheduled lithotripsy. He states he removed his stent on 7/20. Since removing his stent, pt states his pain has recurred and has become acutely worse this AM. He describes the pain as constant, aching pain, radiating to his central abdomen. In addition, he endorses nausea, but denies dysuria/hematuria, recent illnesses, HA, CP, or SOB.           Review of patient's allergies indicates:  No Known Allergies  Past Medical History:   Diagnosis Date    Chronic back pain     Chronic diarrhea     Hernia     TBI (traumatic brain injury) 2010    fake wall fell on head (no brain or skull injury per pt)     Past Surgical History:   Procedure Laterality Date    COLONOSCOPY N/A 5/29/2017    Procedure: COLONOSCOPY;  Surgeon: Sukhi Scanlon MD;  Location: [facility] ([address] [place] [address]);  Service: Endoscopy;  Laterality: N/A;    HERNIA REPAIR  2017    umbilical    LUMBAR EPIDURAL INJECTION      with steroid     Family History   Problem Relation Age of Onset    Diabetes Mother     Heart attack Neg Hx     Heart disease Neg Hx     Hypertension Neg Hx     Colon cancer Neg Hx     Esophageal cancer Neg Hx      Social History   Substance Use Topics    Smoking status: Former Smoker     Quit date: 1/1/2007    Smokeless tobacco: Never Used    Alcohol use Yes      Comment: occasional     Review of Systems   Constitutional: Positive for appetite change. Negative for activity change, fatigue and fever.   HENT: Negative for congestion.    Eyes: Negative.    Respiratory:  Negative for cough and shortness of breath.    Cardiovascular: Negative for chest pain and palpitations.   Gastrointestinal: Positive for abdominal pain (R flank pain) and nausea. Negative for abdominal distention, constipation, diarrhea and vomiting.   Endocrine: Negative.    Genitourinary: Positive for dysuria and flank pain. Negative for hematuria.   Musculoskeletal: Negative for arthralgias and myalgias.   Skin: Negative.    Allergic/Immunologic: Negative.    Neurological: Negative.    Hematological: Negative.    Psychiatric/Behavioral: Negative.        Physical Exam     Initial Vitals [07/23/17 0928]   BP Pulse Resp Temp SpO2   (!) 159/96 64 18 98 °F (36.7 °C) 100 %      MAP       117         Physical Exam    Constitutional: He appears well-developed and well-nourished. He appears distressed.   HENT:   Head: Normocephalic and atraumatic.   Eyes: EOM are normal. Pupils are equal, round, and reactive to light.   Neck: Normal range of motion.   Cardiovascular: Normal rate and regular rhythm.   Pulmonary/Chest: Breath sounds normal. No respiratory distress.   Abdominal: Soft. Bowel sounds are normal. He exhibits no distension. There is tenderness (R flank).   Musculoskeletal: Normal range of motion.   Neurological: He is alert and oriented to person, place, and time. He has normal strength. No cranial nerve deficit.   Skin: Skin is warm and dry.   Psychiatric: He has a normal mood and affect. Thought content normal.         ED Course   Procedures  Labs Reviewed   CBC W/ AUTO DIFFERENTIAL - Abnormal; Notable for the following:        Result Value    MCH 32.1 (*)     MCHC 36.5 (*)     Gran% 74.1 (*)     All other components within normal limits   URINALYSIS, REFLEX TO URINE CULTURE - Abnormal; Notable for the following:     Occult Blood UA 2+ (*)     Leukocytes, UA Trace (*)     All other components within normal limits    Narrative:     Preferred Collection Type->Urine, Clean Catch   COMPREHENSIVE METABOLIC PANEL    LIPASE   URINALYSIS MICROSCOPIC    Narrative:     Preferred Collection Type->Urine, Clean Catch                          Attending Attestation:   Physician Attestation Statement for Resident:  As the supervising MD   Physician Attestation Statement: I have personally seen and examined this patient.   I agree with the above history. -: 42 yo m, followed closely by urology for ureteral stones, s/p stent removal, returning for recurrent renal colic and hematuria.  Pain control acheieved in ED.  Urology evaluated pt and recommended d/c home with cipro/flomax/pain control.  They will f/u pt in clinic this week   As the supervising MD I agree with the above PE.    As the supervising MD I agree with the above treatment, course, plan, and disposition.  I have reviewed and agree with the residents interpretation of the following: lab data and x-rays.  I have reviewed the following: old records at this facility.                    ED Course     Clinical Impression:   Diagnoses of Abdominal pain and Flank pain were pertinent to this visit.                           Deana Colon MD  07/24/17 0196

## 2017-07-23 NOTE — HPI
Mr. Zarate is a 42 yo M with no significant PMHx who presents with complaint of RUQ pain x3 days. Pt states he presented to [facility] on 7/9 with similar complaints where he was diagnosed with kidney stone.     On 7/14/2017 he underwent cystoscopy with right ureteral JJ stent placement and Right ESWL. For 2-3 days s/p ESWL he noted hematuria and dysuria. He states his stent was removed on 7/20/17. Since removing his stent, he has not been straining his urine and began experiencing R flank and abd pain that became acutely worse the AM of 7/23/2017, prompting him to present to the ED. He describes the pain as constant, aching pain. He also notes right testicle pain. In addition, he endorses nausea, but denies dysuria/hematuria, frequency, urgency, fever, sweats,  nausea,vomiting, HA, CP, or SOB.

## 2017-07-23 NOTE — ED NOTES
Pt identifiers checked and correct.    LOC: The patient is awake, alert and aware of environment with an appropriate affect, the patient is oriented x 3 and speaking appropriately.   APPEARANCE: Patient resting comfortably and in no acute distress, patient is clean and well groomed, patient's clothing is properly fastened.   SKIN: The skin is warm and dry, color consistent with ethnicity, patient has normal skin turgor and moist mucus membranes, skin intact, no breakdown or bruising noted.   MUSCULOSKELETAL: Patient moving all extremities spontaneously, no obvious swelling or deformities noted.   RESPIRATORY: Airway is open and patent, respirations are spontaneous, patient has a normal effort and rate, no accessory muscle use noted.   CARDIAC: Patient has a normal rate and regular rhythm, no periphreal edema noted, capillary refill < 3 seconds.   ABDOMEN: Soft and non tender to palpation, no distention noted, active bowel sounds present in all four quadrants.   NEUROLOGIC: PERRL, 3 mm bilaterally, eyes open spontaneously, behavior appropriate to situation, follows commands, facial expression symmetrical, bilateral hand grasp equal and even, purposeful motor response noted, normal sensation in all extremities when touched with a finger.

## 2017-07-23 NOTE — SUBJECTIVE & OBJECTIVE
Past Medical History:   Diagnosis Date    Chronic back pain     Chronic diarrhea     Hernia     TBI (traumatic brain injury) 2010    fake wall fell on head (no brain or skull injury per pt)       Past Surgical History:   Procedure Laterality Date    COLONOSCOPY N/A 5/29/2017    Procedure: COLONOSCOPY;  Surgeon: Sukhi Scanlon MD;  Location: [facility] ([address] [place] [address]);  Service: Endoscopy;  Laterality: N/A;    HERNIA REPAIR  2017    umbilical    LUMBAR EPIDURAL INJECTION      with steroid       Review of patient's allergies indicates:  No Known Allergies    Family History     Problem Relation (Age of Onset)    Diabetes Mother          Social History Main Topics    Smoking status: Former Smoker     Quit date: 1/1/2007    Smokeless tobacco: Never Used    Alcohol use Yes      Comment: occasional    Drug use: No    Sexual activity: Yes     Partners: Female       Review of Systems   Constitutional: Positive for appetite change and chills. Negative for diaphoresis, fatigue and fever.   Respiratory: Negative for shortness of breath.    Cardiovascular: Negative for chest pain and palpitations.   Gastrointestinal: Positive for abdominal pain, constipation and nausea. Negative for diarrhea and vomiting.   Genitourinary: Positive for flank pain, nocturia (3x/night for the past few months) and testicular pain. Negative for difficulty urinating, dysuria, frequency, hematuria and urgency.   Musculoskeletal: Negative for arthralgias and myalgias.   Neurological: Negative for dizziness and syncope.       Objective:     Temp:  [98 °F (36.7 °C)] 98 °F (36.7 °C)  Pulse:  [64] 64  Resp:  [18] 18  SpO2:  [100 %] 100 %  BP: (159)/(96) 159/96     Body mass index is 29.99 kg/m².            Drains          No matching active lines, drains, or airways          Physical Exam   Vitals reviewed.  Constitutional: He is oriented to person, place, and time. He appears well-developed and well-nourished. No distress.   HENT:   Head:  Normocephalic and atraumatic.   Eyes: Pupils are equal, round, and reactive to light.   Neck: Normal range of motion.   Cardiovascular: Normal rate.    Pulmonary/Chest: Effort normal. No respiratory distress.   Abdominal: Soft. He exhibits no distension. There is no tenderness.   Genitourinary: Penis normal.   Genitourinary Comments: Uncircumcised, erythematous meatus, testes of normal size and contour and NTTP bilaterally, cord structures normal bilaterally    Musculoskeletal: Normal range of motion. He exhibits no edema.   Neurological: He is alert and oriented to person, place, and time.   Skin: Skin is warm and dry. He is not diaphoretic.     Psychiatric: He has a normal mood and affect.       Significant Labs:    BMP:    Recent Labs  Lab 07/23/17  1003      K 3.7      CO2 23   BUN 16   CREATININE 1.0   CALCIUM 8.9       CBC:    Recent Labs  Lab 07/23/17  1003   WBC 8.99   HGB 15.5   HCT 42.5          Blood Culture: No results for input(s): LABBLOO in the last 168 hours.  Urine Culture: No results for input(s): LABURIN in the last 168 hours.  Urine Studies:   Recent Labs  Lab 07/23/17  1041   COLORU Yellow   APPEARANCEUA Clear   PHUR 5.0   SPECGRAV 1.020   PROTEINUA Negative   GLUCUA Negative   KETONESU Negative   BILIRUBINUA Negative   OCCULTUA 2+*   NITRITE Negative   UROBILINOGEN Negative   LEUKOCYTESUR Trace*   RBCUA 2   WBCUA 4   SQUAMEPITHEL 0       Significant Imaging:  U/S: I have reviewed all results within the past 24 hours.    - 7/23/2017 Retroperitoneal US:  Right-sided hydronephrosis with 1.3cm stone at the proximal ureter/ UPJ, correlating with the 7/9/17 CT exam.    - 7/23/2017: Results:One view abdomen obtained. No dilated loops of small bowel identified.No differential air fluid levels.  No findings to suggest free air.  Probable phleboliths in the pelvis, unchanged.     -7/9/2017 CT RSS:  1. 1.5 cm obstructing calculus at the right ureteropelvic junction with associated right  renal enlargement, hydronephrosis, and perinephric stranding.  2.  2 small nonobstructing left renal calculi.  3.  Stable hepatosplenomegaly.  4.  Stable left paracentral disc extrusion at L5-S1.

## 2017-07-23 NOTE — CONSULTS
[facility]  Urology  Consult Note    Patient Name: Sanchez Zarate  MRN: [id number]  Admission Date: 7/23/2017  Hospital Length of Stay: 0   Code Status: No Order   Attending Provider: Deana Colon MD  Consulting Provider: Deana La MD  Primary Care Physician: Liliam Carbajal MD  Principal Problem:<principal problem not specified>    Inpatient consult to Urology  Consult performed by: DEANA LA  Consult ordered by: JOHN ROGERS          Subjective:     HPI:  Mr. Zarate is a 42 yo M with no significant PMHx who presents with complaint of RUQ pain x3 days. Pt states he presented to [facility] [place] [facility] on 7/9 with similar complaints where he was diagnosed with kidney stone.     On 7/14/2017 he underwent cystoscopy with right ureteral JJ stent placement and Right ESWL. For 2-3 days s/p ESWL he noted hematuria and dysuria. He states his stent was removed on 7/20/17. Since removing his stent, he has not been straining his urine and began experiencing R flank and abd pain that became acutely worse the AM of 7/23/2017, prompting him to present to the ED. He describes the pain as constant, aching pain. He also notes right testicle pain. In addition, he endorses nausea, but denies dysuria/hematuria, frequency, urgency, fever, sweats,  nausea,vomiting, HA, CP, or SOB.       Past Medical History:   Diagnosis Date    Chronic back pain     Chronic diarrhea     Hernia     TBI (traumatic brain injury) 2010    fake wall fell on head (no brain or skull injury per pt)       Past Surgical History:   Procedure Laterality Date    COLONOSCOPY N/A 5/29/2017    Procedure: COLONOSCOPY;  Surgeon: Sukhi Scanlon MD;  Location: [address]);  Service: Endoscopy;  Laterality: N/A;    HERNIA REPAIR  2017    umbilical    LUMBAR EPIDURAL INJECTION      with steroid       Review of patient's allergies indicates:  No Known Allergies    Family History     Problem Relation (Age of Onset)    Diabetes Mother           Social History Main Topics    Smoking status: Former Smoker     Quit date: 1/1/2007    Smokeless tobacco: Never Used    Alcohol use Yes      Comment: occasional    Drug use: No    Sexual activity: Yes     Partners: Female       Review of Systems   Constitutional: Positive for appetite change and chills. Negative for diaphoresis, fatigue and fever.   Respiratory: Negative for shortness of breath.    Cardiovascular: Negative for chest pain and palpitations.   Gastrointestinal: Positive for abdominal pain, constipation and nausea. Negative for diarrhea and vomiting.   Genitourinary: Positive for flank pain, nocturia (3x/night for the past few months) and testicular pain. Negative for difficulty urinating, dysuria, frequency, hematuria and urgency.   Musculoskeletal: Negative for arthralgias and myalgias.   Neurological: Negative for dizziness and syncope.       Objective:     Temp:  [98 °F (36.7 °C)] 98 °F (36.7 °C)  Pulse:  [64] 64  Resp:  [18] 18  SpO2:  [100 %] 100 %  BP: (159)/(96) 159/96     Body mass index is 29.99 kg/m².            Drains          No matching active lines, drains, or airways          Physical Exam   Vitals reviewed.  Constitutional: He is oriented to person, place, and time. He appears well-developed and well-nourished. No distress.   HENT:   Head: Normocephalic and atraumatic.   Eyes: Pupils are equal, round, and reactive to light.   Neck: Normal range of motion.   Cardiovascular: Normal rate.    Pulmonary/Chest: Effort normal. No respiratory distress.   Abdominal: Soft. He exhibits no distension. There is no tenderness.   Genitourinary: Penis normal.   Genitourinary Comments: Uncircumcised, erythematous meatus, testes of normal size and contour and NTTP bilaterally, cord structures normal bilaterally    Musculoskeletal: Normal range of motion. He exhibits no edema.   Neurological: He is alert and oriented to person, place, and time.   Skin: Skin is warm and dry. He is not  diaphoretic.     Psychiatric: He has a normal mood and affect.       Significant Labs:    BMP:    Recent Labs  Lab 07/23/17  1003      K 3.7      CO2 23   BUN 16   CREATININE 1.0   CALCIUM 8.9       CBC:    Recent Labs  Lab 07/23/17  1003   WBC 8.99   HGB 15.5   HCT 42.5          Blood Culture: No results for input(s): LABBLOO in the last 168 hours.  Urine Culture: No results for input(s): LABURIN in the last 168 hours.  Urine Studies:   Recent Labs  Lab 07/23/17  1041   COLORU Yellow   APPEARANCEUA Clear   PHUR 5.0   SPECGRAV 1.020   PROTEINUA Negative   GLUCUA Negative   KETONESU Negative   BILIRUBINUA Negative   OCCULTUA 2+*   NITRITE Negative   UROBILINOGEN Negative   LEUKOCYTESUR Trace*   RBCUA 2   WBCUA 4   SQUAMEPITHEL 0       Significant Imaging:  U/S: I have reviewed all results within the past 24 hours.    - 7/23/2017 Retroperitoneal US:  Right-sided hydronephrosis with 1.3cm stone at the proximal ureter/ UPJ, correlating with the 7/9/17 CT exam.    - 7/23/2017: Results:One view abdomen obtained. No dilated loops of small bowel identified.No differential air fluid levels.  No findings to suggest free air.  Probable phleboliths in the pelvis, unchanged.     -7/9/2017 CT RSS:  1. 1.5 cm obstructing calculus at the right ureteropelvic junction with associated right renal enlargement, hydronephrosis, and perinephric stranding.  2.  2 small nonobstructing left renal calculi.  3.  Stable hepatosplenomegaly.  4.  Stable left paracentral disc extrusion at L5-S1.            Assessment and Plan:     Nephrolithiasis    Mr. Zarate is a 42 yo male with right flank pain and hydronephrosis secondary to a 1.3 cm obstructing proximal ureteral stone.  - PO pain control: recommend 4mg dilaudid PO as perc 10 was not a high enough dose  - Nausea medication: recommend zofran 8mg  - Flomax  - Empiric abx: recommend cipro 500 mg PO BID  - Strain all urine  - Dispo: Discharge home w PO pain rx, PO nausea rx,  flomax, Abx, strainer. Follow up with  this week for a ureteroscopy        Testalgia    See Nephrolithiasis            VTE Risk Mitigation     None          Thank you for your consult. I will sign off. Please contact us if you have any additional questions.    Deana Mckay MD  Urology  [facility]

## 2017-07-23 NOTE — ASSESSMENT & PLAN NOTE
Mr. Zarate is a 42 yo male with right flank pain and hydronephrosis secondary to a 1.3 cm obstructing proximal ureteral stone.  - PO pain control: recommend 4mg dilaudid PO as perc 10 was not a high enough dose  - Nausea medication: recommend zofran 8mg  - Flomax  - Empiric abx: recommend cipro 500 mg PO BID  - Strain all urine  - Dispo: Discharge home w PO pain rx, PO nausea rx, flomax, Abx, strainer. Follow up with  this week for a ureteroscopy

## 2017-07-24 ENCOUNTER — OFFICE VISIT (OUTPATIENT)
Dept: UROLOGY | Facility: CLINIC | Age: 42
End: 2017-07-24
Payer: COMMERCIAL

## 2017-07-24 ENCOUNTER — SURGERY (OUTPATIENT)
Age: 42
End: 2017-07-24

## 2017-07-24 ENCOUNTER — TELEPHONE (OUTPATIENT)
Dept: UROLOGY | Facility: CLINIC | Age: 42
End: 2017-07-24

## 2017-07-24 VITALS
BODY MASS INDEX: 28.91 KG/M2 | WEIGHT: 201.94 LBS | HEART RATE: 70 BPM | HEIGHT: 70 IN | DIASTOLIC BLOOD PRESSURE: 80 MMHG | SYSTOLIC BLOOD PRESSURE: 124 MMHG

## 2017-07-24 DIAGNOSIS — Z98.890 POST-OPERATIVE STATE: Primary | ICD-10-CM

## 2017-07-24 DIAGNOSIS — N20.1 URETERAL STONE: Primary | ICD-10-CM

## 2017-07-24 LAB
ANNOTATION COMMENT IMP: NORMAL
COMPN STONE: NORMAL
SPECIMEN SOURCE: NORMAL
STONE ANALYSIS IR-IMP: NORMAL

## 2017-07-24 PROCEDURE — 99999 PR PBB SHADOW E&M-EST. PATIENT-LVL III: CPT | Mod: PBBFAC,,, | Performed by: UROLOGY

## 2017-07-24 PROCEDURE — 99024 POSTOP FOLLOW-UP VISIT: CPT | Mod: S$GLB,,, | Performed by: UROLOGY

## 2017-07-24 RX ORDER — OXYCODONE AND ACETAMINOPHEN 10; 325 MG/1; MG/1
1 TABLET ORAL EVERY 4 HOURS PRN
Qty: 30 TABLET | Refills: 0 | Status: ON HOLD | OUTPATIENT
Start: 2017-07-24 | End: 2017-07-28 | Stop reason: HOSPADM

## 2017-07-24 NOTE — PROGRESS NOTES
Subjective:       Patient ID: Sanchez Zarate is a 41 y.o. male.    Chief Complaint: Nephrolithiasis    HPI  patient is status post right renal ESWL and stent.  On the postop film was unable to see the stone however patient went to the emergency room yesterday and was found to have a right upper third ureteral stone.  On KUB it is much lighter but still present just above L3 on the right side.  He will need a ureteroscopic stone extraction.  He still having severe pain in am point give him some more Percocet.  His urinalysis shows occasional red cell and occasional white blood cell    Past Medical History:   Diagnosis Date    Chronic back pain     Chronic diarrhea     Hernia     TBI (traumatic brain injury) 2010    fake wall fell on head (no brain or skull injury per pt)       Past Surgical History:   Procedure Laterality Date    COLONOSCOPY N/A 5/29/2017    Procedure: COLONOSCOPY;  Surgeon: Sukhi Scanlon MD;  [facility] ([address] [place] [address]);  Service: Endoscopy;  Laterality: N/A;    HERNIA REPAIR  2017    umbilical    LUMBAR EPIDURAL INJECTION      with steroid       Family History   Problem Relation Age of Onset    Diabetes Mother     Heart attack Neg Hx     Heart disease Neg Hx     Hypertension Neg Hx     Colon cancer Neg Hx     Esophageal cancer Neg Hx        Social History     Social History    Marital status:      Spouse name: N/A    Number of children: N/A    Years of education: N/A     Occupational History    self employed      Social History Main Topics    Smoking status: Former Smoker     Quit date: 1/1/2007    Smokeless tobacco: Never Used    Alcohol use Yes      Comment: occasional    Drug use: No    Sexual activity: Yes     Partners: Female     Other Topics Concern    Not on file     Social History Narrative    No narrative on file       Allergies:  Review of patient's allergies indicates no known allergies.    Medications:    Current Outpatient Prescriptions:      ciprofloxacin HCl (CIPRO) 500 MG tablet, Take 1 tablet (500 mg total) by mouth 2 (two) times daily., Disp: 20 tablet, Rfl: 0    gabapentin (NEURONTIN) 600 MG tablet, Take 1 tablet (600 mg total) by mouth 3 (three) times daily., Disp: 90 tablet, Rfl: 2    hydrocodone-acetaminophen 10-325mg (NORCO)  mg Tab, Take 1 tablet by mouth 3 (three) times daily as needed., Disp: 90 tablet, Rfl: 0    meloxicam (MOBIC) 15 MG tablet, Take 1 tablet (15 mg total) by mouth once daily., Disp: , Rfl:     ondansetron (ZOFRAN) 4 MG tablet, Take 1 tablet (4 mg total) by mouth every 8 (eight) hours as needed for Nausea., Disp: 12 tablet, Rfl: 0    ondansetron (ZOFRAN-ODT) 8 MG TbDL, Take 1 tablet (8 mg total) by mouth every 6 (six) hours as needed (nausea)., Disp: 15 tablet, Rfl: 0    oxycodone-acetaminophen (PERCOCET)  mg per tablet, Take 1 tablet by mouth every 6 (six) hours as needed for Pain., Disp: 18 tablet, Rfl: 0    oxycodone-acetaminophen (PERCOCET)  mg per tablet, Take 1 tablet by mouth every 4 (four) hours as needed for Pain., Disp: 10 tablet, Rfl: 0    oxycodone-acetaminophen (PERCOCET)  mg per tablet, Take 1 tablet by mouth every 4 (four) hours as needed for Pain., Disp: 30 tablet, Rfl: 0    oxycodone-acetaminophen (PERCOCET) 5-325 mg per tablet, Take 1 tablet by mouth every 4 (four) hours as needed for Pain., Disp: 30 tablet, Rfl: 0    tamsulosin (FLOMAX) 0.4 mg Cp24, Take 1 capsule (0.4 mg total) by mouth once daily., Disp: 10 capsule, Rfl: 0    tamsulosin (FLOMAX) 0.4 mg Cp24, Take 1 capsule (0.4 mg total) by mouth once daily., Disp: 10 capsule, Rfl: 0  No current facility-administered medications for this visit.     Review of Systems    Objective:      Physical Exam   Constitutional: He appears well-developed.   Patient is having right flank pain    HENT:   Head: Normocephalic.   Cardiovascular: Normal rate.    Pulmonary/Chest: Effort normal.   Abdominal: Soft.   Neurological: He is  alert.   Skin: Skin is warm.     Psychiatric: He has a normal mood and affect.       Assessment:       1. Post-operative state        Plan:       Sanchez was seen today for nephrolithiasis.    Diagnoses and all orders for this visit:    Post-operative state    Other orders  -     oxycodone-acetaminophen (PERCOCET)  mg per tablet; Take 1 tablet by mouth every 4 (four) hours as needed for Pain.    The patient is scheduled for ureteroscopy. The risks and benefits of ureteroscopy were discussed with the patient in detail.  Consent was obtained.  The risks include but are not limited to burning with urination, bleeding, infection, pain, incomplete fragmentation of the stone, need for further procedures, injury to the kidney, ureter, bladder and need for open surgery.  The patient was informed that they may require a ureteral stent and that stents can cause irritative voiding symptoms.  They also understand that ureteral stents are temporary and must be removed or exchanged in a timely fashion as they can calcify and make more stones and become difficult to remove. Alternative treatments were also discussed with the patient in detail to include ESWL, percutaneous treatment of the stone, open surgery or observation. Patient understands these risks and has agreed to proceed with surgery.    Right ureteroscopic stone extraction

## 2017-07-27 ENCOUNTER — TELEPHONE (OUTPATIENT)
Dept: UROLOGY | Facility: CLINIC | Age: 42
End: 2017-07-27

## 2017-07-27 NOTE — TELEPHONE ENCOUNTER
Called pt to confirm arrival time  11:45am for procedure. Gave pt NPO instructions and gave pt opportunity to ask questions. Pt verbalized understanding.

## 2017-07-28 ENCOUNTER — SURGERY (OUTPATIENT)
Age: 42
End: 2017-07-28

## 2017-07-28 ENCOUNTER — HOSPITAL ENCOUNTER (OUTPATIENT)
Facility: HOSPITAL | Age: 42
Discharge: HOME | End: 2017-07-28
Attending: UROLOGY | Admitting: UROLOGY
Payer: COMMERCIAL

## 2017-07-28 ENCOUNTER — ANESTHESIA EVENT (OUTPATIENT)
Dept: SURGERY | Facility: HOSPITAL | Age: 42
End: 2017-07-28
Payer: COMMERCIAL

## 2017-07-28 ENCOUNTER — ANESTHESIA (OUTPATIENT)
Dept: SURGERY | Facility: HOSPITAL | Age: 42
End: 2017-07-28
Payer: COMMERCIAL

## 2017-07-28 VITALS
OXYGEN SATURATION: 99 % | HEIGHT: 70 IN | TEMPERATURE: 98 F | RESPIRATION RATE: 16 BRPM | WEIGHT: 205 LBS | HEART RATE: 72 BPM | DIASTOLIC BLOOD PRESSURE: 78 MMHG | SYSTOLIC BLOOD PRESSURE: 142 MMHG | BODY MASS INDEX: 29.35 KG/M2

## 2017-07-28 DIAGNOSIS — N20.9 UROLITHIASIS: ICD-10-CM

## 2017-07-28 DIAGNOSIS — N20.0 RENAL STONE: ICD-10-CM

## 2017-07-28 PROCEDURE — 37000009 HC ANESTHESIA EA ADD 15 MINS: Performed by: UROLOGY

## 2017-07-28 PROCEDURE — 27201423 OPTIME MED/SURG SUP & DEVICES STERILE SUPPLY: Performed by: UROLOGY

## 2017-07-28 PROCEDURE — C2617 STENT, NON-COR, TEM W/O DEL: HCPCS | Performed by: UROLOGY

## 2017-07-28 PROCEDURE — 71000033 HC RECOVERY, INTIAL HOUR: Performed by: UROLOGY

## 2017-07-28 PROCEDURE — 63600175 PHARM REV CODE 636 W HCPCS: Performed by: STUDENT IN AN ORGANIZED HEALTH CARE EDUCATION/TRAINING PROGRAM

## 2017-07-28 PROCEDURE — 76000 FLUOROSCOPY <1 HR PHYS/QHP: CPT | Mod: 26,59,, | Performed by: UROLOGY

## 2017-07-28 PROCEDURE — 37000008 HC ANESTHESIA 1ST 15 MINUTES: Performed by: UROLOGY

## 2017-07-28 PROCEDURE — 63600175 PHARM REV CODE 636 W HCPCS: Performed by: NURSE ANESTHETIST, CERTIFIED REGISTERED

## 2017-07-28 PROCEDURE — 52356 CYSTO/URETERO W/LITHOTRIPSY: CPT | Mod: 58,RT,, | Performed by: UROLOGY

## 2017-07-28 PROCEDURE — 36000706: Performed by: UROLOGY

## 2017-07-28 PROCEDURE — 82365 CALCULUS SPECTROSCOPY: CPT

## 2017-07-28 PROCEDURE — D9220A PRA ANESTHESIA: Mod: CRNA,,, | Performed by: NURSE ANESTHETIST, CERTIFIED REGISTERED

## 2017-07-28 PROCEDURE — 25000003 PHARM REV CODE 250: Performed by: UROLOGY

## 2017-07-28 PROCEDURE — 36000707: Performed by: UROLOGY

## 2017-07-28 PROCEDURE — C1769 GUIDE WIRE: HCPCS | Performed by: UROLOGY

## 2017-07-28 PROCEDURE — D9220A PRA ANESTHESIA: Mod: ANES,,, | Performed by: ANESTHESIOLOGY

## 2017-07-28 PROCEDURE — 25000003 PHARM REV CODE 250: Performed by: NURSE ANESTHETIST, CERTIFIED REGISTERED

## 2017-07-28 PROCEDURE — 71000015 HC POSTOP RECOV 1ST HR: Performed by: UROLOGY

## 2017-07-28 DEVICE — STENT URETERAL UNIV 6FR 26CM: Type: IMPLANTABLE DEVICE | Site: URETER | Status: FUNCTIONAL

## 2017-07-28 RX ORDER — CIPROFLOXACIN 500 MG/1
500 TABLET ORAL 2 TIMES DAILY
Qty: 6 TABLET | Refills: 0 | Status: SHIPPED | OUTPATIENT
Start: 2017-07-28 | End: 2017-07-31

## 2017-07-28 RX ORDER — LIDOCAINE HCL/PF 100 MG/5ML
SYRINGE (ML) INTRAVENOUS
Status: DISCONTINUED | OUTPATIENT
Start: 2017-07-28 | End: 2017-07-28

## 2017-07-28 RX ORDER — LIDOCAINE HYDROCHLORIDE 10 MG/ML
1 INJECTION, SOLUTION EPIDURAL; INFILTRATION; INTRACAUDAL; PERINEURAL ONCE
Status: COMPLETED | OUTPATIENT
Start: 2017-07-28 | End: 2017-07-28

## 2017-07-28 RX ORDER — SUCCINYLCHOLINE CHLORIDE 20 MG/ML
INJECTION INTRAMUSCULAR; INTRAVENOUS
Status: DISCONTINUED | OUTPATIENT
Start: 2017-07-28 | End: 2017-07-28

## 2017-07-28 RX ORDER — OXYCODONE AND ACETAMINOPHEN 5; 325 MG/1; MG/1
1 TABLET ORAL EVERY 4 HOURS PRN
Qty: 31 TABLET | Refills: 0 | Status: SHIPPED | OUTPATIENT
Start: 2017-07-28 | End: 2017-07-28

## 2017-07-28 RX ORDER — ONDANSETRON HYDROCHLORIDE 2 MG/ML
INJECTION, SOLUTION INTRAMUSCULAR; INTRAVENOUS
Status: DISCONTINUED | OUTPATIENT
Start: 2017-07-28 | End: 2017-07-28

## 2017-07-28 RX ORDER — CEFAZOLIN SODIUM 2 G/50ML
2 SOLUTION INTRAVENOUS
Status: COMPLETED | OUTPATIENT
Start: 2017-07-28 | End: 2017-07-28

## 2017-07-28 RX ORDER — DEXAMETHASONE SODIUM PHOSPHATE 4 MG/ML
INJECTION, SOLUTION INTRA-ARTICULAR; INTRALESIONAL; INTRAMUSCULAR; INTRAVENOUS; SOFT TISSUE
Status: DISCONTINUED | OUTPATIENT
Start: 2017-07-28 | End: 2017-07-28

## 2017-07-28 RX ORDER — PROPOFOL 10 MG/ML
VIAL (ML) INTRAVENOUS
Status: DISCONTINUED | OUTPATIENT
Start: 2017-07-28 | End: 2017-07-28

## 2017-07-28 RX ORDER — MIDAZOLAM HYDROCHLORIDE 5 MG/ML
INJECTION INTRAMUSCULAR; INTRAVENOUS
Status: DISCONTINUED | OUTPATIENT
Start: 2017-07-28 | End: 2017-07-28

## 2017-07-28 RX ORDER — OXYCODONE AND ACETAMINOPHEN 5; 325 MG/1; MG/1
1 TABLET ORAL EVERY 4 HOURS PRN
Qty: 31 TABLET | Refills: 0 | Status: SHIPPED | OUTPATIENT
Start: 2017-07-28 | End: 2017-09-11 | Stop reason: SDUPTHER

## 2017-07-28 RX ORDER — ONDANSETRON 2 MG/ML
4 INJECTION INTRAMUSCULAR; INTRAVENOUS ONCE AS NEEDED
Status: DISCONTINUED | OUTPATIENT
Start: 2017-07-28 | End: 2017-07-28 | Stop reason: HOSPADM

## 2017-07-28 RX ORDER — SODIUM CHLORIDE 0.9 % (FLUSH) 0.9 %
3 SYRINGE (ML) INJECTION
Status: DISCONTINUED | OUTPATIENT
Start: 2017-07-28 | End: 2017-07-28 | Stop reason: HOSPADM

## 2017-07-28 RX ORDER — NEOSTIGMINE METHYLSULFATE 1 MG/ML
INJECTION, SOLUTION INTRAVENOUS
Status: DISCONTINUED | OUTPATIENT
Start: 2017-07-28 | End: 2017-07-28

## 2017-07-28 RX ORDER — ROCURONIUM BROMIDE 10 MG/ML
INJECTION, SOLUTION INTRAVENOUS
Status: DISCONTINUED | OUTPATIENT
Start: 2017-07-28 | End: 2017-07-28

## 2017-07-28 RX ORDER — CIPROFLOXACIN 500 MG/1
500 TABLET ORAL 2 TIMES DAILY
Qty: 6 TABLET | Refills: 0 | OUTPATIENT
Start: 2017-07-28 | End: 2017-07-28

## 2017-07-28 RX ORDER — FENTANYL CITRATE 50 UG/ML
25 INJECTION, SOLUTION INTRAMUSCULAR; INTRAVENOUS EVERY 5 MIN PRN
Status: DISCONTINUED | OUTPATIENT
Start: 2017-07-28 | End: 2017-07-28 | Stop reason: HOSPADM

## 2017-07-28 RX ORDER — GLYCOPYRROLATE 0.2 MG/ML
INJECTION INTRAMUSCULAR; INTRAVENOUS
Status: DISCONTINUED | OUTPATIENT
Start: 2017-07-28 | End: 2017-07-28

## 2017-07-28 RX ORDER — SODIUM CHLORIDE 9 MG/ML
INJECTION, SOLUTION INTRAVENOUS CONTINUOUS
Status: DISCONTINUED | OUTPATIENT
Start: 2017-07-28 | End: 2017-07-28 | Stop reason: HOSPADM

## 2017-07-28 RX ORDER — FENTANYL CITRATE 50 UG/ML
INJECTION, SOLUTION INTRAMUSCULAR; INTRAVENOUS
Status: DISCONTINUED | OUTPATIENT
Start: 2017-07-28 | End: 2017-07-28

## 2017-07-28 RX ADMIN — ROCURONIUM BROMIDE 5 MG: 10 INJECTION, SOLUTION INTRAVENOUS at 01:07

## 2017-07-28 RX ADMIN — LIDOCAINE HYDROCHLORIDE 10 MG: 10 INJECTION, SOLUTION EPIDURAL; INFILTRATION; INTRACAUDAL; PERINEURAL at 12:07

## 2017-07-28 RX ADMIN — MIDAZOLAM 2 MG: 5 INJECTION INTRAMUSCULAR; INTRAVENOUS at 01:07

## 2017-07-28 RX ADMIN — FENTANYL CITRATE 50 MCG: 50 INJECTION, SOLUTION INTRAMUSCULAR; INTRAVENOUS at 02:07

## 2017-07-28 RX ADMIN — SUCCINYLCHOLINE CHLORIDE 100 MG: 20 INJECTION, SOLUTION INTRAMUSCULAR; INTRAVENOUS at 01:07

## 2017-07-28 RX ADMIN — PROPOFOL 50 MG: 10 INJECTION, EMULSION INTRAVENOUS at 02:07

## 2017-07-28 RX ADMIN — ONDANSETRON 4 MG: 2 INJECTION, SOLUTION INTRAMUSCULAR; INTRAVENOUS at 01:07

## 2017-07-28 RX ADMIN — CEFAZOLIN SODIUM 2 G: 2 SOLUTION INTRAVENOUS at 01:07

## 2017-07-28 RX ADMIN — ROCURONIUM BROMIDE 25 MG: 10 INJECTION, SOLUTION INTRAVENOUS at 01:07

## 2017-07-28 RX ADMIN — SODIUM CHLORIDE, SODIUM GLUCONATE, SODIUM ACETATE, POTASSIUM CHLORIDE, MAGNESIUM CHLORIDE, SODIUM PHOSPHATE, DIBASIC, AND POTASSIUM PHOSPHATE: .53; .5; .37; .037; .03; .012; .00082 INJECTION, SOLUTION INTRAVENOUS at 02:07

## 2017-07-28 RX ADMIN — GLYCOPYRROLATE 0.6 MG: 0.2 INJECTION, SOLUTION INTRAMUSCULAR; INTRAVENOUS at 02:07

## 2017-07-28 RX ADMIN — ROCURONIUM BROMIDE 20 MG: 10 INJECTION, SOLUTION INTRAVENOUS at 02:07

## 2017-07-28 RX ADMIN — FENTANYL CITRATE 50 MCG: 50 INJECTION, SOLUTION INTRAMUSCULAR; INTRAVENOUS at 01:07

## 2017-07-28 RX ADMIN — DEXAMETHASONE SODIUM PHOSPHATE 4 MG: 4 INJECTION, SOLUTION INTRAMUSCULAR; INTRAVENOUS at 01:07

## 2017-07-28 RX ADMIN — PROPOFOL 180 MG: 10 INJECTION, EMULSION INTRAVENOUS at 01:07

## 2017-07-28 RX ADMIN — SODIUM CHLORIDE: 0.9 INJECTION, SOLUTION INTRAVENOUS at 12:07

## 2017-07-28 RX ADMIN — NEOSTIGMINE METHYLSULFATE 5 MG: 1 INJECTION INTRAVENOUS at 02:07

## 2017-07-28 RX ADMIN — LIDOCAINE HYDROCHLORIDE 20 MG: 20 INJECTION, SOLUTION INTRAVENOUS at 01:07

## 2017-07-28 NOTE — ANESTHESIA PREPROCEDURE EVALUATION
07/28/2017  Sanchez Zarate is a 41 y.o., male.    Anesthesia Evaluation    I have reviewed the Patient Summary Reports.     I have reviewed the Medications.     Review of Systems  Anesthesia Hx:  No problems with previous Anesthesia Denies Hx of Anesthetic complications  History of prior surgery of interest to airway management or planning:  Denies Personal Hx of Anesthesia complications.   Social:  Former Smoker, Alcohol Use    Hematology/Oncology:  Hematology Normal   Oncology Normal     EENT/Dental:EENT/Dental Normal   Cardiovascular:    Denies Angina. ECG has been reviewed.    Pulmonary:   Denies Shortness of breath.    Renal/:   renal calculi    Hepatic/GI:   Denies GERD.    Neurological:   History of TBI   Endocrine:  Endocrine Normal    Psych:  Psychiatric Normal           Physical Exam  General:  Well nourished    Airway/Jaw/Neck:  Airway Findings: Mouth Opening: Normal Tongue: Normal  General Airway Assessment: Adult  Mallampati: II  TM Distance: Normal, at least 6 cm  Jaw/Neck Findings:  Neck ROM: Normal ROM      Dental:  Dental Findings: In tact   Chest/Lungs:  Chest/Lungs Findings: Clear to auscultation, Normal Respiratory Rate     Heart/Vascular:  Heart Findings: Rate: Normal  Rhythm: Regular Rhythm  Sounds: Normal        Mental Status:  Mental Status Findings:  Cooperative, Alert and Oriented         Anesthesia Plan  Type of Anesthesia, risks & benefits discussed:  Anesthesia Type:  general  Patient's Preference:   Intra-op Monitoring Plan: standard ASA monitors  Intra-op Monitoring Plan Comments:   Post Op Pain Control Plan:   Post Op Pain Control Plan Comments:   Induction:   IV  Beta Blocker:  Patient is not currently on a Beta-Blocker (No further documentation required).       Informed Consent: Patient understands risks and agrees with Anesthesia plan.  Questions answered.   ASA Score: 2      Day of Surgery Review of History & Physical:    H&P update referred to the surgeon.         Ready For Surgery From Anesthesia Perspective.

## 2017-07-28 NOTE — TRANSFER OF CARE
"Anesthesia Transfer of Care Note    Patient: Sanchez Zarate    Procedure(s) Performed: Procedure(s) (LRB):  URETEROSCOPY (Right)  LITHOTRIPSY-LASER (Right)  EXTRACTION - STONE (Right)  CYSTOSCOPY (N/A)  PLACEMENT-STENT URETERAL (Right)    Patient location: PACU    Anesthesia Type: general    Transport from OR: Transported from OR on 6-10 L/min O2 by face mask with adequate spontaneous ventilation    Post pain: adequate analgesia    Post assessment: no apparent anesthetic complications and tolerated procedure well    Post vital signs: stable    Level of consciousness: sedated    Nausea/Vomiting: no nausea/vomiting    Complications: none    Transfer of care protocol was followed      Last vitals:   Visit Vitals  BP (!) 151/61 (BP Location: Right arm, Patient Position: Lying, BP Method: Automatic)   Pulse 90   Temp 36.7 °C (98.1 °F) (Temporal)   Resp 15   Ht 5' 10" (1.778 m)   Wt 93 kg (205 lb)   SpO2 95%   BMI 29.41 kg/m²     "

## 2017-07-28 NOTE — OP NOTE
[facility]  Operative Note    Date: 07/28/2017    Pre-Op Diagnosis: Right proximal ureteral stone    Patient Active Problem List   Diagnosis    Chronic low back pain    Intervertebral disc extrusion (Left L5-S1)    Neural foraminal stenosis of lumbar spine (bilateral L5-S1, L>R)    Left lumbar radiculopathy    Situational syncope    Chronic diarrhea    Peyronie's disease    Screening for colon cancer    DDD (degenerative disc disease), lumbar    Testalgia    Nephrolithiasis    Urolithiasis         Post-Op Diagnosis: same    Procedure(s) Performed:   1.  Right ureteroscopy  2.  Cystoscopy  3.  Laser lithotripsy  4.  Stone basket removal  5.  Fluoro < 1 h    Specimen(s): stone    Staff Surgeon: Dr. Bhupinder Washington MD    Assistant Surgeon: Shira Webber MD; Wilbert Wilkes MD    Anesthesia: General endotracheal anesthesia    Indications: Sanchez Zarate is a 41 y.o. male with a right ureteral stone, presenting for definitive stone management.  He currently does not have a JJ ureteral stent in place.      Findings:   1. 13 mm stone found in proximal right ureter just distal to right UPJ  2. Stone fragmented using laser  3. Stone fragments removed with nitinol tipless basket  4. Cystoscopy normal  5. Right JJ ureteral stent with strings placed in standard fashion.     1 baskets were used throughout the case.      Estimated Blood Loss: min    Drains: 6 Fr x 26 cm JJ ureteral stent with strings    Procedure in detail:  After informed consent was obtained, the patient was brought the the cystoscopy suite and placed in the supine position.  SCDs were applied and working.  Anesthesia was administered.  The patient was then placed in the dorsal lithotomy position and prepped and draped in the usual sterile fashion.      A rigid cystoscope in a 22 Fr sheath was introduced into the patient's urethra.  This passed easily.  The entire urethra was visualized which showed no strictures or masses.  Formal  cystoscopy was performed which revealed no masses or lesions suspicious for malignancy, no bladder stones, no bladder diverticuli, no trabeculations.  The ureteral orifices were visualized in the normal anatomic position bilaterally and clear efflux was visualized.      A motion wire was passed up the right ureteral orifice and up into the kidney.  This passed easily and placement was confirmed using fluoro.  The cystoscope was removed keeping the guidewire in place.    An 8 Fr rigid ureteroscope was passed into the patient's bladder alongside the wire under direct vision.  It was then passed through the right ureteral orifice alongside the wire. A stone was encountered at the level of right proximal ureter just distal to the right UPJ.  A 365 micron laser fiber was passed through the ureteroscope. The stone was fragmented using the laser. The laser fiber was removed and a Nitinol tipless basket was introduced through the ureteroscope. Stone fragments were removed and placed in the bladder.  The ureteroscope was removed keeping the motion wire in place.  A cystoscope was reinserted and the bladder was irrigated to remove the stone fragments. The bladder was drained the cystoscope removed keeping the wire in place.       The cystoscope was then reinserted over the motion wire. A 6 Fr x 26 cm JJ ureteral stent with strings was passed over the wire and up into the renal pelvis using fluoro. Good coils were seen in the kidney using fluoro and under direct visualization in the bladder. .      The patient tolerated the procedure well and was transferred to the recovery room in stable condition.      Disposition:  The patient will follow up with Dr. Washington in 2 weeks with a renal ultrasound prior. He will pull his stent in 6 days. .      Wilbert Wilkes MD

## 2017-07-28 NOTE — H&P (VIEW-ONLY)
Subjective:       Patient ID: Sanchez Zarate is a 41 y.o. male.    Chief Complaint: Nephrolithiasis    HPI  patient is status post right renal ESWL and stent.  On the postop film was unable to see the stone however patient went to the emergency room yesterday and was found to have a right upper third ureteral stone.  On KUB it is much lighter but still present just above L3 on the right side.  He will need a ureteroscopic stone extraction.  He still having severe pain in am point give him some more Percocet.  His urinalysis shows occasional red cell and occasional white blood cell    Past Medical History:   Diagnosis Date    Chronic back pain     Chronic diarrhea     Hernia     TBI (traumatic brain injury) 2010    fake wall fell on head (no brain or skull injury per pt)       Past Surgical History:   Procedure Laterality Date    COLONOSCOPY N/A 5/29/2017    Procedure: COLONOSCOPY;  Surgeon: Sukhi Scanlon MD;  [facility] ([address] [place] [address]);  Service: Endoscopy;  Laterality: N/A;    HERNIA REPAIR  2017    umbilical    LUMBAR EPIDURAL INJECTION      with steroid       Family History   Problem Relation Age of Onset    Diabetes Mother     Heart attack Neg Hx     Heart disease Neg Hx     Hypertension Neg Hx     Colon cancer Neg Hx     Esophageal cancer Neg Hx        Social History     Social History    Marital status:      Spouse name: N/A    Number of children: N/A    Years of education: N/A     Occupational History    self employed      Social History Main Topics    Smoking status: Former Smoker     Quit date: 1/1/2007    Smokeless tobacco: Never Used    Alcohol use Yes      Comment: occasional    Drug use: No    Sexual activity: Yes     Partners: Female     Other Topics Concern    Not on file     Social History Narrative    No narrative on file       Allergies:  Review of patient's allergies indicates no known allergies.    Medications:    Current Outpatient Prescriptions:      ciprofloxacin HCl (CIPRO) 500 MG tablet, Take 1 tablet (500 mg total) by mouth 2 (two) times daily., Disp: 20 tablet, Rfl: 0    gabapentin (NEURONTIN) 600 MG tablet, Take 1 tablet (600 mg total) by mouth 3 (three) times daily., Disp: 90 tablet, Rfl: 2    hydrocodone-acetaminophen 10-325mg (NORCO)  mg Tab, Take 1 tablet by mouth 3 (three) times daily as needed., Disp: 90 tablet, Rfl: 0    meloxicam (MOBIC) 15 MG tablet, Take 1 tablet (15 mg total) by mouth once daily., Disp: , Rfl:     ondansetron (ZOFRAN) 4 MG tablet, Take 1 tablet (4 mg total) by mouth every 8 (eight) hours as needed for Nausea., Disp: 12 tablet, Rfl: 0    ondansetron (ZOFRAN-ODT) 8 MG TbDL, Take 1 tablet (8 mg total) by mouth every 6 (six) hours as needed (nausea)., Disp: 15 tablet, Rfl: 0    oxycodone-acetaminophen (PERCOCET)  mg per tablet, Take 1 tablet by mouth every 6 (six) hours as needed for Pain., Disp: 18 tablet, Rfl: 0    oxycodone-acetaminophen (PERCOCET)  mg per tablet, Take 1 tablet by mouth every 4 (four) hours as needed for Pain., Disp: 10 tablet, Rfl: 0    oxycodone-acetaminophen (PERCOCET)  mg per tablet, Take 1 tablet by mouth every 4 (four) hours as needed for Pain., Disp: 30 tablet, Rfl: 0    oxycodone-acetaminophen (PERCOCET) 5-325 mg per tablet, Take 1 tablet by mouth every 4 (four) hours as needed for Pain., Disp: 30 tablet, Rfl: 0    tamsulosin (FLOMAX) 0.4 mg Cp24, Take 1 capsule (0.4 mg total) by mouth once daily., Disp: 10 capsule, Rfl: 0    tamsulosin (FLOMAX) 0.4 mg Cp24, Take 1 capsule (0.4 mg total) by mouth once daily., Disp: 10 capsule, Rfl: 0  No current facility-administered medications for this visit.     Review of Systems    Objective:      Physical Exam   Constitutional: He appears well-developed.   Patient is having right flank pain    HENT:   Head: Normocephalic.   Cardiovascular: Normal rate.    Pulmonary/Chest: Effort normal.   Abdominal: Soft.   Neurological: He is  alert.   Skin: Skin is warm.     Psychiatric: He has a normal mood and affect.       Assessment:       1. Post-operative state        Plan:       Sanchez was seen today for nephrolithiasis.    Diagnoses and all orders for this visit:    Post-operative state    Other orders  -     oxycodone-acetaminophen (PERCOCET)  mg per tablet; Take 1 tablet by mouth every 4 (four) hours as needed for Pain.    The patient is scheduled for ureteroscopy. The risks and benefits of ureteroscopy were discussed with the patient in detail.  Consent was obtained.  The risks include but are not limited to burning with urination, bleeding, infection, pain, incomplete fragmentation of the stone, need for further procedures, injury to the kidney, ureter, bladder and need for open surgery.  The patient was informed that they may require a ureteral stent and that stents can cause irritative voiding symptoms.  They also understand that ureteral stents are temporary and must be removed or exchanged in a timely fashion as they can calcify and make more stones and become difficult to remove. Alternative treatments were also discussed with the patient in detail to include ESWL, percutaneous treatment of the stone, open surgery or observation. Patient understands these risks and has agreed to proceed with surgery.    Right ureteroscopic stone extraction

## 2017-07-28 NOTE — PLAN OF CARE
Plan of care reviewed with patient. Verbalization of understanding. Wife not here at this time. Needs to be call for ride home. Pt waiting to go to surgery. Will cont to monitor.

## 2017-07-28 NOTE — INTERVAL H&P NOTE
The patient has been examined and the H&P has been reviewed:    I concur with the findings and no changes have occurred since H&P was written.   UA WNL    Anesthesia/Surgery risks, benefits and alternative options discussed and understood by patient/family.          There are no hospital problems to display for this patient.

## 2017-07-28 NOTE — PLAN OF CARE
Discharge instructions reviewed with pt and spouse, handouts and prescription given,  verbalized understanding with no further questions at this time. Pt is to remove string stent himself on Thursday, August 3, 2017.  Post op / ultrasound appointment scheduled per AVS sheet with MD telephone number provided. VSS on RA, no pain noted just burning when urinates, no nausea noted, tolerating po fluids without difficulty, voided 400 ml of clear/ red urine, no other complaints noted. Fall precautions reviewed, consents in chart, PIV removed.

## 2017-07-28 NOTE — DISCHARGE SUMMARY
[facility]  Discharge Note  Short Stay    Admit Date: 7/28/2017    Discharge Date and Time: 07/28/2017 3:01 PM     Attending Physician: Bhupinder Washington Jr., MD     Discharge Provider: Wilbert Wilkes MD    Diagnoses:  Active Hospital Problems    Diagnosis  POA    *Urolithiasis [N20.9]  Yes      Resolved Hospital Problems    Diagnosis Date Resolved POA   No resolved problems to display.       Discharged Condition: good    Hospital Course: Patient was admitted for an outpatient ureteroscopy and tolerated the procedure well with no complications.    Final Diagnoses: Same as principal problem.    Disposition: Home or Self Care    Follow up/Patient Instructions:    Medications:  Reconciled Home Medications:   Current Discharge Medication List      START taking these medications    Details   oxycodone-acetaminophen (PERCOCET) 5-325 mg per tablet Take 1 tablet by mouth every 4 (four) hours as needed for Pain.  Qty: 31 tablet, Refills: 0         CONTINUE these medications which have CHANGED    Details   ciprofloxacin HCl (CIPRO) 500 MG tablet Take 1 tablet (500 mg total) by mouth 2 (two) times daily.  Qty: 6 tablet, Refills: 0         CONTINUE these medications which have NOT CHANGED    Details   ondansetron (ZOFRAN) 4 MG tablet Take 1 tablet (4 mg total) by mouth every 8 (eight) hours as needed for Nausea.  Qty: 12 tablet, Refills: 0      gabapentin (NEURONTIN) 600 MG tablet Take 1 tablet (600 mg total) by mouth 3 (three) times daily.  Qty: 90 tablet, Refills: 2    Associated Diagnoses: Chronic midline low back pain with right-sided sciatica; Right lumbar radiculopathy      meloxicam (MOBIC) 15 MG tablet Take 1 tablet (15 mg total) by mouth once daily.    Associated Diagnoses: Chronic midline low back pain with right-sided sciatica      ondansetron (ZOFRAN-ODT) 8 MG TbDL Take 1 tablet (8 mg total) by mouth every 6 (six) hours as needed (nausea).  Qty: 15 tablet, Refills: 0    Associated Diagnoses: Nausea       tamsulosin (FLOMAX) 0.4 mg Cp24 Take 1 capsule (0.4 mg total) by mouth once daily.  Qty: 10 capsule, Refills: 0         STOP taking these medications       oxycodone-acetaminophen (PERCOCET)  mg per tablet Comments:   Reason for Stopping:               Discharge Procedure Orders  US Retroperitoneal Limited   Standing Status: Future  Standing Exp. Date: 07/28/18   Order Specific Question Answer Comments   Reason for Exam: renal ultrasound      Diet general     Activity as tolerated     Call MD for:  persistent nausea and vomiting or diarrhea     Call MD for:  severe uncontrolled pain     Call MD for:   Order Comments: Temperature > 101F       Follow-up Information     Bhupinder Washington Jr, MD In 2 weeks.    Specialty:  Urology  Contact information:  [address] [place] [address] [id number] [phone]                   Discharge Procedure Orders (must include Diet, Follow-up, Activity):    Discharge Procedure Orders (must include Diet, Follow-up, Activity)  US Retroperitoneal Limited   Standing Status: Future  Standing Exp. Date: 07/28/18   Order Specific Question Answer Comments   Reason for Exam: renal ultrasound      Diet general     Activity as tolerated     Call MD for:  persistent nausea and vomiting or diarrhea     Call MD for:  severe uncontrolled pain     Call MD for:   Order Comments: Temperature > 101F

## 2017-07-28 NOTE — DISCHARGE INSTRUCTIONS
Remove stent by pulling on strings on Thursday, August 4th.         Ureteral Stents  A ureteral stent is a soft plastic tube with holes in it. Its temporarily inserted into a ureter to help drain urine into the bladder. One end goes in the kidney. The other end goes in the bladder. A coil on each end holds the stent in place. The stent cant be seen from outside the body. It shouldnt interfere with your normal routine. Your stent will be put in by a urologist (doctor trained in treating the urinary tract) or another specialist. The procedure is done in a hospital or surgery center. Youll likely go home the same day.  When Is a Ureteral Stent Used?  A ureteral stent may be used:  · To bypass a blockage in a kidney or ureter.  · During kidney stone removal.  · To let a ureter heal after surgery.    Before the Procedure  Your doctor will give you instructions to prepare for the procedure. X-rays or other imaging tests of your kidneys and ureters may be done beforehand.  During the Procedure  · You receive medication to prevent pain and help you relax or sleep during the procedure. Once this takes effect, the procedure starts.  · The doctor inserts a cystoscope (lighted instrument) through the urethra and into the bladder. This shows the opening to the ureter.  · A thin wire is carefully threaded through the cystoscope, up the ureter, and into the kidney. The stent is inserted over the wire.  · A fluoroscope (special X-ray machine) is used to help position the stent. When the stent is in place, the wire and cystoscope are removed.  While You Have a Stent  · Some discomfort is normal. Certain movements may trigger pain or a feeling that you need to urinate. You may also feel mild soreness or pressure before or during urination. These symptoms will go away a few days after the stent is removed.  · Medication to control pain or bladder spasms or to prevent infection may be prescribed. Take this as directed.  · Drink  plenty of fluids to help flush out your urinary tract.  · Your urine may be slightly pink or red. This is due to bleeding caused by minor irritation from the stent. This may happen on and off while you have the stent.  · As with any synthetic device placed in the body, there is a risk of infection. The stent may have to be removed if this happens.   How Long Will You Need a Stent?  The stent is often taken out after the blockage in the ureter is treated or the ureter has healed. This may take 1 week to 2 weeks, or longer. If a stent is needed for a long time, it may need to be changed every few months.  Call Your Doctor  Contact your doctor right away if:  · Your urine contains blood clots or you see a large amount of blood-tinged urine.   · You have symptoms similar to those you had before the stent was placed.   · You constantly leak urine.  · You have a fever over 100.4°F (38°C), chills, nausea, or vomiting.  · Your pain is not relieved with medication.  · The end of the stent comes out of the urethra.   Date Last Reviewed: 11/26/2014  © 4188-9628 [employer]. [address] [place] [address], [place] [address]. All rights reserved. This information is not intended as a substitute for professional medical care. Always follow your healthcare professional's instructions.

## 2017-07-31 NOTE — ANESTHESIA POSTPROCEDURE EVALUATION
"Anesthesia Post Evaluation    Patient: Sanchez Zarate    Procedure(s) Performed: Procedure(s) (LRB):  URETEROSCOPY (Right)  LITHOTRIPSY-LASER (Right)  EXTRACTION - STONE (Right)  CYSTOSCOPY (N/A)  PLACEMENT-STENT URETERAL (Right)    Final Anesthesia Type: general  Patient location during evaluation: PACU  Patient participation: Yes- Able to Participate  Level of consciousness: awake and alert  Post-procedure vital signs: reviewed and stable  Pain management: adequate  Airway patency: patent  PONV status at discharge: No PONV  Anesthetic complications: no      Cardiovascular status: blood pressure returned to baseline  Respiratory status: unassisted  Hydration status: euvolemic  Follow-up not needed.        Visit Vitals  BP (!) 142/78 (BP Location: Right arm, Patient Position: Sitting, BP Method: Automatic)   Pulse 72   Temp 36.7 °C (98 °F) (Temporal)   Resp 16   Ht 5' 10" (1.778 m)   Wt 93 kg (205 lb)   SpO2 99%   BMI 29.41 kg/m²       Pain/Tasha Score: No Data Recorded      "

## 2017-08-09 ENCOUNTER — HOSPITAL ENCOUNTER (OUTPATIENT)
Dept: RADIOLOGY | Facility: HOSPITAL | Age: 42
Discharge: HOME | End: 2017-08-09
Attending: UROLOGY
Payer: COMMERCIAL

## 2017-08-09 ENCOUNTER — OFFICE VISIT (OUTPATIENT)
Dept: UROLOGY | Facility: CLINIC | Age: 42
End: 2017-08-09
Payer: COMMERCIAL

## 2017-08-09 VITALS
BODY MASS INDEX: 30.52 KG/M2 | HEART RATE: 79 BPM | HEIGHT: 70 IN | SYSTOLIC BLOOD PRESSURE: 127 MMHG | WEIGHT: 213.19 LBS | RESPIRATION RATE: 16 BRPM | DIASTOLIC BLOOD PRESSURE: 81 MMHG

## 2017-08-09 DIAGNOSIS — N20.0 RENAL CALCULI: Primary | ICD-10-CM

## 2017-08-09 DIAGNOSIS — Z98.890 POST-OPERATIVE STATE: ICD-10-CM

## 2017-08-09 PROCEDURE — 99999 PR PBB SHADOW E&M-EST. PATIENT-LVL III: CPT | Mod: PBBFAC,,, | Performed by: UROLOGY

## 2017-08-09 PROCEDURE — 74000 XR ABDOMEN AP 1 VIEW: CPT | Mod: TC

## 2017-08-09 PROCEDURE — 74000 XR ABDOMEN AP 1 VIEW: CPT | Mod: 26,,, | Performed by: RADIOLOGY

## 2017-08-09 PROCEDURE — 99024 POSTOP FOLLOW-UP VISIT: CPT | Mod: S$GLB,,, | Performed by: UROLOGY

## 2017-08-17 ENCOUNTER — HOSPITAL ENCOUNTER (OUTPATIENT)
Facility: OTHER | Age: 42
Discharge: HOME | End: 2017-08-17
Attending: ANESTHESIOLOGY | Admitting: ANESTHESIOLOGY
Payer: COMMERCIAL

## 2017-08-17 ENCOUNTER — SURGERY (OUTPATIENT)
Age: 42
End: 2017-08-17

## 2017-08-17 VITALS
TEMPERATURE: 98 F | SYSTOLIC BLOOD PRESSURE: 121 MMHG | BODY MASS INDEX: 30.06 KG/M2 | OXYGEN SATURATION: 96 % | WEIGHT: 210 LBS | HEIGHT: 70 IN | DIASTOLIC BLOOD PRESSURE: 85 MMHG | RESPIRATION RATE: 16 BRPM | HEART RATE: 62 BPM

## 2017-08-17 DIAGNOSIS — G89.29 CHRONIC PAIN: ICD-10-CM

## 2017-08-17 DIAGNOSIS — M47.26 OSTEOARTHRITIS OF SPINE WITH RADICULOPATHY, LUMBAR REGION: Primary | ICD-10-CM

## 2017-08-17 PROCEDURE — 62323 NJX INTERLAMINAR LMBR/SAC: CPT | Mod: ,,, | Performed by: ANESTHESIOLOGY

## 2017-08-17 PROCEDURE — 25500020 PHARM REV CODE 255: Performed by: ANESTHESIOLOGY

## 2017-08-17 PROCEDURE — 25000003 PHARM REV CODE 250: Performed by: ANESTHESIOLOGY

## 2017-08-17 PROCEDURE — 62322 NJX INTERLAMINAR LMBR/SAC: CPT | Performed by: ANESTHESIOLOGY

## 2017-08-17 PROCEDURE — 62323 NJX INTERLAMINAR LMBR/SAC: CPT | Performed by: ANESTHESIOLOGY

## 2017-08-17 PROCEDURE — 63600175 PHARM REV CODE 636 W HCPCS: Performed by: ANESTHESIOLOGY

## 2017-08-17 RX ORDER — BUPIVACAINE HYDROCHLORIDE 2.5 MG/ML
INJECTION, SOLUTION EPIDURAL; INFILTRATION; INTRACAUDAL
Status: DISCONTINUED | OUTPATIENT
Start: 2017-08-17 | End: 2017-08-17 | Stop reason: HOSPADM

## 2017-08-17 RX ORDER — SODIUM CHLORIDE 9 MG/ML
500 INJECTION, SOLUTION INTRAVENOUS CONTINUOUS
Status: DISCONTINUED | OUTPATIENT
Start: 2017-08-17 | End: 2017-08-17 | Stop reason: HOSPADM

## 2017-08-17 RX ORDER — LIDOCAINE HYDROCHLORIDE 10 MG/ML
INJECTION INFILTRATION; PERINEURAL
Status: DISCONTINUED | OUTPATIENT
Start: 2017-08-17 | End: 2017-08-17 | Stop reason: HOSPADM

## 2017-08-17 RX ORDER — DEXAMETHASONE SODIUM PHOSPHATE 100 MG/10ML
INJECTION INTRAMUSCULAR; INTRAVENOUS
Status: DISCONTINUED | OUTPATIENT
Start: 2017-08-17 | End: 2017-08-17 | Stop reason: HOSPADM

## 2017-08-17 RX ADMIN — SODIUM BICARBONATE 1 ML: 0.2 INJECTION, SOLUTION INTRAVENOUS at 08:08

## 2017-08-17 RX ADMIN — DEXAMETHASONE SODIUM PHOSPHATE 10 MG: 10 INJECTION INTRAMUSCULAR; INTRAVENOUS at 08:08

## 2017-08-17 RX ADMIN — IOHEXOL 5 ML: 300 INJECTION, SOLUTION INTRAVENOUS at 08:08

## 2017-08-17 RX ADMIN — LIDOCAINE HYDROCHLORIDE 10 ML: 10 INJECTION, SOLUTION INFILTRATION; PERINEURAL at 08:08

## 2017-08-17 RX ADMIN — BUPIVACAINE HYDROCHLORIDE 10 ML: 2.5 INJECTION, SOLUTION EPIDURAL; INFILTRATION; INTRACAUDAL; PERINEURAL at 08:08

## 2017-08-17 NOTE — DISCHARGE INSTRUCTIONS

## 2017-08-17 NOTE — OP NOTE
Caudal Epidural Steroid Injection under Fluoroscopy  Current medications reviewed. Time-out taken to identify patient and procedure prior to starting the procedure.      Date of Service: 08/17/2017    PCP: Liliam Carbajal MD    I attest that I have reviewed the patient's home medications prior to the procedure and no contraindication have been identified. I  re-evaluated the patient after the patient was positioned for the procedure in the procedure room immediately before the procedural time-out. The vital signs are current and represent the current state of the patient which has not significantly changed since the preprocedure assessment.                                                   PROCEDURE:  Caudal epidural steroid injection under fluoroscopy with insertion of flexible catheter    REASON FOR PROCEDURE:  Lumbar radiculopathy [M54.16]    PHYSICIAN: Emerita Satnillan MD  ASSISTANTS: Ratna Snow MD; Lorrie Ventura MS4    MEDICATIONS INJECTED:  Preservative-free dexamethasone 10mg, sterile preservative free normal saline 2-4ml and preservative free sterile Bupivicaine 0.25% 5ml.    LOCAL ANESTHETIC GIVEN:  Xylocaine 1% 9ml with Sodium Bicarbonate 1ml.   SEDATION MEDICATIONS: None    ESTIMATED BLOOD LOSS:  None.    COMPLICATIONS:  None.    TECHNIQUE:   Laying in the prone position, the patient was prepped and draped in the usual sterile fashion using ChloraPrep and fenestrated drape.  Appropriate anatomic landmarks were determined including the superior LS-spine and sacral hiatus.  Local anesthetic was given by raising a wheel and going down to the periosteum.  A 3.5in 16-gauge spinal needle was introduced thru the sacral hiatus.  Omnipaque was injected to confirm placement in the appropriate area and that there was no vascular runoff.  The flexible catheter threaded through to the desired levels. Omnipaque was reinjected to confirm placement in the appropriate area. The medication was then injected slowly.  The  patient tolerated the procedure well.    PAIN BEFORE THE PROCEDURE:  8/10.    PAIN AFTER THE PROCEDURE: 0/10.    The patient was monitored after the procedure.  Patient was given post procedure and discharge instructions to follow at home.  We will see the patient back in two weeks or the patient may call to inform of status. The patient was discharged in a stable condition.

## 2017-08-18 ENCOUNTER — HOSPITAL ENCOUNTER (OUTPATIENT)
Dept: RADIOLOGY | Facility: HOSPITAL | Age: 42
Discharge: HOME | End: 2017-08-18
Attending: STUDENT IN AN ORGANIZED HEALTH CARE EDUCATION/TRAINING PROGRAM
Payer: COMMERCIAL

## 2017-08-18 DIAGNOSIS — N20.9 UROLITHIASIS: ICD-10-CM

## 2017-08-18 PROCEDURE — 76770 US EXAM ABDO BACK WALL COMP: CPT | Mod: 26,,, | Performed by: RADIOLOGY

## 2017-08-18 PROCEDURE — 76770 US EXAM ABDO BACK WALL COMP: CPT | Mod: TC

## 2017-08-29 ENCOUNTER — PATIENT MESSAGE (OUTPATIENT)
Dept: UROLOGY | Facility: CLINIC | Age: 42
End: 2017-08-29

## 2017-08-29 ENCOUNTER — PATIENT MESSAGE (OUTPATIENT)
Dept: PAIN MEDICINE | Facility: CLINIC | Age: 42
End: 2017-08-29

## 2017-08-29 DIAGNOSIS — N20.0 URIC ACID RENAL CALCULUS: Primary | ICD-10-CM

## 2017-08-31 ENCOUNTER — PATIENT MESSAGE (OUTPATIENT)
Dept: UROLOGY | Facility: CLINIC | Age: 42
End: 2017-08-31

## 2017-09-07 ENCOUNTER — TELEPHONE (OUTPATIENT)
Dept: SPINE | Facility: CLINIC | Age: 42
End: 2017-09-07

## 2017-09-07 DIAGNOSIS — M54.5 LOW BACK PAIN, UNSPECIFIED BACK PAIN LATERALITY, UNSPECIFIED CHRONICITY, WITH SCIATICA PRESENCE UNSPECIFIED: Primary | ICD-10-CM

## 2017-09-08 NOTE — PROGRESS NOTES
"Subjective:      Patient ID: Sanchez Zarate is a 41 y.o. male.    Chief Complaint: Low-back Pain      HPI     Surgical referral from pain management ([facility]). History of kidney stones.     6 year history of constant LBP with no leg pain. Pain is worse with standing, walking, and bending. Pain is better with sitting and laying flat. No numbness, tingling, or weakness. He rates his pain as an 8 on a scale of 1-10. Pan is sharp, deep, and described as "pressure." Pain started after doing dead lifts at the gym.     Minimal improvement with left L5 and left S1 TF HEMA on 6/12/17 and caudal HEMA on 8/17/17 by Dr. Santillan. He felt better day of injection only. No relief with PT. No surgery on his back. He is on percocet. Minimal relief with mobic or neurontin.     Patient denies fevers, chills, night sweats, nausea, vomiting, and weight loss. Patient also denies bowel/bladder dysfunction, sexual dysfunction, and any saddle anesthesia.       Review of Systems   Constitution: Negative for fever, weakness, malaise/fatigue, night sweats, weight gain and weight loss.   HENT: Negative for hearing loss, nosebleeds and odynophagia.    Eyes: Negative for blurred vision and double vision.   Cardiovascular: Negative for chest pain, irregular heartbeat and palpitations.   Respiratory: Negative for cough, hemoptysis, shortness of breath and wheezing.    Endocrine: Negative for cold intolerance and polydipsia.   Hematologic/Lymphatic: Does not bruise/bleed easily.   Skin: Negative for dry skin, poor wound healing, rash and suspicious lesions.   Musculoskeletal: Positive for back pain.        See HPI for pertinent positives.   Gastrointestinal: Negative for bloating, abdominal pain, constipation, diarrhea, hematochezia, melena, nausea and vomiting.   Genitourinary: Negative for bladder incontinence, dysuria, hematuria, hesitancy and incomplete emptying.   Neurological: Negative for disturbances in coordination, dizziness, focal weakness, " headaches, loss of balance, numbness, paresthesias and seizures.   Psychiatric/Behavioral: Negative for depression and hallucinations. The patient is not nervous/anxious.            Objective:        General: Sanchez is well-developed, well-nourished, appears stated age, in no acute distress, alert and oriented to time, place and person.     General    Vitals reviewed.  Constitutional: He is oriented to person, place, and time. He appears well-developed and well-nourished.   Pulmonary/Chest: Effort normal.   Abdominal: He exhibits no distension.   Neurological: He is alert and oriented to person, place, and time.   Psychiatric: He has a normal mood and affect. His behavior is normal. Judgment and thought content normal.           Gait: normal, tandem walking is normal and he is able to heel/toe stand.     On exam of the lumbar spine, Inspection of back is normal, mild central lower lumbar tenderness.     Skin in lumbar region is warm to the touch without visible rashes.     muscle tone normal without spasm, limited range of motion with pain  Pain in flexion. and Pain in extension.    Strength testing of the bilateral LEs shows  Right hip abduction:  +5/5  Left hip abduction:  +5/5  Right hip flexion:  +5/5   Left hip flexion:  +5/5  Right hip extensors:  +5/5  Left hip extensors:  +5/5  Right quadriceps:  +5/5  Left quadriceps:  +5/5  Right hamstring:  +5/5  Left hamstring:  +5/5  Right dorsiflexion:  +5/5  Left dorsiflexion:  +5/5  Right plantar flexion:  +5/5  Left plantar flexion:  +5/5   Right EHL:  +5/5   Left EHL:  +5/5    negative clonus of bilateral LEs.     Negative straight leg raise on bilateral LEs however pain is recreated in lower back. No leg pain.     DTRs:  Right patellar:  +2     Left patellar:  +2  Right achilles:  +2   Left achilles:  +2    Sensation is grossly intact in L2, L3, L4, L5, and S1 distribution.    Right hip has no pain with IR/ER. Left hip has no pain with IR/ER.      On exam of  bilateral UEs, patient has full painfree ROM with no signs of clubbing, laxity, cyanosis, edema, instability, weakness, or tenderness.       XRAY INTERPRETATION:  X-rays of lumbar spine (AP/lat) dated 4/24/17 are personally reviewed and show mild DDD L5-S1.    MRI of lumbar spine dated 5/15/17 is personally reviewed and shows disc bulge/facet hypertrophy L4-L5 with mild bilateral foraminal stenosis. Left paracentral disc L5-S1 with moderate central stenosis and mild bilateral foraminal stenosis.         Assessment:       1. HNP (herniated nucleus pulposus), lumbar    2. Spondylosis without myelopathy    3. DDD (degenerative disc disease), lumbosacral    4. Spinal stenosis of lumbar region without neurogenic claudication    5. Chronic bilateral low back pain without sciatica    6. Chronic midline low back pain with right-sided sciatica           Plan:       Orders Placed This Encounter    meloxicam (MOBIC) 15 MG tablet       6 year history of constant LBP with no leg pain. Known left paracentral disc L5-S1 with moderate central stenosis, DDD, and mild bilateral foraminal stenosis that is likely pain generator. No improvement with PT, ESIs, or medications. He is interested in any possible surgical options. Treatment options reviewed with patient and his wife along with above lumbar XRs/MRI. Following plan made:     - Continue prn percocet (message sent to Alexa for refill) and mobic.   - No relief with ESIs, PT, or medications.   - Will review with Dr. Fortune and call with further plan. Cell [phone] or call his wife Yanni [phone].   - Of note, he is going out of town on Friday and will not be back until 10/10/17.     ADDENDUM 9/11/17:  Patient reviewed with Dr. Fortune and he recommends left L5-S1 microdiscectomy. He believes this should address his LBP. Patient notified. He should f/u with Dr. Fortune in October when he returns to town.     Follow-up: Return if symptoms worsen or fail to improve.  If there are any questions prior to this, the patient was instructed to contact the office.

## 2017-09-11 ENCOUNTER — OFFICE VISIT (OUTPATIENT)
Dept: SPINE | Facility: CLINIC | Age: 42
End: 2017-09-11
Payer: COMMERCIAL

## 2017-09-11 ENCOUNTER — LAB VISIT (OUTPATIENT)
Dept: LAB | Facility: HOSPITAL | Age: 42
End: 2017-09-11
Attending: UROLOGY
Payer: COMMERCIAL

## 2017-09-11 ENCOUNTER — TELEPHONE (OUTPATIENT)
Dept: UROLOGY | Facility: CLINIC | Age: 42
End: 2017-09-11

## 2017-09-11 ENCOUNTER — OFFICE VISIT (OUTPATIENT)
Dept: UROLOGY | Facility: CLINIC | Age: 42
End: 2017-09-11
Payer: COMMERCIAL

## 2017-09-11 VITALS
HEIGHT: 70 IN | BODY MASS INDEX: 30.06 KG/M2 | SYSTOLIC BLOOD PRESSURE: 133 MMHG | DIASTOLIC BLOOD PRESSURE: 72 MMHG | HEART RATE: 78 BPM | WEIGHT: 210 LBS

## 2017-09-11 VITALS
HEIGHT: 70 IN | DIASTOLIC BLOOD PRESSURE: 70 MMHG | BODY MASS INDEX: 31.72 KG/M2 | WEIGHT: 221.56 LBS | HEART RATE: 67 BPM | SYSTOLIC BLOOD PRESSURE: 144 MMHG

## 2017-09-11 DIAGNOSIS — M51.26 HNP (HERNIATED NUCLEUS PULPOSUS), LUMBAR: Primary | ICD-10-CM

## 2017-09-11 DIAGNOSIS — M54.41 CHRONIC MIDLINE LOW BACK PAIN WITH RIGHT-SIDED SCIATICA: ICD-10-CM

## 2017-09-11 DIAGNOSIS — M48.061 SPINAL STENOSIS OF LUMBAR REGION WITHOUT NEUROGENIC CLAUDICATION: ICD-10-CM

## 2017-09-11 DIAGNOSIS — G89.29 CHRONIC MIDLINE LOW BACK PAIN WITH RIGHT-SIDED SCIATICA: ICD-10-CM

## 2017-09-11 DIAGNOSIS — N20.0 URIC ACID RENAL CALCULUS: ICD-10-CM

## 2017-09-11 DIAGNOSIS — M51.37 DDD (DEGENERATIVE DISC DISEASE), LUMBOSACRAL: ICD-10-CM

## 2017-09-11 DIAGNOSIS — M47.819 SPONDYLOSIS WITHOUT MYELOPATHY: ICD-10-CM

## 2017-09-11 DIAGNOSIS — M54.50 CHRONIC BILATERAL LOW BACK PAIN WITHOUT SCIATICA: ICD-10-CM

## 2017-09-11 DIAGNOSIS — N48.6 PEYRONIE'S DISEASE: Primary | ICD-10-CM

## 2017-09-11 DIAGNOSIS — G89.29 CHRONIC BILATERAL LOW BACK PAIN WITHOUT SCIATICA: ICD-10-CM

## 2017-09-11 DIAGNOSIS — N48.6 PEYRONIE DISEASE: Primary | ICD-10-CM

## 2017-09-11 PROBLEM — N20.9 UROLITHIASIS: Status: RESOLVED | Noted: 2017-07-28 | Resolved: 2017-09-11

## 2017-09-11 PROBLEM — N50.819 TESTALGIA: Status: RESOLVED | Noted: 2017-07-09 | Resolved: 2017-09-11

## 2017-09-11 PROBLEM — Z12.11 SCREENING FOR COLON CANCER: Status: RESOLVED | Noted: 2017-05-29 | Resolved: 2017-09-11

## 2017-09-11 LAB
CALCIUM SERPL-MCNC: 9.5 MG/DL
MAGNESIUM SERPL-MCNC: 2.3 MG/DL
PHOSPHATE SERPL-MCNC: 3.3 MG/DL
PTH-INTACT SERPL-MCNC: 23 PG/ML
URATE SERPL-MCNC: 5 MG/DL

## 2017-09-11 PROCEDURE — 82310 ASSAY OF CALCIUM: CPT

## 2017-09-11 PROCEDURE — 83970 ASSAY OF PARATHORMONE: CPT

## 2017-09-11 PROCEDURE — 84100 ASSAY OF PHOSPHORUS: CPT

## 2017-09-11 PROCEDURE — 3008F BODY MASS INDEX DOCD: CPT | Mod: S$GLB,,, | Performed by: PHYSICIAN ASSISTANT

## 2017-09-11 PROCEDURE — 99999 PR PBB SHADOW E&M-EST. PATIENT-LVL III: CPT | Mod: PBBFAC,,, | Performed by: UROLOGY

## 2017-09-11 PROCEDURE — 99214 OFFICE O/P EST MOD 30 MIN: CPT | Mod: 24,S$GLB,, | Performed by: UROLOGY

## 2017-09-11 PROCEDURE — 84550 ASSAY OF BLOOD/URIC ACID: CPT

## 2017-09-11 PROCEDURE — 36415 COLL VENOUS BLD VENIPUNCTURE: CPT

## 2017-09-11 PROCEDURE — 83735 ASSAY OF MAGNESIUM: CPT

## 2017-09-11 PROCEDURE — 99204 OFFICE O/P NEW MOD 45 MIN: CPT | Mod: S$GLB,,, | Performed by: PHYSICIAN ASSISTANT

## 2017-09-11 PROCEDURE — 99999 PR PBB SHADOW E&M-EST. PATIENT-LVL III: CPT | Mod: PBBFAC,,, | Performed by: PHYSICIAN ASSISTANT

## 2017-09-11 PROCEDURE — 3008F BODY MASS INDEX DOCD: CPT | Mod: S$GLB,,, | Performed by: UROLOGY

## 2017-09-11 RX ORDER — MELOXICAM 15 MG/1
15 TABLET ORAL DAILY
Qty: 90 TABLET | Refills: 0 | Status: SHIPPED | OUTPATIENT
Start: 2017-09-11 | End: 2019-01-09

## 2017-09-11 RX ORDER — OXYCODONE AND ACETAMINOPHEN 5; 325 MG/1; MG/1
1 TABLET ORAL 2 TIMES DAILY PRN
Qty: 30 TABLET | Refills: 0 | Status: SHIPPED | OUTPATIENT
Start: 2017-09-11 | End: 2017-10-11

## 2017-09-11 NOTE — PROGRESS NOTES
CHIEF COMPLAINT:    Mr. Zarate is a 41 y.o. male presenting  with peyronie's disease.    PRESENTING ILLNESS:    Sanchez Zarate is a 41 y.o. male who c/o peyronie's disease.  It has been present for > 5 years.  He has ~ 45-50 degrees of curve to the L.  It is not difficult to penetrate, but sex is painful for his wife.  He denies ED.  He underwent a doppler u/s of his penis on 6/2/17 showing 50 degrees of curve to the L.  After this, he elected for plication, but he did not get it done.    He has nocturia x 2-3.  Good FOS.  No hematuria.  No dysuria.     REVIEW OF SYSTEMS:    Patient denies bleeding diathesis, chills, decreased size/force of stream, dysuria, fever, flank pain, frequency or urgency, hematuria, hesitancy, intermittency or feeling of incomplete emptying, stones, stress or urgency incontinence, TB or genitourinary trauma and urethral discharge.  Sanchez Zarate denies any history of headache, blurred vision, fever, nausea, vomiting, chills, abdominal pain, bleeding per rectum, cough, SOB, recent loss of consciousness, recent mental status changes, seizures, dizziness, or upper or lower extremity weakness.    WILLIAN  1. 5  2. 5  3. 5  4. 5  5. 5     PATIENT HISTORY:    Past Medical History:   Diagnosis Date    Chronic back pain     Chronic diarrhea     Hernia     TBI (traumatic brain injury) 2010    fake wall fell on head (no brain or skull injury per pt)       Past Surgical History:   Procedure Laterality Date    COLONOSCOPY N/A 5/29/2017    Procedure: COLONOSCOPY;  Surgeon: Sukhi Scanlon MD;  Location: [facility] ([address] [place] [address]);  Service: Endoscopy;  Laterality: N/A;    HERNIA REPAIR  2017    umbilical    LUMBAR EPIDURAL INJECTION      with steroid       Family History   Problem Relation Age of Onset    Diabetes Mother     Heart attack Neg Hx     Heart disease Neg Hx     Hypertension Neg Hx     Colon cancer Neg Hx     Esophageal cancer Neg Hx        Social History     Social History    Marital  status:      Spouse name: N/A    Number of children: N/A    Years of education: N/A     Occupational History    self employed      Social History Main Topics    Smoking status: Former Smoker     Quit date: 1/1/2007    Smokeless tobacco: Never Used    Alcohol use Yes      Comment: occasional    Drug use: No    Sexual activity: Yes     Partners: Female     Other Topics Concern    Not on file     Social History Narrative    No narrative on file       Allergies:  Review of patient's allergies indicates no known allergies.    Medications:    Current Outpatient Prescriptions:     gabapentin (NEURONTIN) 600 MG tablet, Take 1 tablet (600 mg total) by mouth 3 (three) times daily., Disp: 90 tablet, Rfl: 2    meloxicam (MOBIC) 15 MG tablet, Take 1 tablet (15 mg total) by mouth once daily. Take with food., Disp: 90 tablet, Rfl: 0    ondansetron (ZOFRAN) 4 MG tablet, Take 1 tablet (4 mg total) by mouth every 8 (eight) hours as needed for Nausea., Disp: 12 tablet, Rfl: 0    ondansetron (ZOFRAN-ODT) 8 MG TbDL, Take 1 tablet (8 mg total) by mouth every 6 (six) hours as needed (nausea)., Disp: 15 tablet, Rfl: 0    oxycodone-acetaminophen (PERCOCET) 5-325 mg per tablet, Take 1 tablet by mouth every 4 (four) hours as needed for Pain., Disp: 31 tablet, Rfl: 0    tamsulosin (FLOMAX) 0.4 mg Cp24, Take 1 capsule (0.4 mg total) by mouth once daily., Disp: 10 capsule, Rfl: 0    PHYSICAL EXAMINATION:    The patient generally appears in good health, is appropriately interactive, and is in no apparent distress.     Eyes: anicteric sclerae, moist conjunctivae; no lid-lag; PERRLA     HENT: Atraumatic; oropharynx clear with moist mucous membranes and no mucosal ulcerations;normal hard and soft palate.  No evidence of lymphadenopathy.    Neck: Trachea midline.  No thyromegaly.    Musculoskeletal: No abnormal gait.    Skin: No lesions.    Mental: Cooperative with normal affect.  Is oriented to time, place, and  person.    Neuro: Grossly intact.    Chest: Normal inspiratory effort.   No accessory muscles.  No audible wheezes.  Respirations symmetric on inspiration and expiration.    Heart: Regular rhythm.      Abdomen:  Soft, non-tender. No masses or organomegaly. Bladder is not palpable. No evidence of flank discomfort. No evidence of inguinal hernia.    Genitourinary: The penis is not circumcised with a plaque c/w peyronie's on the shaft. The urethral meatus is normal. The testes, epididymides, and cord structures are normal in size and contour bilaterally. The scrotum is normal in size and contour.    Extremities: No clubbing, cyanosis, or edema      LABS:      No results found for: PSA, PSADIAG, PSATOTAL, PSAFREE, PSAFREEPCT    IMPRESSION:    Encounter Diagnoses   Name Primary?    Peyronie's disease Yes         PLAN:    1. Again discussed options for his peyronie's.  He'd like plication.  We discussed the risks and benefits of penile plication for Peyronie's disease.  We discussed penile shortening, ED, infection, pain, failure to improve the curve, and decreased sensation amongst other risks.  He was given the chance to ask questions, and alternatives were discussed.  2. Will perform circ at the same time. The patient is scheduled for a circumcision.  The risks and benefits were explained to the patient in detail.  The risks include but are not limited to bleeding, infection, pain,  fistula formation (hole in the urine tube), meatal stenosis (ulceration/scarring at the tip of the penis, skin bridge, removal of too much or too little skin, buried penis, penile swelling of discomfort, infection of incision or bleeding (usually mild) that may result in a hospital treatment.  The Patient was given the alternatives of observation and medical therapy as well. He was given the chance to ask questions.  The patient understands these risks and has agreed to proceed with surgery.      Copy to:

## 2017-09-11 NOTE — LETTER
September 11, 2017      Emerita Santillan MD  [address] [facility]  [address]  [facility] [address]           [facility]  [address], [address]  [facility] [address]  Phone: [phone]  Fax: [phone]          Patient: Sanchez Zarate   MR Number: [id number]   YOB: 1975   Date of Visit: 9/11/2017       Dear Dr. Emerita Santillan:    Thank you for referring Sanchez Zarate to me for evaluation. Attached you will find relevant portions of my assessment and plan of care.    If you have questions, please do not hesitate to call me. I look forward to following Sanchez Zarate along with you.    Sincerely,    Alysha Hardin PA-C    Enclosure  CC:  No Recipients    If you would like to receive this communication electronically, please contact [email or web address] or [phone] to request more information on [employer] access.    For providers and/or their staff who would like to refer a patient to [facility], please contact us through our one-stop-shop provider referral line, [facility], at [phone].    If you feel you have received this communication in error or would no longer like to receive these types of communications, please e-mail [email or web address]

## 2017-09-12 ENCOUNTER — PATIENT MESSAGE (OUTPATIENT)
Dept: SPINE | Facility: CLINIC | Age: 42
End: 2017-09-12

## 2017-09-12 ENCOUNTER — TELEPHONE (OUTPATIENT)
Dept: SPINE | Facility: CLINIC | Age: 42
End: 2017-09-12

## 2017-09-12 NOTE — TELEPHONE ENCOUNTER
Called spoke with patient results given explain Dr.Celestre ontiveros April will contact him to set up appointment patient verbally understood.  ----- Message from Alysha Hardin PA-C sent at 9/11/2017  9:05 PM CDT -----  Please call him on his cell [phone] or call his wife Yanni [phone]. Let them know Dr. Fortune thinks he is a candidate for a microdisectomy at L5-S1. He does not think he needs to do a fusion. Please make him f/u to see Tong to discuss when he is back in town after 10/10/17. Let me know if they have any other questions.     Thanks.

## 2017-10-20 ENCOUNTER — OFFICE VISIT (OUTPATIENT)
Dept: ORTHOPEDICS | Facility: CLINIC | Age: 42
End: 2017-10-20
Payer: COMMERCIAL

## 2017-10-20 ENCOUNTER — HOSPITAL ENCOUNTER (OUTPATIENT)
Dept: RADIOLOGY | Facility: HOSPITAL | Age: 42
Discharge: HOME | End: 2017-10-20
Attending: PHYSICIAN ASSISTANT
Payer: COMMERCIAL

## 2017-10-20 VITALS — HEIGHT: 70 IN | WEIGHT: 221 LBS | BODY MASS INDEX: 31.64 KG/M2

## 2017-10-20 DIAGNOSIS — M54.5 LOW BACK PAIN, UNSPECIFIED BACK PAIN LATERALITY, UNSPECIFIED CHRONICITY, WITH SCIATICA PRESENCE UNSPECIFIED: ICD-10-CM

## 2017-10-20 DIAGNOSIS — M54.16 LUMBAR RADICULOPATHY: Primary | ICD-10-CM

## 2017-10-20 PROCEDURE — 99999 PR PBB SHADOW E&M-EST. PATIENT-LVL III: CPT | Mod: PBBFAC,,, | Performed by: ORTHOPAEDIC SURGERY

## 2017-10-20 PROCEDURE — 99213 OFFICE O/P EST LOW 20 MIN: CPT | Mod: S$GLB,,, | Performed by: ORTHOPAEDIC SURGERY

## 2017-10-20 PROCEDURE — 72100 X-RAY EXAM L-S SPINE 2/3 VWS: CPT | Mod: 26,,, | Performed by: RADIOLOGY

## 2017-10-20 PROCEDURE — 72100 X-RAY EXAM L-S SPINE 2/3 VWS: CPT | Mod: TC

## 2017-10-20 RX ORDER — OXYCODONE AND ACETAMINOPHEN 7.5; 325 MG/1; MG/1
1 TABLET ORAL EVERY 6 HOURS PRN
Qty: 30 TABLET | Refills: 0 | Status: SHIPPED | OUTPATIENT
Start: 2017-10-20 | End: 2017-10-27

## 2017-10-22 NOTE — PROGRESS NOTES
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    He has a known large L sided L5/S1 disc herniation and years of low back pain.    He has had multiple treatements including NSAIDs, PT and multiple injections, none of which have provided lasting relief.    He notes the pain is only better with staying in bed and better with with standing and bending.    On exam he has no focal motor or sensory deficits. A L SLR reproduces his pain.    I reviewed his recent radiographs and an MRI of the lumbar spine. These demonstrate the following: There is loss of disc height at L4/5, there is a large L sided paracentral L5/S1 disc herniation.    A: L5/S1 HNP with refractory LBP and radiculopathy  P:  1) Today I discussed options with the patient and his wife. He has not had much improvement despite significant conservative management. We have decided to proceed with an L5/S1 discectomy. He will call with his desired date. I discussed the expected recovery period with the patient.    I spent 15 minutes with the patient of which greater than 1/2 the time was devoted to counciling the patient regarding treatment options.

## 2017-12-18 ENCOUNTER — TELEPHONE (OUTPATIENT)
Dept: UROLOGY | Facility: CLINIC | Age: 42
End: 2017-12-18

## 2018-04-09 DIAGNOSIS — M51.26 LUMBAR DISC HERNIATION: Primary | ICD-10-CM

## 2018-04-09 RX ORDER — SODIUM CHLORIDE 9 MG/ML
INJECTION, SOLUTION INTRAVENOUS CONTINUOUS
Status: CANCELLED | OUTPATIENT
Start: 2018-04-09

## 2018-04-09 RX ORDER — MUPIROCIN 20 MG/G
OINTMENT TOPICAL
Status: CANCELLED | OUTPATIENT
Start: 2018-04-09

## 2018-04-10 ENCOUNTER — HOSPITAL ENCOUNTER (OUTPATIENT)
Dept: RADIOLOGY | Facility: HOSPITAL | Age: 43
Discharge: HOME | End: 2018-04-10
Attending: ORTHOPAEDIC SURGERY
Payer: COMMERCIAL

## 2018-04-10 DIAGNOSIS — M51.26 LUMBAR DISC HERNIATION: Primary | ICD-10-CM

## 2018-04-10 DIAGNOSIS — M51.26 LUMBAR DISC HERNIATION: ICD-10-CM

## 2018-04-10 PROCEDURE — 72148 MRI LUMBAR SPINE W/O DYE: CPT | Mod: 26,,, | Performed by: RADIOLOGY

## 2018-04-10 PROCEDURE — 72148 MRI LUMBAR SPINE W/O DYE: CPT | Mod: TC

## 2018-04-11 ENCOUNTER — ANESTHESIA EVENT (OUTPATIENT)
Dept: SURGERY | Facility: HOSPITAL | Age: 43
End: 2018-04-11
Payer: COMMERCIAL

## 2018-04-11 NOTE — ANESTHESIA PREPROCEDURE EVALUATION
04/11/2018  [facility]  Anesthesia Pre-Operative Evaluation         Patient Name: Sanchez Zarate  YOB: 1975  MRN: [id number]    SUBJECTIVE:     Pre-operative evaluation for Procedure(s) (LRB):  MICRODISCECTOMY L5/S1 -- Prone -- SSEP/EMG (N/A)     04/11/2018    Sanchez Zarate is a 42 y.o. male w/ a significant PMHx of lumbar disc herniation who presents for the above procedure.    LDA: None documented.    Prev airway:   Placement Date: 07/28/17; Placement Time: 1337; Method of Intubation: Direct laryngoscopy; Inserted by: ESDRAS (Kyrie); Airway Device: Endotracheal Tube; Mask Ventilation: Easy; Intubated: Postinduction; Blade: Ru #3; Airway Device Size: 7.0; Style: Cuffed; Cuff Inflation: Minimal occlusive pressure; Inflation Amount: 8; Placement Verified By: Auscultation, Capnometry; Grade: Grade I; Complicating Factors: None; Intubation Findings: Positive EtCO2, Bilateral breath sounds, Atraumatic/Condition of teeth unchanged;  Depth of Insertion: 21; Complications: None; Breath Sounds: Equal Bilateral; Insertion Attempts: 1; Removal Date: 07/28/17;  Removal Time: 1457    Drips: None documented.    Patient Active Problem List   Diagnosis    Chronic low back pain    Intervertebral disc extrusion (Left L5-S1)    Neural foraminal stenosis of lumbar spine (bilateral L5-S1, L>R)    Left lumbar radiculopathy    Situational syncope    Chronic diarrhea    Peyronie's disease    DDD (degenerative disc disease), lumbar    Nephrolithiasis    Chronic pain    Lumbar disc herniation     Review of patient's allergies indicates:  No Known Allergies    Current Inpatient Medications:    No current facility-administered medications on file prior to encounter.      Current Outpatient Prescriptions on File Prior to Encounter   Medication Sig Dispense Refill    gabapentin (NEURONTIN)  600 MG tablet Take 1 tablet (600 mg total) by mouth 3 (three) times daily. 90 tablet 2    meloxicam (MOBIC) 15 MG tablet Take 1 tablet (15 mg total) by mouth once daily. Take with food. 90 tablet 0       Past Surgical History:   Procedure Laterality Date    COLONOSCOPY N/A 5/29/2017    Procedure: COLONOSCOPY;  Surgeon: Sukhi Scanlon MD;  Location: [address]);  Service: Endoscopy;  Laterality: N/A;    HERNIA REPAIR  2017    umbilical    LUMBAR EPIDURAL INJECTION      with steroid       Social History     Social History    Marital status:      Spouse name: N/A    Number of children: N/A    Years of education: N/A     Occupational History    self employed      Social History Main Topics    Smoking status: Former Smoker     Quit date: 1/1/2007    Smokeless tobacco: Never Used    Alcohol use Yes      Comment: occasional    Drug use: No    Sexual activity: Yes     Partners: Female     Other Topics Concern    Not on file     Social History Narrative    No narrative on file       OBJECTIVE:     Vital Signs Range (Last 24H):         CBC:   No results for input(s): WBC, RBC, HGB, HCT, PLT, MCV, MCH, MCHC in the last 72 hours.    CMP: No results for input(s): NA, K, CL, CO2, BUN, CREATININE, GLU, MG, PHOS, CALCIUM, ALBUMIN, PROT, ALKPHOS, ALT, AST, BILITOT in the last 72 hours.    INR:  No results for input(s): PT, INR, PROTIME, APTT in the last 72 hours.    Diagnostic Studies: No relevant studies.    EKG:   Normal sinus rhythm  Normal ECG  When compared with ECG of 15-FEB-2017 07:24,  No significant change was found  Confirmed by Lisa Garzon MD ([id number]) on 4/26/2017 2:36:16 PM    2D ECHO:  No results found for this or any previous visit.    ASSESSMENT/PLAN:     Anesthesia Evaluation    I have reviewed the Patient Summary Reports.     I have reviewed the Medications.     Review of Systems  Anesthesia Hx:  Denies Hx of Anesthetic complications  History of prior surgery of interest to airway  management or planning: Denies Family Hx of Anesthesia complications.   Denies Personal Hx of Anesthesia complications.   Social:  Former Smoker    Hematology/Oncology:  Hematology Normal   Oncology Normal     EENT/Dental:EENT/Dental Normal   Cardiovascular:  Cardiovascular Normal     Pulmonary:  Pulmonary Normal    Renal/:   renal calculi    Hepatic/GI:  Hepatic/GI Normal    Musculoskeletal:   Arthritis   Spine Disorders:    Neurological:  Neurology Normal    Endocrine:  Endocrine Normal    Dermatological:  Skin Normal    Psych:  Psychiatric Normal           Physical Exam  General:  Well nourished, Obesity                 Anesthesia Plan  Type of Anesthesia, risks & benefits discussed:  Anesthesia Type:  general  Patient's Preference:   Intra-op Monitoring Plan: standard ASA monitors and arterial line  Intra-op Monitoring Plan Comments:   Post Op Pain Control Plan: multimodal analgesia  Post Op Pain Control Plan Comments:   Induction:   IV  Beta Blocker:  Patient is not currently on a Beta-Blocker (No further documentation required).       Informed Consent: Patient understands risks and agrees with Anesthesia plan.  Questions answered. Anesthesia consent signed with patient.  ASA Score: 2     Day of Surgery Review of History & Physical:    H&P update referred to the surgeon.         Ready For Surgery From Anesthesia Perspective.

## 2018-04-12 ENCOUNTER — HOSPITAL ENCOUNTER (OUTPATIENT)
Facility: HOSPITAL | Age: 43
Discharge: HOME | End: 2018-04-12
Attending: ORTHOPAEDIC SURGERY | Admitting: ORTHOPAEDIC SURGERY
Payer: COMMERCIAL

## 2018-04-12 ENCOUNTER — ANESTHESIA (OUTPATIENT)
Dept: SURGERY | Facility: HOSPITAL | Age: 43
End: 2018-04-12
Payer: COMMERCIAL

## 2018-04-12 VITALS
HEIGHT: 70 IN | OXYGEN SATURATION: 94 % | WEIGHT: 216 LBS | BODY MASS INDEX: 30.92 KG/M2 | TEMPERATURE: 97 F | RESPIRATION RATE: 15 BRPM | HEART RATE: 84 BPM | SYSTOLIC BLOOD PRESSURE: 106 MMHG | DIASTOLIC BLOOD PRESSURE: 72 MMHG

## 2018-04-12 DIAGNOSIS — M51.26 LUMBAR DISC HERNIATION: ICD-10-CM

## 2018-04-12 PROCEDURE — C9290 INJ, BUPIVACAINE LIPOSOME: HCPCS | Performed by: ORTHOPAEDIC SURGERY

## 2018-04-12 PROCEDURE — 94761 N-INVAS EAR/PLS OXIMETRY MLT: CPT

## 2018-04-12 PROCEDURE — 63600175 PHARM REV CODE 636 W HCPCS: Performed by: STUDENT IN AN ORGANIZED HEALTH CARE EDUCATION/TRAINING PROGRAM

## 2018-04-12 PROCEDURE — 27201423 OPTIME MED/SURG SUP & DEVICES STERILE SUPPLY: Performed by: ORTHOPAEDIC SURGERY

## 2018-04-12 PROCEDURE — D9220A PRA ANESTHESIA: Mod: ANES,,, | Performed by: ANESTHESIOLOGY

## 2018-04-12 PROCEDURE — 25000242 PHARM REV CODE 250 ALT 637 W/ HCPCS: Performed by: STUDENT IN AN ORGANIZED HEALTH CARE EDUCATION/TRAINING PROGRAM

## 2018-04-12 PROCEDURE — 63600175 PHARM REV CODE 636 W HCPCS: Performed by: ANESTHESIOLOGY

## 2018-04-12 PROCEDURE — 71000039 HC RECOVERY, EACH ADD'L HOUR: Performed by: ORTHOPAEDIC SURGERY

## 2018-04-12 PROCEDURE — 71000033 HC RECOVERY, INTIAL HOUR: Performed by: ORTHOPAEDIC SURGERY

## 2018-04-12 PROCEDURE — C1729 CATH, DRAINAGE: HCPCS | Performed by: ORTHOPAEDIC SURGERY

## 2018-04-12 PROCEDURE — 63600175 PHARM REV CODE 636 W HCPCS: Performed by: ORTHOPAEDIC SURGERY

## 2018-04-12 PROCEDURE — 71000015 HC POSTOP RECOV 1ST HR: Performed by: ORTHOPAEDIC SURGERY

## 2018-04-12 PROCEDURE — 36000710: Performed by: ORTHOPAEDIC SURGERY

## 2018-04-12 PROCEDURE — A4216 STERILE WATER/SALINE, 10 ML: HCPCS | Performed by: NURSE ANESTHETIST, CERTIFIED REGISTERED

## 2018-04-12 PROCEDURE — D9220A PRA ANESTHESIA: Mod: CRNA,,, | Performed by: NURSE ANESTHETIST, CERTIFIED REGISTERED

## 2018-04-12 PROCEDURE — 25000003 PHARM REV CODE 250: Performed by: ORTHOPAEDIC SURGERY

## 2018-04-12 PROCEDURE — 27000221 HC OXYGEN, UP TO 24 HOURS

## 2018-04-12 PROCEDURE — 25000003 PHARM REV CODE 250: Performed by: STUDENT IN AN ORGANIZED HEALTH CARE EDUCATION/TRAINING PROGRAM

## 2018-04-12 PROCEDURE — 37000009 HC ANESTHESIA EA ADD 15 MINS: Performed by: ORTHOPAEDIC SURGERY

## 2018-04-12 PROCEDURE — 36000711: Performed by: ORTHOPAEDIC SURGERY

## 2018-04-12 PROCEDURE — 37000008 HC ANESTHESIA 1ST 15 MINUTES: Performed by: ORTHOPAEDIC SURGERY

## 2018-04-12 PROCEDURE — 63047 LAM FACETEC & FORAMOT LUMBAR: CPT | Mod: AS,,, | Performed by: PHYSICIAN ASSISTANT

## 2018-04-12 PROCEDURE — 63047 LAM FACETEC & FORAMOT LUMBAR: CPT | Mod: ,,, | Performed by: ORTHOPAEDIC SURGERY

## 2018-04-12 PROCEDURE — 63600175 PHARM REV CODE 636 W HCPCS: Performed by: NURSE ANESTHETIST, CERTIFIED REGISTERED

## 2018-04-12 PROCEDURE — 25000003 PHARM REV CODE 250: Performed by: NURSE ANESTHETIST, CERTIFIED REGISTERED

## 2018-04-12 RX ORDER — ALBUTEROL SULFATE 90 UG/1
AEROSOL, METERED RESPIRATORY (INHALATION)
Status: DISCONTINUED | OUTPATIENT
Start: 2018-04-12 | End: 2018-04-12

## 2018-04-12 RX ORDER — OXYCODONE HYDROCHLORIDE 5 MG/1
10 TABLET ORAL EVERY 4 HOURS PRN
Status: DISCONTINUED | OUTPATIENT
Start: 2018-04-12 | End: 2018-04-12 | Stop reason: HOSPADM

## 2018-04-12 RX ORDER — FENTANYL CITRATE 50 UG/ML
50 INJECTION, SOLUTION INTRAMUSCULAR; INTRAVENOUS EVERY 10 MIN PRN
Status: DISCONTINUED | OUTPATIENT
Start: 2018-04-12 | End: 2018-04-12 | Stop reason: HOSPADM

## 2018-04-12 RX ORDER — GLYCOPYRROLATE 0.2 MG/ML
INJECTION INTRAMUSCULAR; INTRAVENOUS
Status: DISCONTINUED | OUTPATIENT
Start: 2018-04-12 | End: 2018-04-12

## 2018-04-12 RX ORDER — PHENYLEPHRINE HYDROCHLORIDE 10 MG/ML
INJECTION INTRAVENOUS
Status: DISCONTINUED | OUTPATIENT
Start: 2018-04-12 | End: 2018-04-12

## 2018-04-12 RX ORDER — PROPOFOL 10 MG/ML
VIAL (ML) INTRAVENOUS
Status: DISCONTINUED | OUTPATIENT
Start: 2018-04-12 | End: 2018-04-12

## 2018-04-12 RX ORDER — FENTANYL CITRATE 50 UG/ML
25 INJECTION, SOLUTION INTRAMUSCULAR; INTRAVENOUS
Status: DISCONTINUED | OUTPATIENT
Start: 2018-04-12 | End: 2018-04-12 | Stop reason: HOSPADM

## 2018-04-12 RX ORDER — DOCUSATE SODIUM 100 MG/1
100 CAPSULE, LIQUID FILLED ORAL 2 TIMES DAILY PRN
Qty: 60 CAPSULE | Refills: 0 | Status: SHIPPED | OUTPATIENT
Start: 2018-04-12 | End: 2019-01-09

## 2018-04-12 RX ORDER — METHOCARBAMOL 750 MG/1
750 TABLET, FILM COATED ORAL 3 TIMES DAILY
Qty: 30 TABLET | Refills: 0 | Status: SHIPPED | OUTPATIENT
Start: 2018-04-12 | End: 2018-04-22

## 2018-04-12 RX ORDER — MUPIROCIN 20 MG/G
OINTMENT TOPICAL
Status: DISCONTINUED | OUTPATIENT
Start: 2018-04-12 | End: 2018-04-12 | Stop reason: HOSPADM

## 2018-04-12 RX ORDER — ACETAMINOPHEN 325 MG/1
650 TABLET ORAL EVERY 4 HOURS PRN
Status: DISCONTINUED | OUTPATIENT
Start: 2018-04-12 | End: 2018-04-12 | Stop reason: HOSPADM

## 2018-04-12 RX ORDER — BACITRACIN 50000 [IU]/1
INJECTION, POWDER, FOR SOLUTION INTRAMUSCULAR
Status: DISCONTINUED | OUTPATIENT
Start: 2018-04-12 | End: 2018-04-12 | Stop reason: HOSPADM

## 2018-04-12 RX ORDER — KETAMINE HYDROCHLORIDE 100 MG/ML
INJECTION, SOLUTION INTRAMUSCULAR; INTRAVENOUS
Status: DISCONTINUED | OUTPATIENT
Start: 2018-04-12 | End: 2018-04-12

## 2018-04-12 RX ORDER — LIDOCAINE HYDROCHLORIDE AND EPINEPHRINE 10; 10 MG/ML; UG/ML
INJECTION, SOLUTION INFILTRATION; PERINEURAL
Status: DISCONTINUED | OUTPATIENT
Start: 2018-04-12 | End: 2018-04-12 | Stop reason: HOSPADM

## 2018-04-12 RX ORDER — FENTANYL CITRATE 50 UG/ML
INJECTION, SOLUTION INTRAMUSCULAR; INTRAVENOUS
Status: DISCONTINUED | OUTPATIENT
Start: 2018-04-12 | End: 2018-04-12

## 2018-04-12 RX ORDER — FENTANYL CITRATE 50 UG/ML
50 INJECTION, SOLUTION INTRAMUSCULAR; INTRAVENOUS EVERY 5 MIN PRN
Status: DISCONTINUED | OUTPATIENT
Start: 2018-04-12 | End: 2018-04-12 | Stop reason: HOSPADM

## 2018-04-12 RX ORDER — SODIUM CHLORIDE 0.9 % (FLUSH) 0.9 %
3 SYRINGE (ML) INJECTION
Status: DISCONTINUED | OUTPATIENT
Start: 2018-04-12 | End: 2018-04-12 | Stop reason: HOSPADM

## 2018-04-12 RX ORDER — MEPERIDINE HYDROCHLORIDE 50 MG/ML
12.5 INJECTION INTRAMUSCULAR; INTRAVENOUS; SUBCUTANEOUS EVERY 10 MIN PRN
Status: DISCONTINUED | OUTPATIENT
Start: 2018-04-12 | End: 2018-04-12 | Stop reason: HOSPADM

## 2018-04-12 RX ORDER — MUPIROCIN 20 MG/G
1 OINTMENT TOPICAL 2 TIMES DAILY
Status: DISCONTINUED | OUTPATIENT
Start: 2018-04-12 | End: 2018-04-12 | Stop reason: HOSPADM

## 2018-04-12 RX ORDER — SODIUM CHLORIDE 9 MG/ML
INJECTION, SOLUTION INTRAVENOUS CONTINUOUS
Status: DISCONTINUED | OUTPATIENT
Start: 2018-04-12 | End: 2018-04-12 | Stop reason: HOSPADM

## 2018-04-12 RX ORDER — OXYCODONE HYDROCHLORIDE 5 MG/1
15 TABLET ORAL EVERY 4 HOURS PRN
Status: DISCONTINUED | OUTPATIENT
Start: 2018-04-12 | End: 2018-04-12 | Stop reason: HOSPADM

## 2018-04-12 RX ORDER — PROCHLORPERAZINE EDISYLATE 5 MG/ML
5 INJECTION INTRAMUSCULAR; INTRAVENOUS EVERY 30 MIN PRN
Status: DISCONTINUED | OUTPATIENT
Start: 2018-04-12 | End: 2018-04-12 | Stop reason: HOSPADM

## 2018-04-12 RX ORDER — OXYCODONE AND ACETAMINOPHEN 10; 325 MG/1; MG/1
1 TABLET ORAL EVERY 8 HOURS PRN
Status: ON HOLD | COMMUNITY
End: 2018-11-19 | Stop reason: HOSPADM

## 2018-04-12 RX ORDER — DEXAMETHASONE SODIUM PHOSPHATE 4 MG/ML
INJECTION, SOLUTION INTRA-ARTICULAR; INTRALESIONAL; INTRAMUSCULAR; INTRAVENOUS; SOFT TISSUE
Status: DISCONTINUED | OUTPATIENT
Start: 2018-04-12 | End: 2018-04-12

## 2018-04-12 RX ORDER — HYDROMORPHONE HYDROCHLORIDE 1 MG/ML
0.4 INJECTION, SOLUTION INTRAMUSCULAR; INTRAVENOUS; SUBCUTANEOUS EVERY 5 MIN PRN
Status: DISCONTINUED | OUTPATIENT
Start: 2018-04-12 | End: 2018-04-12 | Stop reason: HOSPADM

## 2018-04-12 RX ORDER — METOCLOPRAMIDE HYDROCHLORIDE 5 MG/ML
10 INJECTION INTRAMUSCULAR; INTRAVENOUS EVERY 10 MIN PRN
Status: DISCONTINUED | OUTPATIENT
Start: 2018-04-12 | End: 2018-04-12

## 2018-04-12 RX ORDER — LIDOCAINE HCL/PF 100 MG/5ML
SYRINGE (ML) INTRAVENOUS
Status: DISCONTINUED | OUTPATIENT
Start: 2018-04-12 | End: 2018-04-12

## 2018-04-12 RX ORDER — BUPIVACAINE HYDROCHLORIDE AND EPINEPHRINE 5; 5 MG/ML; UG/ML
INJECTION, SOLUTION EPIDURAL; INTRACAUDAL; PERINEURAL
Status: DISCONTINUED | OUTPATIENT
Start: 2018-04-12 | End: 2018-04-12 | Stop reason: HOSPADM

## 2018-04-12 RX ORDER — CEFAZOLIN SODIUM 1 G/3ML
2 INJECTION, POWDER, FOR SOLUTION INTRAMUSCULAR; INTRAVENOUS
Status: COMPLETED | OUTPATIENT
Start: 2018-04-12 | End: 2018-04-12

## 2018-04-12 RX ORDER — MIDAZOLAM HYDROCHLORIDE 1 MG/ML
INJECTION, SOLUTION INTRAMUSCULAR; INTRAVENOUS
Status: DISCONTINUED | OUTPATIENT
Start: 2018-04-12 | End: 2018-04-12

## 2018-04-12 RX ORDER — OXYCODONE HYDROCHLORIDE 5 MG/1
5 TABLET ORAL EVERY 30 MIN PRN
Status: DISCONTINUED | OUTPATIENT
Start: 2018-04-12 | End: 2018-04-12 | Stop reason: HOSPADM

## 2018-04-12 RX ORDER — OXYCODONE AND ACETAMINOPHEN 10; 325 MG/1; MG/1
1 TABLET ORAL EVERY 4 HOURS PRN
Qty: 90 TABLET | Refills: 0 | Status: ON HOLD | OUTPATIENT
Start: 2018-04-12 | End: 2018-11-19 | Stop reason: SDUPTHER

## 2018-04-12 RX ORDER — SODIUM CHLORIDE 0.9 % (FLUSH) 0.9 %
5 SYRINGE (ML) INJECTION
Status: DISCONTINUED | OUTPATIENT
Start: 2018-04-12 | End: 2018-04-12 | Stop reason: HOSPADM

## 2018-04-12 RX ORDER — ROCURONIUM BROMIDE 10 MG/ML
INJECTION, SOLUTION INTRAVENOUS
Status: DISCONTINUED | OUTPATIENT
Start: 2018-04-12 | End: 2018-04-12

## 2018-04-12 RX ORDER — VANCOMYCIN HYDROCHLORIDE 1 G/20ML
INJECTION, POWDER, LYOPHILIZED, FOR SOLUTION INTRAVENOUS
Status: DISCONTINUED | OUTPATIENT
Start: 2018-04-12 | End: 2018-04-12 | Stop reason: HOSPADM

## 2018-04-12 RX ORDER — PROMETHAZINE HYDROCHLORIDE 25 MG/1
25 TABLET ORAL EVERY 6 HOURS PRN
Status: DISCONTINUED | OUTPATIENT
Start: 2018-04-12 | End: 2018-04-12 | Stop reason: HOSPADM

## 2018-04-12 RX ORDER — NEOSTIGMINE METHYLSULFATE 1 MG/ML
INJECTION, SOLUTION INTRAVENOUS
Status: DISCONTINUED | OUTPATIENT
Start: 2018-04-12 | End: 2018-04-12

## 2018-04-12 RX ORDER — PROMETHAZINE HYDROCHLORIDE 12.5 MG/1
12.5 TABLET ORAL EVERY 6 HOURS PRN
Qty: 30 TABLET | Refills: 0 | Status: SHIPPED | OUTPATIENT
Start: 2018-04-12 | End: 2018-05-02

## 2018-04-12 RX ORDER — ONDANSETRON 8 MG/1
8 TABLET, ORALLY DISINTEGRATING ORAL EVERY 8 HOURS PRN
Status: DISCONTINUED | OUTPATIENT
Start: 2018-04-12 | End: 2018-04-12 | Stop reason: HOSPADM

## 2018-04-12 RX ORDER — ONDANSETRON 2 MG/ML
4 INJECTION INTRAMUSCULAR; INTRAVENOUS EVERY 8 HOURS PRN
Status: DISCONTINUED | OUTPATIENT
Start: 2018-04-12 | End: 2018-04-12 | Stop reason: HOSPADM

## 2018-04-12 RX ADMIN — SODIUM CHLORIDE 1000 ML: 0.9 INJECTION, SOLUTION INTRAVENOUS at 10:04

## 2018-04-12 RX ADMIN — GLYCOPYRROLATE 0.6 MG: 0.2 INJECTION, SOLUTION INTRAMUSCULAR; INTRAVENOUS at 03:04

## 2018-04-12 RX ADMIN — PROPOFOL 50 MG: 10 INJECTION, EMULSION INTRAVENOUS at 01:04

## 2018-04-12 RX ADMIN — CEFAZOLIN 2 G: 330 INJECTION, POWDER, FOR SOLUTION INTRAMUSCULAR; INTRAVENOUS at 02:04

## 2018-04-12 RX ADMIN — ALBUTEROL SULFATE 2 PUFF: 90 AEROSOL, METERED RESPIRATORY (INHALATION) at 03:04

## 2018-04-12 RX ADMIN — FENTANYL CITRATE 50 MCG: 50 INJECTION, SOLUTION INTRAMUSCULAR; INTRAVENOUS at 02:04

## 2018-04-12 RX ADMIN — FENTANYL CITRATE 50 MCG: 50 INJECTION, SOLUTION INTRAMUSCULAR; INTRAVENOUS at 01:04

## 2018-04-12 RX ADMIN — MUPIROCIN: 20 OINTMENT TOPICAL at 10:04

## 2018-04-12 RX ADMIN — LIDOCAINE HYDROCHLORIDE 100 MG: 20 INJECTION, SOLUTION INTRAVENOUS at 01:04

## 2018-04-12 RX ADMIN — ALBUTEROL SULFATE 2 PUFF: 90 AEROSOL, METERED RESPIRATORY (INHALATION) at 02:04

## 2018-04-12 RX ADMIN — NEOSTIGMINE METHYLSULFATE 5 MG: 1 INJECTION INTRAVENOUS at 03:04

## 2018-04-12 RX ADMIN — ROCURONIUM BROMIDE 40 MG: 10 INJECTION, SOLUTION INTRAVENOUS at 01:04

## 2018-04-12 RX ADMIN — FENTANYL CITRATE 25 MCG: 50 INJECTION, SOLUTION INTRAMUSCULAR; INTRAVENOUS at 02:04

## 2018-04-12 RX ADMIN — KETAMINE HYDROCHLORIDE 50 MG: 100 INJECTION, SOLUTION, CONCENTRATE INTRAMUSCULAR; INTRAVENOUS at 02:04

## 2018-04-12 RX ADMIN — PROPOFOL 150 MG: 10 INJECTION, EMULSION INTRAVENOUS at 01:04

## 2018-04-12 RX ADMIN — SODIUM CHLORIDE, SODIUM GLUCONATE, SODIUM ACETATE, POTASSIUM CHLORIDE, MAGNESIUM CHLORIDE, SODIUM PHOSPHATE, DIBASIC, AND POTASSIUM PHOSPHATE: .53; .5; .37; .037; .03; .012; .00082 INJECTION, SOLUTION INTRAVENOUS at 03:04

## 2018-04-12 RX ADMIN — ROCURONIUM BROMIDE 10 MG: 10 INJECTION, SOLUTION INTRAVENOUS at 01:04

## 2018-04-12 RX ADMIN — OXYCODONE HYDROCHLORIDE 15 MG: 5 TABLET ORAL at 04:04

## 2018-04-12 RX ADMIN — MIDAZOLAM HYDROCHLORIDE 2 MG: 1 INJECTION, SOLUTION INTRAMUSCULAR; INTRAVENOUS at 03:04

## 2018-04-12 RX ADMIN — PHENYLEPHRINE HYDROCHLORIDE 200 MCG: 10 INJECTION INTRAVENOUS at 03:04

## 2018-04-12 RX ADMIN — PROPOFOL 100 MG: 10 INJECTION, EMULSION INTRAVENOUS at 01:04

## 2018-04-12 RX ADMIN — DEXAMETHASONE SODIUM PHOSPHATE 8 MG: 4 INJECTION, SOLUTION INTRAMUSCULAR; INTRAVENOUS at 02:04

## 2018-04-12 RX ADMIN — SODIUM CHLORIDE, SODIUM GLUCONATE, SODIUM ACETATE, POTASSIUM CHLORIDE, MAGNESIUM CHLORIDE, SODIUM PHOSPHATE, DIBASIC, AND POTASSIUM PHOSPHATE: .53; .5; .37; .037; .03; .012; .00082 INJECTION, SOLUTION INTRAVENOUS at 02:04

## 2018-04-12 RX ADMIN — DEXMEDETOMIDINE HYDROCHLORIDE 0.5 MCG/KG/HR: 100 INJECTION, SOLUTION, CONCENTRATE INTRAVENOUS at 02:04

## 2018-04-12 NOTE — TRANSFER OF CARE
"Anesthesia Transfer of Care Note    Patient: Sanchez Zarate    Procedure(s) Performed: Procedure(s) (LRB):  MICRODISCECTOMY L5/S1 -- Prone -- SSEP/EMG (N/A)    Patient location: PACU    Anesthesia Type: general    Post pain: adequate analgesia    Post assessment: no apparent anesthetic complications    Post vital signs: stable    Level of consciousness: sedated and responds to stimulation    Nausea/Vomiting: no nausea/vomiting    Complications: none    Transfer of care protocol was followed      Last vitals:   Visit Vitals  /78 (BP Location: Right arm, Patient Position: Lying)   Pulse 87   Temp 36.8 °C (98.2 °F) (Oral)   Resp 20   Ht 5' 10" (1.778 m)   Wt 98 kg (216 lb)   SpO2 99%   BMI 30.99 kg/m²     "

## 2018-04-12 NOTE — BRIEF OP NOTE
[facility]  Brief Operative Note     SUMMARY     Surgery Date: 4/12/2018     Surgeon(s) and Role:     * Lino Fortune MD - Primary    Assisting Surgeon: None    Pre-op Diagnosis:  Lumbar disc herniation [M51.26]    Post-op Diagnosis:  Post-Op Diagnosis Codes:     * Lumbar disc herniation [M51.26]    Procedure(s) (LRB):  MICRODISCECTOMY L5/S1 -- Prone -- SSEP/EMG (N/A)    Anesthesia: General    Description of the findings of the procedure: see op note    Findings/Key Components: see op note     Estimated Blood Loss: 50cc          Specimens:   Specimen (12h ago through future)    None          Discharge Note    SUMMARY     Admit Date: 4/12/2018    Discharge Date and Time:    4/12/18  1730    Hospital Course (synopsis of major diagnoses, care, treatment, and services provided during the course of the hospital stay):     On the day of surgery, Sanchez Zarate arrived to the day of surgery center. The patient was identified in the pre-operative area and the operative site was marked. All consents were verified. The patient was taken to the operative suite and underwent a L5/S1 discectomy without complication. The patient tolerated the procedure well and was then taken to the PACU and monitored post-operatively. The patient's pain was controlled with oral medications and the decision was made to discharge the patient home with close follow up.     Follow up appointment scheduled for 2 weeks with Tong  Weight Bearing Status: As tolerated   Prescriptions:   Diet: Regular          Final Diagnosis: Post-Op Diagnosis Codes:     * Lumbar disc herniation [M51.26]    Disposition: Home or Self Care    Follow Up/Patient Instructions:     Medications:  Reconciled Home Medications:      Medication List      START taking these medications    docusate sodium 100 MG capsule  Commonly known as:  COLACE  Take 1 capsule (100 mg total) by mouth 2 (two) times daily as needed for Constipation.     methocarbamol 750 MG  "Tab  Commonly known as:  ROBAXIN  Take 1 tablet (750 mg total) by mouth 3 (three) times daily.     promethazine 12.5 MG Tab  Commonly known as:  PHENERGAN  Take 1 tablet (12.5 mg total) by mouth every 6 (six) hours as needed (for nausea).        CHANGE how you take these medications    meloxicam 15 MG tablet  Commonly known as:  MOBIC  Take 1 tablet (15 mg total) by mouth once daily. Take with food.  What changed:  · when to take this  · additional instructions     * oxyCODONE-acetaminophen  mg per tablet  Commonly known as:  PERCOCET  Take 1 tablet by mouth every 8 (eight) hours as needed for Pain.  What changed:  Another medication with the same name was added. Make sure you understand how and when to take each.     * oxyCODONE-acetaminophen  mg per tablet  Commonly known as:  PERCOCET  Take 1 tablet by mouth every 4 (four) hours as needed for Pain.  What changed:  You were already taking a medication with the same name, and this prescription was added. Make sure you understand how and when to take each.        * This list has 2 medication(s) that are the same as other medications prescribed for you. Read the directions carefully, and ask your doctor or other care provider to review them with you.            CONTINUE taking these medications    gabapentin 600 MG tablet  Commonly known as:  NEURONTIN  Take 1 tablet (600 mg total) by mouth 3 (three) times daily.            Discharge Procedure Orders  WALKER FOR HOME USE   Order Specific Question Answer Comments   Type of Walker: Adult (5'4"-6'6")    With wheels? Yes    Height: 5' 10" (1.778 m)    Weight: 98 kg (216 lb)    Length of need (1-99 months): 99    Please check all that apply: Patient's condition impairs ambulation.      Diet general     Call MD for:  temperature >100.4     Call MD for:  persistent nausea and vomiting     Call MD for:  severe uncontrolled pain     Call MD for:  difficulty breathing, headache or visual disturbances     Call MD " for:  redness, tenderness, or signs of infection (pain, swelling, redness, odor or green/yellow discharge around incision site)     Call MD for:  hives     Call MD for:  persistent dizziness or light-headedness     Call MD for:  extreme fatigue     Weight bearing as tolerated     Leave dressing on - Keep it clean, dry, and intact until clinic visit       Follow-up Information     Lino Fortune MD In 3 weeks.    Specialties:  Orthopedic Surgery, Spine Surgery  Why:  For suture removal, For wound re-check  Contact information:  [address]  [facility] [id number] [phone]

## 2018-04-12 NOTE — DISCHARGE INSTRUCTIONS
DR. CRISTINO JOHNSON    POSTOPERATIVE LUMBAR DISCECTOMY INSTRUCTIONS    Antibiotics: You do not need additional antibiotics at home.    Wound Care: You may remove your dressing and shower 5 days after surgery. Until then please keep your wound clean and dry. Sponge baths are acceptable. Do not go in a pool or hot tub until seen in clinic. Please leave the small steri-strips covering your wound in place until they fall off naturally (2 weeks). You may notice clear suture ends hanging from the sides of your incense after the steri-strips are removed, it is ok to clip these with scissors.    Brace: You do not need a brace.    Pain: You will be given a prescription for pain medicines before discharge. If you pain is not adequately controlled with these medications, please call [phone].    Infection: Signs of infection include increasing wound drainage and redness around the wound, as well as a temperature over 101.5 degrees. It is unnecessary to take your temperature on a routine basis. Please call the above number if you are concerned about an infection.    Driving and Work: It is ok to return to driving and work as long as you are not taking narcotic pain medications. Please do not lift over 10 pounds or participate in exercise or sports until cleared by Dr. Fortune.    Deep Venous Thrombosis (Blood Clots): Symptoms include swelling in the legs and shortness of breath. Please call the office or proceed to the nearest emergency room if you have any of these symptoms.    Physical Therapy: The best physical therapy after surgery is walking. Please try to walk as much as possible.    Follow-up: You will be scheduled for a follow-up appointment in 2-3 weeks.    Questions: During business hours please call [phone] for routine questions. For after hours questions please call [phone] and ask to speak with the orthopaedic resident on call.

## 2018-04-12 NOTE — PLAN OF CARE
PT is AAox4. VSS. NAD. IV discontinued. Discharge instructions given, verbalized understanding. Discharged home with family.

## 2018-04-12 NOTE — H&P
DATE: 4/12/2018  PATIENT: Sanchez Zarate    Attending Physician: Lino Fortune M.D.    CHIEF COMPLAINT: Left low back and left testicular pain.    HISTORY:  Sanchez Zarate is a 42 y.o. male with a known large L5/S1 left sided disc herniation. His has been increasingly symptomatic since helping family in [place]. He has low back, Left buttock and left testicular pain. He has not found relief with extensive conservative care.    The Patient denies myelopathic symptoms such as handwriting changes or difficulty with buttons/coins/keys. Denies perineal paresthesias, bowel/bladder dysfunction.    PAST MEDICAL/SURGICAL HISTORY:  Past Medical History:   Diagnosis Date    Chronic back pain     Chronic diarrhea     Hernia     TBI (traumatic brain injury) 2010    fake wall fell on head (no brain or skull injury per pt)     Past Surgical History:   Procedure Laterality Date    COLONOSCOPY N/A 5/29/2017    Procedure: COLONOSCOPY;  Surgeon: Sukhi Scanlon MD;  Location: [facility] ([address] [place] [address]);  Service: Endoscopy;  Laterality: N/A;    HERNIA REPAIR  2017    umbilical    LUMBAR EPIDURAL INJECTION      with steroid       Current Medications:   Current Facility-Administered Medications:     0.9%  NaCl infusion, , Intravenous, Continuous, Srini Francis MD, 1,000 mL at 04/12/18 1045    ceFAZolin injection 2 g, 2 g, Intravenous, On Call Procedure, Srini Francis MD    mupirocin 2 % ointment, , Nasal, On Call Procedure, Srini Francis MD    Social History:   Social History     Social History    Marital status:      Spouse name: N/A    Number of children: N/A    Years of education: N/A     Occupational History    self employed      Social History Main Topics    Smoking status: Former Smoker     Quit date: 1/1/2007    Smokeless tobacco: Never Used    Alcohol use Yes      Comment: occasional    Drug use: No    Sexual activity: Yes     Partners: Female     Other Topics Concern    Not on file  "    Social History Narrative    No narrative on file       REVIEW OF SYSTEMS:  Constitution: Negative. Negative for chills, fever and night sweats.   Cardiovascular: Negative for chest pain and syncope.      EXAM:  /71 (BP Location: Left arm, Patient Position: Lying)   Pulse 76   Temp 98.2 °F (36.8 °C) (Oral)   Resp 20   Ht 5' 10" (1.778 m)   Wt 98 kg (216 lb)   SpO2 96%   BMI 30.99 kg/m²     PHYSICAL EXAMINATION:    General: The patient is a pleasant 42 y.o. male in no apparent distress, the patient is orientatied to person, place and time.  Psych: Normal mood and affect  HEENT: Vision grossly intact, hearing intact to the spoken word.  Lungs: Respirations unlabored.  Gait: Normal station and gait, no difficulty with toe or heel walk.   Skin: Dorsal lumbar skin negative for rashes, lesions, hairy patches and surgical scars.   Musculoskeletal: No pain with the range of motion of the bilateral hips. No trochanteric tenderness to palpation.  Vascular: Bilateral lower extremities warm and well perfused, Dorsalis pedis pulses 2+ bilaterally.  Neurological: Normal strength and tone in all major motor groups in the bilateral lower extremities. Normal sensation to light touch in the L2-S1 dermatomes bilaterally.  Deep tendon reflexes symmetric 1+ in the bilateral lower extremities.  Negative Babinski bilaterally. Straight leg raise positive on the left.    IMAGING:      Recent MRI reviewed    ASSESSMENT/PLAN:    Plan for left L5/S1 discectomy today.    I had a sit down discussion with the patient and his wife regarding the planned procedure. We specifically discussed the risks, benefits, and alternatives to surgery. We discussed the surgical procedure including the skin incision, nerve decompression, bone fusion, allograft, iliac crest bone graft, and surgical implants including pedicle screws and interbody devices as indicated: they understand the risks include but are not limited to death, paralysis, " blindness, bleeding, infection, damage to arteries, veins and nerves, spinal fluid leak, continued or worsening pain, no improvement in symptoms, non-union, and the possible need for more surgery in the future, the possible need for perioperative blood transfusion, as well as the possibility other unforseen and unknown complications. We talked about expected hospital stay and recovery period. All questions were answered; they understand and wish to proceed.

## 2018-04-12 NOTE — OP NOTE
DATE OF PROCEDURE: 4/12/2018.     SURGEON: Lino Fortune M.D.     FIRST ASSISTANT SURGEON: Lorrie Rey PA-C, no resident was available.     PREOPERATIVE DIAGNOSIS: Left Lumbar Radiculopathy.     POSTOPERATIVE DIAGNOSIS: Left Lumbar Radiculopathy.     PROCEDURES PERFORMED:  1. Lumbar laminotomy, foraminotomy and disectomy Left L5/S1     ANESTHESIA: General endotracheal anesthesia.     ESTIMATED BLOOD LOSS: 25 mL.     IMPLANTS: none.     SPECIMENS: None.     FINDINGS: see note.     DRAINS: None.     COMPLICATIONS: None.     SPONGE AND NEEDLE COUNT: Correct x2.     NEUROLOGICAL MONITORING: Somatosensory evoked  potential, free-running EMG.  There were no changes to SSEP baselines.  There no significant EMG activity.     REASON FOR OPERATION AND BRIEF HISTORY AND PHYSICAL: Sanchez Downey a    42 y.o. male with refractory lumbar radiculopathy     DESCRIPTION OF INFORMED CONSENT: Please see my H&P for a full   description of the informed consent I had with the patient as well as his wife.     DESCRIPTION OF PROCEDURE: The patient was met in the preoperative area where   her low back was marked in the midline by me personally. Subsequently, he was   brought to the Operating Room where sequential compression devices were placed   on the patient's bilateral calves and run throughout the case. The patient was   then intubated via general endotracheal anesthesia. They were then flipped prone   onto a Rainer table with Jaspreet frame. The head was secured on a facial pillow. The eyes were personally checked by cristina found to be acceptable. The arms were held in a 90-90 position. All bony prominences were padded paying special attention to the axilla, elbows, hands, genitalia, knees, and feet. Being satisfied with positioning and confirming this to be adequate with Anesthesia, the patient's lower back was prepped and draped in normal sterile fashion.     A full timeout was then called identifying the patient, the procedural  site and   level, the availability of all instruments and no specific nursing, surgical,   anesthetic or neurological monitoring concerns. Finding that it was safe to   proceed with surgery, the patient was given a weight-appropriate dose of   antibiotics by the Anesthesia Service.     I then infiltrated my planned incision with 10 mL of 1% lidocaine. I then made   a midline lumbar incision and carried dissection down through the skin and  subcutaneous tissues using combination of sharp dissection and electrocautery   where necessary. The dorsal fascia of the lumbar spine was identified and   incised in the midline and I performed a preliminary subperiosteal exposure of   what I took to be the L5 lamina as well as what I took to be   the Left L5/S1 facet joint. At the conclusion of my preliminary dissection, I   placed a [place] 4 elevator under what I took to be the trailing edge of the L5  lamina, took lateral radiograph and thus confirmed my level.     At this point, I finalized my exposure. I then used a high-speed drill to thin   the trailing one-third of the Left L5 lamina to reveal the origin of the ligamentum   flavum. I then released the ligamentum flavum from the trailing edge of the L5  lamina using a forward-angle curette. I gently worked the ligamentum flavum   distally. Thisallowed me to visualize the underlying epidural fat and subsequently blue-whitedura. I used the [place] 4 elevator to move the thecal sac towards the   midline and brought in a nerve root retractor. An obvious disk bulge was then   seen in this area and I entered it with a disc knife via a vertical annulotomy. Multiple soft   fragments were removed with a pituitary. At the conclusion of my diskectomy, I   could easily pass a [place] elevator along the S1 nerve root ventrally as well   as across the thecal sac. The wound was then thoroughly irrigated, including   irrigation of the disk space, which revealed no residual free  fragments. I then  finalized hemostasis with FloSeal and patties. Valsalva maneuver to 40 mmHg   demonstrated no cerebrospinal fluid leak. Next, 500 mg of vancomycin powder was  placed in the deep space and deep fascia was closed with #1 Vicryl in a   figure-of-eight fashion. The superficial layer was then irrigated and closed   over a drain with 2-0 Vicryl, 3-0 Monocryl, Dermabond and Steri-Strips. A soft   dressing was placed. The drain was secured in place with 2-0 silk suture. The   patient was then flipped supine, extubated and transferred to the Recovery Room   in stable condition.

## 2018-04-12 NOTE — PROGRESS NOTES
Orders faxed for walker delivery to house tomorrow because patient did not want to wait for DME delivery.

## 2018-04-13 ENCOUNTER — PATIENT MESSAGE (OUTPATIENT)
Dept: ORTHOPEDICS | Facility: CLINIC | Age: 43
End: 2018-04-13

## 2018-04-13 NOTE — ANESTHESIA POSTPROCEDURE EVALUATION
"Anesthesia Post Evaluation    Patient: Sanchez Zarate    Procedure(s) Performed: Procedure(s) (LRB):  MICRODISCECTOMY L5/S1 -- Prone -- SSEP/EMG (N/A)    Final Anesthesia Type: general  Patient location: home.  Patient participation: Yes- Able to Participate  Level of consciousness: awake and alert  Post-procedure vital signs: reviewed and stable  Pain management: adequate  Airway patency: patent  PONV status at discharge: No PONV  Anesthetic complications: no      Cardiovascular status: blood pressure returned to baseline  Respiratory status: unassisted, spontaneous ventilation and room air  Hydration status: euvolemic  Follow-up not needed.        Visit Vitals  /72   Pulse 84   Temp 36.2 °C (97.2 °F) (Temporal)   Resp 15   Ht 5' 10" (1.778 m)   Wt 98 kg (216 lb)   SpO2 (!) 94%   BMI 30.99 kg/m²       Pain/Tasha Score: Pain Assessment Performed: Yes (4/12/2018  6:30 PM)  Presence of Pain: complains of pain/discomfort (4/12/2018  6:30 PM)  Pain Rating Prior to Med Admin: 7 (4/12/2018  4:42 PM)  Pain Rating Post Med Admin: 3 (4/12/2018  6:00 PM)  Tasha Score: 10 (4/12/2018  6:30 PM)  Modified Tasha Score: 19 (4/12/2018  6:30 PM)      "

## 2018-04-13 NOTE — ANESTHESIA POSTPROCEDURE EVALUATION
"Anesthesia Post Evaluation    Patient: Sanchez Zarate    Procedure(s) Performed: Procedure(s) (LRB):  MICRODISCECTOMY L5/S1 -- Prone -- SSEP/EMG (N/A)    Final Anesthesia Type: general  Patient location during evaluation: floor  Patient participation: Yes- Able to Participate  Level of consciousness: awake and alert and oriented  Post-procedure vital signs: reviewed and stable  Pain management: adequate  Airway patency: patent  PONV status at discharge: No PONV  Anesthetic complications: no      Cardiovascular status: blood pressure returned to baseline and hemodynamically stable  Respiratory status: unassisted, spontaneous ventilation and room air  Hydration status: euvolemic  Follow-up not needed.        Visit Vitals  /72   Pulse 84   Temp 36.2 °C (97.2 °F) (Temporal)   Resp 15   Ht 5' 10" (1.778 m)   Wt 98 kg (216 lb)   SpO2 (!) 94%   BMI 30.99 kg/m²       Pain/Tasha Score: Pain Assessment Performed: Yes (4/12/2018  6:30 PM)  Presence of Pain: complains of pain/discomfort (4/12/2018  6:30 PM)  Pain Rating Prior to Med Admin: 7 (4/12/2018  4:42 PM)  Pain Rating Post Med Admin: 3 (4/12/2018  6:00 PM)  Tasha Score: 10 (4/12/2018  6:30 PM)  Modified Tasha Score: 19 (4/12/2018  6:30 PM)      "

## 2018-04-16 NOTE — OPERATIVE NOTE ADDENDUM
Certification of Assistant at Surgery       Surgery Date: 4/12/2018     Participating Surgeons:  Surgeon(s) and Role:     * Lino Fortune MD - Primary    Procedures:  Procedure(s) (LRB):  MICRODISCECTOMY L5/S1 -- Prone -- SSEP/EMG (N/A)    Assistant Surgeon's Certification of Necessity:  I understand that section 1842 (b) (6) (d) of the Social Security Act generally prohibits Medicare Part B reasonable charge payment for the services of assistants at surgery in teaching hospitals when qualified residents are available to furnish such services. I certify that the services for which payment is claimed were medically necessary, and that no qualified resident was available to perform the services. I further understand that these services are subject to post-payment review by the Medicare carrier.      Lorrie Rey PA-C    04/16/2018  7:33 AM

## 2018-04-17 ENCOUNTER — PATIENT MESSAGE (OUTPATIENT)
Dept: ORTHOPEDICS | Facility: CLINIC | Age: 43
End: 2018-04-17

## 2018-04-18 ENCOUNTER — PATIENT MESSAGE (OUTPATIENT)
Dept: ORTHOPEDICS | Facility: CLINIC | Age: 43
End: 2018-04-18

## 2018-05-02 ENCOUNTER — OFFICE VISIT (OUTPATIENT)
Dept: ORTHOPEDICS | Facility: CLINIC | Age: 43
End: 2018-05-02
Payer: COMMERCIAL

## 2018-05-02 VITALS — WEIGHT: 217.38 LBS | HEIGHT: 70 IN | BODY MASS INDEX: 31.12 KG/M2

## 2018-05-02 DIAGNOSIS — M54.16 LUMBAR RADICULOPATHY: Primary | ICD-10-CM

## 2018-05-02 PROCEDURE — 99024 POSTOP FOLLOW-UP VISIT: CPT | Mod: S$GLB,,, | Performed by: ORTHOPAEDIC SURGERY

## 2018-05-02 PROCEDURE — 99999 PR PBB SHADOW E&M-EST. PATIENT-LVL III: CPT | Mod: PBBFAC,,, | Performed by: ORTHOPAEDIC SURGERY

## 2018-05-02 RX ORDER — HYDROCODONE BITARTRATE AND ACETAMINOPHEN 5; 325 MG/1; MG/1
1 TABLET ORAL EVERY 6 HOURS PRN
Qty: 42 TABLET | Refills: 0 | Status: ON HOLD | OUTPATIENT
Start: 2018-05-02 | End: 2018-11-19 | Stop reason: HOSPADM

## 2018-05-03 NOTE — PROGRESS NOTES
Date of Surgery: 4/12/18    Procedure: Left L5/S1 discectomy    History: Sanchez Zarate is seen today for follow-up following the above listed procedure. Overall the patient is doing well but today notes some pain with L SLR. His L testicular pain is resolved.    Exam: Incision is healing well. There is no sign of infection. Neuro exam is stable. No signs of DVT.    Radiographs: no new films today.    Assessment/Plan: Doing well postoperatively. I will plan to see the patient back for the next postop visit in 6 weeks. Thank you for the opportunity to participate in this patient's care. Please give me a call if there are any concerns or questions.

## 2018-06-01 ENCOUNTER — OFFICE VISIT (OUTPATIENT)
Dept: ORTHOPEDICS | Facility: CLINIC | Age: 43
End: 2018-06-01
Payer: COMMERCIAL

## 2018-06-01 VITALS — BODY MASS INDEX: 31.7 KG/M2 | HEIGHT: 70 IN | WEIGHT: 221.44 LBS

## 2018-06-01 DIAGNOSIS — Z98.890 HISTORY OF LUMBAR DISCECTOMY: Primary | ICD-10-CM

## 2018-06-01 PROCEDURE — 99024 POSTOP FOLLOW-UP VISIT: CPT | Mod: S$GLB,,, | Performed by: ORTHOPAEDIC SURGERY

## 2018-06-01 PROCEDURE — 99999 PR PBB SHADOW E&M-EST. PATIENT-LVL II: CPT | Mod: PBBFAC,,, | Performed by: ORTHOPAEDIC SURGERY

## 2018-07-06 ENCOUNTER — OFFICE VISIT (OUTPATIENT)
Dept: ORTHOPEDICS | Facility: CLINIC | Age: 43
End: 2018-07-06
Payer: COMMERCIAL

## 2018-07-06 VITALS — BODY MASS INDEX: 30.71 KG/M2 | WEIGHT: 214.5 LBS | HEIGHT: 70 IN

## 2018-07-06 DIAGNOSIS — Z98.890 HISTORY OF LUMBAR DISCECTOMY: Primary | ICD-10-CM

## 2018-07-06 PROCEDURE — 99999 PR PBB SHADOW E&M-EST. PATIENT-LVL II: CPT | Mod: PBBFAC,,, | Performed by: ORTHOPAEDIC SURGERY

## 2018-07-06 PROCEDURE — 99024 POSTOP FOLLOW-UP VISIT: CPT | Mod: S$GLB,,, | Performed by: ORTHOPAEDIC SURGERY

## 2018-07-09 NOTE — PROGRESS NOTES
Date of Surgery: 4/12/18    Procedure: Left L5/S1 discectomy    History: Sanchez Zarate is seen today for follow-up following the above listed procedure. Overall the patient is doing well he is back to work.    Exam: Incision is healing well. There is no sign of infection. Neuro exam is stable. No signs of DVT.    Radiographs: No new films today    Assessment/Plan: Doing well postoperatively. I will plan to see the patient back for the next postop visit at 6 months from surgery for a final check. Thank you for the opportunity to participate in this patient's care. Please give me a call if there are any concerns or questions.    He can return to work with no restrictions.

## 2018-11-18 ENCOUNTER — HOSPITAL ENCOUNTER (OUTPATIENT)
Facility: HOSPITAL | Age: 43
Discharge: HOME | End: 2018-11-20
Attending: EMERGENCY MEDICINE | Admitting: UROLOGY
Payer: COMMERCIAL

## 2018-11-18 DIAGNOSIS — N20.0 NEPHROLITHIASIS: Primary | ICD-10-CM

## 2018-11-18 DIAGNOSIS — R10.9 ABDOMINAL PAIN: ICD-10-CM

## 2018-11-18 LAB
ALBUMIN SERPL BCP-MCNC: 4.6 G/DL
ALP SERPL-CCNC: 93 U/L
ALT SERPL W/O P-5'-P-CCNC: 30 U/L
ANION GAP SERPL CALC-SCNC: 11 MMOL/L
AST SERPL-CCNC: 21 U/L
BASOPHILS # BLD AUTO: 0.08 K/UL
BASOPHILS NFR BLD: 0.5 %
BILIRUB SERPL-MCNC: 0.8 MG/DL
BUN SERPL-MCNC: 21 MG/DL
CALCIUM SERPL-MCNC: 10 MG/DL
CHLORIDE SERPL-SCNC: 106 MMOL/L
CO2 SERPL-SCNC: 25 MMOL/L
CREAT SERPL-MCNC: 1.1 MG/DL
DIFFERENTIAL METHOD: ABNORMAL
EOSINOPHIL # BLD AUTO: 0 K/UL
EOSINOPHIL NFR BLD: 0.1 %
ERYTHROCYTE [DISTWIDTH] IN BLOOD BY AUTOMATED COUNT: 12.8 %
EST. GFR  (AFRICAN AMERICAN): >60 ML/MIN/1.73 M^2
EST. GFR  (NON AFRICAN AMERICAN): >60 ML/MIN/1.73 M^2
GLUCOSE SERPL-MCNC: 127 MG/DL
HCT VFR BLD AUTO: 46 %
HGB BLD-MCNC: 16.3 G/DL
IMM GRANULOCYTES # BLD AUTO: 0.08 K/UL
IMM GRANULOCYTES NFR BLD AUTO: 0.5 %
LIPASE SERPL-CCNC: 55 U/L
LYMPHOCYTES # BLD AUTO: 2.2 K/UL
LYMPHOCYTES NFR BLD: 13.8 %
MCH RBC QN AUTO: 31.3 PG
MCHC RBC AUTO-ENTMCNC: 35.4 G/DL
MCV RBC AUTO: 88 FL
MONOCYTES # BLD AUTO: 0.6 K/UL
MONOCYTES NFR BLD: 3.8 %
NEUTROPHILS # BLD AUTO: 13.2 K/UL
NEUTROPHILS NFR BLD: 81.3 %
NRBC BLD-RTO: 0 /100 WBC
PLATELET # BLD AUTO: 252 K/UL
PMV BLD AUTO: 9.5 FL
POTASSIUM SERPL-SCNC: 4 MMOL/L
PROT SERPL-MCNC: 8.1 G/DL
RBC # BLD AUTO: 5.21 M/UL
SODIUM SERPL-SCNC: 142 MMOL/L
WBC # BLD AUTO: 16.28 K/UL

## 2018-11-18 PROCEDURE — 96375 TX/PRO/DX INJ NEW DRUG ADDON: CPT

## 2018-11-18 PROCEDURE — 96361 HYDRATE IV INFUSION ADD-ON: CPT

## 2018-11-18 PROCEDURE — 99285 EMERGENCY DEPT VISIT HI MDM: CPT | Mod: 25

## 2018-11-18 PROCEDURE — 80053 COMPREHEN METABOLIC PANEL: CPT

## 2018-11-18 PROCEDURE — 85025 COMPLETE CBC W/AUTO DIFF WBC: CPT

## 2018-11-18 PROCEDURE — 83690 ASSAY OF LIPASE: CPT

## 2018-11-18 PROCEDURE — 96376 TX/PRO/DX INJ SAME DRUG ADON: CPT

## 2018-11-18 PROCEDURE — 99285 EMERGENCY DEPT VISIT HI MDM: CPT | Mod: ,,, | Performed by: EMERGENCY MEDICINE

## 2018-11-18 PROCEDURE — 96374 THER/PROPH/DIAG INJ IV PUSH: CPT

## 2018-11-18 PROCEDURE — 63600175 PHARM REV CODE 636 W HCPCS: Performed by: EMERGENCY MEDICINE

## 2018-11-18 PROCEDURE — 93005 ELECTROCARDIOGRAM TRACING: CPT

## 2018-11-18 PROCEDURE — 25000003 PHARM REV CODE 250: Performed by: EMERGENCY MEDICINE

## 2018-11-18 PROCEDURE — 93010 ELECTROCARDIOGRAM REPORT: CPT | Mod: ,,, | Performed by: INTERNAL MEDICINE

## 2018-11-18 RX ORDER — KETOROLAC TROMETHAMINE 30 MG/ML
10 INJECTION, SOLUTION INTRAMUSCULAR; INTRAVENOUS
Status: COMPLETED | OUTPATIENT
Start: 2018-11-18 | End: 2018-11-18

## 2018-11-18 RX ORDER — ONDANSETRON 2 MG/ML
4 INJECTION INTRAMUSCULAR; INTRAVENOUS
Status: COMPLETED | OUTPATIENT
Start: 2018-11-18 | End: 2018-11-18

## 2018-11-18 RX ADMIN — KETOROLAC TROMETHAMINE 10 MG: 30 INJECTION, SOLUTION INTRAMUSCULAR at 11:11

## 2018-11-18 RX ADMIN — ONDANSETRON HYDROCHLORIDE 4 MG: 2 INJECTION INTRAMUSCULAR; INTRAVENOUS at 11:11

## 2018-11-18 RX ADMIN — SODIUM CHLORIDE 1000 ML: 0.9 INJECTION, SOLUTION INTRAVENOUS at 11:11

## 2018-11-19 ENCOUNTER — ANESTHESIA EVENT (OUTPATIENT)
Dept: SURGERY | Facility: HOSPITAL | Age: 43
End: 2018-11-19
Payer: COMMERCIAL

## 2018-11-19 ENCOUNTER — ANESTHESIA (OUTPATIENT)
Dept: SURGERY | Facility: HOSPITAL | Age: 43
End: 2018-11-19
Payer: COMMERCIAL

## 2018-11-19 PROBLEM — R10.9 ABDOMINAL PAIN: Status: ACTIVE | Noted: 2018-11-19

## 2018-11-19 LAB
BILIRUB UR QL STRIP: NEGATIVE
CAOX CRY UR QL COMP ASSIST: ABNORMAL
CLARITY UR REFRACT.AUTO: ABNORMAL
COLOR UR AUTO: YELLOW
GLUCOSE UR QL STRIP: NEGATIVE
HGB UR QL STRIP: ABNORMAL
KETONES UR QL STRIP: NEGATIVE
LEUKOCYTE ESTERASE UR QL STRIP: NEGATIVE
MICROSCOPIC COMMENT: ABNORMAL
NITRITE UR QL STRIP: NEGATIVE
PH UR STRIP: 5 [PH] (ref 5–8)
PROT UR QL STRIP: NEGATIVE
RBC #/AREA URNS AUTO: 49 /HPF (ref 0–4)
SP GR UR STRIP: 1.02 (ref 1–1.03)
URN SPEC COLLECT METH UR: ABNORMAL
WBC #/AREA URNS AUTO: 2 /HPF (ref 0–5)

## 2018-11-19 PROCEDURE — 76000 FLUOROSCOPY <1 HR PHYS/QHP: CPT | Mod: 26,59,, | Performed by: UROLOGY

## 2018-11-19 PROCEDURE — 36000707: Performed by: UROLOGY

## 2018-11-19 PROCEDURE — 25000003 PHARM REV CODE 250: Performed by: STUDENT IN AN ORGANIZED HEALTH CARE EDUCATION/TRAINING PROGRAM

## 2018-11-19 PROCEDURE — 63600175 PHARM REV CODE 636 W HCPCS: Performed by: STUDENT IN AN ORGANIZED HEALTH CARE EDUCATION/TRAINING PROGRAM

## 2018-11-19 PROCEDURE — 63600175 PHARM REV CODE 636 W HCPCS: Performed by: EMERGENCY MEDICINE

## 2018-11-19 PROCEDURE — 37000008 HC ANESTHESIA 1ST 15 MINUTES: Performed by: UROLOGY

## 2018-11-19 PROCEDURE — C2617 STENT, NON-COR, TEM W/O DEL: HCPCS | Performed by: UROLOGY

## 2018-11-19 PROCEDURE — 25500020 PHARM REV CODE 255: Performed by: UROLOGY

## 2018-11-19 PROCEDURE — G0378 HOSPITAL OBSERVATION PER HR: HCPCS

## 2018-11-19 PROCEDURE — 36000706: Performed by: UROLOGY

## 2018-11-19 PROCEDURE — 37000009 HC ANESTHESIA EA ADD 15 MINS: Performed by: UROLOGY

## 2018-11-19 PROCEDURE — C1769 GUIDE WIRE: HCPCS | Performed by: UROLOGY

## 2018-11-19 PROCEDURE — 63600175 PHARM REV CODE 636 W HCPCS: Performed by: NURSE ANESTHETIST, CERTIFIED REGISTERED

## 2018-11-19 PROCEDURE — 52332 CYSTOSCOPY AND TREATMENT: CPT | Mod: LT,,, | Performed by: UROLOGY

## 2018-11-19 PROCEDURE — D9220A PRA ANESTHESIA: Mod: CRNA,,, | Performed by: NURSE ANESTHETIST, CERTIFIED REGISTERED

## 2018-11-19 PROCEDURE — 81001 URINALYSIS AUTO W/SCOPE: CPT

## 2018-11-19 PROCEDURE — 71000015 HC POSTOP RECOV 1ST HR: Performed by: UROLOGY

## 2018-11-19 PROCEDURE — D9220A PRA ANESTHESIA: Mod: ANES,,, | Performed by: ANESTHESIOLOGY

## 2018-11-19 PROCEDURE — C1758 CATHETER, URETERAL: HCPCS | Performed by: UROLOGY

## 2018-11-19 PROCEDURE — 71000044 HC DOSC ROUTINE RECOVERY FIRST HOUR: Performed by: UROLOGY

## 2018-11-19 PROCEDURE — 99214 OFFICE O/P EST MOD 30 MIN: CPT | Mod: 25,,, | Performed by: UROLOGY

## 2018-11-19 DEVICE — STENT URETERAL UNIV 6FR 22CM: Type: IMPLANTABLE DEVICE | Site: URETER | Status: FUNCTIONAL

## 2018-11-19 RX ORDER — TAMSULOSIN HYDROCHLORIDE 0.4 MG/1
0.4 CAPSULE ORAL NIGHTLY
Qty: 30 CAPSULE | Refills: 0 | Status: SHIPPED | OUTPATIENT
Start: 2018-11-19 | End: 2019-01-09

## 2018-11-19 RX ORDER — KETOROLAC TROMETHAMINE 15 MG/ML
15 INJECTION, SOLUTION INTRAMUSCULAR; INTRAVENOUS EVERY 6 HOURS PRN
Status: DISCONTINUED | OUTPATIENT
Start: 2018-11-19 | End: 2018-11-20 | Stop reason: HOSPADM

## 2018-11-19 RX ORDER — FENTANYL CITRATE 50 UG/ML
25 INJECTION, SOLUTION INTRAMUSCULAR; INTRAVENOUS EVERY 5 MIN PRN
Status: DISCONTINUED | OUTPATIENT
Start: 2018-11-19 | End: 2018-11-20 | Stop reason: HOSPADM

## 2018-11-19 RX ORDER — MIDAZOLAM HYDROCHLORIDE 1 MG/ML
INJECTION, SOLUTION INTRAMUSCULAR; INTRAVENOUS
Status: DISCONTINUED | OUTPATIENT
Start: 2018-11-19 | End: 2018-11-19

## 2018-11-19 RX ORDER — TAMSULOSIN HYDROCHLORIDE 0.4 MG/1
0.4 CAPSULE ORAL NIGHTLY
Status: DISCONTINUED | OUTPATIENT
Start: 2018-11-19 | End: 2018-11-20 | Stop reason: HOSPADM

## 2018-11-19 RX ORDER — OXYCODONE HYDROCHLORIDE 5 MG/1
5 TABLET ORAL EVERY 4 HOURS PRN
Status: DISCONTINUED | OUTPATIENT
Start: 2018-11-19 | End: 2018-11-20 | Stop reason: HOSPADM

## 2018-11-19 RX ORDER — KETOROLAC TROMETHAMINE 30 MG/ML
15 INJECTION, SOLUTION INTRAMUSCULAR; INTRAVENOUS EVERY 6 HOURS
Status: DISCONTINUED | OUTPATIENT
Start: 2018-11-19 | End: 2018-11-19

## 2018-11-19 RX ORDER — PROPOFOL 10 MG/ML
VIAL (ML) INTRAVENOUS CONTINUOUS PRN
Status: DISCONTINUED | OUTPATIENT
Start: 2018-11-19 | End: 2018-11-19

## 2018-11-19 RX ORDER — ACETAMINOPHEN 10 MG/ML
1000 INJECTION, SOLUTION INTRAVENOUS EVERY 8 HOURS
Status: DISCONTINUED | OUTPATIENT
Start: 2018-11-19 | End: 2018-11-20 | Stop reason: HOSPADM

## 2018-11-19 RX ORDER — FENTANYL CITRATE 50 UG/ML
INJECTION, SOLUTION INTRAMUSCULAR; INTRAVENOUS
Status: DISCONTINUED | OUTPATIENT
Start: 2018-11-19 | End: 2018-11-19

## 2018-11-19 RX ORDER — PROPOFOL 10 MG/ML
VIAL (ML) INTRAVENOUS
Status: DISCONTINUED | OUTPATIENT
Start: 2018-11-19 | End: 2018-11-19

## 2018-11-19 RX ORDER — SODIUM CHLORIDE 9 MG/ML
INJECTION, SOLUTION INTRAVENOUS CONTINUOUS
Status: DISCONTINUED | OUTPATIENT
Start: 2018-11-19 | End: 2018-11-20 | Stop reason: HOSPADM

## 2018-11-19 RX ORDER — CEFAZOLIN SODIUM 1 G/3ML
2 INJECTION, POWDER, FOR SOLUTION INTRAMUSCULAR; INTRAVENOUS
Status: COMPLETED | OUTPATIENT
Start: 2018-11-19 | End: 2018-11-19

## 2018-11-19 RX ORDER — SODIUM CHLORIDE 0.9 % (FLUSH) 0.9 %
3 SYRINGE (ML) INJECTION
Status: DISCONTINUED | OUTPATIENT
Start: 2018-11-19 | End: 2018-11-20 | Stop reason: HOSPADM

## 2018-11-19 RX ORDER — ONDANSETRON 2 MG/ML
INJECTION INTRAMUSCULAR; INTRAVENOUS
Status: DISCONTINUED | OUTPATIENT
Start: 2018-11-19 | End: 2018-11-19

## 2018-11-19 RX ORDER — MORPHINE SULFATE 4 MG/ML
6 INJECTION, SOLUTION INTRAMUSCULAR; INTRAVENOUS
Status: COMPLETED | OUTPATIENT
Start: 2018-11-19 | End: 2018-11-19

## 2018-11-19 RX ORDER — LIDOCAINE HCL/PF 100 MG/5ML
SYRINGE (ML) INTRAVENOUS
Status: DISCONTINUED | OUTPATIENT
Start: 2018-11-19 | End: 2018-11-19

## 2018-11-19 RX ORDER — MORPHINE SULFATE 2 MG/ML
6 INJECTION, SOLUTION INTRAMUSCULAR; INTRAVENOUS ONCE
Status: COMPLETED | OUTPATIENT
Start: 2018-11-19 | End: 2018-11-19

## 2018-11-19 RX ORDER — OXYCODONE AND ACETAMINOPHEN 10; 325 MG/1; MG/1
1 TABLET ORAL EVERY 6 HOURS PRN
Qty: 15 TABLET | Refills: 0 | Status: ON HOLD | OUTPATIENT
Start: 2018-11-19 | End: 2018-12-21 | Stop reason: SDUPTHER

## 2018-11-19 RX ORDER — ONDANSETRON 2 MG/ML
4 INJECTION INTRAMUSCULAR; INTRAVENOUS DAILY PRN
Status: DISCONTINUED | OUTPATIENT
Start: 2018-11-19 | End: 2018-11-20 | Stop reason: HOSPADM

## 2018-11-19 RX ORDER — ONDANSETRON 2 MG/ML
4 INJECTION INTRAMUSCULAR; INTRAVENOUS EVERY 6 HOURS PRN
Status: DISCONTINUED | OUTPATIENT
Start: 2018-11-19 | End: 2018-11-20 | Stop reason: HOSPADM

## 2018-11-19 RX ORDER — DIPHENHYDRAMINE HCL 25 MG
25 CAPSULE ORAL EVERY 12 HOURS PRN
Status: DISCONTINUED | OUTPATIENT
Start: 2018-11-19 | End: 2018-11-20 | Stop reason: HOSPADM

## 2018-11-19 RX ORDER — MORPHINE SULFATE 4 MG/ML
6 INJECTION, SOLUTION INTRAMUSCULAR; INTRAVENOUS ONCE
Status: COMPLETED | OUTPATIENT
Start: 2018-11-19 | End: 2018-11-19

## 2018-11-19 RX ORDER — MORPHINE SULFATE 4 MG/ML
4 INJECTION, SOLUTION INTRAMUSCULAR; INTRAVENOUS
Status: DISCONTINUED | OUTPATIENT
Start: 2018-11-19 | End: 2018-11-19

## 2018-11-19 RX ORDER — OXYCODONE HYDROCHLORIDE 10 MG/1
10 TABLET ORAL EVERY 4 HOURS PRN
Status: DISCONTINUED | OUTPATIENT
Start: 2018-11-19 | End: 2018-11-20 | Stop reason: HOSPADM

## 2018-11-19 RX ADMIN — PROPOFOL 40 MG: 10 INJECTION, EMULSION INTRAVENOUS at 12:11

## 2018-11-19 RX ADMIN — ACETAMINOPHEN 1000 MG: 10 INJECTION, SOLUTION INTRAVENOUS at 02:11

## 2018-11-19 RX ADMIN — MORPHINE SULFATE 6 MG: 4 INJECTION, SOLUTION INTRAMUSCULAR; INTRAVENOUS at 03:11

## 2018-11-19 RX ADMIN — SODIUM CHLORIDE 1000 ML: 0.9 INJECTION, SOLUTION INTRAVENOUS at 08:11

## 2018-11-19 RX ADMIN — ACETAMINOPHEN 1000 MG: 10 INJECTION, SOLUTION INTRAVENOUS at 10:11

## 2018-11-19 RX ADMIN — CEFAZOLIN 2 G: 330 INJECTION, POWDER, FOR SOLUTION INTRAMUSCULAR; INTRAVENOUS at 12:11

## 2018-11-19 RX ADMIN — MIDAZOLAM HYDROCHLORIDE 2 MG: 1 INJECTION, SOLUTION INTRAMUSCULAR; INTRAVENOUS at 12:11

## 2018-11-19 RX ADMIN — FENTANYL CITRATE 50 MCG: 50 INJECTION, SOLUTION INTRAMUSCULAR; INTRAVENOUS at 12:11

## 2018-11-19 RX ADMIN — Medication 6 MG: at 12:11

## 2018-11-19 RX ADMIN — FENTANYL CITRATE 25 MCG: 50 INJECTION, SOLUTION INTRAMUSCULAR; INTRAVENOUS at 01:11

## 2018-11-19 RX ADMIN — TAMSULOSIN HYDROCHLORIDE 0.4 MG: 0.4 CAPSULE ORAL at 10:11

## 2018-11-19 RX ADMIN — ONDANSETRON 4 MG: 2 INJECTION INTRAMUSCULAR; INTRAVENOUS at 01:11

## 2018-11-19 RX ADMIN — PROPOFOL 150 MCG/KG/MIN: 10 INJECTION, EMULSION INTRAVENOUS at 12:11

## 2018-11-19 RX ADMIN — LIDOCAINE HYDROCHLORIDE 40 MG: 20 INJECTION, SOLUTION INTRAVENOUS at 12:11

## 2018-11-19 RX ADMIN — MORPHINE SULFATE 6 MG: 4 INJECTION, SOLUTION INTRAMUSCULAR; INTRAVENOUS at 07:11

## 2018-11-19 RX ADMIN — OXYCODONE HYDROCHLORIDE 10 MG: 10 TABLET ORAL at 09:11

## 2018-11-19 NOTE — ED NOTES
Urology consult Dr Bell is room to explain procedure for placement of ureteral stent  and have pt sign consent

## 2018-11-19 NOTE — PROGRESS NOTES
Pt needs to void a minimum of 100 ml before being discharged. 7:30 pm will be 6 hr giacomo. Spoke to RHYS Negro & he will let pts nurse know.

## 2018-11-19 NOTE — ED NOTES
HO-3 AOC:  I assumed care of patient from Dr. Bach, briefly he is a 43-year-old male with a history of kidney stones evaluated today for a 1.3 cm left ureteropelvic junction stone, with mild hydronephrosis, for which she has been consult to Urology.  UA does not show evidence of infection, and I have called urology resident at this point will come to evaluate the patient at bedside to help plan disposition.  Patient continues to have pain, for which I have re-dosed him with morphine.  Dispo pending.  Bob Hagan M.D.  Emergency Medicine, PGY-3  11/19/2018 3:26 AM    HO-3 Update:  Patient currently remains comfortable.  Urology resident called back, who states that the team will likely admit the patient for pain control and plan and management of large kidney stone.  He is to staff the patient with his attending prior to disposition.  Urology resident call back.  Patient report provided oncoming physician with recommendations to follow up Urology recommendations.  Bob Hagan M.D.  Emergency Medicine, PGY-3  11/19/2018 6:39 AM         Bob Garcia MD  Resident  11/19/18 0639

## 2018-11-19 NOTE — CONSULTS
[facility]  Urology  Consult Note    Patient Name: Sanchez Zarate  MRN: [id number]  Admission Date: 11/18/2018  Hospital Length of Stay: 0   Code Status: Full Code   Attending Provider: Rony Bach MD   Consulting Provider: Binh Rahman MD  Primary Care Physician: Nixon Mcguire MD  Principal Problem:<principal problem not specified>    Inpatient consult to Urology  Consult performed by: Binh Rahman MD  Consult ordered by: Rony Bach MD        Subjective:     HPI:  Sanchez Zarate is a 43 y.o. male with a history of nephrolithiasis who presents to the ED with diffuse abdominal pain with associated testicular pain and associated nausea with multiple episodes of emesis. He denies flank pain, hematuria, dysuria, fevers or chills. Urology was consulted due to persistent pain and leukocytosis and UPJ stone.    His vitals have been stable. His WBC count is 16.2 and creatinine is 1.1 with baseline around 0.9-1.0. UA showed 49 RBCs, 2 WBCs, no bacteria. CT RSS shows 1.3 cm left UPJ stone with moderate hydro proximally. No stones on the left. He had right sided stones in the 7/2017 treated with ureteroscopy by Dr. Washington.    He has received 10 mg of toradol and 12 mg of morphine. His pain is controlled on my interview.    Past Medical History:   Diagnosis Date    Chronic back pain     Chronic diarrhea     Hernia     TBI (traumatic brain injury) 2010    fake wall fell on head (no brain or skull injury per pt)       Past Surgical History:   Procedure Laterality Date    COLONOSCOPY N/A 5/29/2017    Procedure: COLONOSCOPY;  Surgeon: Sukhi Scanlon MD;  Location: [facility] (4TH FLR);  Service: Endoscopy;  Laterality: N/A;    COLONOSCOPY N/A 5/29/2017    Performed by S[facility]hi Scanlon MD at [facility] ENDO (4TH FLR)    CYSTOSCOPY N/A 7/28/2017    Performed by Bhupinder Washington Jr., MD at [facility] OR 1ST FLR    CYSTOSCOPY WITH STENT PLACEMENT Right 7/14/2017    Performed by Bhupinder Washington Jr., MD at  [facility] OR 1ST FLR    EXTRACTION - STONE Right 2017    Performed by Bhupinder Washington Jr., MD at [facility] OR 1ST FLR    HERNIA REPAIR  2017    umbilical    INJECTION-STEROID-EPIDURAL-CAUDAL N/A 2017    Performed by Emerita [facility]illan [facility] at [facility] PAIN MGT    INJECTION-STEROID-EPIDURAL-TRANSFORAMINAL Left 2017    Performed by Emer[facility]illan MD at [facility] PAIN MGT    LITHOTRIPSY-EXTRACORPOREAL SHOCK WAVE Right 2017    Performed by Bhupinder Washington Jr., MD at [facility] OR 1ST FLR    LITHOTRIPSY-LASER Right 2017    Performed by Bhupinder Washington Jr., MD at [facility] OR 1ST FLR    LUMBAR EPIDURAL INJECTION      with steroid    MICRODISCECTOMY L5/S1 -- Prone -- SSEP/EMG N/A 2018    Performed by Lino Fortune MD at [facility] OR 2ND FLR    PLACEMENT-STENT URETERAL Right 2017    Performed by Bhupinder Washington Jr., MD at [facility] OR 1ST FLR    REPAIR-HERNIA-UMBILICAL N/A 2017    Performed by Nomi Robb MD at [facility] OR 2ND FLR    URETEROSCOPY Right 2017    Performed by Bhupinder Washington Jr., MD at [facility] OR 1ST [facility]       Review of patient's allergies indicates:  No Known Allergies    Family History     Problem Relation (Age of Onset)    Diabetes Mother          Tobacco Use    Smoking status: Former Smoker     Last attempt to quit: 2007     Years since quittin.8    Smokeless tobacco: Never Used   Substance and Sexual Activity    Alcohol use: Yes     Comment: occasional    Drug use: No    Sexual activity: Yes     Partners: Female       Review of Systems   Constitutional: Negative for activity change, fatigue and fever.   HENT: Negative for sore throat and trouble swallowing.    Eyes: Negative for pain and discharge.   Respiratory: Negative for chest tightness and shortness of breath.    Cardiovascular: Negative for chest pain and palpitations.   Gastrointestinal: Positive for abdominal pain, nausea and vomiting. Negative for constipation and diarrhea.   Genitourinary:        As per HPI    Musculoskeletal: Negative for arthralgias and gait problem.   Skin: Negative for rash and wound.   Neurological: Negative for seizures and numbness.   Psychiatric/Behavioral: Negative for hallucinations. The patient is not nervous/anxious.        Objective:     Temp:  [98.7 °F (37.1 °C)] 98.7 °F (37.1 °C)  Pulse:  [71-82] 71  Resp:  [18-20] 18  SpO2:  [97 %-99 %] 99 %  BP: (132-170)/() 132/87     Body mass index is 30.13 kg/m².            Drains          None          Physical Exam   Nursing note and vitals reviewed.  Constitutional: He is oriented to person, place, and time. He appears well-developed and well-nourished. No distress.   Neck: Trachea normal. Neck supple. No tracheal deviation present. No thyromegaly present.   Cardiovascular: Normal rate and intact distal pulses.    No swelling or varicosities   Pulmonary/Chest: Effort normal. No accessory muscle usage. No respiratory distress.   Abdominal: Soft. He exhibits no distension and no mass. There is no splenomegaly or hepatomegaly. There is tenderness (minimal). There is no CVA tenderness. No hernia.   Genitourinary:   Genitourinary Comments: Penis is not circumcised, foreskin is retractable, there are no lesions, masses, plaques. Scrotum showed no rashes or lesions. Testicles and epididymes were symmetric and showed no masses or tenderness. Urethral meatus is orthotopic, patent and without lesions or discharge.   Musculoskeletal: He exhibits no edema or tenderness.   Neurological: He is alert and oriented to person, place, and time.   Skin: Skin is warm and dry. No lesion and no rash noted. No cyanosis.     Psychiatric: He has a normal mood and affect.     Significant Labs:    BMP:  Recent Labs   Lab 11/18/18  2257      K 4.0      CO2 25   BUN 21*   CREATININE 1.1   CALCIUM 10.0     CBC:  Recent Labs   Lab 11/18/18  2257   WBC 16.28*   HGB 16.3   HCT 46.0        All pertinent labs results from the past 24 hours have been  reviewed.    Significant Imaging:  All pertinent imaging results/findings from the past 24 hours have been reviewed.    Assessment and Plan:     Nephrolithiasis    43M with a history of nephrolithiasis who presents with left UPJ stone and leukocytosis    - place in observation for urology  - currently stable, will add on later today for OR for cystoscopy with left ureteral stent possible left RPG  - MIVF  - antiemetics  - PRN pain control  - scheduled toradol  - will set up outpatient ureteroscopy  - patient can likely be discharged following procedure         VTE Risk Mitigation (From admission, onward)        Ordered     Place sequential compression device  Until discontinued      11/19/18 0727     Place BRIA hose  Until discontinued      11/19/18 0727     IP VTE HIGH RISK PATIENT  Once      11/19/18 0727        Binh Rahman MD  Urology  [facility]    Patient seen and examined, agree with the above note.

## 2018-11-19 NOTE — PROGRESS NOTES
Pt transferred via stretcher from post-op to room 5th floor  Transported by: PCT  Report given to Marky Jean RN PER Handoff on Doc Flowsheet  VSS per Doc Flowsheet  Medicines sent: IV fluids  Chart sent with patient: Yes

## 2018-11-19 NOTE — SUBJECTIVE & OBJECTIVE
Past Medical History:   Diagnosis Date    Chronic back pain     Chronic diarrhea     Hernia     TBI (traumatic brain injury) 2010    fake wall fell on head (no brain or skull injury per pt)       Past Surgical History:   Procedure Laterality Date    COLONOSCOPY N/A 5/29/2017    Procedure: COLONOSCOPY;  Surgeon: Sukhi Scanlon MD;  Location: [facility] (4TH FLR);  Service: Endoscopy;  Laterality: N/A;    COLONOSCOPY N/A 5/29/2017    Performed by Sukhi Scanlon MD at [facility] ENDO (4TH FLR)    CYSTOSCOPY N/A 7/28/2017    Performed by Bhupinder Washington Jr., MD at [facility] OR 1ST FLR    CYSTOSCOPY WITH STENT PLACEMENT Right 7/14/2017    Performed by Bhupinder Washington Jr., MD at [facility] OR 1ST [place]    EXTRACTION - STONE Right 7/28/2017    Performed by Bhupinder Washington Jr., MD at [facility] OR 1ST FLR    HERNIA REPAIR  2017    umbilical    INJECTION-STEROID-EPIDURAL-CAUDAL N/A 8/17/2017    Performed by [facility]an [facility] at [facility] PAIN [facility]GT    INJECTION-STEROID-EPIDURAL-TRANSFORAMINAL Left 6/12/2017    Performed by Emer[facility]an MD at [facility] PAIN MGT    LITHOTRIPSY-EXTRACORPOREAL SHOCK WAVE Right 7/14/2017    Performed by Bhupinder Washington Jr., MD at [facility] OR [address] [place] [address]    LITHOTRIPSY-LASER Right 7/28/2017    Performed by Bhupinder Washington Jr., MD at [facility] OR 1ST FLR    LUMBAR EPIDURAL INJECTION      with steroid    MICRODISCECTOMY L5/S1 -- Prone -- SSEP/EMG N/A 4/12/2018    Performed by Lino Fortune MD at [facility] OR 2ND FLR    PLACEMENT-STENT URETERAL Right 7/28/2017    Performed by Bhupinder Washington Jr., MD at [facility] OR 1ST FLR    REPAIR-HERNIA-UMBILICAL N/A 2/13/2017    Performed by Nomi Robb MD at [facility] OR 2ND FLR    URETEROSCOPY Right 7/28/2017    Performed by Bhupinder Washington Jr., MD at [facility] OR 1ST [facility]       Review of patient's allergies indicates:  No Known Allergies    Family History     Problem Relation (Age of Onset)    Diabetes Mother          Tobacco Use    Smoking status: Former Smoker     Last attempt to quit:  2007     Years since quittin.8    Smokeless tobacco: Never Used   Substance and Sexual Activity    Alcohol use: Yes     Comment: occasional    Drug use: No    Sexual activity: Yes     Partners: Female       Review of Systems   Constitutional: Negative for activity change, fatigue and fever.   HENT: Negative for sore throat and trouble swallowing.    Eyes: Negative for pain and discharge.   Respiratory: Negative for chest tightness and shortness of breath.    Cardiovascular: Negative for chest pain and palpitations.   Gastrointestinal: Positive for abdominal pain, nausea and vomiting. Negative for constipation and diarrhea.   Genitourinary:        As per HPI   Musculoskeletal: Negative for arthralgias and gait problem.   Skin: Negative for rash and wound.   Neurological: Negative for seizures and numbness.   Psychiatric/Behavioral: Negative for hallucinations. The patient is not nervous/anxious.        Objective:     Temp:  [98.7 °F (37.1 °C)] 98.7 °F (37.1 °C)  Pulse:  [71-82] 71  Resp:  [18-20] 18  SpO2:  [97 %-99 %] 99 %  BP: (132-170)/() 132/87     Body mass index is 30.13 kg/m².            Drains          None          Physical Exam   Nursing note and vitals reviewed.  Constitutional: He is oriented to person, place, and time. He appears well-developed and well-nourished. No distress.   Neck: Trachea normal. Neck supple. No tracheal deviation present. No thyromegaly present.   Cardiovascular: Normal rate and intact distal pulses.    No swelling or varicosities   Pulmonary/Chest: Effort normal. No accessory muscle usage. No respiratory distress.   Abdominal: Soft. He exhibits no distension and no mass. There is no splenomegaly or hepatomegaly. There is tenderness (minimal). There is no CVA tenderness. No hernia.   Genitourinary:   Genitourinary Comments: Penis is not circumcised, foreskin is retractable, there are no lesions, masses, plaques. Scrotum showed no rashes or lesions. Testicles and  epididymes were symmetric and showed no masses or tenderness. Urethral meatus is orthotopic, patent and without lesions or discharge.   Musculoskeletal: He exhibits no edema or tenderness.   Neurological: He is alert and oriented to person, place, and time.   Skin: Skin is warm and dry. No lesion and no rash noted. No cyanosis.     Psychiatric: He has a normal mood and affect.     Significant Labs:    BMP:  Recent Labs   Lab 11/18/18  2257      K 4.0      CO2 25   BUN 21*   CREATININE 1.1   CALCIUM 10.0     CBC:  Recent Labs   Lab 11/18/18  2257   WBC 16.28*   HGB 16.3   HCT 46.0        All pertinent labs results from the past 24 hours have been reviewed.    Significant Imaging:  All pertinent imaging results/findings from the past 24 hours have been reviewed.

## 2018-11-19 NOTE — ED PROVIDER NOTES
Encounter Date: 11/18/2018       History     Chief Complaint   Patient presents with    Abdominal Pain     Pt to ER c/o abd pain with 5 episodes of N/V x 2 hours. Denies diarrhea.     Emesis     Patient is a 43-year-old male with past medical history of kidney stones, herniated disc status post remote surgery, comes in with 2 hr of severe abdominal pain and multiple episodes of vomiting. Abdominal pain is diffuse,, severe, aching although possibly worsening in the left lower abdomen, and radiates into the testicles although patient denies testicular pain at this time and denies that it the pain is origin eating in the testicles.  No chest pain or shortness of breath. Vomiting is nonbloody.  No diarrhea.  No fevers.  No headache          Review of patient's allergies indicates:  No Known Allergies  Past Medical History:   Diagnosis Date    Chronic back pain     Chronic diarrhea     Hernia     TBI (traumatic brain injury) 2010    fake wall fell on head (no brain or skull injury per pt)     Past Surgical History:   Procedure Laterality Date    COLONOSCOPY N/A 5/29/2017    Procedure: COLONOSCOPY;  Surgeon: Sukhi Scanlon MD;  Location: [facility] (4TH FLR);  Service: Endoscopy;  Laterality: N/A;    COLONOSCOPY N/A 5/29/2017    Performed by Sukhi Scanlon MD at [facility] ENDO (4TH FLR)    CYSTOSCOPY N/A 7/28/2017    [facility]    CYSTOSCOPY WITH STENT PLACEMENT Right 7/14/2017    Performed by Bhupinder Washington Jr., MD at [facility] OR [facility]    EXTRACTION - STONE Right 7/28/2017    Performed by Bhupinder Washington Jr., MD at [facility] OR [facility] FLR    HERNIA REPAIR  2017    umbilical    INJECTION-STEROID-EPIDURAL-CAUDAL N/A 8/17/2017    Performed by [facility] at [facility]IN [facility]    INJECTION-STEROID-EPIDURAL-TRANSFORAMINAL Left 6/12/2017    [facility]    LITHOTRIPSY-EXTRACORPOREAL SHOCK WAVE Right 7/14/2017    Performed by Bhupinder Washington Jr.,  MD at [facility] OR 1ST FLR    LITHOTRIPSY-LASER Right 2017    Performed by Bhupinder Washington Jr., MD at [facility] OR 1ST FLR    LUMBAR EPIDURAL INJECTION      with steroid    MICRODISCECTOMY L5/S1 -- Prone -- SSEP/EMG N/A 2018    Performed by Lino Fortune MD at [facility] OR 2ND FLR    PLACEMENT-STENT URETERAL Right 2017    Performed by Bhupinder Washington Jr., MD at [facility] OR 1ST FLR    REPAIR-HERNIA-UMBILICAL N/A 2017    Performed by Nomi Robb MD at [facility] OR 2ND FLR    URETEROSCOPY Right 2017    Performed by Bhupinder Washington Jr., MD at [facility] OR 1ST FLR     Family History   Problem Relation Age of Onset    Diabetes Mother     Heart attack Neg Hx     Heart disease Neg Hx     Hypertension Neg Hx     Colon cancer Neg Hx     Esophageal cancer Neg Hx      Social History     Tobacco Use    Smoking status: Former Smoker     Last attempt to quit: 2007     Years since quittin.8    Smokeless tobacco: Never Used   Substance Use Topics    Alcohol use: Yes     Comment: occasional    Drug use: No     Review of Systems   Constitutional:  No Fever, No Chills,   Eyes: No Vision Changes  ENT/Mouth: No sore throat, No rhinorrhea  Cardiovascular:  No Chest Pain, No Palpitations  Respiratory:  No Cough, No SOB  Gastrointestinal:  No Nausea, + Vomiting, No Diarrhea   Genitourinary:  As per HPI, No dysuria   Musculoskeletal:  No Arthralgias, No Back Pain, No Neck Pain, No recent trauma.  Skin:  No skin Lesions  Neuro:  No Weakness, No Numbness, No Paresthesias, No Dizziness, No Headache        Physical Exam     Initial Vitals [18 2236]   BP Pulse Resp Temp SpO2   (!) 170/103 82 20 98.7 °F (37.1 °C) 97 %      MAP       --         Physical Exam   Physical Exam:  GENERAL APPEARANCE: Well developed, well nourished, initially very uncomfortable in the emergency department, vomiting.  HENT: Normocephalic, atraumatic    EYES: Sclerae anicteric   NECK: Supple, no thyroid enlargement  CARDIOVASCULAR:  Regular rate and rhythm without any murmurs, gallops, rubs.  LUNGS: Speaking in full sentences. Breathing comfortably. Auscultation of the lungs revealed normal breath sounds b/l  ABDOMEN: Soft with mild diffuse tenderness, no masses, no rebound or guarding   There is no CVA tenderness  :  Penis is unremarkable, testicles are unremarkable with no erythema, tenderness to palpation, or masses felt. There is normally the testicles.  Cremaster reflex difficult to obtain bilaterally.   NEUROLOGIC: Alert, interacting normally. No facial droop.   MSK: Moving all four extremities.  Skin: Warm and dry. No visible rash on exposed areas of skin.    Psych: Mood and affect normal.       ED Course   Procedures  Labs Reviewed   CBC W/ AUTO DIFFERENTIAL   COMPREHENSIVE METABOLIC PANEL   LIPASE   URINALYSIS, REFLEX TO URINE CULTURE          Imaging Results    None          Medical Decision Making:   Initial Assessment:   43-year-old male with past medical was kidney stones comes in with acute onset abdominal pain, repeat appears very uncomfortable and is vomiting in the ED.   Differential Diagnosis:   Kidney stones  Not consistent with ACS however will get screening EKG.  Viral enteritis, although onset is very cute for this diagnosis.   Not consistent with pyelonephritis or urinary infection.  Testicular torsion was considered however ordered the pain is in the abdomen, and the testicles are unremarkable making this diagnosis significantly less likely that her current episode of kidney stones.  ED Management:  Will get full set of labs.  Symptom control with IV fluids, Toradol, Zofran.  Urine studies.  CT abdomen pelvis with without contrast to look for kidney stones.  Repeat eval status post above interventions    2:17 AM  1.3 cm stone, with mild hydro.   WBC count 16.  Urine still pending.  Discussed with Urology, they are waiting for UA results.    Will sign at night team  Plan for discuss again once UA results are back.   Assess for continued pain medication requirements.                       Clinical Impression:   The encounter diagnosis was Abdominal pain. Kidney stone.                             Rony Bach MD  11/19/18 1589

## 2018-11-19 NOTE — DISCHARGE INSTRUCTIONS
Ureteral Stents  A ureteral stent is a soft plastic tube with holes in it. Its temporarily inserted into a ureter to help drain urine into the bladder. One end goes in the kidney. The other end goes in the bladder. A coil on each end holds the stent in place. The stent cant be seen from outside the body. It shouldnt interfere with your normal routine. Your stent will be put in by a doctor trained in treating the urinary tract (a urologist) or another specialist. The procedure is done in a hospital or surgery center. Youll likely go home the same day.  When is a ureteral stent used?  A ureteral stent may be used:  · To bypass a blockage in a kidney or ureter.  · During kidney stone removal.  · To let a ureter heal after surgery.    Before the Procedure  Your healthcare provider will give you instructions to prepare for the procedure. X-rays or other imaging tests of your kidneys and ureters may be done beforehand.  During the procedure  · You receive medicine to prevent pain and help you relax or sleep during the procedure. Once this takes effect, the procedure starts.  · The doctor inserts a cystoscope (lighted instrument) through the urethra and into the bladder. This shows the opening to the ureter.  · A thin wire is carefully threaded through the cystoscope, up the ureter, and into the kidney. The stent is inserted over the wire.  · A fluoroscope (special X-ray machine) is used to help position the stent. When the stent is in place, the wire and cystoscope are removed.  While you have a stent  · Some discomfort is normal. Certain movements may trigger pain or a feeling that you need to urinate. You may also feel mild soreness or pressure before or during urination. These symptoms will go away a few days after the stent is removed.  · Medicine to control pain or bladder spasms or to prevent infection may be prescribed. Take this as directed.  · Drink plenty of fluids to help flush out your urinary  tract.  · Your urine may be slightly pink or red. This is due to bleeding caused by minor irritation from the stent. This may happen on and off while you have the stent.  · As with any synthetic device placed in the body, there is a risk of infection. The stent may have to be removed if this happens.   How long will you need a stent?  The stent is often taken out after the blockage in the ureter is treated or the ureter has healed. This may take 1 week to 2 weeks, or longer. If a stent is needed for a long time, it may need to be changed every few months.  When to call your healthcare provider  Contact your healthcare provider right away if:  · Your urine contains blood clots or you see a large amount of blood-tinged urine  · You have symptoms similar to those you had before the stent was placed  · You constantly leak urine  · You have a fever over 100.4°F (38°C), chills, nausea, or vomiting  · Your pain is not relieved with medicine  · The end of the stent comes out of the urethra   Date Last Reviewed: 1/1/2017  © 7484-1392 [employer]. [address] [place] [address]. All rights reserved. This information is not intended as a substitute for professional medical care. Always follow your healthcare professional's instructions.

## 2018-11-19 NOTE — ED NOTES
Pt transported to room 540 A via stretcher  Pt has 1 L NS infusing at 125 ml per hr per pump per left forearm saline lock with 100 ml left TBA  Pt condition stable on leaving the ED, pt belongings are with pt and pt will notify family of room number

## 2018-11-19 NOTE — OP NOTE
[facility] Note    Date: 11/19/2018    Pre-Op Diagnosis:   - Left UPJ stone  Patient Active Problem List   Diagnosis    Chronic low back pain    Intervertebral disc extrusion (Left L5-S1)    Neural foraminal stenosis of lumbar spine (bilateral L5-S1, L>R)    Left lumbar radiculopathy    Situational syncope    Chronic diarrhea    Peyronie's disease    DDD (degenerative disc disease), lumbar    Nephrolithiasis    Chronic pain    Lumbar disc herniation    Abdominal pain     Op Diagnosis: same and pyonephrosis    Procedure(s) Performed:    1. Cystoscopy with left ureteral JJ stent placement  2. Left RPG  3. Fluoroscopy < 1 h    Specimen(s): none    Staff Surgeon: Renee Garcia MD    Assistant Surgeon: Binh Rahman MD, Kelli Nava MD    Anesthesia: Monitored Local Anesthesia with Sedation    Indications: Sanchez Zarate is a 43 y.o. male with a 1.3 cm left UPJ stone with leukocytosis and significant pain. He presents for ureteral stent placement.    Findings:    - stone not clearly radiopaque  - retrograde pyelogram on the left demonstrated no hydronephrosis, collected system mapped  - JJ ureteral stent placed in standard fashion    Estimated Blood Loss: min    Drains:  6 Chinese x 22 cm left JJ ureteral stent without strings    Procedure in Detail:  After risks, benefits and possible complications of the procedure were explained, the patient elected to undergo the procedure and informed consent was obtained. All questions were answered in the jillian-operative area. The patient was transferred to the cystoscopy suite and placed on the fluoroscopy table in the supine position.  SCDs were applied and working. Time out was performed, jillian-procedural antibiotics were given. Anesthesia was administered.  After adequate anesthesia the patient was placed in dorsal lithotomy position and prepped and draped in the usual sterile fashion.     A rigid cystoscope in a 22 Fr sheath was introduced into the  patients bladder per urethra. This passed easily.  The entire urethra was visualized and revealed no strictures or masses.  Cystoscopy was performed which showed the right and left ureteral orifices in the normal anatomic position.  There were no bladder tumors, no  trabeculations, and no stones.     Our attention was turned to the patient's left ureteral orifice.  A motion wire was advanced up the left ureteral orifice to the level of the expected renal pelvis.  This was confirmed using fluoroscopy. We then passed a 5 Fr open-ended ureteral to the level of the UPJ to measure ureteral length.    We then passed a 6 Fr x 22 cm JJ ureteral stent without strings over the wire to the level of the renal pelvis under direct vision as well as flouroscopy. The guide wire was removed.  A 180 degree coil was observed in the renal pelvis as well as the bladder using fluoroscopy.  A 180 degree coil was also seen using direct visualization in the bladder.    The patient tolerated the procedure well and was transferred to the recovery room in stable condition.      Disposition: The patient will return to observation under the care of the urology service.      Binh Rahman MD      I was present for the entire case and agree with the above note.

## 2018-11-19 NOTE — HPI
Sanchez Zarate is a 43 y.o. male with a history of nephrolithiasis who presents to the ED with diffuse abdominal pain with associated testicular pain and associated nausea with multiple episodes of emesis. He denies flank pain, hematuria, dysuria, fevers or chills. Urology was consulted due to persistent pain and leukocytosis and UPJ stone.    His vitals have been stable. His WBC count is 16.2 and creatinine is 1.1 with baseline around 0.9-1.0. UA showed 49 RBCs, 2 WBCs, no bacteria. CT RSS shows 1.3 cm left UPJ stone with moderate hydro proximally. No stones on the left. He had right sided stones in the 7/2017 treated with ureteroscopy by Dr. Washington.    He has received 10 mg of toradol and 12 mg of morphine. His pain is controlled on my interview.

## 2018-11-19 NOTE — ANESTHESIA PREPROCEDURE EVALUATION
11/19/2018  Sanchez Zarate is a 43 y.o., male.    Anesthesia Evaluation    I have reviewed the Patient Summary Reports.    I have reviewed the Nursing Notes.   I have reviewed the Medications.     Review of Systems  Anesthesia Hx:  No problems with previous Anesthesia  History of prior surgery of interest to airway management or planning: Denies Family Hx of Anesthesia complications.   Denies Personal Hx of Anesthesia complications.   Social:  Non-Smoker    Hematology/Oncology:  Hematology Normal   Oncology Normal     EENT/Dental:EENT/Dental Normal   Cardiovascular:  Cardiovascular Normal     Pulmonary:  Pulmonary Normal    Renal/:   Chronic Renal Disease renal calculi    Hepatic/GI:  Hepatic/GI Normal    Musculoskeletal:   Arthritis     Neurological:   Neuromuscular Disease,    Endocrine:  Endocrine Normal    Psych:  Psychiatric Normal           Physical Exam  General:  Well nourished    Airway/Jaw/Neck:  Airway Findings: Mouth Opening: Normal Tongue: Normal  General Airway Assessment: Adult  Mallampati: I  TM Distance: Normal, at least 6 cm  Jaw/Neck Findings:  Neck ROM: Normal ROM      Dental:  Dental Findings: In tact   Chest/Lungs:  Chest/Lungs Findings: Clear to auscultation, Normal Respiratory Rate     Heart/Vascular:  Heart Findings: Rate: Normal  Rhythm: Regular Rhythm  Sounds: Normal        Mental Status:  Mental Status Findings:  Cooperative, Alert and Oriented         Anesthesia Plan  Type of Anesthesia, risks & benefits discussed:  Anesthesia Type:  general  Patient's Preference:   Intra-op Monitoring Plan: standard ASA monitors  Intra-op Monitoring Plan Comments:   Post Op Pain Control Plan: multimodal analgesia  Post Op Pain Control Plan Comments:   Induction:   IV  Beta Blocker:  Patient is not currently on a Beta-Blocker (No further documentation required).       Informed Consent: Patient  understands risks and agrees with Anesthesia plan.  Questions answered. Anesthesia consent signed with patient.  ASA Score: 2     Day of Surgery Review of History & Physical:    H&P update referred to the surgeon.         Ready For Surgery From Anesthesia Perspective.

## 2018-11-19 NOTE — ED NOTES
Patient presents to ER with complaints of intense abdominal pain (with Nausea and vomiting) that radiate down into his testicles.  Patient describes pain as sudden; starting 2 hours ago.  Patient is diaphoretic and appears very uncomfortable, guarding his abdominal regiion on initial assessment.  Patient states pain is around his umbilicus and travels down into bilateral testicles.  No testicular swellign or discoloration noted.   Patient has bilious emesis.        · Patient Identifiers for Sanchez Zarate checked and correct  · LOC: The patient is awake, alert and aware of environment with an appropriate affect.  Alert to person, place, time and situation.    · APPEARANCE: Patient resting comfortably with no acute distress noted, patient is clean and well groomed, patient's clothing is properly fastened.  · SKIN: The skin is warm and dry, patient has normal skin turgor and moist mucus membranes,no rashes or lesions.  Skin is Intact , No Breakdown Noted  · Musculoskeletal:  Normal range of motion noted. Moves all extremeties well, No swelling or tenderness noted  · RESPIRATORY: Airway is open and patent, respirations are spontaneous, patient has a normal effort, rate & depth with symmetrical expansion.  Bilateral Lungs Sounds are clear.  · CARDIAC: Patient has a normal rate and rhythm, no periphreal edema noted, capillary refill < 3 seconds.   · ABDOMEN: Patient complaints of umbilicus pain that travels down to his testicles.  Patient has report nausea and is actively vomiting.     · PULSES: 2+  And symmetrical in all extremeties  · NEUROLOGIC: Pupils PERRL & Reactive to light.  Facial expression is symmetrical, patient moving all extremities, normal sensation in all extremities when touched with a finger. The patient is awake, alert with calm affect, patient is aware of environment.   · PSYCHOLOGICAL:  Calm, cooperative and compliant.      Will continue to monitor patient for any acute changes.

## 2018-11-19 NOTE — ASSESSMENT & PLAN NOTE
43M with a history of nephrolithiasis who presents with left UPJ stone and leukocytosis    - place in observation for urology  - currently stable, will add on later today for OR for cystoscopy with left ureteral stent possible left RPG  - MIVF  - antiemetics  - PRN pain control  - scheduled toradol  - will set up outpatient ureteroscopy  - patient can likely be discharged following procedure

## 2018-11-19 NOTE — PLAN OF CARE
"CM to bedside - pt provided assessment info. Pt is independent w/ no DME in place, lives w/ spouse and 2 young children. Pt will likely d/c home w/ no needs.     CM provided patient anticipated SRAVAN which will be update by nursing staff. Patient provided a Blue "My Health Packet" for d/c planning and health tool. Patient verbalized understanding.     11/19/18 1137   Discharge Assessment   Assessment Type Discharge Planning Assessment   Confirmed/corrected address and phone number on facesheet? Yes   Assessment information obtained from? Patient   Expected Length of Stay (days) 1   Communicated expected length of stay with patient/caregiver yes   Prior to hospitilization cognitive status: Alert/Oriented   Prior to hospitalization functional status: Independent   Current cognitive status: Alert/Oriented   Current Functional Status: Independent   Facility Arrived From: N/A   Lives With spouse;child(sebastien), dependent   Able to Return to Prior Arrangements yes   Is patient able to care for self after discharge? Yes   Who are your caregiver(s) and their phone number(s)? spouse - Yanni [phone]   Patient's perception of discharge disposition home or selfcare   Readmission Within The Last 30 Days no previous admission in last 30 days   Patient currently being followed by outpatient case management? No   Patient currently receives any other outside agency services? No   Equipment Currently Used at Home none   Do you have any problems affording any of your prescribed medications? No   Is the patient taking medications as prescribed? yes   Does the patient have transportation home? Yes   Transportation Available car   Dialysis Name and Scheduled days N/A   Does the patient receive services at the [facility]? No   Discharge Plan A Home with family   Discharge Plan B Home with family;Home Health   Patient/Family In Agreement With Plan yes     "

## 2018-11-20 ENCOUNTER — PATIENT MESSAGE (OUTPATIENT)
Dept: UROLOGY | Facility: CLINIC | Age: 43
End: 2018-11-20

## 2018-11-20 VITALS
BODY MASS INDEX: 30.06 KG/M2 | DIASTOLIC BLOOD PRESSURE: 65 MMHG | SYSTOLIC BLOOD PRESSURE: 121 MMHG | HEIGHT: 70 IN | HEART RATE: 63 BPM | WEIGHT: 210 LBS | RESPIRATION RATE: 18 BRPM | OXYGEN SATURATION: 99 % | TEMPERATURE: 99 F

## 2018-11-20 PROCEDURE — G0378 HOSPITAL OBSERVATION PER HR: HCPCS

## 2018-11-20 PROCEDURE — 99024 POSTOP FOLLOW-UP VISIT: CPT | Mod: ,,, | Performed by: UROLOGY

## 2018-11-20 NOTE — TELEPHONE ENCOUNTER
----- Message from Yesenia Ramírez MA sent at 11/20/2018 11:55 AM CST -----  Contact: [phone]  Needs Advice    Reason for call: pt was seen by Dr Garcia yesterday through the e.r and had emergency  surgery (stents placed) he was told to call today about his follow up         Communication Preference: [phone]

## 2018-11-20 NOTE — DISCHARGE SUMMARY
[facility]  Urology  Discharge Summary      Patient Name: Sanchez Zarate  MRN: [id number]  Admission Date: 11/18/2018  Hospital Length of Stay: 0 days  Discharge Date and Time: 11/20/2018  1:18 AM  Attending Physician: Renee Garcia MD  Discharging Provider: Wilbert Wilkes MD  Primary Care Physician: Nixon Mcguire MD    HPI: Sanchez Zarate is a 43 y.o. male with a history of nephrolithiasis who presents to the ED with diffuse abdominal pain with associated testicular pain and associated nausea with multiple episodes of emesis. He denies flank pain, hematuria, dysuria, fevers or chills. Urology was consulted due to persistent pain and leukocytosis and UPJ stone.     His vitals have been stable. His WBC count is 16.2 and creatinine is 1.1 with baseline around 0.9-1.0. UA showed 49 RBCs, 2 WBCs, no bacteria. CT RSS shows 1.3 cm left UPJ stone with moderate hydro proximally. No stones on the left. He had right sided stones in the 7/2017 treated with ureteroscopy by Dr. Washington.      Procedure(s) (LRB):  CYSTOSCOPY, WITH URETERAL STENT INSERTION (Left)  PYELOGRAM, RETROGRADE (Left)     Indwelling Lines/Drains at time of discharge:   Lines/Drains/Airways          None          Hospital Course (synopsis of major diagnoses, care, treatment, and services provided during the course of the hospital stay): Pt presented to [facility] for the procedure described above.  Pt tolerated the procedure well in its entirety without issue.  For more details, please refer to op note.  Post-op, pt was transferred to the floor after a short stay in recovery.  Pt was started on regular diet and tolerated it well.  Pain was controlled with PO analgesics.  Pt was able to ambulate and urinate without issue. His vital signs remained stable and he was deemed stable for discharge.       Consults:   Consults (From admission, onward)        Status Ordering Provider     Inpatient consult to Urology  Once     Provider:  (Not yet assigned)     Completed ROBBIN DOYLE          Significant Diagnostic Studies: none    Pending Diagnostic Studies:     None          Final Active Diagnoses:    Diagnosis Date Noted POA    PRINCIPAL PROBLEM:  Nephrolithiasis [N20.0] 07/14/2017 Yes    Abdominal pain [R10.9] 11/19/2018 Yes    Lumbar disc herniation [M51.26] 04/09/2018 Yes    Peyronie's disease [N48.6] 05/04/2017 Yes    Left lumbar radiculopathy [M54.16] 01/19/2015 Yes    Chronic low back pain [M54.5, G89.29] 05/01/2014 Yes      Problems Resolved During this Admission:       Discharged Condition: good    Disposition: Home or Self Care    Follow Up:  Follow-up Information     Renee Garcia MD In 2 weeks.    Specialty:  Urology  Contact information:  [address] [facility] [address]  [phone]                 Patient Instructions:      Call MD for:  temperature >100.4     Call MD for:  persistent nausea and vomiting or diarrhea     Call MD for:  severe uncontrolled pain     Call MD for:  difficulty breathing or increased cough     Call MD for:  severe persistent headache     Call MD for:  persistent dizziness, light-headedness, or visual disturbances     Call MD for:  increased confusion or weakness     No dressing needed     Call MD for:  persistent nausea and vomiting or diarrhea     Call MD for:  severe uncontrolled pain     Call MD for:  persistent dizziness, light-headedness, or visual disturbances     Call MD for:   Order Comments: Temperature > 101F     Activity as tolerated     Medications:  Reconciled Home Medications:      Medication List      START taking these medications    tamsulosin 0.4 mg Cap  Commonly known as:  FLOMAX  Take 1 capsule (0.4 mg total) by mouth every evening.        CHANGE how you take these medications    meloxicam 15 MG tablet  Commonly known as:  MOBIC  Take 1 tablet (15 mg total) by mouth once daily. Take with food.  What changed:    · when to take this  · additional instructions     oxyCODONE-acetaminophen   mg per tablet  Commonly known as:  PERCOCET  Take 1 tablet by mouth every 6 (six) hours as needed for Pain.  What changed:    · when to take this  · Another medication with the same name was removed. Continue taking this medication, and follow the directions you see here.        CONTINUE taking these medications    docusate sodium 100 MG capsule  Commonly known as:  COLACE  Take 1 capsule (100 mg total) by mouth 2 (two) times daily as needed for Constipation.     gabapentin 600 MG tablet  Commonly known as:  NEURONTIN  Take 1 tablet (600 mg total) by mouth 3 (three) times daily.        STOP taking these medications    HYDROcodone-acetaminophen 5-325 mg per tablet  Commonly known as:  NORCO            Time spent on the discharge of patient: 15 minutes    Wilbert Wilkes MD  Urology  [facility]    Agree with the above.

## 2018-11-20 NOTE — PROGRESS NOTES
Pt stated that he is going to Uber home upon discharge. Paged on-call to notify of pt's decision; awaiting return response. Will continue to monitor.

## 2018-11-20 NOTE — PROGRESS NOTES
Pt requesting to drive himself home upon discharge. Spoke with Dr. Wilkes with discharge update; MD advised pt against driving himself home, but pt is okay to leave if he finds a ride. Will continue to monitor.

## 2018-11-20 NOTE — ANESTHESIA POSTPROCEDURE EVALUATION
"Anesthesia Post Evaluation    Patient: Sanchez Zarate    Procedure(s) Performed: Procedure(s) (LRB):  CYSTOSCOPY, WITH URETERAL STENT INSERTION (Left)  PYELOGRAM, RETROGRADE (Left)    Final Anesthesia Type: general  Patient location during evaluation: PACU  Patient participation: Yes- Able to Participate  Level of consciousness: awake and alert  Post-procedure vital signs: reviewed and stable  Pain management: adequate  Airway patency: patent  PONV status at discharge: No PONV  Anesthetic complications: no      Cardiovascular status: blood pressure returned to baseline  Respiratory status: unassisted, spontaneous ventilation and room air  Hydration status: euvolemic  Follow-up not needed.        Visit Vitals  /65 (BP Location: Right arm, Patient Position: Lying)   Pulse 63   Temp 37.3 °C (99.1 °F) (Oral)   Resp 18   Ht 5' 10" (1.778 m)   Wt 95.3 kg (210 lb)   SpO2 99%   BMI 30.13 kg/m²       Pain/Tasha Score: Pain Assessment Performed: Yes (11/19/2018  4:00 PM)  Presence of Pain: denies (11/19/2018  4:00 PM)  Pain Rating Prior to Med Admin: 0 (11/19/2018 10:32 PM)  Tasha Score: 10 (11/19/2018  2:18 PM)        "

## 2018-11-20 NOTE — PROGRESS NOTES
Spoke with Dr. Wilkes re: pt's decision to Uber home; MD hesitant to allow pt to order Uber, but is authorizing as pt has not had narcotics or anesthesia recently. Will continue to monitor.

## 2018-11-20 NOTE — PLAN OF CARE
Problem: Patient Care Overview  Goal: Plan of Care Review  Outcome: Ongoing (interventions implemented as appropriate)  Pt aaox4, vs stable, no falls or injuries. Fall precautions in place w/ personal items near by. Pain level being monitor and control by medication. No signs of infection or distress.Walked in halls. Voiding without difficulty. Call light in reach; will continue to monitor pt.

## 2018-11-20 NOTE — PLAN OF CARE
Problem: Patient Care Overview  Goal: Plan of Care Review  Outcome: Outcome(s) achieved Date Met: 11/20/18  Pt discharged per MD order. Pt made aware of Dr. Wilkes's concern for pt to drive himself home; verbalized understanding. Pt stated that he will Uber home upon discharge. Discharge instructions reviewed with pt and IV removed.

## 2018-11-21 ENCOUNTER — PATIENT MESSAGE (OUTPATIENT)
Dept: UROLOGY | Facility: CLINIC | Age: 43
End: 2018-11-21

## 2018-11-27 ENCOUNTER — HOSPITAL ENCOUNTER (OUTPATIENT)
Dept: RADIOLOGY | Facility: HOSPITAL | Age: 43
Discharge: HOME | End: 2018-11-27
Attending: UROLOGY
Payer: COMMERCIAL

## 2018-11-27 ENCOUNTER — TELEPHONE (OUTPATIENT)
Dept: UROLOGY | Facility: CLINIC | Age: 43
End: 2018-11-27

## 2018-11-27 ENCOUNTER — OFFICE VISIT (OUTPATIENT)
Dept: UROLOGY | Facility: CLINIC | Age: 43
End: 2018-11-27
Payer: COMMERCIAL

## 2018-11-27 ENCOUNTER — PATIENT MESSAGE (OUTPATIENT)
Dept: UROLOGY | Facility: CLINIC | Age: 43
End: 2018-11-27

## 2018-11-27 VITALS — RESPIRATION RATE: 16 BRPM | HEIGHT: 70 IN | WEIGHT: 210.13 LBS | BODY MASS INDEX: 30.08 KG/M2

## 2018-11-27 DIAGNOSIS — N20.0 RENAL CALCULUS, LEFT: Primary | ICD-10-CM

## 2018-11-27 DIAGNOSIS — N20.0 KIDNEY STONES: Primary | ICD-10-CM

## 2018-11-27 DIAGNOSIS — N20.0 RENAL CALCULUS, LEFT: ICD-10-CM

## 2018-11-27 DIAGNOSIS — N20.1 URETERAL STONE: Primary | ICD-10-CM

## 2018-11-27 PROCEDURE — 99999 PR PBB SHADOW E&M-EST. PATIENT-LVL III: CPT | Mod: PBBFAC,,, | Performed by: UROLOGY

## 2018-11-27 PROCEDURE — 99214 OFFICE O/P EST MOD 30 MIN: CPT | Mod: S$GLB,,, | Performed by: UROLOGY

## 2018-11-27 PROCEDURE — 74018 RADEX ABDOMEN 1 VIEW: CPT | Mod: 26,,, | Performed by: RADIOLOGY

## 2018-11-27 PROCEDURE — 74018 RADEX ABDOMEN 1 VIEW: CPT | Mod: TC

## 2018-11-27 PROCEDURE — 3008F BODY MASS INDEX DOCD: CPT | Mod: CPTII,S$GLB,, | Performed by: UROLOGY

## 2018-11-27 RX ORDER — POTASSIUM CITRATE 10 MEQ/1
20 TABLET, EXTENDED RELEASE ORAL
Qty: 180 TABLET | Refills: 11 | Status: SHIPPED | OUTPATIENT
Start: 2018-11-27 | End: 2019-01-09

## 2018-11-27 NOTE — TELEPHONE ENCOUNTER
----- Message from Bhupinder Washington Jr., MD sent at 11/27/2018 12:51 PM CST -----  No stone seen  Uric acid  Keep taking urocit k and keep appt for left urs

## 2018-11-27 NOTE — PROGRESS NOTES
Subjective:       Patient ID: Sanchez Zarate is a 43 y.o. male.    Chief Complaint: Nephrolithiasis (er/rss)    HPI    Sanchez Zarate is a 43 y.o. male with PMHx of kidney stones here for management and evaluation of nephrolithiasis. Patient went to the ED for kidney stones and had a cystoscopy with left ureteral stent placement on 11/19/18. CT RSS shows a 1.3 cm left ureteropelvic junction stone with mild to moderate obstructive uropathy. In the past, patient unsuccessfully tried ESWL, so then he had a ureteroscopy. Patient has enough pain medicine. Stone analysis shows 100% uric acid stones. He is taking Flomax. AUA symptom score is 33.  Patient did not respond well to alkalinization in the past.  We discussed the pros and cons of left PCNL versus ureteroscopy and he would like to have ureteroscopy    Past Medical History:   Diagnosis Date    Chronic back pain     Chronic diarrhea     Hernia     TBI (traumatic brain injury) 2010    fake wall fell on head (no brain or skull injury per pt)       Past Surgical History:   Procedure Laterality Date    COLONOSCOPY N/A 5/29/2017    Procedure: COLONOSCOPY;  Surgeon: Sukhi Scanlon MD;  Location: [facility] (4TH FLR);  Service: Endoscopy;  Laterality: N/A;    COLONOSCOPY N/A 5/29/2017    Performed by Sukhi Scanlon MD at [facility] (4TH FLR)    CYSTOSCOPY N/A 7/28/2017    Performed by Bhupinder Washington Jr., MD at [facility] OR 1ST FLR    CYSTOSCOPY W/ URETERAL STENT PLACEMENT Left 11/19/2018    Procedure: CYSTOSCOPY, WITH URETERAL STENT INSERTION;  Surgeon: Renee Garcia MD;  Location: [facility] OR 2ND [facility];  Service: Urology;  Laterality: Left;    CYSTOSCOPY WITH STENT PLACEMENT Right 7/14/2017    Performed by Bhupinder Washington Jr., MD at [facility] OR 1ST FLR    CYSTOSCOPY, WITH URETERAL STENT INSERTION Left 11/19/2018    Performed by Reene Garcia MD at [facility] OR 2ND FLR    EXTRACTION - STONE Right 7/28/2017    Performed by Bhupinder Washington Jr., MD at [facility] OR [address] [place] [address]     HERNIA REPAIR  2017    umbilical    INJECTION-STEROID-EPIDURAL-CAUDAL N/A 8/17/2017    [facility]    INJECTION-STEROID-EPIDURAL-TRANSFORAMINAL Left 6/12/2017    Performed by [facility] at [facility] PAIN MGT    LITHOTRIPSY-EXTRACORPOREAL SHOCK WAVE Right 7/14/2017    Performed by Bhupinder Washington Jr., MD at [facility] OR [address] [place] [address]    LITHOTRIPSY-LASER Right 7/28/2017    Performed by Bhupinder Washington Jr., MD at [facility] OR [address] [place] [address]    LUMBAR EPIDURAL INJECTION      with steroid    MICRODISCECTOMY L5/S1 -- Prone -- SSEP/EMG N/A 4/12/2018    Performed by Lino Fortune MD at [facility] OR [address] [place] [address]    PLACEMENT-STENT URETERAL Right 7/28/2017    [facility]    PYELOGRAM, RETROGRADE Left 11/19/2018    [facility]    REPAIR-HERNIA-UMBILICAL N/A 2/13/2017    Performed by Nomi Robb MD at [facility] OR 2ND FLR    RETROGRADE PYELOGRAPHY Left 11/19/2018    Procedure: PYELOGRAM, RETROGRADE;  Surgeon: Renee Garcia MD;  Location: [facility] OR [address] [place] [address];  Service: Urology;  Laterality: Left;    URETEROSCOPY Right 7/28/2017    Performed by Bhupinder Washington Jr., MD at [facility] OR [address] [place] [address]       Family History   Problem Relation Age of Onset    Diabetes Mother     Heart attack Neg Hx     Heart disease Neg Hx     Hypertension Neg Hx     Colon cancer Neg Hx     Esophageal cancer Neg Hx        Social History     Socioeconomic History    Marital status:      Spouse name: Not on file    Number of children: Not on file    Years of education: Not on file    Highest education level: Not on file   Social Needs    Financial resource strain: Not on file    Food insecurity - worry: Not on file    Food insecurity - inability: Not on file    Transportation needs - medical: Not on file    Transportation needs - non-medical: Not on file   Occupational History    Occupation: self employed   Tobacco Use    Smoking  status: Former Smoker     Last attempt to quit: 2007     Years since quittin.9    Smokeless tobacco: Never Used   Substance and Sexual Activity    Alcohol use: Yes     Comment: occasional    Drug use: No    Sexual activity: Yes     Partners: Female   Other Topics Concern    Not on file   Social History Narrative    Not on file       Allergies:  Patient has no known allergies.    Medications:    Current Outpatient Medications:     docusate sodium (COLACE) 100 MG capsule, Take 1 capsule (100 mg total) by mouth 2 (two) times daily as needed for Constipation., Disp: 60 capsule, Rfl: 0    gabapentin (NEURONTIN) 600 MG tablet, Take 1 tablet (600 mg total) by mouth 3 (three) times daily., Disp: 90 tablet, Rfl: 2    meloxicam (MOBIC) 15 MG tablet, Take 1 tablet (15 mg total) by mouth once daily. Take with food. (Patient taking differently: Take 15 mg by mouth 2 (two) times daily with meals. Take with food.), Disp: 90 tablet, Rfl: 0    oxyCODONE-acetaminophen (PERCOCET)  mg per tablet, Take 1 tablet by mouth every 6 (six) hours as needed for Pain., Disp: 15 tablet, Rfl: 0    tamsulosin (FLOMAX) 0.4 mg Cap, Take 1 capsule (0.4 mg total) by mouth every evening., Disp: 30 capsule, Rfl: 0    Review of Systems   Constitutional: Negative for activity change, appetite change, chills, diaphoresis, fatigue, fever and unexpected weight change.   HENT: Negative for congestion, dental problem, hearing loss, mouth sores, postnasal drip, rhinorrhea, sinus pressure and trouble swallowing.    Eyes: Negative for pain, discharge and itching.   Respiratory: Negative for apnea, cough, choking, chest tightness, shortness of breath and wheezing.    Cardiovascular: Negative for chest pain, palpitations and leg swelling.   Gastrointestinal: Negative for abdominal distention, abdominal pain, anal bleeding, blood in stool, constipation, diarrhea, nausea, rectal pain and vomiting.   Endocrine: Negative for polydipsia and  polyuria.   Genitourinary: Negative for decreased urine volume, difficulty urinating, discharge, dysuria, enuresis, flank pain, frequency, genital sores, hematuria, penile pain, penile swelling and scrotal swelling.   Musculoskeletal: Negative for arthralgias, back pain and myalgias.   Skin: Negative for color change, rash and wound.   Neurological: Negative for dizziness, syncope, speech difficulty, light-headedness and headaches.   Hematological: Negative for adenopathy. Does not bruise/bleed easily.   Psychiatric/Behavioral: Negative for behavioral problems, confusion and sleep disturbance.       Objective:      Physical Exam   Constitutional: He appears well-developed.   HENT:   Head: Normocephalic.   Neck: Neck supple.   Cardiovascular: Normal rate.    Pulmonary/Chest: Effort normal.   Abdominal: Soft.   Genitourinary:   Genitourinary Comments: Urine dip shows blood present.   Neurological: He is alert.   Skin: Skin is warm.     Psychiatric: He has a normal mood and affect.       Procedures:    Date: 11/19/2018  Procedure(s) Performed:    1. Cystoscopy with left ureteral JJ stent placement  2. Left RPG  3. Fluoroscopy < 1 h  Findings:    - stone not clearly radiopaque  - retrograde pyelogram on the left demonstrated no hydronephrosis, collected system mapped  - JJ ureteral stent placed in standard fashion  Drains:  6 [place] x 22 cm left JJ ureteral stent without strings    Imaging:    CT RSS 11/19/18  1.3 cm left ureteropelvic junction stone with mild to moderate obstructive uropathy.    Two additional punctate nonobstructing stones in the left kidney.    Hepatosplenomegaly and hepatic steatosis.    Small hiatal hernia.    Assessment:       No diagnosis found.    Plan:       There are no diagnoses linked to this encounter.      Reviewed CT RSS with patient showing a 1.3 cm left UPJ stone with mild to moderate obstructive uropathy.  Schedule KUB and will phone review results with patient.  Keep ureteral stent in  until ureteroscopy.  Schedule ureteroscopy.  Recommended taking urocit-K.  Patient has enough pain medication at this time.    The patient is scheduled for ureteroscopy. The risks and benefits of ureteroscopy were discussed with the patient in detail.  Consent was obtained.  The risks include but are not limited to burning with urination, bleeding, infection, pain, incomplete fragmentation of the stone, need for further procedures, injury to the kidney, ureter, bladder and need for open surgery.  The patient was informed that they may require a ureteral stent and that stents can cause irritative voiding symptoms.  They also understand that ureteral stents are temporary and must be removed or exchanged in a timely fashion as they can calcify and make more stones and become difficult to remove. Alternative treatments were also discussed with the patient in detail to include ESWL, percutaneous treatment of the stone, open surgery or observation. Patient understands these risks and has agreed to proceed with surgery.    Micah GOMEZ, am acting as a scribe on this patient encounter in the presence and under the supervision of Dr. Washington.    11/27/2018 7:10 AM    The patient is scheduled for ureteroscopy. The risks and benefits of ureteroscopy were discussed with the patient in detail.  Consent was obtained.  The risks include but are not limited to burning with urination, bleeding, infection, pain, incomplete fragmentation of the stone, need for further procedures, injury to the kidney, ureter, bladder and need for open surgery.  The patient was informed that they may require a ureteral stent and that stents can cause irritative voiding symptoms.  They also understand that ureteral stents are temporary and must be removed or exchanged in a timely fashion as they can calcify and make more stones and become difficult to remove. Alternative treatments were also discussed with the patient in detail to include ESWL,  percutaneous treatment of the stone, open surgery or observation. Patient understands these risks and has agreed to proceed with surgery.    I, Dr. Washington, personally performed the services described in this documentation.   All medical record entries made by the scribe were at my direction and in my presence.   I have reviewed the chart and agree that the record is accurate and complete.   Bhupinder Washington MD.  7:22 AM 11/27/2018

## 2018-11-27 NOTE — H&P (VIEW-ONLY)
Subjective:       Patient ID: Sanchez Zarate is a 43 y.o. male.    Chief Complaint: Nephrolithiasis (er/rss)    HPI    Sanchez Zarate is a 43 y.o. male with PMHx of kidney stones here for management and evaluation of nephrolithiasis. Patient went to the ED for kidney stones and had a cystoscopy with left ureteral stent placement on 11/19/18. CT RSS shows a 1.3 cm left ureteropelvic junction stone with mild to moderate obstructive uropathy. In the past, patient unsuccessfully tried ESWL, so then he had a ureteroscopy. Patient has enough pain medicine. Stone analysis shows 100% uric acid stones. He is taking Flomax. AUA symptom score is 33.  Patient did not respond well to alkalinization in the past.  We discussed the pros and cons of left PCNL versus ureteroscopy and he would like to have ureteroscopy    Past Medical History:   Diagnosis Date    Chronic back pain     Chronic diarrhea     Hernia     TBI (traumatic brain injury) 2010    fake wall fell on head (no brain or skull injury per pt)       Past Surgical History:   Procedure Laterality Date    COLONOSCOPY N/A 5/29/2017    Procedure: COLONOSCOPY;  Surgeon: Sukhi Scanlon MD;  Location: [facility] (4TH FLR);  Service: Endoscopy;  Laterality: N/A;    COLONOSCOPY N/A 5/29/2017    Performed by Sukhi Scanlon MD at [facility] (4TH FLR)    CYSTOSCOPY N/A 7/28/2017    Performed by Bhupinder Washington Jr., MD at [facility] OR 1ST FLR    CYSTOSCOPY W/ URETERAL STENT PLACEMENT Left 11/19/2018    Procedure: CYSTOSCOPY, WITH URETERAL STENT INSERTION;  Surgeon: Renee Garcia MD;  Location: [facility] OR 2ND [facility];  Service: Urology;  Laterality: Left;    CYSTOSCOPY WITH STENT PLACEMENT Right 7/14/2017    Performed by Bhupinder Washington Jr., MD at [facility] OR 1ST FLR    CYSTOSCOPY, WITH URETERAL STENT INSERTION Left 11/19/2018    Performed by Renee Garcia MD at [facility] OR 2ND FLR    EXTRACTION - STONE Right 7/28/2017    Performed by Bhupinder Washington Jr., MD at [facility] OR [address] [place] [address]     HERNIA REPAIR  2017    umbilical    INJECTION-STEROID-EPIDURAL-CAUDAL N/A 8/17/2017    [facility]    INJECTION-STEROID-EPIDURAL-TRANSFORAMINAL Left 6/12/2017    Performed by [facility] at [facility] PAIN MGT    LITHOTRIPSY-EXTRACORPOREAL SHOCK WAVE Right 7/14/2017    Performed by Bhupinder Washington Jr[facility] MD at [facility] OR [address] [place] [address]    LITHOTRIPSY-LASER Right 7/28/2017    Performed by Bhupinder Washington Jr., MD at [facility] OR [address] [place] [address]    LUMBAR EPIDURAL INJECTION      with steroid    MICRODISCECTOMY L5/S1 -- Prone -- SSEP/EMG N/A 4/12/2018    Performed by Lino Fortune MD at [facility] OR [address] [place] [address]    PLACEMENT-STENT URETERAL Right 7/28/2017    [facility]    PYELOGRAM, RETROGRADE Left 11/19/2018    [facility]    REPAIR-HERNIA-UMBILICAL N/A 2/13/2017    Performed by Nomi Robb MD at [facility] OR 2ND FLR    RETROGRADE PYELOGRAPHY Left 11/19/2018    Procedure: PYELOGRAM, RETROGRADE;  Surgeon: Renee Garcia MD;  Location: [facility] OR [address] [place] [address];  Service: Urology;  Laterality: Left;    URETEROSCOPY Right 7/28/2017    Performed by Bhupinder Washington Jr., MD at [facility] OR [address] [place] [address]       Family History   Problem Relation Age of Onset    Diabetes Mother     Heart attack Neg Hx     Heart disease Neg Hx     Hypertension Neg Hx     Colon cancer Neg Hx     Esophageal cancer Neg Hx        Social History     Socioeconomic History    Marital status:      Spouse name: Not on file    Number of children: Not on file    Years of education: Not on file    Highest education level: Not on file   Social Needs    Financial resource strain: Not on file    Food insecurity - worry: Not on file    Food insecurity - inability: Not on file    Transportation needs - medical: Not on file    Transportation needs - non-medical: Not on file   Occupational History    Occupation: self employed   Tobacco Use    Smoking  status: Former Smoker     Last attempt to quit: 2007     Years since quittin.9    Smokeless tobacco: Never Used   Substance and Sexual Activity    Alcohol use: Yes     Comment: occasional    Drug use: No    Sexual activity: Yes     Partners: Female   Other Topics Concern    Not on file   Social History Narrative    Not on file       Allergies:  Patient has no known allergies.    Medications:    Current Outpatient Medications:     docusate sodium (COLACE) 100 MG capsule, Take 1 capsule (100 mg total) by mouth 2 (two) times daily as needed for Constipation., Disp: 60 capsule, Rfl: 0    gabapentin (NEURONTIN) 600 MG tablet, Take 1 tablet (600 mg total) by mouth 3 (three) times daily., Disp: 90 tablet, Rfl: 2    meloxicam (MOBIC) 15 MG tablet, Take 1 tablet (15 mg total) by mouth once daily. Take with food. (Patient taking differently: Take 15 mg by mouth 2 (two) times daily with meals. Take with food.), Disp: 90 tablet, Rfl: 0    oxyCODONE-acetaminophen (PERCOCET)  mg per tablet, Take 1 tablet by mouth every 6 (six) hours as needed for Pain., Disp: 15 tablet, Rfl: 0    tamsulosin (FLOMAX) 0.4 mg Cap, Take 1 capsule (0.4 mg total) by mouth every evening., Disp: 30 capsule, Rfl: 0    Review of Systems   Constitutional: Negative for activity change, appetite change, chills, diaphoresis, fatigue, fever and unexpected weight change.   HENT: Negative for congestion, dental problem, hearing loss, mouth sores, postnasal drip, rhinorrhea, sinus pressure and trouble swallowing.    Eyes: Negative for pain, discharge and itching.   Respiratory: Negative for apnea, cough, choking, chest tightness, shortness of breath and wheezing.    Cardiovascular: Negative for chest pain, palpitations and leg swelling.   Gastrointestinal: Negative for abdominal distention, abdominal pain, anal bleeding, blood in stool, constipation, diarrhea, nausea, rectal pain and vomiting.   Endocrine: Negative for polydipsia and  polyuria.   Genitourinary: Negative for decreased urine volume, difficulty urinating, discharge, dysuria, enuresis, flank pain, frequency, genital sores, hematuria, penile pain, penile swelling and scrotal swelling.   Musculoskeletal: Negative for arthralgias, back pain and myalgias.   Skin: Negative for color change, rash and wound.   Neurological: Negative for dizziness, syncope, speech difficulty, light-headedness and headaches.   Hematological: Negative for adenopathy. Does not bruise/bleed easily.   Psychiatric/Behavioral: Negative for behavioral problems, confusion and sleep disturbance.       Objective:      Physical Exam   Constitutional: He appears well-developed.   HENT:   Head: Normocephalic.   Neck: Neck supple.   Cardiovascular: Normal rate.    Pulmonary/Chest: Effort normal.   Abdominal: Soft.   Genitourinary:   Genitourinary Comments: Urine dip shows blood present.   Neurological: He is alert.   Skin: Skin is warm.     Psychiatric: He has a normal mood and affect.       Procedures:    Date: 11/19/2018  Procedure(s) Performed:    1. Cystoscopy with left ureteral JJ stent placement  2. Left RPG  3. Fluoroscopy < 1 h  Findings:    - stone not clearly radiopaque  - retrograde pyelogram on the left demonstrated no hydronephrosis, collected system mapped  - JJ ureteral stent placed in standard fashion  Drains:  6 Greek x 22 cm left JJ ureteral stent without strings    Imaging:    CT RSS 11/19/18  1.3 cm left ureteropelvic junction stone with mild to moderate obstructive uropathy.    Two additional punctate nonobstructing stones in the left kidney.    Hepatosplenomegaly and hepatic steatosis.    Small hiatal hernia.    Assessment:       No diagnosis found.    Plan:       There are no diagnoses linked to this encounter.      Reviewed CT RSS with patient showing a 1.3 cm left UPJ stone with mild to moderate obstructive uropathy.  Schedule KUB and will phone review results with patient.  Keep ureteral stent in  until ureteroscopy.  Schedule ureteroscopy.  Recommended taking urocit-K.  Patient has enough pain medication at this time.    The patient is scheduled for ureteroscopy. The risks and benefits of ureteroscopy were discussed with the patient in detail.  Consent was obtained.  The risks include but are not limited to burning with urination, bleeding, infection, pain, incomplete fragmentation of the stone, need for further procedures, injury to the kidney, ureter, bladder and need for open surgery.  The patient was informed that they may require a ureteral stent and that stents can cause irritative voiding symptoms.  They also understand that ureteral stents are temporary and must be removed or exchanged in a timely fashion as they can calcify and make more stones and become difficult to remove. Alternative treatments were also discussed with the patient in detail to include ESWL, percutaneous treatment of the stone, open surgery or observation. Patient understands these risks and has agreed to proceed with surgery.    Micah GOMEZ, am acting as a scribe on this patient encounter in the presence and under the supervision of Dr. Washington.    11/27/2018 7:10 AM    The patient is scheduled for ureteroscopy. The risks and benefits of ureteroscopy were discussed with the patient in detail.  Consent was obtained.  The risks include but are not limited to burning with urination, bleeding, infection, pain, incomplete fragmentation of the stone, need for further procedures, injury to the kidney, ureter, bladder and need for open surgery.  The patient was informed that they may require a ureteral stent and that stents can cause irritative voiding symptoms.  They also understand that ureteral stents are temporary and must be removed or exchanged in a timely fashion as they can calcify and make more stones and become difficult to remove. Alternative treatments were also discussed with the patient in detail to include ESWL,  percutaneous treatment of the stone, open surgery or observation. Patient understands these risks and has agreed to proceed with surgery.    I, Dr. Washington, personally performed the services described in this documentation.   All medical record entries made by the scribe were at my direction and in my presence.   I have reviewed the chart and agree that the record is accurate and complete.   Bhupinder Washington MD.  7:22 AM 11/27/2018

## 2018-11-28 ENCOUNTER — TELEPHONE (OUTPATIENT)
Dept: UROLOGY | Facility: CLINIC | Age: 43
End: 2018-11-28

## 2018-11-28 NOTE — TELEPHONE ENCOUNTER
I have sent out the patients surgery instructions and also his 3wk follow up along with his us appt in the mail 11-28-18

## 2018-12-07 ENCOUNTER — ANESTHESIA EVENT (OUTPATIENT)
Dept: SURGERY | Facility: HOSPITAL | Age: 43
End: 2018-12-07
Payer: COMMERCIAL

## 2018-12-07 NOTE — ANESTHESIA PREPROCEDURE EVALUATION
Yuli Daley RN   Registered Nurse      Pre Admission Screening   Signed                             []Hide copied text    []Hover for details      Anesthesia Assessment: Preoperative EQUATION     Planned Procedure: Procedure(s) (LRB):  URETEROSCOPY (Left)  LITHOTRIPSY, USING LASER (mana) (Left)  Requested Anesthesia Type:General  Surgeon: Bhupinder Washington Jr., MD  Service: Urology  Known or anticipated Date of Surgery:12/21/2018     Surgeon notes: reviewed     Electronic QUestionnaire Assessment completed via nurse interview with patient.         No AQ     Triage considerations:      The patient has no apparent active cardiac condition (No unstable coronary Syndrome such as severe unstable angina or recent [<1 month] myocardial infarction, decompensated CHF, severe valvular   disease or significant arrhythmia)     Previous anesthesia records:GETA, MAC and No problems   11/19/18 cystoscopy:  Airway/Jaw/Neck:  Airway Findings: Mouth Opening: Normal Tongue: Normal  General Airway Assessment: Adult  Mallampati: I  TM Distance: Normal, at least 6 cm  Jaw/Neck Findings:  Neck ROM: Normal ROM      Dental:  Dental Findings: In tact         Last PCP note: outside [facility]   Subspecialty notes: Spine services     Other important co-morbidities: none     Tests already available:  Available tests,  within 3 months . 11/18/18 CMP, CBC, EKG.                            Instructions given. (See in Nurse's note)     Optimization:  Anesthesia Preop Clinic Assessment  Indicated-not required for this procedure                   Plan:    Testing:  none                           Patient  has previously scheduled Medical Appointment: none     Navigation:              Straight Line to surgery.                          No tests, anesthesia preop clinic visit, or consult required.                                                           Electronically signed by Yuli Daley RN at 12/7/2018  3:04 PM       Pre-admit on 12/21/2018             Detailed Report                                                                                                                         12/07/2018  Sanchez Zarate is a 43 y.o., male.    Anesthesia Evaluation    I have reviewed the Patient Summary Reports.    I have reviewed the Nursing Notes.   I have reviewed the Medications.     Review of Systems  Anesthesia Hx:  No problems with previous Anesthesia  History of prior surgery of interest to airway management or planning: Previous anesthesia: MAC  11/19/18 ureteroscopy with MAC.  Procedure performed at an [facility]. Denies Family Hx of Anesthesia complications.   Denies Personal Hx of Anesthesia complications.   Social:  Former Smoker Quit over 11 years ago   Hematology/Oncology:  Hematology Normal   Oncology Normal     EENT/Dental:EENT/Dental Normal   Cardiovascular:  Cardiovascular Normal Exercise tolerance: good   Denies Angina.    Pulmonary:  Pulmonary Normal  Denies Shortness of breath.  Denies Recent URI.    Renal/:   Chronic Renal Disease renal calculi    Hepatic/GI:  Hepatic/GI Normal    Musculoskeletal:   Arthritis   Musculoskeletal General/Symptoms: Functional capacity is ambulatory without assistance.  Spine Disorders:  Lumbar Spine Disorders, Lumbar Disc Disease Hx of microdiskectomy 4/2018  Neurological:   Neuromuscular Disease, TBI  Chronic Pain Syndrome Head Injury HX of TBI 2010-no residual   Endocrine:  Endocrine Normal    Psych:  Psychiatric Normal           Physical Exam  General:  Well nourished    Airway/Jaw/Neck:  Airway Findings: Mouth Opening: Normal Tongue: Normal  General Airway Assessment: Adult, Average  Mallampati: III  Improves to II with phonation.  TM Distance: Normal, at least 6 cm        Eyes/Ears/Nose:  EYES/EARS/NOSE FINDINGS: Normal   Dental:  Dental Findings: In tact   Chest/Lungs:  Chest/Lungs Findings: Clear to auscultation, Normal Respiratory Rate     Heart/Vascular:  Heart Findings: Rate: Normal  Rhythm:  Regular Rhythm  Sounds: Normal  Heart murmur: negative Vascular Findings: Normal    Abdomen:  Abdomen Findings: Normal    Musculoskeletal:  Musculoskeletal Findings: Normal   Skin:  Skin Findings: Normal    Mental Status:  Mental Status Findings:  Cooperative, Alert and Oriented         Anesthesia Plan  Type of Anesthesia, risks & benefits discussed:  Anesthesia Type:  general  Patient's Preference:   Intra-op Monitoring Plan: standard ASA monitors  Intra-op Monitoring Plan Comments:   Post Op Pain Control Plan:   Post Op Pain Control Plan Comments:   Induction:   IV  Beta Blocker:  Patient is not currently on a Beta-Blocker (No further documentation required).       Informed Consent: Patient understands risks and agrees with Anesthesia plan.  Questions answered. Anesthesia consent signed with patient.  ASA Score: 2     Day of Surgery Review of History & Physical:            Ready For Surgery From Anesthesia Perspective.

## 2018-12-07 NOTE — PRE ADMISSION SCREENING
Anesthesia Assessment: Preoperative EQUATION    Planned Procedure: Procedure(s) (LRB):  URETEROSCOPY (Left)  LITHOTRIPSY, USING LASER (mana) (Left)  Requested Anesthesia Type:General  Surgeon: Bhupinder Washington Jr., MD  Service: Urology  Known or anticipated Date of Surgery:12/21/2018    Surgeon notes: reviewed    Electronic QUestionnaire Assessment completed via nurse interview with patient.        No AQ    Triage considerations:     The patient has no apparent active cardiac condition (No unstable coronary Syndrome such as severe unstable angina or recent [<1 month] myocardial infarction, decompensated CHF, severe valvular   disease or significant arrhythmia)    Previous anesthesia records:GETA, MAC and No problems   11/19/18 cystoscopy:  Airway/Jaw/Neck:  Airway Findings: Mouth Opening: Normal Tongue: Normal  General Airway Assessment: Adult  Mallampati: I  TM Distance: Normal, at least 6 cm  Jaw/Neck Findings:  Neck ROM: Normal ROM      Dental:  Dental Findings: In tact       Last PCP note: outside [facility]   Subspecialty notes: Spine services    Other important co-morbidities: none     Tests already available:  Available tests,  within 3 months . 11/18/18 CMP, CBC, EKG.            Instructions given. (See in Nurse's note)    Optimization:  Anesthesia Preop Clinic Assessment  Indicated-not required for this procedure        Plan:    Testing:  none     Patient  has previously scheduled Medical Appointment: none    Navigation:             Straight Line to surgery.               No tests, anesthesia preop clinic visit, or consult required.

## 2018-12-20 ENCOUNTER — TELEPHONE (OUTPATIENT)
Dept: UROLOGY | Facility: CLINIC | Age: 43
End: 2018-12-20

## 2018-12-20 NOTE — TELEPHONE ENCOUNTER
Called pt to confirm arrival time 1230pm for procedure. Gave pt NPO instructions and gave pt opportunity to ask questions. Pt verbalized understanding.

## 2018-12-21 ENCOUNTER — HOSPITAL ENCOUNTER (OUTPATIENT)
Facility: HOSPITAL | Age: 43
Discharge: HOME | End: 2018-12-21
Attending: UROLOGY | Admitting: UROLOGY
Payer: COMMERCIAL

## 2018-12-21 ENCOUNTER — ANESTHESIA (OUTPATIENT)
Dept: SURGERY | Facility: HOSPITAL | Age: 43
End: 2018-12-21
Payer: COMMERCIAL

## 2018-12-21 VITALS
WEIGHT: 210.13 LBS | DIASTOLIC BLOOD PRESSURE: 70 MMHG | TEMPERATURE: 98 F | BODY MASS INDEX: 30.08 KG/M2 | SYSTOLIC BLOOD PRESSURE: 132 MMHG | RESPIRATION RATE: 18 BRPM | HEIGHT: 70 IN | HEART RATE: 68 BPM | OXYGEN SATURATION: 100 %

## 2018-12-21 DIAGNOSIS — N20.1 URETERAL STONE: ICD-10-CM

## 2018-12-21 PROCEDURE — 71000015 HC POSTOP RECOV 1ST HR: Performed by: UROLOGY

## 2018-12-21 PROCEDURE — 63600175 PHARM REV CODE 636 W HCPCS: Performed by: STUDENT IN AN ORGANIZED HEALTH CARE EDUCATION/TRAINING PROGRAM

## 2018-12-21 PROCEDURE — 71000044 HC DOSC ROUTINE RECOVERY FIRST HOUR: Performed by: UROLOGY

## 2018-12-21 PROCEDURE — 74420 UROGRAPHY RTRGR +-KUB: CPT | Mod: 26,,, | Performed by: UROLOGY

## 2018-12-21 PROCEDURE — 52356 CYSTO/URETERO W/LITHOTRIPSY: CPT | Mod: LT,,, | Performed by: UROLOGY

## 2018-12-21 PROCEDURE — 25000003 PHARM REV CODE 250: Performed by: STUDENT IN AN ORGANIZED HEALTH CARE EDUCATION/TRAINING PROGRAM

## 2018-12-21 PROCEDURE — 36000707: Performed by: UROLOGY

## 2018-12-21 PROCEDURE — 36000706: Performed by: UROLOGY

## 2018-12-21 PROCEDURE — C1769 GUIDE WIRE: HCPCS | Performed by: UROLOGY

## 2018-12-21 PROCEDURE — D9220A PRA ANESTHESIA: Mod: ANES,,, | Performed by: ANESTHESIOLOGY

## 2018-12-21 PROCEDURE — 76000 FLUOROSCOPY <1 HR PHYS/QHP: CPT | Mod: 26,59,, | Performed by: UROLOGY

## 2018-12-21 PROCEDURE — 25000003 PHARM REV CODE 250: Performed by: NURSE ANESTHETIST, CERTIFIED REGISTERED

## 2018-12-21 PROCEDURE — 63600175 PHARM REV CODE 636 W HCPCS: Performed by: NURSE ANESTHETIST, CERTIFIED REGISTERED

## 2018-12-21 PROCEDURE — 37000008 HC ANESTHESIA 1ST 15 MINUTES: Performed by: UROLOGY

## 2018-12-21 PROCEDURE — 37000009 HC ANESTHESIA EA ADD 15 MINS: Performed by: UROLOGY

## 2018-12-21 PROCEDURE — C2617 STENT, NON-COR, TEM W/O DEL: HCPCS | Performed by: UROLOGY

## 2018-12-21 PROCEDURE — 27201423 OPTIME MED/SURG SUP & DEVICES STERILE SUPPLY: Performed by: UROLOGY

## 2018-12-21 PROCEDURE — C1894 INTRO/SHEATH, NON-LASER: HCPCS | Performed by: UROLOGY

## 2018-12-21 PROCEDURE — 25500020 PHARM REV CODE 255: Performed by: UROLOGY

## 2018-12-21 PROCEDURE — D9220A PRA ANESTHESIA: Mod: CRNA,,, | Performed by: NURSE ANESTHETIST, CERTIFIED REGISTERED

## 2018-12-21 DEVICE — STENT URETERAL UNIV 6FR 26CM: Type: IMPLANTABLE DEVICE | Site: URETER | Status: FUNCTIONAL

## 2018-12-21 RX ORDER — OXYBUTYNIN CHLORIDE 5 MG/1
5 TABLET ORAL 3 TIMES DAILY PRN
Qty: 30 TABLET | Refills: 0 | Status: SHIPPED | OUTPATIENT
Start: 2018-12-21 | End: 2019-01-09

## 2018-12-21 RX ORDER — GLYCOPYRROLATE 0.2 MG/ML
INJECTION INTRAMUSCULAR; INTRAVENOUS
Status: DISCONTINUED | OUTPATIENT
Start: 2018-12-21 | End: 2018-12-21

## 2018-12-21 RX ORDER — HYDROMORPHONE HYDROCHLORIDE 1 MG/ML
0.2 INJECTION, SOLUTION INTRAMUSCULAR; INTRAVENOUS; SUBCUTANEOUS EVERY 5 MIN PRN
Status: DISCONTINUED | OUTPATIENT
Start: 2018-12-21 | End: 2018-12-21 | Stop reason: HOSPADM

## 2018-12-21 RX ORDER — SODIUM CHLORIDE 9 MG/ML
INJECTION, SOLUTION INTRAVENOUS CONTINUOUS
Status: DISCONTINUED | OUTPATIENT
Start: 2018-12-21 | End: 2018-12-21 | Stop reason: HOSPADM

## 2018-12-21 RX ORDER — LORAZEPAM 2 MG/ML
0.25 INJECTION INTRAMUSCULAR ONCE AS NEEDED
Status: CANCELLED | OUTPATIENT
Start: 2018-12-21 | End: 2030-05-19

## 2018-12-21 RX ORDER — OXYCODONE AND ACETAMINOPHEN 10; 325 MG/1; MG/1
1 TABLET ORAL EVERY 4 HOURS PRN
Qty: 21 TABLET | Refills: 0 | Status: SHIPPED | OUTPATIENT
Start: 2018-12-21 | End: 2019-01-09

## 2018-12-21 RX ORDER — PROPOFOL 10 MG/ML
VIAL (ML) INTRAVENOUS
Status: DISCONTINUED | OUTPATIENT
Start: 2018-12-21 | End: 2018-12-21

## 2018-12-21 RX ORDER — NEOSTIGMINE METHYLSULFATE 1 MG/ML
INJECTION, SOLUTION INTRAVENOUS
Status: DISCONTINUED | OUTPATIENT
Start: 2018-12-21 | End: 2018-12-21

## 2018-12-21 RX ORDER — OXYCODONE AND ACETAMINOPHEN 10; 325 MG/1; MG/1
TABLET ORAL
Status: DISCONTINUED
Start: 2018-12-21 | End: 2018-12-21 | Stop reason: HOSPADM

## 2018-12-21 RX ORDER — HYDROMORPHONE HYDROCHLORIDE 1 MG/ML
0.2 INJECTION, SOLUTION INTRAMUSCULAR; INTRAVENOUS; SUBCUTANEOUS EVERY 5 MIN PRN
Status: CANCELLED | OUTPATIENT
Start: 2018-12-21

## 2018-12-21 RX ORDER — LORAZEPAM 2 MG/ML
0.25 INJECTION INTRAMUSCULAR ONCE AS NEEDED
Status: DISCONTINUED | OUTPATIENT
Start: 2018-12-21 | End: 2018-12-21 | Stop reason: HOSPADM

## 2018-12-21 RX ORDER — METOCLOPRAMIDE HYDROCHLORIDE 5 MG/ML
10 INJECTION INTRAMUSCULAR; INTRAVENOUS EVERY 10 MIN PRN
Status: DISCONTINUED | OUTPATIENT
Start: 2018-12-21 | End: 2018-12-21 | Stop reason: HOSPADM

## 2018-12-21 RX ORDER — ONDANSETRON 2 MG/ML
INJECTION INTRAMUSCULAR; INTRAVENOUS
Status: DISCONTINUED | OUTPATIENT
Start: 2018-12-21 | End: 2018-12-21

## 2018-12-21 RX ORDER — OXYCODONE AND ACETAMINOPHEN 10; 325 MG/1; MG/1
1 TABLET ORAL EVERY 4 HOURS PRN
Status: DISCONTINUED | OUTPATIENT
Start: 2018-12-21 | End: 2018-12-21 | Stop reason: HOSPADM

## 2018-12-21 RX ORDER — TAMSULOSIN HYDROCHLORIDE 0.4 MG/1
0.4 CAPSULE ORAL ONCE
Status: COMPLETED | OUTPATIENT
Start: 2018-12-21 | End: 2018-12-21

## 2018-12-21 RX ORDER — ROCURONIUM BROMIDE 10 MG/ML
INJECTION, SOLUTION INTRAVENOUS
Status: DISCONTINUED | OUTPATIENT
Start: 2018-12-21 | End: 2018-12-21

## 2018-12-21 RX ORDER — OXYBUTYNIN CHLORIDE 5 MG/1
TABLET ORAL
Status: DISCONTINUED
Start: 2018-12-21 | End: 2018-12-21 | Stop reason: HOSPADM

## 2018-12-21 RX ORDER — MIDAZOLAM HYDROCHLORIDE 1 MG/ML
INJECTION INTRAMUSCULAR; INTRAVENOUS
Status: DISCONTINUED | OUTPATIENT
Start: 2018-12-21 | End: 2018-12-21

## 2018-12-21 RX ORDER — LIDOCAINE HYDROCHLORIDE 10 MG/ML
1 INJECTION, SOLUTION EPIDURAL; INFILTRATION; INTRACAUDAL; PERINEURAL ONCE
Status: CANCELLED | OUTPATIENT
Start: 2018-12-21 | End: 2018-12-21

## 2018-12-21 RX ORDER — TAMSULOSIN HYDROCHLORIDE 0.4 MG/1
CAPSULE ORAL
Status: DISCONTINUED
Start: 2018-12-21 | End: 2018-12-21 | Stop reason: HOSPADM

## 2018-12-21 RX ORDER — LIDOCAINE HCL/PF 100 MG/5ML
SYRINGE (ML) INTRAVENOUS
Status: DISCONTINUED | OUTPATIENT
Start: 2018-12-21 | End: 2018-12-21

## 2018-12-21 RX ORDER — POLYETHYLENE GLYCOL 3350 17 G/17G
17 POWDER, FOR SOLUTION ORAL DAILY
Qty: 30 PACKET | Refills: 0 | Status: SHIPPED | OUTPATIENT
Start: 2018-12-21 | End: 2019-01-09

## 2018-12-21 RX ORDER — FENTANYL CITRATE 50 UG/ML
INJECTION, SOLUTION INTRAMUSCULAR; INTRAVENOUS
Status: DISCONTINUED | OUTPATIENT
Start: 2018-12-21 | End: 2018-12-21

## 2018-12-21 RX ORDER — SODIUM CHLORIDE 0.9 % (FLUSH) 0.9 %
3 SYRINGE (ML) INJECTION
Status: DISCONTINUED | OUTPATIENT
Start: 2018-12-21 | End: 2018-12-21 | Stop reason: HOSPADM

## 2018-12-21 RX ORDER — OXYCODONE AND ACETAMINOPHEN 10; 325 MG/1; MG/1
1 TABLET ORAL EVERY 6 HOURS PRN
Qty: 10 TABLET | Refills: 0 | Status: SHIPPED | OUTPATIENT
Start: 2018-12-21 | End: 2019-01-09

## 2018-12-21 RX ORDER — CEFAZOLIN SODIUM 1 G/3ML
2 INJECTION, POWDER, FOR SOLUTION INTRAMUSCULAR; INTRAVENOUS
Status: COMPLETED | OUTPATIENT
Start: 2018-12-21 | End: 2018-12-21

## 2018-12-21 RX ORDER — SODIUM CHLORIDE 0.9 % (FLUSH) 0.9 %
3 SYRINGE (ML) INJECTION
Status: CANCELLED | OUTPATIENT
Start: 2018-12-21

## 2018-12-21 RX ORDER — OXYBUTYNIN CHLORIDE 5 MG/1
5 TABLET ORAL 3 TIMES DAILY PRN
Status: DISCONTINUED | OUTPATIENT
Start: 2018-12-21 | End: 2018-12-21 | Stop reason: HOSPADM

## 2018-12-21 RX ADMIN — LIDOCAINE HYDROCHLORIDE 100 MG: 20 INJECTION, SOLUTION INTRAVENOUS at 02:12

## 2018-12-21 RX ADMIN — GLYCOPYRROLATE 0.4 MG: 0.2 INJECTION INTRAMUSCULAR; INTRAVENOUS at 03:12

## 2018-12-21 RX ADMIN — FENTANYL CITRATE 100 MCG: 50 INJECTION, SOLUTION INTRAMUSCULAR; INTRAVENOUS at 02:12

## 2018-12-21 RX ADMIN — ROCURONIUM BROMIDE 50 MG: 10 INJECTION, SOLUTION INTRAVENOUS at 02:12

## 2018-12-21 RX ADMIN — TAMSULOSIN HYDROCHLORIDE 0.4 MG: 0.4 CAPSULE ORAL at 04:12

## 2018-12-21 RX ADMIN — SODIUM CHLORIDE: 0.9 INJECTION, SOLUTION INTRAVENOUS at 12:12

## 2018-12-21 RX ADMIN — NEOSTIGMINE METHYLSULFATE 4 MG: 1 INJECTION INTRAVENOUS at 03:12

## 2018-12-21 RX ADMIN — CEFAZOLIN 2 G: 330 INJECTION, POWDER, FOR SOLUTION INTRAMUSCULAR; INTRAVENOUS at 02:12

## 2018-12-21 RX ADMIN — ONDANSETRON 4 MG: 2 INJECTION INTRAMUSCULAR; INTRAVENOUS at 03:12

## 2018-12-21 RX ADMIN — PROPOFOL 180 MG: 10 INJECTION, EMULSION INTRAVENOUS at 02:12

## 2018-12-21 RX ADMIN — MIDAZOLAM HYDROCHLORIDE 2 MG: 1 INJECTION, SOLUTION INTRAMUSCULAR; INTRAVENOUS at 02:12

## 2018-12-21 RX ADMIN — OXYBUTYNIN CHLORIDE 5 MG: 5 TABLET ORAL at 04:12

## 2018-12-21 RX ADMIN — SODIUM CHLORIDE, SODIUM GLUCONATE, SODIUM ACETATE, POTASSIUM CHLORIDE, MAGNESIUM CHLORIDE, SODIUM PHOSPHATE, DIBASIC, AND POTASSIUM PHOSPHATE: .53; .5; .37; .037; .03; .012; .00082 INJECTION, SOLUTION INTRAVENOUS at 03:12

## 2018-12-21 RX ADMIN — OXYCODONE HYDROCHLORIDE AND ACETAMINOPHEN 1 TABLET: 10; 325 TABLET ORAL at 04:12

## 2018-12-21 NOTE — OP NOTE
[facility]  Operative Note    Date: 12/21/2018    Pre-Op Diagnosis: left ureteral stone    Patient Active Problem List    Diagnosis Date Noted    Ureteral stone 12/21/2018    Abdominal pain 11/19/2018    Lumbar disc herniation 04/09/2018    Chronic pain 08/17/2017    Nephrolithiasis 07/14/2017    DDD (degenerative disc disease), lumbar 06/12/2017    Peyronie's disease 05/04/2017    Situational syncope 01/04/2017    Chronic diarrhea 01/04/2017    Intervertebral disc extrusion (Left L5-S1) 01/19/2015    Neural foraminal stenosis of lumbar spine (bilateral L5-S1, L>R) 01/19/2015    Left lumbar radiculopathy 01/19/2015    Chronic low back pain 05/01/2014          Post-Op Diagnosis: left renal stone    Procedure(s) Performed:   1.  Left ureteroscopy with laser lithotripsy  2.  Cystoscopy with stent exchange   3.  Retrograde pyelogram  4.  Fluoro < 1 hr  5.  Intraoperative interpretation of radiographic images    Specimen(s): none    Staff Surgeon:  Bhupinder Washington MD     Assistant Surgeon: Capo Loera MD       Anesthesia: General endotracheal anesthesia    Indications: Arabella Lemus is a 58 y.o. female with a left ureteral stone, presenting for definitive stone management.  He currently does have a JJ ureteral stent in place.      Findings:    - Large mid-pole renal stone, radiolucent, soft, dusted with Paul laser, residual fragments further obliterated with popcorn settings     - stent replaced      Estimated Blood Loss: min    Drains: 6 Fr x 26 cm JJ ureteral stent with strings    Procedure in detail:  After informed consent was obtained, the patient was brought the the cystoscopy suite and placed in the supine position.  SCDs were applied and working.  Anesthesia was administered.  The patient was then placed in the dorsal lithotomy position and prepped and draped in the usual sterile fashion.      A rigid cystoscope in a 22 Fr sheath was introduced into the patient's urethra.   This passed easily.  The entire urethra was visualized which showed no strictures or masses.  Formal cystoscopy was performed which revealed no masses or lesions suspicious for malignancy, no bladder stones, no bladder diverticuli, no trabeculations.  The ureteral orifices were visualized in the normal anatomic position bilaterally.  There was a stent in place in the left UO.      A stiff glide wire was passed up the left ureteral orifice and up into the kidney.  This passed easily and placement was confirmed using fluoro.  A stent grasper was used to pull the stent to the meatus, and a guide wire was then passed up the left ureteral stent into the renal pelvis, again confirmed using fluoro.  The cystoscope was removed keeping the guidewires in place.    A 12/14 fr 45 cm ureteral access sheath was then passed over the free wire under fluoroscopic guidance.  Subsequently, the flexible ureteroscope was passed into the patient's ureter through the access sheath under direct vision. Flexible pyeloscopy was performed systematically.  A stone was encountered at the level of the mid-pole. This was very large.      A 200 micron [email or web address] laser fiber was passed through the ureteroscope.  The stone was dusted using the laser.  The laser fiber.   Stone fragments were obliterated using popcorn settings.  A retrograde pyelogram was performed through the ureteroscope.  There were no filling defects noted and the renal pelvis was opacified.  The ureteroscope was removed keeping the guidewire in place.  The entire course of the ureter was visualized as the ureteroscope and access sheath were simultaneously removed.  There were no significant ureteral fragments left behind.     A 6 Fr x 26 cm JJ ureteral stent with strings was passed over the wire and up into the renal pelvis using fluoro.  When the coil appeared to be in good position in the kidney and the radio-opaque marker of the pusher was at the inferior pubis, the wire was  removed under continuous fluoro.  Good coils were seen in the kidney and the bladder using fluoro.      The patient tolerated the procedure well and was transferred to the recovery room in stable condition.      Disposition:  The patient will follow up with Dr. Washington in 3 weeks with retroperitoneal US.  He will strain his urine and bring any stone fragments or dust to clinic.      Capo Loera

## 2018-12-21 NOTE — INTERVAL H&P NOTE
The patient has been examined and the H&P has been reviewed:    I concur with the findings and no changes have occurred since H&P was written.     Urine positive for RBC, otherwise negative     To OR for left URS, HLL, stone extraction, stent replacement, retrograde pyelogram     Anesthesia/Surgery risks, benefits and alternative options discussed and understood by patient/family.          Active Hospital Problems    Diagnosis  POA    Ureteral stone [N20.1]  Yes      Resolved Hospital Problems   No resolved problems to display.

## 2018-12-21 NOTE — TRANSFER OF CARE
"Anesthesia Transfer of Care Note    Patient: Sanchez Zarate    Procedure(s) Performed: Procedure(s) (LRB):  URETEROSCOPY (Left)  LITHOTRIPSY, USING LASER (mana) (Left)  PYELOGRAM, RETROGRADE (Left)  CYSTOSCOPY, WITH URETERAL STENT REMOVAL (Left)  CYSTOSCOPY, WITH URETERAL STENT INSERTION    Patient location: PACU    Anesthesia Type: general    Transport from OR: Transported from OR on 6-10 L/min O2 by face mask with adequate spontaneous ventilation    Post pain: adequate analgesia    Post assessment: no apparent anesthetic complications    Post vital signs: stable    Level of consciousness: awake    Nausea/Vomiting: no nausea/vomiting    Complications: none    Transfer of care protocol was followed      Last vitals:   Visit Vitals  /81 (BP Location: Left arm, Patient Position: Lying)   Pulse 83   Temp 36.7 °C (98 °F) (Oral)   Resp 17   Ht 5' 10" (1.778 m)   Wt 95.3 kg (210 lb 1.6 oz)   SpO2 (!) 94%   BMI 30.15 kg/m²     "

## 2018-12-21 NOTE — DISCHARGE SUMMARY
[facility]  Discharge Note  Short Stay    Admit Date: 12/21/2018    Discharge Date and Time: 12/21/2018       Attending Physician: Bhupinder Washington Jr., MD     Discharge Provider: Capo Loera MD    Diagnoses:  Active Hospital Problems    Diagnosis  POA    *Ureteral stone [N20.1]  Yes    Abdominal pain [R10.9]  Yes    Chronic pain [G89.29]  Yes    Nephrolithiasis [N20.0]  Yes    DDD (degenerative disc disease), lumbar [M51.36]  Yes    Situational syncope [R55]  Yes    Neural foraminal stenosis of lumbar spine (bilateral L5-S1, L>R) [M99.83]  Yes    Left lumbar radiculopathy [M54.16]  Yes    Chronic low back pain [M54.5, G89.29]  Yes      Resolved Hospital Problems   No resolved problems to display.       Discharged Condition: good    Hospital Course: Patient was admitted for a left ureteroscopy with laser lithotripsy and stent exchange and tolerated the procedure well with no complications.    Final Diagnoses: Same as principal problem.    Disposition: Home or Self Care    Follow up/Patient Instructions:  Remove stent by pulling on strings on 12/26/2018.  Do not pull prior to that date.     Medications:  Reconciled Home Medications:      Medication List      START taking these medications    oxybutynin 5 MG Tab  Commonly known as:  DITROPAN  Take 1 tablet (5 mg total) by mouth 3 (three) times daily as needed.     * oxyCODONE-acetaminophen  mg per tablet  Commonly known as:  PERCOCET  Take 1 tablet by mouth every 6 (six) hours as needed for Pain.     * oxyCODONE-acetaminophen  mg per tablet  Commonly known as:  PERCOCET  Take 1 tablet by mouth every 4 (four) hours as needed for Pain.     polyethylene glycol 17 gram Pwpk  Commonly known as:  GLYCOLAX  Take 17 g by mouth once daily.         * This list has 2 medication(s) that are the same as other medications prescribed for you. Read the directions carefully, and ask your doctor or other care provider to review them with you.             CHANGE how you take these medications    meloxicam 15 MG tablet  Commonly known as:  MOBIC  Take 1 tablet (15 mg total) by mouth once daily. Take with food.  What changed:    · when to take this  · additional instructions        CONTINUE taking these medications    docusate sodium 100 MG capsule  Commonly known as:  COLACE  Take 1 capsule (100 mg total) by mouth 2 (two) times daily as needed for Constipation.     potassium citrate 10 mEq (1,080 mg) Tbsr  Commonly known as:  UROCIT-K  Take 2 tablets (20 mEq total) by mouth 3 (three) times daily with meals.     tamsulosin 0.4 mg Cap  Commonly known as:  FLOMAX  Take 1 capsule (0.4 mg total) by mouth every evening.          Discharge Procedure Orders   US Retroperitoneal Complete   Standing Status: Future Standing Exp. Date: 12/21/19     Order Specific Question Answer Comments   Reason for Exam: s/p ureteroscopy, rule out hydronephrosis, recurrent stone    May the Radiologist modify the order per protocol to meet the clinical needs of the patient? Yes      Call MD for:  persistent nausea and vomiting or diarrhea     Call MD for:  severe uncontrolled pain     Call MD for:  redness, tenderness, or signs of infection (pain, swelling, redness, odor or green/yellow discharge around incision site)     Call MD for:  difficulty breathing or increased cough     Call MD for:  severe persistent headache     Call MD for:  worsening rash     Call MD for:  persistent dizziness, light-headedness, or visual disturbances     Call MD for:  increased confusion or weakness     No dressing needed     Follow-up Information     Bhupinder Washington Jr, MD In 3 weeks.    Specialty:  Urology  Why:  post op left URS, needs RBUS prior   Contact information:  [address]  [facility] [id number] [phone]

## 2018-12-21 NOTE — DISCHARGE INSTRUCTIONS
Remove stent by pulling on strings on 12/26/2018.  Do not pull prior to that date.  He will strain his urine and bring any stone fragments or dust to clinic.        Treating Kidney Stones: Ureteroscopic Stone Removal    Ureteroscopic stone removal may be done before, after, or instead of other treatments. If you need this procedure, your healthcare provider will discuss its risks and possible complications. You will be told how to prepare. And you will be told about anesthesia that will keep you pain-free during treatment.     A ureteroscope lets your doctor see your stone before removing it.   Removing the stone through the ureter  Ureteroscopic stone removal extracts a small stone in your ureter without an incision. Your doctor places a viewing tube (ureteroscope) in your ureter. A wire basket inserted through the tube removes the stone. Sometimes, a laser or a mechanical device is used to break up the stone. A soft tube may be left in your ureter briefly to drain urine.     The stone may be fragmented. The stone is then withdrawn or passed.   Your recovery  This is an outpatient or overnight procedure. For a few days after surgery, you may feel some pain when you urinate. Or you may need to urinate more often, or have bloody urine. You may have a ureteral stent. This is a soft tube that prevents blockage from swelling after the procedure. The stent is removed when the swelling goes down, often within days. Follow up as instructed to check for any new stones.  When to call your healthcare provider  Call your healthcare provider right away if:  · You have sudden pain or flank pain  · You have a fever over 100.4°F (38°C)  · You have nausea that lasts for days  · You have heavy bleeding when you urinate  · You have heavy bleeding through your drainage tube  · You have swelling or redness around your incision

## 2018-12-21 NOTE — PLAN OF CARE
Pt is AAOx4. VSS. NAD. Voided without difficulty. Notified family via phone. IV discontinued. Discharge instructions given, verbalized understanding. Waiting to be discharged home with family.

## 2018-12-24 NOTE — ANESTHESIA POSTPROCEDURE EVALUATION
"Anesthesia Post Evaluation    Patient: Sanchez Zarate    Procedure(s) Performed: Procedure(s) (LRB):  URETEROSCOPY (Left)  LITHOTRIPSY, USING LASER (mana) (Left)  PYELOGRAM, RETROGRADE (Left)  CYSTOSCOPY, WITH URETERAL STENT REMOVAL (Left)  CYSTOSCOPY, WITH URETERAL STENT INSERTION    Final Anesthesia Type: general  Patient location during evaluation: PACU  Patient participation: Yes- Able to Participate  Level of consciousness: awake and alert and oriented  Post-procedure vital signs: reviewed and stable  Pain management: adequate  Airway patency: patent  PONV status at discharge: No PONV  Anesthetic complications: no      Cardiovascular status: blood pressure returned to baseline  Respiratory status: unassisted  Hydration status: euvolemic  Follow-up not needed.        Visit Vitals  /70   Pulse 68   Temp 36.8 °C (98.2 °F) (Temporal)   Resp 18   Ht 5' 10" (1.778 m)   Wt 95.3 kg (210 lb 1.6 oz)   SpO2 100%   BMI 30.15 kg/m²       Pain/Tasha Score: No Data Recorded      "

## 2019-01-09 ENCOUNTER — OFFICE VISIT (OUTPATIENT)
Dept: INTERNAL MEDICINE | Facility: CLINIC | Age: 44
End: 2019-01-09
Payer: COMMERCIAL

## 2019-01-09 VITALS
BODY MASS INDEX: 31.88 KG/M2 | RESPIRATION RATE: 18 BRPM | HEIGHT: 70 IN | HEART RATE: 90 BPM | TEMPERATURE: 99 F | SYSTOLIC BLOOD PRESSURE: 106 MMHG | DIASTOLIC BLOOD PRESSURE: 78 MMHG | WEIGHT: 222.69 LBS | OXYGEN SATURATION: 95 %

## 2019-01-09 DIAGNOSIS — Z00.00 ANNUAL PHYSICAL EXAM: Primary | ICD-10-CM

## 2019-01-09 PROCEDURE — 99396 PREV VISIT EST AGE 40-64: CPT | Mod: S$GLB,,, | Performed by: HOSPITALIST

## 2019-01-09 PROCEDURE — 99396 PR PREVENTIVE VISIT,EST,40-64: ICD-10-PCS | Mod: S$GLB,,, | Performed by: HOSPITALIST

## 2019-01-09 PROCEDURE — 99999 PR PBB SHADOW E&M-EST. PATIENT-LVL IV: ICD-10-PCS | Mod: PBBFAC,,, | Performed by: HOSPITALIST

## 2019-01-09 PROCEDURE — 99999 PR PBB SHADOW E&M-EST. PATIENT-LVL IV: CPT | Mod: PBBFAC,,, | Performed by: HOSPITALIST

## 2019-01-09 NOTE — PROGRESS NOTES
Subjective:     @Patient ID: Sanchez Zarate is a 43 y.o. male.    Chief Complaint: Establish Care and Annual Exam    HPI  44 yo male presents for annual exam and to establish care. Pt is present with his wife. He lives with his 2 kids. Reports he is doing well but does have some intermittent chronic lower back pain. States he is otherwise is doing well. Has had recently 2 kidney stones and few weeks ago underwent ureteral stent removal.     Lipid disorders/ASCVD risk (ages >/= 45 or >/= 20 if increased risk ): ordered  DM (>45y yearly or if obese, HTN): A1c ordered  Hepatitis C (one time if born between 0060-6066): ordered    STD screening   Eye exam: reading glasses at night. None   Prostate (per discussion with patient starting at 50y or 45y if high risk): ordered      Vaccines:   Influenza (yearly): Done 2018  Tetanus (every 10 yrs - 1st tdap) Done     Exercise: walking, physical exercise with job  Diet: regular      Past Medical History:   Diagnosis Date    Chronic back pain     Chronic diarrhea     Hernia     TBI (traumatic brain injury) 2010    fake wall fell on head (no brain or skull injury per pt)     Past Surgical History:   Procedure Laterality Date    COLONOSCOPY N/A 5/29/2017    Performed by Sukhi Scanlon MD at [facility] ENDO (4TH FLR)    CYSTOSCOPY N/A 7/28/2017    Performed by Bhupinder Washington Jr., MD at [facility] OR 1ST FLR    CYSTOSCOPY WITH STENT PLACEMENT Right 7/14/2017    Performed by Bhupinder Washington Jr., MD at [facility] OR 1ST FLR    CYSTOSCOPY, WITH URETERAL STENT INSERTION  12/21/2018    Performed by Bhupinder Washington Jr., MD at [facility] OR 1ST FLR    CYSTOSCOPY, WITH URETERAL STENT INSERTION Left 11/19/2018    Performed by Renee Garcia MD at [facility] OR 2ND FLR    CYSTOSCOPY, WITH URETERAL STENT REMOVAL Left 12/21/2018    Performed by Bhupinder Washington Jr., MD at [facility] OR 1ST FLR    EXTRACTION - STONE Right 7/28/2017    Performed by Bhupinder Washington Jr., MD at [facility] OR 1ST FLR    HERNIA REPAIR  2017     umbilical    INJECTION-STEROID-EPIDURAL-CAUDAL N/A 8/17/2017    Performed by Emerita Santillan MD at [facility] PAIN MGT    INJECTION-STEROID-EPIDURAL-TRANSFORAMINAL Left 6/12/2017    Performed by Emer[facility]illan MD at [facility] PAIN MGT    LITHOTRIPSY, USING LASER (mana) Left 12/21/2018    [facility]    LITHOTRIPSY-EXTRACORPOREAL SHOCK WAVE Right 7/14/2017    Performed by Bhupinder Washington Jr., MD at [facility] OR 1ST FLR    LITHOTRIPSY-LASER Right 7/28/2017    Performed by Bhupinder Washington Jr., MD at [facility] OR [address] [place] [address]    LUMBAR EPIDURAL INJECTION      with steroid    MICRODISCECTOMY L5/S1 -- Prone -- SSEP/EMG N/A 4/12/2018    Performed by Lino Fortune MD at [facility] OR 2ND FLR    PLACEMENT-STENT URETERAL Right 7/28/2017    Performed by Bhupinder Washington Jr., MD at [facility] OR 1ST FLR    PYELOGRAM, RETROGRADE Left 12/21/2018    Performed by Bhupinder Washington Jr., MD at [facility] OR 1ST FLR    PYELOGRAM, RETROGRADE Left 11/19/2018    Performed by Renee Garcia MD at [facility] OR 2ND FLR    REPAIR-HERNIA-UMBILICAL N/A 2/13/2017    Performed by Nomi Robb MD at [facility] OR 2ND FLR    URETEROSCOPY Left 12/21/2018    Performed by Bhupinder Washington Jr., MD at [facility] OR 1ST FLR    URETEROSCOPY Right 7/28/2017    Performed by Bhupinder Washington Jr., MD at [facility] OR 1ST FLR     Family History   Problem Relation Age of Onset    Diabetes Mother     Heart attack Neg Hx     Heart disease Neg Hx     Hypertension Neg Hx     Colon cancer Neg Hx     Esophageal cancer Neg Hx      Social History     Socioeconomic History    Marital status:      Spouse name: Not on file    Number of children: 2    Years of education: Not on file    Highest education level: Not on file   Social Needs    Financial resource strain: Not on file    Food insecurity - worry: Not on file    Food insecurity - inability: Not on file    Transportation needs - medical: Not on file    Transportation needs - non-medical: Not on  "file   Occupational History    Occupation: self employed   Tobacco Use    Smoking status: Former Smoker     Last attempt to quit: 2007     Years since quittin.0    Smokeless tobacco: Never Used   Substance and Sexual Activity    Alcohol use: Yes     Comment: rare    Drug use: No    Sexual activity: Yes     Partners: Female   Other Topics Concern    Not on file   Social History Narrative    Not on file     Review of patient's allergies indicates:  No Known Allergies  No current outpatient medications on file.          Review of Systems   Constitutional: Negative for activity change and unexpected weight change.   HENT: Negative for hearing loss, rhinorrhea and trouble swallowing.    Eyes: Negative for discharge and visual disturbance.   Respiratory: Negative for chest tightness and wheezing.    Cardiovascular: Negative for chest pain and palpitations.   Gastrointestinal: Negative for blood in stool, constipation, diarrhea and vomiting.   Endocrine: Negative for polydipsia and polyuria.   Genitourinary: Negative for difficulty urinating, hematuria and urgency.   Musculoskeletal: Negative for arthralgias, joint swelling and neck pain.   Skin: Negative for color change and wound.   Neurological: Negative for weakness and headaches.   Psychiatric/Behavioral: Negative for confusion and dysphoric mood.     Past medical history, surgical history, and family medical history reviewed and updated as appropriate.    Medications and allergies reviewed.     Objective:     Vitals:    19 1303   BP: 106/78   Pulse: 90   Resp: 18   Temp: 98.7 °F (37.1 °C)   SpO2: 95%   Weight: 101 kg (222 lb 10.6 oz)   Height: 5' 10" (1.778 m)     Body mass index is 31.95 kg/m².  Physical Exam   Constitutional: He is oriented to person, place, and time. He appears well-developed and well-nourished. No distress.   HENT:   Head: Normocephalic and atraumatic.   Mouth/Throat: Oropharynx is clear and moist. No oropharyngeal exudate. "   Eyes: Conjunctivae are normal. Pupils are equal, round, and reactive to light. Right eye exhibits no discharge. Left eye exhibits no discharge.   Neck: Normal range of motion. Neck supple.   Cardiovascular: Normal rate, regular rhythm and normal heart sounds. Exam reveals no friction rub.   No murmur heard.  Pulmonary/Chest: Effort normal and breath sounds normal.   Abdominal: Soft. Bowel sounds are normal. He exhibits no distension. There is no tenderness.   Musculoskeletal: Normal range of motion. He exhibits no edema.   Lymphadenopathy:     He has no cervical adenopathy.   Neurological: He is alert and oriented to person, place, and time.   Skin: Skin is warm and dry.   Psychiatric: He has a normal mood and affect. His behavior is normal.   Vitals reviewed.      Lab Results   Component Value Date    WBC 16.28 (H) 11/18/2018    HGB 16.3 11/18/2018    HCT 46.0 11/18/2018     11/18/2018    CHOL 151 01/05/2017    TRIG 121 01/05/2017    HDL 25 (L) 01/05/2017    ALT 30 11/18/2018    AST 21 11/18/2018     11/18/2018    K 4.0 11/18/2018     11/18/2018    CREATININE 1.1 11/18/2018    BUN 21 (H) 11/18/2018    CO2 25 11/18/2018    TSH 0.997 05/15/2017    INR 1.0 02/09/2017       Assessment:     1. Annual physical exam      Plan:   Sanchez was seen today for establish care and annual exam.    Diagnoses and all orders for this visit:    Annual physical exam  -     Comprehensive metabolic panel; Future  -     CBC auto differential; Future  -     TSH; Future  -     Vitamin D; Future  -     Lipid panel; Future  -     Hepatitis C antibody; Future  -     HIV 1/2 Ag/Ab (4th Gen); Future  -     Urinalysis; Future  -     HEMOGLOBIN A1C; Future  -     PSA, Screening; Future     Encouraged continued physical activity and healthy eating.      Follow-up in about 1 year (around 1/9/2020).    Anna Moore MD  Internal Medicine    1/9/2019

## 2019-01-10 ENCOUNTER — OFFICE VISIT (OUTPATIENT)
Dept: UROLOGY | Facility: CLINIC | Age: 44
End: 2019-01-10
Payer: COMMERCIAL

## 2019-01-10 ENCOUNTER — HOSPITAL ENCOUNTER (OUTPATIENT)
Dept: RADIOLOGY | Facility: HOSPITAL | Age: 44
Discharge: HOME | End: 2019-01-10
Attending: UROLOGY
Payer: COMMERCIAL

## 2019-01-10 ENCOUNTER — PATIENT MESSAGE (OUTPATIENT)
Dept: INTERNAL MEDICINE | Facility: CLINIC | Age: 44
End: 2019-01-10

## 2019-01-10 VITALS
HEART RATE: 78 BPM | WEIGHT: 219.81 LBS | DIASTOLIC BLOOD PRESSURE: 77 MMHG | HEIGHT: 70 IN | BODY MASS INDEX: 31.47 KG/M2 | SYSTOLIC BLOOD PRESSURE: 131 MMHG

## 2019-01-10 DIAGNOSIS — N20.0 URIC ACID RENAL CALCULUS: Primary | ICD-10-CM

## 2019-01-10 DIAGNOSIS — N20.0 KIDNEY STONES: ICD-10-CM

## 2019-01-10 PROCEDURE — 76770 US EXAM ABDO BACK WALL COMP: CPT | Mod: 26,,, | Performed by: RADIOLOGY

## 2019-01-10 PROCEDURE — 99024 POSTOP FOLLOW-UP VISIT: CPT | Mod: S$GLB,,, | Performed by: UROLOGY

## 2019-01-10 PROCEDURE — 76770 US RETROPERITONEAL COMPLETE: ICD-10-PCS | Mod: 26,,, | Performed by: RADIOLOGY

## 2019-01-10 PROCEDURE — 99024 PR POST-OP FOLLOW-UP VISIT: ICD-10-PCS | Mod: S$GLB,,, | Performed by: UROLOGY

## 2019-01-10 PROCEDURE — 99999 PR PBB SHADOW E&M-EST. PATIENT-LVL III: CPT | Mod: PBBFAC,,, | Performed by: UROLOGY

## 2019-01-10 PROCEDURE — 82365 CALCULUS SPECTROSCOPY: CPT

## 2019-01-10 PROCEDURE — 99999 PR PBB SHADOW E&M-EST. PATIENT-LVL III: ICD-10-PCS | Mod: PBBFAC,,, | Performed by: UROLOGY

## 2019-01-10 PROCEDURE — 76770 US EXAM ABDO BACK WALL COMP: CPT | Mod: TC

## 2019-01-10 NOTE — PROGRESS NOTES
Subjective:       Patient ID: Sanchez Zarate is a 43 y.o. male.    Chief Complaint: Post-op Evaluation (pt passed the stones and he has the specimen.)    HPI    Sanchez Zarate is a 43 y.o. male s/p ureteroscopy on 12/21/18 here for a post-op evaluation. Patient is scheduled for his renal US later today. He brought in a few stones to clinic. Patient already had his stent removed. He had some pain right after surgery, but denies any current pain. Prior stone analysis shows 100% uric acid stones. Denies gout, toe pain, or leg pain.     Past Medical History:   Diagnosis Date    Chronic back pain     Chronic diarrhea     Hernia     TBI (traumatic brain injury) 2010    fake wall fell on head (no brain or skull injury per pt)       Past Surgical History:   Procedure Laterality Date    COLONOSCOPY N/A 5/29/2017    Performed by [facility]hi [facility]lon MD at [facility] ENDO (4TH FLR)    CYSTOSCOPY N/A 7/28/2017    Performed by Bhupinder Washington Jr., MD at [facility] OR 1ST FLR    CYSTOSCOPY WITH STENT PLACEMENT Right 7/14/2017    Performed by Bhupinder [facility] Jr., MD at [facility] OR 1ST FLR    CYSTOSCOPY, WITH URETERAL STENT INSERTION  12/21/2018    Performed by Bhupinder [facility] Jr., MD at [facility] OR 1ST FLR    CYSTOSCOPY, WITH URETERAL STENT INSERTION Left 11/19/2018    Performed by Renee Garcia MD at [facility] OR 2ND FLR    CYSTOSCOPY, WITH URETERAL STENT REMOVAL Left 12/21/2018    Performed by Bhupinder Washington Jr., MD at [facility] OR 1ST FLR    EXTRACTION - STONE Right 7/28/2017    Performed by Bhupinder [facility] Jr., MD at [facility] OR 1ST FLR    HERNIA REPAIR  2017    umbilical    INJECTION-STEROID-EPIDURAL-CAUDAL N/A 8/17/2017    [facility] at [facility] PAIN MGT    INJECTION-STEROID-EPIDURAL-TRANSFORAMINAL Left 6/12/2017    Performed by [facility] at [facility] PAIN MGT    LITHOTRIPSY, USING LASER (mana) Left 12/21/2018    Performed by [facility]inder [facility] Jr[facility] at [facility]    LITHOTRIPSY-EXTRACORPOREAL SHOCK WAVE Right 7/14/2017     Performed by [facility]inder [facility] Jr[facility] at [facility] OR 1ST FLR    LITHOTRIPSY-LASER Right 2017    Performed by Bhupinder Washington Jr., MD at [facility] OR 1ST FLR    LUMBAR EPIDURAL INJECTION      with steroid    MICRODISCECTOMY L5/S1 -- Prone -- SSEP/EMG N/A 2018    Performed by Lino Fortune MD at [facility] OR 2ND FLR    PLACEMENT-STENT URETERAL Right 2017    Performed by Bhupinder Washington Jr., MD at [facility] OR 1ST FLR    PYELOGRAM, RETROGRADE Left 2018    Performed by Bhupinder Washington Jr., MD at [facility] OR 1ST FLR    PYELOGRAM, RETROGRADE Left 2018    Performed by Renee Garcia MD at [facility] OR 2ND FLR    REPAIR-HERNIA-UMBILICAL N/A 2017    Performed by Nomi Robb MD at [facility] OR 2ND FLR    URETEROSCOPY Left 2018    Performed by Bhupinder Washington Jr., MD at [facility] OR 1ST FLR    URETEROSCOPY Right 2017    Performed by Bhupinder Washington Jr., MD at [facility] OR 1ST FLR       Family History   Problem Relation Age of Onset    Diabetes Mother     Cancer Cousin 50        Cancer possibly stomach     Heart attack Neg Hx     Heart disease Neg Hx     Hypertension Neg Hx     Colon cancer Neg Hx     Esophageal cancer Neg Hx        Social History     Socioeconomic History    Marital status:      Spouse name: Not on file    Number of children: 2    Years of education: Not on file    Highest education level: Not on file   Social Needs    Financial resource strain: Not on file    Food insecurity - worry: Not on file    Food insecurity - inability: Not on file    Transportation needs - medical: Not on file    Transportation needs - non-medical: Not on file   Occupational History    Occupation: self employed   Tobacco Use    Smoking status: Former Smoker     Last attempt to quit: 2007     Years since quittin.0    Smokeless tobacco: Never Used   Substance and Sexual Activity    Alcohol use: Yes     Comment: rare    Drug use: No    Sexual activity: Yes     Partners:  Female   Other Topics Concern    Not on file   Social History Narrative    Not on file       Allergies:  Patient has no known allergies.    Medications:  No current outpatient medications on file.    Review of Systems   Constitutional: Negative for activity change, appetite change, chills, diaphoresis, fatigue, fever and unexpected weight change.   HENT: Negative for congestion, dental problem, hearing loss, mouth sores, postnasal drip, rhinorrhea, sinus pressure and trouble swallowing.    Eyes: Negative for pain, discharge and itching.   Respiratory: Negative for apnea, cough, choking, chest tightness, shortness of breath and wheezing.    Cardiovascular: Negative for chest pain, palpitations and leg swelling.   Gastrointestinal: Negative for abdominal distention, abdominal pain, anal bleeding, blood in stool, constipation, diarrhea, nausea, rectal pain and vomiting.   Endocrine: Negative for polydipsia and polyuria.   Genitourinary: Negative for decreased urine volume, difficulty urinating, discharge, dysuria, enuresis, flank pain, frequency, genital sores, hematuria, penile pain, penile swelling and scrotal swelling.   Musculoskeletal: Negative for arthralgias, back pain and myalgias.   Skin: Negative for color change, rash and wound.   Neurological: Negative for dizziness, syncope, speech difficulty, light-headedness and headaches.   Hematological: Negative for adenopathy. Does not bruise/bleed easily.   Psychiatric/Behavioral: Negative for behavioral problems, confusion and sleep disturbance.       Objective:      Physical Exam   Constitutional: He appears well-developed.   HENT:   Head: Normocephalic.   Neck: Neck supple.   Cardiovascular: Normal rate.    Pulmonary/Chest: Effort normal.   Abdominal: Soft.   Neurological: He is alert.   Skin: Skin is warm.     Psychiatric: He has a normal mood and affect.       Procedures:    Date: 12/21/2018  Procedure(s) Performed:   1.  Left ureteroscopy with laser  lithotripsy  2.  Cystoscopy with stent exchange   3.  Retrograde pyelogram  4.  Fluoro < 1 hr  5.  Intraoperative interpretation of radiographic images  Findings:  - Large mid-pole renal stone, radiolucent, soft, dusted with Paul laser, residual fragments further obliterated with popcorn settings   - stent replaced  Drains: 6 Fr x 26 cm JJ ureteral stent with strings    Assessment:       1. Uric acid renal calculus        Plan:       Sanchez was seen today for post-op evaluation.    Diagnoses and all orders for this visit:    Uric acid renal calculus  -     Magnesium; Future  -     PTH, intact; Future  -     Uric acid; Future  -     Phosphorus; Future  -     Calcium; Future  -     UroRisk Diagnostic Profile, 24 Hour; Future  -     Urinary Stone Analysis          Discussed patient may still continue to pass fragments.  Send stones off for analysis.  Metabolic stone workup.  Discussed patient taking Urocit-k after workup.  Recommended staying well hydrated.  Will review renal US and phone review with patient.     IMicah, am acting as a scribe on this patient encounter in the presence and under the supervision of Dr. Washington.    01/10/2019 9:37 AM    I, Dr. Washington, personally performed the services described in this documentation.   All medical record entries made by the scribe were at my direction and in my presence.   I have reviewed the chart and agree that the record is accurate and complete.   Bhupinder Washington MD.  9:47 AM 01/10/2019

## 2019-01-11 ENCOUNTER — LAB VISIT (OUTPATIENT)
Dept: LAB | Facility: HOSPITAL | Age: 44
End: 2019-01-11
Attending: UROLOGY
Payer: COMMERCIAL

## 2019-01-11 DIAGNOSIS — N20.0 URIC ACID RENAL CALCULUS: ICD-10-CM

## 2019-01-11 PROCEDURE — 83735 ASSAY OF MAGNESIUM: CPT

## 2019-01-11 PROCEDURE — 84300 ASSAY OF URINE SODIUM: CPT

## 2019-01-16 ENCOUNTER — OFFICE VISIT (OUTPATIENT)
Dept: OPTOMETRY | Facility: CLINIC | Age: 44
End: 2019-01-16
Payer: COMMERCIAL

## 2019-01-16 DIAGNOSIS — H52.4 BILATERAL PRESBYOPIA: ICD-10-CM

## 2019-01-16 DIAGNOSIS — H04.123 BILATERAL DRY EYES: Primary | ICD-10-CM

## 2019-01-16 PROCEDURE — 92014 COMPRE OPH EXAM EST PT 1/>: CPT | Mod: S$GLB,,, | Performed by: OPTOMETRIST

## 2019-01-16 PROCEDURE — 99999 PR PBB SHADOW E&M-EST. PATIENT-LVL III: CPT | Mod: PBBFAC,,, | Performed by: OPTOMETRIST

## 2019-01-16 PROCEDURE — 99999 PR PBB SHADOW E&M-EST. PATIENT-LVL III: ICD-10-PCS | Mod: PBBFAC,,, | Performed by: OPTOMETRIST

## 2019-01-16 PROCEDURE — 92014 PR EYE EXAM, EST PATIENT,COMPREHESV: ICD-10-PCS | Mod: S$GLB,,, | Performed by: OPTOMETRIST

## 2019-01-16 NOTE — PROGRESS NOTES
HPI     Mr. Sanchez Zarate is here for a comprehensive eye exam.    Clear vision distance vision without correction and clear near vision with   +1.00 OTC readers. He reports dry eyes during sustained computer use.     Would patient like a refraction today? no    (+)drops: Visine prn  (-)flashes  (-)floaters  (-)diplopia    Diabetic: no    OCULAR HISTORY  Last Eye Exam: 05/26/17 with Dr. Lora for chemical conjunctivitis  (-)eye surgery   Chemical conjunctivitis OD (2017)    FAMILY HISTORY  (-)Glaucoma         Last edited by Lilibeth Lee, OD on 1/16/2019  9:20 AM. (History)            Assessment /Plan     For exam results, see Encounter Report.    Bilateral dry eyes   Recommended artificial tears prn.    Bilateral presbyopia   Distance glasses not needed. Continue with OTC readers.       RTC 2 years

## 2019-01-16 NOTE — PATIENT INSTRUCTIONS
PRESBYOPIA    Presbyopia is a condition in which the lens of the eye loses its ability to focus. The condition is associated with aging and is progressive (gets worse). People who have presbyopia have difficulty seeing objects close-up.    Causes, Incidence, and Risk Factors:  The focusing power of the eye depends on the elasticity of the lens. This elasticity is gradually lost as people age. The result is a slow decrease in the ability of the eye to focus on nearby objects.    People usually notice the condition around age 45, when they realize that they need to hold reading materials further away in order to focus on them. Presbyopia is a natural part of the aging process and affects everyone.    Symptoms:  * Decreased focusing ability for near objects   * Eyestrain   * Headache     Treatment:  Presbyopia can be corrected with glasses or contact lenses. In some cases, the addition of bifocals to an existing lens prescription is sufficient. As the ability to focus up close worsens, the prescription needs to be changed accordingly.    Around the age of 65, the eyes have usually lost most of the elasticity needed to focus up close. However, it will still be possible to read with the help of the appropriate prescription. Even so, you may find it necessary to hold reading materials further away, and you may require larger print and more light to read by.    People who do not need glasses for distance vision may only need half glasses or reading glasses.    Expectations (Prognosis):   Vision can be corrected with glasses or contact lenses.    Complications:  If uncorrected, progressive vision difficulty can cause problems with driving, lifestyle, or work.    Prevention:   There is no proven prevention for presbyopia.

## 2019-01-21 LAB — STONE ANALYSIS-IMP: NORMAL

## 2019-01-30 ENCOUNTER — TELEPHONE (OUTPATIENT)
Dept: UROLOGY | Facility: CLINIC | Age: 44
End: 2019-01-30

## 2019-01-30 ENCOUNTER — PATIENT MESSAGE (OUTPATIENT)
Dept: UROLOGY | Facility: CLINIC | Age: 44
End: 2019-01-30

## 2019-01-30 NOTE — TELEPHONE ENCOUNTER
----- Message from Bhupinder Washington Jr., MD sent at 1/29/2019  9:29 AM CST -----  appt w JOY w lab and xrays

## 2019-04-09 ENCOUNTER — TELEPHONE (OUTPATIENT)
Dept: INTERNAL MEDICINE | Facility: CLINIC | Age: 44
End: 2019-04-09

## 2019-04-09 RX ORDER — PSEUDOEPHEDRINE HCL 120 MG/1
120 TABLET, FILM COATED, EXTENDED RELEASE ORAL 2 TIMES DAILY
Qty: 14 TABLET | Refills: 0 | Status: SHIPPED | OUTPATIENT
Start: 2019-04-09 | End: 2019-04-16

## 2019-04-09 RX ORDER — AMOXICILLIN AND CLAVULANATE POTASSIUM 875; 125 MG/1; MG/1
1 TABLET, FILM COATED ORAL EVERY 12 HOURS
Qty: 14 TABLET | Refills: 0 | Status: SHIPPED | OUTPATIENT
Start: 2019-04-09 | End: 2019-06-26 | Stop reason: ALTCHOICE

## 2019-04-09 RX ORDER — BENZONATATE 100 MG/1
100 CAPSULE ORAL 3 TIMES DAILY PRN
Qty: 30 CAPSULE | Refills: 1 | Status: SHIPPED | OUTPATIENT
Start: 2019-04-09 | End: 2019-04-19

## 2019-04-09 NOTE — TELEPHONE ENCOUNTER
Spoke w/ pt's wife regarding symptoms. Having runny nose, forehead pressure. And sore throat with congestion. Pt also having similar sx except having cough too.

## 2019-06-26 ENCOUNTER — OFFICE VISIT (OUTPATIENT)
Dept: INTERNAL MEDICINE | Facility: CLINIC | Age: 44
End: 2019-06-26
Payer: COMMERCIAL

## 2019-06-26 VITALS
WEIGHT: 226.44 LBS | TEMPERATURE: 99 F | BODY MASS INDEX: 32.42 KG/M2 | SYSTOLIC BLOOD PRESSURE: 104 MMHG | HEART RATE: 84 BPM | DIASTOLIC BLOOD PRESSURE: 80 MMHG | HEIGHT: 70 IN | RESPIRATION RATE: 20 BRPM

## 2019-06-26 DIAGNOSIS — E78.1 HYPERTRIGLYCERIDEMIA: ICD-10-CM

## 2019-06-26 DIAGNOSIS — Z00.00 GENERAL MEDICAL EXAM: Primary | ICD-10-CM

## 2019-06-26 PROCEDURE — 93005 EKG 12-LEAD: ICD-10-PCS | Mod: S$GLB,,, | Performed by: HOSPITALIST

## 2019-06-26 PROCEDURE — 99214 OFFICE O/P EST MOD 30 MIN: CPT | Mod: S$GLB,,, | Performed by: HOSPITALIST

## 2019-06-26 PROCEDURE — 99999 PR PBB SHADOW E&M-EST. PATIENT-LVL III: CPT | Mod: PBBFAC,,, | Performed by: HOSPITALIST

## 2019-06-26 PROCEDURE — 3008F PR BODY MASS INDEX (BMI) DOCUMENTED: ICD-10-PCS | Mod: CPTII,S$GLB,, | Performed by: HOSPITALIST

## 2019-06-26 PROCEDURE — 99214 PR OFFICE/OUTPT VISIT, EST, LEVL IV, 30-39 MIN: ICD-10-PCS | Mod: S$GLB,,, | Performed by: HOSPITALIST

## 2019-06-26 PROCEDURE — 3008F BODY MASS INDEX DOCD: CPT | Mod: CPTII,S$GLB,, | Performed by: HOSPITALIST

## 2019-06-26 PROCEDURE — 99999 PR PBB SHADOW E&M-EST. PATIENT-LVL III: ICD-10-PCS | Mod: PBBFAC,,, | Performed by: HOSPITALIST

## 2019-06-26 PROCEDURE — 93010 EKG 12-LEAD: ICD-10-PCS | Mod: S$GLB,,, | Performed by: INTERNAL MEDICINE

## 2019-06-26 PROCEDURE — 93005 ELECTROCARDIOGRAM TRACING: CPT | Mod: S$GLB,,, | Performed by: HOSPITALIST

## 2019-06-26 PROCEDURE — 93010 ELECTROCARDIOGRAM REPORT: CPT | Mod: S$GLB,,, | Performed by: INTERNAL MEDICINE

## 2019-06-26 NOTE — PROGRESS NOTES
"Subjective:     @Patient ID: Sanchez Zarate is a 43 y.o. male.    Chief Complaint: Follow-up (6 mo)    HPI    42 yo male presents for follow-up and for work clearance. 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 States [place] are not truly diving [place]. He reports he is well. 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     Of note pt had episode of syncope in 2017. Saw cardiology and attributed to situational syncope as occurred when he was laughing. Was placed on theophyline but is no longer on it for a long time. Reports since then no further episodes of syncope or near syncope.      Review of Systems   Constitutional: Negative for chills and fever.   HENT: Negative for congestion and sore throat.    Eyes: Negative for pain and visual disturbance.   Respiratory: Negative for cough and shortness of breath.    Cardiovascular: Negative for chest pain and leg swelling.   Gastrointestinal: Negative for abdominal pain, nausea and vomiting.   Endocrine: Negative for polydipsia and polyuria.   Genitourinary: Negative for difficulty urinating and dysuria.   Musculoskeletal: Negative for arthralgias and back pain.   Skin: Negative for rash and wound.   Neurological: Negative for weakness and headaches.   Psychiatric/Behavioral: Negative for agitation and confusion.     Past medical history, surgical history, and family medical history reviewed and updated as appropriate.    Medications and allergies reviewed.     Objective:     Vitals:    06/26/19 1417   BP: 104/80   BP Location: Left arm   Patient Position: Sitting   BP Method: Large (Manual)   Pulse: 84   Resp: 20   Temp: 98.5 °F (36.9 °C)   TempSrc: Oral   Weight: 102.7 kg (226 lb 6.6 oz)   Height: 5' 10" (1.778 m)     Body mass index is 32.49 kg/m².  Physical Exam   Constitutional: He is oriented to person, place, and time. He appears well-developed and well-nourished. No " distress.   HENT:   Head: Normocephalic and atraumatic.   Mouth/Throat: Oropharynx is clear and moist. No oropharyngeal exudate.   Eyes: Conjunctivae are normal. Right eye exhibits no discharge. Left eye exhibits no discharge.   Neck: Normal range of motion. Neck supple.   Cardiovascular: Normal rate, regular rhythm, normal heart sounds and intact distal pulses. Exam reveals no friction rub.   No murmur heard.  Pulmonary/Chest: Effort normal and breath sounds normal.   Abdominal: Soft. Bowel sounds are normal. He exhibits no distension. There is no tenderness.   Musculoskeletal: Normal range of motion. He exhibits no edema.   Neurological: He is alert and oriented to person, place, and time.   Skin: Skin is warm and dry.   Psychiatric: He has a normal mood and affect. His behavior is normal.   Vitals reviewed.      Lab Results   Component Value Date    WBC 10.34 01/10/2019    HGB 16.1 01/10/2019    HCT 46.9 01/10/2019     01/10/2019    CHOL 156 01/10/2019    TRIG 198 (H) 01/10/2019    HDL 28 (L) 01/10/2019    ALT 26 01/10/2019    AST 18 01/10/2019     01/10/2019    K 4.0 01/10/2019     01/10/2019    CREATININE 0.8 01/10/2019    BUN 12 01/10/2019    CO2 23 01/10/2019    TSH 2.541 01/10/2019    PSA 0.48 01/10/2019    INR 1.0 02/09/2017    HGBA1C 5.1 01/10/2019       Assessment:     1. General medical exam    2. Hypertriglyceridemia      Plan:   Sanchez was seen today for follow-up.    Diagnoses and all orders for this visit:    General medical exam  - EKG reviewed in clinic normal sinus rhythm. Pt counseled to find out from job if any forms need to be signed for work physical. Pt will notify MD   -     CBC W/ AUTO DIFFERENTIAL; Future  -     Comprehensive metabolic panel; Future  -     Lipid panel; Future  -     IN OFFICE EKG 12-LEAD (to [facility])    Hypertriglyceridemia  -     Lipid panel; Future          Follow up if symptoms worsen or fail to improve.    Anna Moore MD  Internal  Medicine    6/26/2019

## 2019-06-27 ENCOUNTER — PATIENT MESSAGE (OUTPATIENT)
Dept: INTERNAL MEDICINE | Facility: CLINIC | Age: 44
End: 2019-06-27

## 2019-06-27 ENCOUNTER — LAB VISIT (OUTPATIENT)
Dept: LAB | Facility: HOSPITAL | Age: 44
End: 2019-06-27
Attending: HOSPITALIST
Payer: COMMERCIAL

## 2019-06-27 DIAGNOSIS — Z00.00 GENERAL MEDICAL EXAM: ICD-10-CM

## 2019-06-27 DIAGNOSIS — E78.1 HYPERTRIGLYCERIDEMIA: ICD-10-CM

## 2019-06-27 LAB
ALBUMIN SERPL BCP-MCNC: 4.1 G/DL (ref 3.5–5.2)
ALP SERPL-CCNC: 88 U/L (ref 55–135)
ALT SERPL W/O P-5'-P-CCNC: 24 U/L (ref 10–44)
ANION GAP SERPL CALC-SCNC: 10 MMOL/L (ref 8–16)
AST SERPL-CCNC: 16 U/L (ref 10–40)
BASOPHILS # BLD AUTO: 0.03 K/UL (ref 0–0.2)
BASOPHILS NFR BLD: 0.3 % (ref 0–1.9)
BILIRUB SERPL-MCNC: 0.7 MG/DL (ref 0.1–1)
BUN SERPL-MCNC: 15 MG/DL (ref 6–20)
CALCIUM SERPL-MCNC: 9.2 MG/DL (ref 8.7–10.5)
CHLORIDE SERPL-SCNC: 108 MMOL/L (ref 95–110)
CHOLEST SERPL-MCNC: 160 MG/DL (ref 120–199)
CHOLEST/HDLC SERPL: 5.3 {RATIO} (ref 2–5)
CO2 SERPL-SCNC: 23 MMOL/L (ref 23–29)
CREAT SERPL-MCNC: 0.9 MG/DL (ref 0.5–1.4)
DIFFERENTIAL METHOD: NORMAL
EOSINOPHIL # BLD AUTO: 0.1 K/UL (ref 0–0.5)
EOSINOPHIL NFR BLD: 0.9 % (ref 0–8)
ERYTHROCYTE [DISTWIDTH] IN BLOOD BY AUTOMATED COUNT: 13.8 % (ref 11.5–14.5)
EST. GFR  (AFRICAN AMERICAN): >60 ML/MIN/1.73 M^2
EST. GFR  (NON AFRICAN AMERICAN): >60 ML/MIN/1.73 M^2
GLUCOSE SERPL-MCNC: 96 MG/DL (ref 70–110)
HCT VFR BLD AUTO: 46.9 % (ref 40–54)
HDLC SERPL-MCNC: 30 MG/DL (ref 40–75)
HDLC SERPL: 18.8 % (ref 20–50)
HGB BLD-MCNC: 15.9 G/DL (ref 14–18)
LDLC SERPL CALC-MCNC: 93 MG/DL (ref 63–159)
LYMPHOCYTES # BLD AUTO: 3 K/UL (ref 1–4.8)
LYMPHOCYTES NFR BLD: 31.8 % (ref 18–48)
MCH RBC QN AUTO: 31 PG (ref 27–31)
MCHC RBC AUTO-ENTMCNC: 33.9 G/DL (ref 32–36)
MCV RBC AUTO: 91 FL (ref 82–98)
MONOCYTES # BLD AUTO: 0.5 K/UL (ref 0.3–1)
MONOCYTES NFR BLD: 5.3 % (ref 4–15)
NEUTROPHILS # BLD AUTO: 5.7 K/UL (ref 1.8–7.7)
NEUTROPHILS NFR BLD: 61.7 % (ref 38–73)
NONHDLC SERPL-MCNC: 130 MG/DL
PLATELET # BLD AUTO: 224 K/UL (ref 150–350)
PMV BLD AUTO: 9.4 FL (ref 9.2–12.9)
POTASSIUM SERPL-SCNC: 4.2 MMOL/L (ref 3.5–5.1)
PROT SERPL-MCNC: 7.2 G/DL (ref 6–8.4)
RBC # BLD AUTO: 5.13 M/UL (ref 4.6–6.2)
SODIUM SERPL-SCNC: 141 MMOL/L (ref 136–145)
TRIGL SERPL-MCNC: 185 MG/DL (ref 30–150)
WBC # BLD AUTO: 9.31 K/UL (ref 3.9–12.7)

## 2019-06-27 PROCEDURE — 85025 COMPLETE CBC W/AUTO DIFF WBC: CPT

## 2019-06-27 PROCEDURE — 36415 COLL VENOUS BLD VENIPUNCTURE: CPT

## 2019-06-27 PROCEDURE — 80061 LIPID PANEL: CPT

## 2019-06-27 PROCEDURE — 80053 COMPREHEN METABOLIC PANEL: CPT

## 2019-07-03 ENCOUNTER — PATIENT MESSAGE (OUTPATIENT)
Dept: INTERNAL MEDICINE | Facility: CLINIC | Age: 44
End: 2019-07-03

## 2019-12-08 ENCOUNTER — PATIENT MESSAGE (OUTPATIENT)
Dept: ORTHOPEDICS | Facility: CLINIC | Age: 44
End: 2019-12-08

## 2019-12-09 ENCOUNTER — HOSPITAL ENCOUNTER (OUTPATIENT)
Dept: RADIOLOGY | Facility: HOSPITAL | Age: 44
Discharge: HOME | End: 2019-12-09
Attending: ORTHOPAEDIC SURGERY
Payer: COMMERCIAL

## 2019-12-09 ENCOUNTER — TELEPHONE (OUTPATIENT)
Dept: ORTHOPEDICS | Facility: CLINIC | Age: 44
End: 2019-12-09

## 2019-12-09 DIAGNOSIS — Z98.890 S/P SPINAL SURGERY: ICD-10-CM

## 2019-12-09 DIAGNOSIS — Z98.890 S/P SPINAL SURGERY: Primary | ICD-10-CM

## 2019-12-18 ENCOUNTER — PATIENT OUTREACH (OUTPATIENT)
Dept: ADMINISTRATIVE | Facility: OTHER | Age: 44
End: 2019-12-18

## 2019-12-19 ENCOUNTER — PATIENT MESSAGE (OUTPATIENT)
Dept: ORTHOPEDICS | Facility: CLINIC | Age: 44
End: 2019-12-19

## 2019-12-20 ENCOUNTER — HOSPITAL ENCOUNTER (OUTPATIENT)
Dept: RADIOLOGY | Facility: HOSPITAL | Age: 44
Discharge: HOME | End: 2019-12-20
Attending: ORTHOPAEDIC SURGERY
Payer: COMMERCIAL

## 2019-12-20 ENCOUNTER — PATIENT MESSAGE (OUTPATIENT)
Dept: ORTHOPEDICS | Facility: CLINIC | Age: 44
End: 2019-12-20

## 2019-12-20 PROCEDURE — 72100 XR LUMBAR SPINE AP AND LATERAL: ICD-10-PCS | Mod: 26,,, | Performed by: RADIOLOGY

## 2019-12-20 PROCEDURE — 72100 X-RAY EXAM L-S SPINE 2/3 VWS: CPT | Mod: TC

## 2019-12-20 PROCEDURE — 72100 X-RAY EXAM L-S SPINE 2/3 VWS: CPT | Mod: 26,,, | Performed by: RADIOLOGY

## 2019-12-30 ENCOUNTER — PATIENT OUTREACH (OUTPATIENT)
Dept: ADMINISTRATIVE | Facility: OTHER | Age: 44
End: 2019-12-30

## 2020-01-03 ENCOUNTER — OFFICE VISIT (OUTPATIENT)
Dept: ORTHOPEDICS | Facility: CLINIC | Age: 45
End: 2020-01-03
Payer: COMMERCIAL

## 2020-01-03 VITALS — HEIGHT: 70 IN | WEIGHT: 216.94 LBS | BODY MASS INDEX: 31.06 KG/M2

## 2020-01-03 DIAGNOSIS — Z98.890 HISTORY OF LUMBAR DISCECTOMY: ICD-10-CM

## 2020-01-03 DIAGNOSIS — M54.16 LUMBAR RADICULOPATHY: Primary | ICD-10-CM

## 2020-01-03 PROCEDURE — 99999 PR PBB SHADOW E&M-EST. PATIENT-LVL III: ICD-10-PCS | Mod: PBBFAC,,, | Performed by: ORTHOPAEDIC SURGERY

## 2020-01-03 PROCEDURE — 3008F PR BODY MASS INDEX (BMI) DOCUMENTED: ICD-10-PCS | Mod: CPTII,S$GLB,, | Performed by: ORTHOPAEDIC SURGERY

## 2020-01-03 PROCEDURE — 99213 OFFICE O/P EST LOW 20 MIN: CPT | Mod: S$GLB,,, | Performed by: ORTHOPAEDIC SURGERY

## 2020-01-03 PROCEDURE — 99999 PR PBB SHADOW E&M-EST. PATIENT-LVL III: CPT | Mod: PBBFAC,,, | Performed by: ORTHOPAEDIC SURGERY

## 2020-01-03 PROCEDURE — 3008F BODY MASS INDEX DOCD: CPT | Mod: CPTII,S$GLB,, | Performed by: ORTHOPAEDIC SURGERY

## 2020-01-03 PROCEDURE — 99213 PR OFFICE/OUTPT VISIT, EST, LEVL III, 20-29 MIN: ICD-10-PCS | Mod: S$GLB,,, | Performed by: ORTHOPAEDIC SURGERY

## 2020-01-03 RX ORDER — MELOXICAM 15 MG/1
15 TABLET ORAL DAILY
Qty: 30 TABLET | Refills: 1 | Status: SHIPPED | OUTPATIENT
Start: 2020-01-03 | End: 2022-02-17 | Stop reason: CLARIF

## 2020-01-03 NOTE — PROGRESS NOTES
DATE: 1/3/2020  PATIENT: Sanchez Zarate    Attending Physician: Lino Fortune M.D.    CHIEF COMPLAINT: low back pain    HISTORY:  Sanchez Zarate is a 44 y.o. male who is here for initial evaluation of low back and bilateral radiating leg pain, worse left than right (Back - 4, Leg - 5). The pain has been present for about one month. The patient describes the pain as sharp and radiating.  The pain is worse with work and movement and improved by rest. There is no associated numbness and tingling. There is no subjective weakness. Prior treatments have included an unknown medication provided by the medic at his work (he works as an assitant director for movies), back brace, but no therapy or regularly dosed ibuprofen. History of injection L5-S1 and discectomy in 2017 which resolved his symptoms for one year. He had some slight back pain in the last year, but in the last month after standing up from a kneeling position, the pain worsened dramatically.     The Patient denies myelopathic symptoms such as handwriting changes or difficulty with buttons/coins/keys. Denies perineal paresthesias, bowel/bladder dysfunction.    PAST MEDICAL/SURGICAL HISTORY:  Past Medical History:   Diagnosis Date    Chronic back pain     Chronic diarrhea     Hernia     TBI (traumatic brain injury) 2010    fake wall fell on head (no brain or skull injury per pt)     Past Surgical History:   Procedure Laterality Date    COLONOSCOPY N/A 5/29/2017    Procedure: COLONOSCOPY;  Surgeon: Sukhi Scanlon MD;  Location: [facility] ([facility]);  Service: Endoscopy;  Laterality: N/A;    CYSTOSCOPY W/ URETERAL STENT PLACEMENT Left 11/19/2018    Procedure: CYSTOSCOPY, WITH URETERAL STENT INSERTION;  Surgeon: Renee Garcia MD;  Location: [address];  Service: Urology;  Laterality: Left;    CYSTOSCOPY W/ URETERAL STENT PLACEMENT  12/21/2018    Procedure: CYSTOSCOPY, WITH URETERAL STENT INSERTION;  Surgeon: Bhupinder Washington Jr., MD;  Location: [facility]  OR 1ST FLR;  Service: Urology;;    CYSTOSCOPY W/ URETERAL STENT REMOVAL Left 2018    Procedure: CYSTOSCOPY, WITH URETERAL STENT REMOVAL;  Surgeon: Bhupinder Washington Jr., MD;  Location: [facility] OR 1ST FLR;  Service: Urology;  Laterality: Left;    HERNIA REPAIR  2017    umbilical    LASER LITHOTRIPSY Left 2018    Procedure: LITHOTRIPSY, USING LASER (mana);  Surgeon: Bhupinder Washington Jr., MD;  Location: [facility] OR 1ST FLR;  Service: Urology;  Laterality: Left;  90mins    LUMBAR EPIDURAL INJECTION      with steroid    RETROGRADE PYELOGRAPHY Left 2018    Procedure: PYELOGRAM, RETROGRADE;  Surgeon: Renee Garcia MD;  Location: [facility] OR 2ND FLR;  Service: Urology;  Laterality: Left;    RETROGRADE PYELOGRAPHY Left 2018    Procedure: PYELOGRAM, RETROGRADE;  Surgeon: Bhupinder Washington Jr., MD;  Location: [facility] OR 1ST FLR;  Service: Urology;  Laterality: Left;    URETEROSCOPY Left 2018    Procedure: URETEROSCOPY;  Surgeon: Bhupinder Washington Jr., MD;  Location: [facility] OR 1ST FLR;  Service: Urology;  Laterality: Left;  90mins       Current Medications: No current outpatient medications on file.    Social History:   Social History     Socioeconomic History    Marital status:      Spouse name: Not on file    Number of children: 2    Years of education: Not on file    Highest education level: Not on file   Occupational History    Occupation: self employed   Social Needs    Financial resource strain: Not on file    Food insecurity:     Worry: Not on file     Inability: Not on file    Transportation needs:     Medical: Not on file     Non-medical: Not on file   Tobacco Use    Smoking status: Former Smoker     Last attempt to quit: 2007     Years since quittin.0    Smokeless tobacco: Never Used   Substance and Sexual Activity    Alcohol use: Yes     Comment: rare    Drug use: No    Sexual activity: Yes     Partners: Female   Lifestyle    Physical activity:     Days per week: Not  "on file     Minutes per session: Not on file    Stress: Not on file   Relationships    Social connections:     Talks on phone: Not on file     Gets together: Not on file     Attends Latter day service: Not on file     Active member of club or organization: Not on file     Attends meetings of clubs or organizations: Not on file     Relationship status: Not on file   Other Topics Concern    Not on file   Social History Narrative    Not on file       REVIEW OF SYSTEMS:  Constitution: Negative. Negative for chills, fever and night sweats.   Cardiovascular: Negative for chest pain and syncope.   Respiratory: Negative for cough and shortness of breath.   Gastrointestinal: See HPI. Negative for nausea/vomiting. Negative for abdominal pain.  Genitourinary: See HPI. Negative for discoloration or dysuria.  Hematologic/Lymphatic: Negative for bleeding/clotting disorders.   Musculoskeletal: Negative for falls and muscle weakness.   Neurological: See HPI. no history of seizures. no history of cranial surgery or shunts.  Neurological: See HPI. No seizures.   Endocrine: Negative for polydipsia, polyphagia and polyuria.   Allergic/Immunologic: Negative for hives and persistent infections.     EXAM:  Ht 5' 10" (1.778 m)   Wt 98.4 kg (216 lb 14.9 oz)   BMI 31.13 kg/m²     PHYSICAL EXAMINATION:    General: The patient is a healthy 44 y.o. male in no apparent distress, the patient is orientatied to person, place and time.  Psych: Normal mood and affect  HEENT: Vision grossly intact, hearing intact to the spoken word.  Lungs: Respirations unlabored.  Gait: Normal station and gait, no difficulty with toe or heel walk.   Skin: Dorsal lumbar skin negative for rashes, lesions, hairy patches and surgical scars. There is mild lumbar tenderness to palpation.  Range of motion: Lumbar range of motion is acceptable.  Spinal Balance: Global saggital and coronal spinal balance acceptable, no significant for scoliosis and " kyphosis.  Musculoskeletal: No pain with the range of motion of the bilateral hips. No trochanteric tenderness to palpation.  Vascular: Bilateral lower extremities warm and well perfused, Dorsalis pedis pulses 2+ bilaterally.  Neurological: Normal strength and tone in all major motor groups in the bilateral lower extremities. Normal sensation to light touch in the L2-S1 dermatomes bilaterally.  Deep tendon reflexes symmetric +2 in the bilateral lower extremities.  Negative Babinski bilaterally. Straight leg raise positive left.    IMAGING:      Today I personally reviewed AP, Lat and Flex/Ex  upright L-spine that demonstrate mild degenerative changes at L5-S1 level. Otherwise normal aligngment      Body mass index is 31.13 kg/m².  Hemoglobin A1C   Date Value Ref Range Status   01/10/2019 5.1 4.0 - 5.6 % Final     Comment:     ADA Screening Guidelines:  5.7-6.4%  Consistent with prediabetes  >or=6.5%  Consistent with diabetes  High levels of fetal hemoglobin interfere with the HbA1C  assay. Heterozygous hemoglobin variants (HbS, HgC, etc)do  not significantly interfere with this assay.   However, presence of multiple variants may affect accuracy.         ASSESSMENT/PLAN:  Low back pain: Meloxicam sent to pharmacy. Will begin a course of PT and will fu the patient to assess. May need MRI at some point. Will attempt conservative management.   Diagnoses and all orders for this visit:    Lumbar radiculopathy  -     Ambulatory referral to Physical Therapy    History of lumbar discectomy  -     Ambulatory referral to Physical Therapy    Other orders  -     meloxicam (MOBIC) 15 MG tablet; Take 1 tablet (15 mg total) by mouth once daily.

## 2020-01-08 ENCOUNTER — CLINICAL SUPPORT (OUTPATIENT)
Dept: REHABILITATION | Facility: HOSPITAL | Age: 45
End: 2020-01-08
Attending: ORTHOPAEDIC SURGERY
Payer: COMMERCIAL

## 2020-01-08 DIAGNOSIS — M54.50 BILATERAL LOW BACK PAIN, UNSPECIFIED CHRONICITY, UNSPECIFIED WHETHER SCIATICA PRESENT: ICD-10-CM

## 2020-01-08 DIAGNOSIS — R26.2 DIFFICULTY WALKING: ICD-10-CM

## 2020-01-08 PROCEDURE — 97110 THERAPEUTIC EXERCISES: CPT | Mod: PN

## 2020-01-08 PROCEDURE — 97161 PT EVAL LOW COMPLEX 20 MIN: CPT | Mod: PN

## 2020-01-10 ENCOUNTER — CLINICAL SUPPORT (OUTPATIENT)
Dept: REHABILITATION | Facility: HOSPITAL | Age: 45
End: 2020-01-10
Attending: ORTHOPAEDIC SURGERY
Payer: COMMERCIAL

## 2020-01-10 DIAGNOSIS — M54.50 BILATERAL LOW BACK PAIN, UNSPECIFIED CHRONICITY, UNSPECIFIED WHETHER SCIATICA PRESENT: ICD-10-CM

## 2020-01-10 DIAGNOSIS — R26.2 DIFFICULTY WALKING: ICD-10-CM

## 2020-01-10 PROCEDURE — 97110 THERAPEUTIC EXERCISES: CPT | Mod: PN

## 2020-01-10 PROCEDURE — 97140 MANUAL THERAPY 1/> REGIONS: CPT | Mod: PN

## 2020-01-10 NOTE — PROGRESS NOTES
"                            Physical Therapy Daily Treatment Note     Name: Sanchez Zarate  Clinic Number: [id number]    Therapy Diagnosis:   Encounter Diagnoses   Name Primary?    Bilateral low back pain, unspecified chronicity, unspecified whether sciatica present     Difficulty walking      Physician: Lino Fortune MD    Visit Date: 1/10/2020  Physician Orders: PT Eval and Treat   Medical Diagnosis:   M54.16 (ICD-10-CM) - Lumbar radiculopathy   Z98.890 (ICD-10-CM) - History of lumbar discectomy       Date of Surgery: Discectomy in 2017    Evaluation Date: 1/8/2020  Authorization Period Expiration: 1/2/2021  Plan of Care Certification Period: 2/29/2020  Visit # / Visits authorized: 1/ 1    Time In: 0700  Time Out: 0800  Total Billable Time: 60 minutes    Precautions: Standard      Subjective      Pt reports: he was compliant with home exercise program given last session.   Response to previous treatment:NC  Functional change: NC    Pain: 2/10  Location: bilateral back      Objective     Sanchez received therapeutic exercises to develop strength, ROM, flexibility, posture and core stabilization for 50 minutes including:  - SKTC  - Supine hamstring presses BTB x 10  - PPT with walk outs x 10  - Cross pattern bridging x 10  - Modified side bridges x 15  - Clamshells RTB x 20  - Childspose 10x5"  - T spine rotation from childspose position x 5 B  - Kneeling Hip thrusters x 15  - Half kneel hip flexor stretch with triplanar motion x 5   - Palloff press x 20    Sanchez received the following manual therapy techniques: Joint mobilizations, Myofacial release and Soft tissue Mobilization were applied to the: Hip and Lumbar spine for 10 minutes, including:  - IASTM to lumbar paraspinals and superior glute max regions  - Gross hip mobilizations B        Home Exercises Provided and Patient Education Provided     Education provided:   - HEP review and added hip flexor stretching    Written Home Exercises Provided: .  Exercises " were reviewed and Sanchez was able to demonstrate them prior to the end of the session.  Sanchez demonstrated good  understanding of the education provided.     Pt received a written copy of exercises to perform at home.   See EMR under patient instructions for exercises given.     Sanchez demonstrated good  understanding of the education provided.     Assessment     Pt tolerated tx well and had a positive result with half kneeling hip flexor stretching.  Sanchez is progressing well towards his goals.   Pt prognosis is Excellent.     Pt will continue to benefit from skilled outpatient physical therapy to address the deficits listed in the problem list box on initial evaluation, provide pt/family education and to maximize pt's level of independence in the home and community environment.     Pt's spiritual, cultural and educational needs considered and pt agreeable to plan of care and goals.    Anticipated barriers to physical therapy: None    Goals: See Eval    Plan     Continue POC    Freeman Chavez, PT

## 2020-01-10 NOTE — PLAN OF CARE
[facility]  Physical Therapy Initial Evaluation    Name: Sanchez Zarate  Clinic Number: [id number]    Therapy Diagnosis:   Encounter Diagnoses   Name Primary?    Bilateral low back pain, unspecified chronicity, unspecified whether sciatica present     Difficulty walking      Physician: Lino Fortune MD    Physician Orders: PT Eval and Treat   Medical Diagnosis:   M54.16 (ICD-10-CM) - Lumbar radiculopathy   Z98.890 (ICD-10-CM) - History of lumbar discectomy       Date of Surgery: Discectomy in 2017    Evaluation Date: 1/8/2020  Authorization Period Expiration: 1/2/2021  Plan of Care Certification Period: 2/29/2020  Visit # / Visits authorized: 1/ 1    Time In: 0710  Time Out: 0800  Total Billable Time: 50 minutes    Precautions: Standard    Subjective   Date of onset: 1 month.   History of current condition - Sanchez reports to PT with bilateral LBP that was previously radiating into the lower extremities. 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 Pt reports that he was standing from a kneeling position just after Thanksgiving and felt like he could not move. He is taking anti-inflammatories and has started a yoga regiment which has helped.       Past Medical History:   Diagnosis Date    Chronic back pain     Chronic diarrhea     Hernia     TBI (traumatic brain injury) 2010    fake wall fell on head (no brain or skull injury per pt)     Sanchez Zarate  has a past surgical history that includes Hernia repair (2017); Colonoscopy (N/A, 5/29/2017); Lumbar epidural injection; Cystoscopy w/ ureteral stent placement (Left, 11/19/2018); Retrograde pyelography (Left, 11/19/2018); Ureteroscopy (Left, 12/21/2018); Laser lithotripsy (Left, 12/21/2018); Retrograde pyelography (Left, 12/21/2018); Cystoscopy w/ ureteral stent removal (Left, 12/21/2018); and Cystoscopy w/ ureteral stent placement (12/21/2018).    Sanchez has a current medication list which includes the  following prescription(s): meloxicam.    Review of patient's allergies indicates:  No Known Allergies     Imaging, X Ray: Two views: Alignment is normal.  There is minimal DJD.  No fracture dislocation bone destruction seen.  Disc spaces are maintained.  No acute process seen.    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  Prior Level of Function: Independent  Current Level of Function: Difficulty with prolonged standing and lifting activity    Pain:  Current 4/10, worst 8/10, best 1/10   Location: bilateral back   Description: Aching  Aggravating Factors: Standing and Lifting  Easing Factors: relaxation, pain medication and yoga    Pts goals: Return to PLOF    Objective     Observation:   Enters independently without AD  Prior surgical scar clean and healed    Posture:  WNL    Lumbar Range of Motion:    percentage Pain   Flexion 75%   Stretch        Extension 75%   Pinch        Left rotation   75%         Right Rotation   50% Tight             Lower Extremity Strength  Right LE  Left LE    Knee extension: 5/5 Knee extension: 5/5   Knee flexion: 5/5 Knee flexion: 5/5   Hip flexion: 4+/5 Hip flexion: 4+/5   Hip extension:  4+/5 Hip extension: 4+/5   Hip abduction: 4+/5 Hip abduction: 4+/5   Hip adduction: 5/5 Hip adduction 5/5   Ankle dorsiflexion: 5/5 Ankle dorsiflexion: 5/5   Ankle plantarflexion: 5/5 Ankle plantarflexion: 5/5         Special Tests:  -Repeated Flexion: NT  -Repeated Ext: NT  -Piriformis Test: Neg  -Prone Instability Test: Neg  -Bridge Test: Pos  -OH Squat: NT  - Pelvic Cluster: Neg    DTR:   Right Left Comment   Patellar (L3-4) 2+ 2+    Achilles (S1) 2+ 2+        Neuro Dynamic Testing:    Sciatic nerve:      SLR: R = Neg     L = Neg       Femoral Nerve:    Femoral nerve test: Neg      Joint Mobility:   - Hip Hypomobile with restriction into IR    Palpation:   - Hypertrophic lumbar paraspinals    Sensation: Intact    Flexibility:    Ely's test: R =  100 degrees ; L = 100 degrees   Clementine's test: R = Pos ; L = Pos   Capo test: R = Pos ; L = Pos          CMS Impairment/Limitation/Restriction for FOTO Back Survey    Therapist reviewed FOTO scores for Sanchez Zarate on 1/8/2020.   FOTO documents entered into [employer] - see Media section.    Limitation Score: 44%  Category: Other    Current : CK = at least 40% but < 60% impaired, limited or restricted  Goal: CJ = at least 20% but < 40% impaired, limited or restricted           TREATMENT   Treatment Time In: 0745  Treatment Time Out: 0800  Total Treatment time separate from Evaluation time:15min    Sanchez received therapeutic exercises to develop strength, posture and core stabilization for 15 minutes including:  HEP review  - PPT with walk outs  - Chidspose  - Quadruped mutifidi firing  - Eccentric hamstring presses      Home Exercises and Patient Education Provided    Education provided re: Dx, POC, HEP    Written Home Exercises Provided: .  Exercises were reviewed and Sanchez was able to demonstrate them prior to the end of the session.   Pt received a written copy of exercises to perform at home. Sanchez demonstrated good  understanding of the education provided.     See EMR under patient instructions for exercises given.   Assessment   Sanchez is a 44 y.o. male referred to outpatient Physical Therapy with a medical diagnosis of LBP. Pt presents with signs and symptoms consistent with muscle spasms of the lumbar spine. Possible neurologic component but symptoms appear more mechanical in nature. Pt stands to benefit from gross core/hip strengthening, thoracic mobility activities, and manual therapy in order to maximize functional mobility and activity tolerance. Given pt vocation, he will also benefit from education and training on lifting mechanics. Primary impairments at this time include strength, ROM, soft tissue pliability, and pain.    Pt prognosis is Excellent.   Pt will benefit from skilled outpatient Physical Therapy to  address the deficits stated above and in the chart below, provide pt/family education, and to maximize pt's level of independence.     Plan of care discussed with patient: Yes  Pt's spiritual, cultural and educational needs considered and patient is agreeable to the plan of care and goals as stated below:     Anticipated Barriers for therapy: None    Medical Necessity is demonstrated by the following  History  Co-morbidities and personal factors that may impact the plan of care Co-morbidities:   See Above    Personal Factors:   no deficits     low   Examination  Body Structures and Functions, activity limitations and participation restrictions that may impact the plan of care Body Regions:   back    Body Systems:    ROM  strength  transfers  transitions    Participation Restrictions:   Lifting, Carrying, Pushing, Pulling    Activity limitations:   Learning and applying knowledge  no deficits    Mobility  lifting and carrying objects  walking    Self care  washing oneself (bathing, drying, washing hands)  dressing    Domestic Life  doing house work (cleaning house, washing dishes, laundry)  assisting others    Life Areas  no deficits    Community and Social Life  no deficits         low   Clinical Presentation stable and uncomplicated low   Decision Making/ Complexity Score: low     Goals:    Short Term Goals (4 Weeks):  - Pt will increase Core/Hip strength to >4/5 for stability with standing ADL's  - Decrease gross Pain by 50% with squatting activity for improved tolerance to work demands   - Pt independent with HEP to improve tolerance to exercise progressions.     Long Term Goals (6-8 Weeks):  - Pt will increase T spine and Lumbar ROM to > 75% for improved tolerance to personal and work demands  - Pt will increase gross core/LE strength to 5/5 for improved stability and tolerance to vocational demands  - Decrease  Pain by 75% with return to PLOF   - Pt will report improvement in overall functional abilities,  evidenced by improved score on  FOTO to 35% limitation or better.         Plan   Certification Period/Plan of care expiration: 1/8/2020 to 2/29/2020.    Outpatient Physical Therapy 2 times weekly for 8 weeks to include the following interventions: Manual Therapy, Moist Heat/ Ice, Neuromuscular Re-ed, Patient Education, Therapeutic Activites and Therapeutic Exercise.     Freeman Chavez, PT, DPT, OCS

## 2020-01-14 ENCOUNTER — CLINICAL SUPPORT (OUTPATIENT)
Dept: REHABILITATION | Facility: HOSPITAL | Age: 45
End: 2020-01-14
Attending: ORTHOPAEDIC SURGERY
Payer: COMMERCIAL

## 2020-01-14 DIAGNOSIS — M54.50 BILATERAL LOW BACK PAIN, UNSPECIFIED CHRONICITY, UNSPECIFIED WHETHER SCIATICA PRESENT: ICD-10-CM

## 2020-01-14 DIAGNOSIS — R26.2 DIFFICULTY WALKING: ICD-10-CM

## 2020-01-14 PROCEDURE — 97110 THERAPEUTIC EXERCISES: CPT | Mod: PN

## 2020-01-14 NOTE — PROGRESS NOTES
"                            Physical Therapy Daily Treatment Note     Name: Sanchez Zarate  Clinic Number: [id number]    Therapy Diagnosis:   Encounter Diagnoses   Name Primary?    Bilateral low back pain, unspecified chronicity, unspecified whether sciatica present     Difficulty walking      Physician: Lino Fortune MD    Visit Date: 1/14/2020  Physician Orders: PT Eval and Treat   Medical Diagnosis:   M54.16 (ICD-10-CM) - Lumbar radiculopathy   Z98.890 (ICD-10-CM) - History of lumbar discectomy       Date of Surgery: Discectomy in 2017    Evaluation Date: 1/8/2020  Authorization Period Expiration: 1/2/2021  Plan of Care Certification Period: 2/29/2020  Visit # / Visits authorized: 2/TBD    Time In: 0705  Time Out: 0800  Total Billable Time: 55 minutes (4 TE)    Precautions: Standard    Subjective      Pt reports: he has minimal discomfort mainly the right side of his low back  Response to previous treatment:NC  Functional change: NC    Pain: 3/10  Location: bilateral back (R>L)      Objective     Sanchez received therapeutic exercises to develop strength, ROM, flexibility, posture and core stabilization for 55 minutes including:  - SKTC  - Supine hamstring presses BTB x 10  - PPT with walk outs x 10  - Cross pattern bridging x 10  - Modified side bridges x 15  - Clamshells RTB x 20  - Childspose 10x5"  - T spine rotation from childspose position x 5 B  - Tall kneeling Hip thrusters x 15 with 5'' holds  - Half kneel hip flexor stretch with triplanar motion x 5, cues for anterior pelvic tilt  - Palloff press x 20    Sanchez received the following manual therapy techniques: Joint mobilizations, Myofacial release and Soft tissue Mobilization were applied to the: Hip and Lumbar spine for 0 minutes, including:  - IASTM to lumbar paraspinals and superior glute max regions  - Gross hip mobilizations B    Home Exercises Provided and Patient Education Provided   Education provided:   - HEP review and added hip flexor " stretching    Written Home Exercises Provided: .  Exercises were reviewed and Sanchez was able to demonstrate them prior to the end of the session.  Sanchez demonstrated good  understanding of the education provided.     Pt received a written copy of exercises to perform at home.   See EMR under patient instructions for exercises given.     Sanchez demonstrated good  understanding of the education provided.     Assessment     Pt stated he felt some burning discomfort at end of session though felt more like muscle burn from working out; follow-up next visit. Reports he could feels parts of his low back working for standing exercises. PT encouraged daily walking and avoidance/modification of lifting activities at work.  Sanchez is progressing well towards his goals.   Pt prognosis is Excellent.     Pt will continue to benefit from skilled outpatient physical therapy to address the deficits listed in the problem list box on initial evaluation, provide pt/family education and to maximize pt's level of independence in the home and community environment.   Pt's spiritual, cultural and educational needs considered and pt agreeable to plan of care and goals.    Anticipated barriers to physical therapy: None    Goals:  Short Term Goals (4 Weeks):  - Pt will increase Core/Hip strength to >4/5 for stability with standing ADL's - progressing  - Decrease gross Pain by 50% with squatting activity for improved tolerance to work demands  - progressing  - Pt independent with HEP to improve tolerance to exercise progressions. - progressing     Long Term Goals (6-8 Weeks):  - Pt will increase T spine and Lumbar ROM to > 75% for improved tolerance to personal and work demands - progressing  - Pt will increase gross core/LE strength to 5/5 for improved stability and tolerance to vocational demands - progressing  - Decrease  Pain by 75% with return to PLOF - progressing  - Pt will report improvement in overall functional abilities, evidenced by  improved score on  FOTO to 35% limitation or better. - progressing    Plan     Continue POC    Migel Bush, PT

## 2020-01-16 ENCOUNTER — CLINICAL SUPPORT (OUTPATIENT)
Dept: REHABILITATION | Facility: HOSPITAL | Age: 45
End: 2020-01-16
Attending: ORTHOPAEDIC SURGERY
Payer: COMMERCIAL

## 2020-01-16 DIAGNOSIS — R26.2 DIFFICULTY WALKING: ICD-10-CM

## 2020-01-16 DIAGNOSIS — M54.50 BILATERAL LOW BACK PAIN, UNSPECIFIED CHRONICITY, UNSPECIFIED WHETHER SCIATICA PRESENT: ICD-10-CM

## 2020-01-16 PROCEDURE — 97110 THERAPEUTIC EXERCISES: CPT | Mod: PN,CQ

## 2020-01-16 NOTE — PROGRESS NOTES
"                            Physical Therapy Daily Treatment Note     Name: Sanchez Zarate  Clinic Number: [id number]    Therapy Diagnosis:   Encounter Diagnoses   Name Primary?    Bilateral low back pain, unspecified chronicity, unspecified whether sciatica present     Difficulty walking      Physician: Lino Fortune MD    Visit Date: 1/16/2020  Physician Orders: PT Eval and Treat   Medical Diagnosis:   M54.16 (ICD-10-CM) - Lumbar radiculopathy   Z98.890 (ICD-10-CM) - History of lumbar discectomy       Date of Surgery: Discectomy in 2017    Evaluation Date: 1/8/2020  Authorization Period Expiration: 1/2/2021  Plan of Care Certification Period: 2/29/2020  Visit # / Visits authorized: 2/TBD    Time In: 0800  Time Out: 0840  Total Billable Time: 30 minutes (2 TE)    Precautions: Standard    Subjective      Pt reports: he has minimal "tightness"  Response to previous treatment:NC  Functional change: NC    Pain: 3/10  Location: bilateral back (R>L)      Objective     Sanchez received therapeutic exercises to develop strength, ROM, flexibility, posture and core stabilization for 55 minutes including:  - SKTC 30"x3 B alternating  - Supine hamstring presses BTB x 10  - PPT with walk outs x 10  - Cross pattern bridging x 10  - Modified side bridges x 15  - Clamshells RTB x 20  - Childspose 10x5"  - T spine rotation from childspose position x 5 B  - Tall kneeling Hip thrusters x 15 with 5'' holds  - Half kneel hip flexor stretch with triplanar motion x 5, cues for anterior pelvic tilt  - Palloff press x 20    Sanchez received the following manual therapy techniques: Joint mobilizations, Myofacial release and Soft tissue Mobilization were applied to the: Hip and Lumbar spine for 0 minutes, including:  Not performed today  - IASTM to lumbar paraspinals and superior glute max regions  - Gross hip mobilizations B    Home Exercises Provided and Patient Education Provided   Education provided:   - HEP review and added hip flexor " "stretching    Written Home Exercises Provided: .  Exercises were reviewed and Sanchez was able to demonstrate them prior to the end of the session.  Sanchez demonstrated good  understanding of the education provided.     Pt received a written copy of exercises to perform at home.   See EMR under patient instructions for exercises given.     Sanchez demonstrated good  understanding of the education provided.     Assessment     Pt stated some discomfort at end of session though felt more like muscle burn from working out; follow-up next visit. Reports he could feel his low back working for standing exercises.  No complaints of increased pain after treatment.  States "good - I worked out"  Sanchez is progressing well towards his goals.   Pt prognosis is Excellent.     Pt will continue to benefit from skilled outpatient physical therapy to address the deficits listed in the problem list box on initial evaluation, provide pt/family education and to maximize pt's level of independence in the home and community environment.   Pt's spiritual, cultural and educational needs considered and pt agreeable to plan of care and goals.    Anticipated barriers to physical therapy: None    Goals:  Short Term Goals (4 Weeks):  - Pt will increase Core/Hip strength to >4/5 for stability with standing ADL's - progressing  - Decrease gross Pain by 50% with squatting activity for improved tolerance to work demands  - progressing  - Pt independent with HEP to improve tolerance to exercise progressions. - progressing     Long Term Goals (6-8 Weeks):  - Pt will increase T spine and Lumbar ROM to > 75% for improved tolerance to personal and work demands - progressing  - Pt will increase gross core/LE strength to 5/5 for improved stability and tolerance to vocational demands - progressing  - Decrease  Pain by 75% with return to PLOF - progressing  - Pt will report improvement in overall functional abilities, evidenced by improved score on  FOTO to 35% " limitation or better. - progressing    Plan     Continue POC, Monitor response to treatment and progress as tolerated.    Yesenia Veliz, PTA

## 2020-01-21 ENCOUNTER — CLINICAL SUPPORT (OUTPATIENT)
Dept: REHABILITATION | Facility: HOSPITAL | Age: 45
End: 2020-01-21
Attending: ORTHOPAEDIC SURGERY
Payer: COMMERCIAL

## 2020-01-21 DIAGNOSIS — R26.2 DIFFICULTY WALKING: ICD-10-CM

## 2020-01-21 DIAGNOSIS — M54.50 BILATERAL LOW BACK PAIN, UNSPECIFIED CHRONICITY, UNSPECIFIED WHETHER SCIATICA PRESENT: ICD-10-CM

## 2020-01-21 PROCEDURE — 97110 THERAPEUTIC EXERCISES: CPT | Mod: PN

## 2020-01-21 NOTE — PROGRESS NOTES
"                            Physical Therapy Daily Treatment Note     Name: Sanchez Zarate  Clinic Number: [id number]    Therapy Diagnosis:   Encounter Diagnoses   Name Primary?    Bilateral low back pain, unspecified chronicity, unspecified whether sciatica present     Difficulty walking      Physician: Lino Fortune MD    Visit Date: 1/21/2020  Physician Orders: PT Eval and Treat   Medical Diagnosis:   M54.16 (ICD-10-CM) - Lumbar radiculopathy   Z98.890 (ICD-10-CM) - History of lumbar discectomy       Date of Surgery: Discectomy in 2017    Evaluation Date: 1/8/2020  Authorization Period Expiration: 1/2/2021  Plan of Care Certification Period: 2/29/2020  Visit # / Visits authorized: 4/6    Time In: 0810  Time Out: 0855  Total Billable Time: 45 minutes (2 TE)    Precautions: Standard    Subjective      Pt reports: he has minimal "tightness". LBP has been improving and he plans to return to work in early February  Response to previous treatment:NC  Functional change: NC    Pain: 3/10  Location: bilateral back (R>L)      Objective     Sanchez received therapeutic exercises to develop strength, ROM, flexibility, posture and core stabilization for 55 minutes including:  - SKTC 30"x3 B alternating  - Supine hamstring presses BTB x 10  - PPT with walk outs x 10  - Cross pattern bridging x 10  - Modified side bridges x 15  - Clamshells RTB x 20  - Childspose 10x5"  - T spine rotation from childspose position x 5 B  - Tall kneeling Hip thrusters x 15 with 5'' holds  - Half kneel hip flexor stretch with triplanar motion x 5, cues for anterior pelvic tilt  -  press with PPT x 15  - Floor to shoulder box lift x 15    Sanchez received the following manual therapy techniques: Joint mobilizations, Myofacial release and Soft tissue Mobilization were applied to the: Hip and Lumbar spine for 0 minutes, including:  Not performed today  - IASTM to lumbar paraspinals and superior glute max regions  - Gross hip mobilizations " B    Home Exercises Provided and Patient Education Provided   Education provided:   - HEP review and added hip flexor stretching    Written Home Exercises Provided: .  Exercises were reviewed and Sanchez was able to demonstrate them prior to the end of the session.  Sanchez demonstrated good  understanding of the education provided.     Pt received a written copy of exercises to perform at home.   See EMR under patient instructions for exercises given.     Sanchez demonstrated good  understanding of the education provided.     Assessment     Progressed lifting activity which required cues for LE lifting activity. Progressing well and should be progressed as tolerated.  Sanchez is progressing well towards his goals.   Pt prognosis is Excellent.     Pt will continue to benefit from skilled outpatient physical therapy to address the deficits listed in the problem list box on initial evaluation, provide pt/family education and to maximize pt's level of independence in the home and community environment.   Pt's spiritual, cultural and educational needs considered and pt agreeable to plan of care and goals.    Anticipated barriers to physical therapy: None    Goals:  Short Term Goals (4 Weeks):  - Pt will increase Core/Hip strength to >4/5 for stability with standing ADL's - progressing  - Decrease gross Pain by 50% with squatting activity for improved tolerance to work demands  - progressing  - Pt independent with HEP to improve tolerance to exercise progressions. - progressing     Long Term Goals (6-8 Weeks):  - Pt will increase T spine and Lumbar ROM to > 75% for improved tolerance to personal and work demands - progressing  - Pt will increase gross core/LE strength to 5/5 for improved stability and tolerance to vocational demands - progressing  - Decrease  Pain by 75% with return to PLOF - progressing  - Pt will report improvement in overall functional abilities, evidenced by improved score on  FOTO to 35% limitation or better. -  progressing    Plan     Continue POC, Monitor response to treatment and progress as tolerated.    Freeman Chavez, PT

## 2020-01-23 ENCOUNTER — CLINICAL SUPPORT (OUTPATIENT)
Dept: REHABILITATION | Facility: HOSPITAL | Age: 45
End: 2020-01-23
Attending: ORTHOPAEDIC SURGERY
Payer: COMMERCIAL

## 2020-01-23 DIAGNOSIS — M54.50 BILATERAL LOW BACK PAIN, UNSPECIFIED CHRONICITY, UNSPECIFIED WHETHER SCIATICA PRESENT: ICD-10-CM

## 2020-01-23 DIAGNOSIS — R26.2 DIFFICULTY WALKING: ICD-10-CM

## 2020-01-23 PROCEDURE — 97110 THERAPEUTIC EXERCISES: CPT | Mod: PN

## 2020-01-23 NOTE — PROGRESS NOTES
"                            Physical Therapy Daily Treatment Note     Name: Sanchez Zarate  Clinic Number: [id number]    Therapy Diagnosis:   Encounter Diagnoses   Name Primary?    Bilateral low back pain, unspecified chronicity, unspecified whether sciatica present     Difficulty walking      Physician: Lino Fortune MD    Visit Date: 1/23/2020  Physician Orders: PT Eval and Treat   Medical Diagnosis:   M54.16 (ICD-10-CM) - Lumbar radiculopathy   Z98.890 (ICD-10-CM) - History of lumbar discectomy       Date of Surgery: Discectomy in 2017    Evaluation Date: 1/8/2020  Authorization Period Expiration: 1/2/2021  Plan of Care Certification Period: 2/29/2020  Visit # / Visits authorized: 4/6    Time In: 0710  Time Out: 0855  Total Billable Time: 45 minutes (3 TE)    Precautions: Standard    Subjective      Pt states that he is continuing to feel better. Remains compliant with HEP.  Response to previous treatment:NC  Functional change: NC    Pain: 1/10  Location: bilateral back (R>L)      Objective     Sanchez received therapeutic exercises to develop strength, ROM, flexibility, posture and core stabilization for 45 minutes including:  - SKTC 30"x3 B alternating  - Supine hamstring presses BTB x 10  - PPT with walk outs x 10  - Cross pattern bridging x 10  - Med ball dead bugs x 10  - Modified side bridges x 15  - Clamshells RTB x 20  - Childspose 10x5"  - T spine rotation from childspose position x 5 B  - Tall kneeling Hip thrusters 2x10 with purple cord resist  - Half kneel hip flexor stretch with triplanar motion x 5, cues for posterior pelvic tilt  -  press with PPT 2x10  - Floor to shoulder box lift x 15  - Box carries 3x100 ft.    Sanchez received the following manual therapy techniques: Joint mobilizations, Myofacial release and Soft tissue Mobilization were applied to the: Hip and Lumbar spine for 0 minutes, including:  Not performed today  - IASTM to lumbar paraspinals and superior glute max regions  - Gross " hip mobilizations B    Home Exercises Provided and Patient Education Provided   Education provided:   - HEP review and added hip flexor stretching    Written Home Exercises Provided: .  Exercises were reviewed and Sanchez was able to demonstrate them prior to the end of the session.  Sanchez demonstrated good  understanding of the education provided.     Pt received a written copy of exercises to perform at home.   See EMR under patient instructions for exercises given.     Sanchez demonstrated good  understanding of the education provided.     Assessment     Progressed lfuncitnoal lifting and carrying activity. Pt demonstrates good form with lifting mechanics and improved control with core stabilization activity.  Sanchez is progressing well towards his goals.   Pt prognosis is Excellent.     Pt will continue to benefit from skilled outpatient physical therapy to address the deficits listed in the problem list box on initial evaluation, provide pt/family education and to maximize pt's level of independence in the home and community environment.   Pt's spiritual, cultural and educational needs considered and pt agreeable to plan of care and goals.    Anticipated barriers to physical therapy: None    Goals:  Short Term Goals (4 Weeks):  - Pt will increase Core/Hip strength to >4/5 for stability with standing ADL's - progressing  - Decrease gross Pain by 50% with squatting activity for improved tolerance to work demands  - progressing  - Pt independent with HEP to improve tolerance to exercise progressions. - progressing     Long Term Goals (6-8 Weeks):  - Pt will increase T spine and Lumbar ROM to > 75% for improved tolerance to personal and work demands - progressing  - Pt will increase gross core/LE strength to 5/5 for improved stability and tolerance to vocational demands - progressing  - Decrease  Pain by 75% with return to PLOF - progressing  - Pt will report improvement in overall functional abilities, evidenced by  improved score on  FOTO to 35% limitation or better. - progressing    Plan     Continue POC, Monitor response to treatment and progress as tolerated.    Freeman Chavez, PT

## 2020-01-28 ENCOUNTER — CLINICAL SUPPORT (OUTPATIENT)
Dept: REHABILITATION | Facility: HOSPITAL | Age: 45
End: 2020-01-28
Attending: ORTHOPAEDIC SURGERY
Payer: COMMERCIAL

## 2020-01-28 DIAGNOSIS — R26.2 DIFFICULTY WALKING: ICD-10-CM

## 2020-01-28 DIAGNOSIS — M54.50 BILATERAL LOW BACK PAIN, UNSPECIFIED CHRONICITY, UNSPECIFIED WHETHER SCIATICA PRESENT: ICD-10-CM

## 2020-01-28 PROCEDURE — 97110 THERAPEUTIC EXERCISES: CPT | Mod: PN,CQ

## 2020-01-28 NOTE — PROGRESS NOTES
"                            Physical Therapy Daily Treatment Note     Name: Sanchez Zarate  Clinic Number: [id number]    Therapy Diagnosis:   Encounter Diagnoses   Name Primary?    Bilateral low back pain, unspecified chronicity, unspecified whether sciatica present     Difficulty walking      Physician: Lino Fortune MD    Visit Date: 1/28/2020  Physician Orders: PT Eval and Treat   Medical Diagnosis:   M54.16 (ICD-10-CM) - Lumbar radiculopathy   Z98.890 (ICD-10-CM) - History of lumbar discectomy       Date of Surgery: Discectomy in 2017    Evaluation Date: 1/8/2020  Authorization Period Expiration: 1/2/2021  Plan of Care Certification Period: 2/29/2020  Visit # / Visits authorized: 7/6  FOTO 5/5  Time In: 7:05 AM   Time Out: 8:00 AM   Total Billable Time: 45 minutes (3 TE)    Precautions: Standard    Subjective   "Im definitely in less pain than I was in the beginning of therapy treatments". Pt agreeable to PT session. He reports no new complaints of pain upon arrival.      Remains compliant with HEP.  Response to previous treatment:NC  Functional change: NC    Pain: 1/10 minimal pain   Location: bilateral back (R>L)      Objective     Sanchez received therapeutic exercises to develop strength, ROM, flexibility, posture and core stabilization for 45 minutes including:    - SKTC    30"x3 B alternating  - Supine hamstring presses  BTB x 10  - PPT with walk outs   x 10  - Cross pattern bridging  x 10  - Med ball dead bugs   x 10  - Modified side bridges  x 15  - Clamshells    RTB x 20  - Childspose    10x5"  - T spine rotation from childspose position x 5 B  - Tall kneeling Hip thrusters  2x10 with purple cord resist  - Half kneel hip flexor stretch with triplanar motion x 5, cues for posterior pelvic tilt NOT PERFORMED TODAY.  -  press with PPT  2x10  - Floor to shoulder box lift  x 15  (15 lbs box)  - Box carries 3x100 ft.    Sanchez received the following manual therapy techniques: Joint mobilizations, " Myofacial release and Soft tissue Mobilization were applied to the: Hip and Lumbar spine for 0 minutes, including:  Not performed today  - IASTM to lumbar paraspinals and superior glute max regions  - Gross hip mobilizations B    Home Exercises Provided and Patient Education Provided   Education provided:   - HEP review and added hip flexor stretching    Written Home Exercises Provided: .  Exercises were reviewed and Sanchez was able to demonstrate them prior to the end of the session.  Sanchez demonstrated good  understanding of the education provided.     Pt received a written copy of exercises to perform at home.   See EMR under patient instructions for exercises given.     Sanchez demonstrated good  understanding of the education provided.     Assessment   Pt completed session with no reports of increased pain in lower back. He was able to follow verbal instructions on proper lifting technique with crate lifting today.   Sanchez is progressing well towards his goals.   Pt prognosis is Excellent.     Pt will continue to benefit from skilled outpatient physical therapy to address the deficits listed in the problem list box on initial evaluation, provide pt/family education and to maximize pt's level of independence in the home and community environment.   Pt's spiritual, cultural and educational needs considered and pt agreeable to plan of care and goals.    Anticipated barriers to physical therapy: None    Goals:  Short Term Goals (4 Weeks):  - Pt will increase Core/Hip strength to >4/5 for stability with standing ADL's - progressing  - Decrease gross Pain by 50% with squatting activity for improved tolerance to work demands  - progressing  - Pt independent with HEP to improve tolerance to exercise progressions. - progressing     Long Term Goals (6-8 Weeks):  - Pt will increase T spine and Lumbar ROM to > 75% for improved tolerance to personal and work demands - progressing  - Pt will increase gross core/LE strength to 5/5  for improved stability and tolerance to vocational demands - progressing  - Decrease  Pain by 75% with return to PLOF - progressing  - Pt will report improvement in overall functional abilities, evidenced by improved score on  FOTO to 35% limitation or better. - progressing    Plan     Continue to advance PT as per POC, Monitor response to treatment and progress as tolerated.    Saravanan Rose, PTA

## 2020-01-30 ENCOUNTER — ANESTHESIA EVENT (OUTPATIENT)
Dept: SURGERY | Facility: HOSPITAL | Age: 45
End: 2020-01-30
Payer: COMMERCIAL

## 2020-01-30 ENCOUNTER — CLINICAL SUPPORT (OUTPATIENT)
Dept: REHABILITATION | Facility: HOSPITAL | Age: 45
End: 2020-01-30
Attending: ORTHOPAEDIC SURGERY
Payer: COMMERCIAL

## 2020-01-30 ENCOUNTER — HOSPITAL ENCOUNTER (OUTPATIENT)
Facility: HOSPITAL | Age: 45
Discharge: HOME | End: 2020-01-31
Attending: EMERGENCY MEDICINE | Admitting: UROLOGY
Payer: COMMERCIAL

## 2020-01-30 DIAGNOSIS — E80.6 HYPERBILIRUBINEMIA: ICD-10-CM

## 2020-01-30 DIAGNOSIS — R26.2 DIFFICULTY WALKING: ICD-10-CM

## 2020-01-30 DIAGNOSIS — D72.829 LEUKOCYTOSIS, UNSPECIFIED TYPE: ICD-10-CM

## 2020-01-30 DIAGNOSIS — N20.1 URETEROLITHIASIS: ICD-10-CM

## 2020-01-30 DIAGNOSIS — N13.1 HYDRONEPHROSIS DUE TO OBSTRUCTION OF URETER: ICD-10-CM

## 2020-01-30 DIAGNOSIS — N20.1 URETERAL STONE: ICD-10-CM

## 2020-01-30 DIAGNOSIS — M54.50 BILATERAL LOW BACK PAIN, UNSPECIFIED CHRONICITY, UNSPECIFIED WHETHER SCIATICA PRESENT: ICD-10-CM

## 2020-01-30 DIAGNOSIS — R31.9 HEMATURIA, UNSPECIFIED TYPE: Primary | ICD-10-CM

## 2020-01-30 DIAGNOSIS — R10.9 LEFT FLANK PAIN: ICD-10-CM

## 2020-01-30 LAB
ALBUMIN SERPL BCP-MCNC: 5 G/DL (ref 3.5–5.2)
ALP SERPL-CCNC: 90 U/L (ref 55–135)
ALT SERPL W/O P-5'-P-CCNC: 23 U/L (ref 10–44)
ANION GAP SERPL CALC-SCNC: 12 MMOL/L (ref 8–16)
AST SERPL-CCNC: 19 U/L (ref 10–40)
BACTERIA #/AREA URNS AUTO: ABNORMAL /HPF
BASOPHILS # BLD AUTO: 0.1 K/UL (ref 0–0.2)
BASOPHILS NFR BLD: 0.6 % (ref 0–1.9)
BILIRUB SERPL-MCNC: 1.2 MG/DL (ref 0.1–1)
BILIRUB UR QL STRIP: NEGATIVE
BUN SERPL-MCNC: 15 MG/DL (ref 6–20)
CALCIUM SERPL-MCNC: 9.6 MG/DL (ref 8.7–10.5)
CAOX CRY UR QL COMP ASSIST: ABNORMAL
CHLORIDE SERPL-SCNC: 106 MMOL/L (ref 95–110)
CLARITY UR REFRACT.AUTO: ABNORMAL
CO2 SERPL-SCNC: 25 MMOL/L (ref 23–29)
COLOR UR AUTO: YELLOW
CREAT SERPL-MCNC: 1.2 MG/DL (ref 0.5–1.4)
DIFFERENTIAL METHOD: ABNORMAL
EOSINOPHIL # BLD AUTO: 0.1 K/UL (ref 0–0.5)
EOSINOPHIL NFR BLD: 0.3 % (ref 0–8)
ERYTHROCYTE [DISTWIDTH] IN BLOOD BY AUTOMATED COUNT: 12.9 % (ref 11.5–14.5)
EST. GFR  (AFRICAN AMERICAN): >60 ML/MIN/1.73 M^2
EST. GFR  (NON AFRICAN AMERICAN): >60 ML/MIN/1.73 M^2
GLUCOSE SERPL-MCNC: 110 MG/DL (ref 70–110)
GLUCOSE UR QL STRIP: NEGATIVE
HCT VFR BLD AUTO: 46.5 % (ref 40–54)
HGB BLD-MCNC: 16 G/DL (ref 14–18)
HGB UR QL STRIP: ABNORMAL
HYALINE CASTS UR QL AUTO: 0 /LPF
IMM GRANULOCYTES # BLD AUTO: 0.07 K/UL (ref 0–0.04)
IMM GRANULOCYTES NFR BLD AUTO: 0.4 % (ref 0–0.5)
KETONES UR QL STRIP: ABNORMAL
LEUKOCYTE ESTERASE UR QL STRIP: NEGATIVE
LIPASE SERPL-CCNC: 43 U/L (ref 4–60)
LYMPHOCYTES # BLD AUTO: 1.8 K/UL (ref 1–4.8)
LYMPHOCYTES NFR BLD: 10.2 % (ref 18–48)
MCH RBC QN AUTO: 31.4 PG (ref 27–31)
MCHC RBC AUTO-ENTMCNC: 34.4 G/DL (ref 32–36)
MCV RBC AUTO: 91 FL (ref 82–98)
MICROSCOPIC COMMENT: ABNORMAL
MONOCYTES # BLD AUTO: 0.7 K/UL (ref 0.3–1)
MONOCYTES NFR BLD: 4.2 % (ref 4–15)
NEUTROPHILS # BLD AUTO: 14.6 K/UL (ref 1.8–7.7)
NEUTROPHILS NFR BLD: 84.3 % (ref 38–73)
NITRITE UR QL STRIP: NEGATIVE
NRBC BLD-RTO: 0 /100 WBC
PH UR STRIP: 6 [PH] (ref 5–8)
PLATELET # BLD AUTO: 262 K/UL (ref 150–350)
PMV BLD AUTO: 9.3 FL (ref 9.2–12.9)
POTASSIUM SERPL-SCNC: 3.6 MMOL/L (ref 3.5–5.1)
PROT SERPL-MCNC: 8.2 G/DL (ref 6–8.4)
PROT UR QL STRIP: ABNORMAL
RBC # BLD AUTO: 5.09 M/UL (ref 4.6–6.2)
RBC #/AREA URNS AUTO: 32 /HPF (ref 0–4)
SODIUM SERPL-SCNC: 143 MMOL/L (ref 136–145)
SP GR UR STRIP: 1.02 (ref 1–1.03)
URN SPEC COLLECT METH UR: ABNORMAL
WBC # BLD AUTO: 17.32 K/UL (ref 3.9–12.7)
WBC #/AREA URNS AUTO: 1 /HPF (ref 0–5)

## 2020-01-30 PROCEDURE — 97110 THERAPEUTIC EXERCISES: CPT | Mod: PN

## 2020-01-30 PROCEDURE — 99285 EMERGENCY DEPT VISIT HI MDM: CPT | Mod: ,,, | Performed by: PHYSICIAN ASSISTANT

## 2020-01-30 PROCEDURE — 63600175 PHARM REV CODE 636 W HCPCS: Performed by: PHYSICIAN ASSISTANT

## 2020-01-30 PROCEDURE — 80053 COMPREHEN METABOLIC PANEL: CPT

## 2020-01-30 PROCEDURE — 99285 PR EMERGENCY DEPT VISIT,LEVEL V: ICD-10-PCS | Mod: ,,, | Performed by: PHYSICIAN ASSISTANT

## 2020-01-30 PROCEDURE — 96375 TX/PRO/DX INJ NEW DRUG ADDON: CPT

## 2020-01-30 PROCEDURE — 99285 EMERGENCY DEPT VISIT HI MDM: CPT | Mod: 25

## 2020-01-30 PROCEDURE — 81001 URINALYSIS AUTO W/SCOPE: CPT

## 2020-01-30 PROCEDURE — 99219 PR INITIAL OBSERVATION CARE,LEVL II: CPT | Mod: ,,, | Performed by: UROLOGY

## 2020-01-30 PROCEDURE — 83690 ASSAY OF LIPASE: CPT

## 2020-01-30 PROCEDURE — G0378 HOSPITAL OBSERVATION PER HR: HCPCS

## 2020-01-30 PROCEDURE — 99219 PR INITIAL OBSERVATION CARE,LEVL II: ICD-10-PCS | Mod: ,,, | Performed by: UROLOGY

## 2020-01-30 PROCEDURE — 96361 HYDRATE IV INFUSION ADD-ON: CPT | Mod: 59

## 2020-01-30 PROCEDURE — 96374 THER/PROPH/DIAG INJ IV PUSH: CPT

## 2020-01-30 PROCEDURE — 85025 COMPLETE CBC W/AUTO DIFF WBC: CPT

## 2020-01-30 RX ORDER — ONDANSETRON 8 MG/1
8 TABLET, ORALLY DISINTEGRATING ORAL EVERY 8 HOURS PRN
Status: DISCONTINUED | OUTPATIENT
Start: 2020-01-30 | End: 2020-01-31 | Stop reason: HOSPADM

## 2020-01-30 RX ORDER — KETOROLAC TROMETHAMINE 30 MG/ML
10 INJECTION, SOLUTION INTRAMUSCULAR; INTRAVENOUS
Status: COMPLETED | OUTPATIENT
Start: 2020-01-30 | End: 2020-01-30

## 2020-01-30 RX ORDER — ACETAMINOPHEN 500 MG
1000 TABLET ORAL EVERY 6 HOURS
Status: DISCONTINUED | OUTPATIENT
Start: 2020-01-31 | End: 2020-01-31 | Stop reason: HOSPADM

## 2020-01-30 RX ORDER — ONDANSETRON 2 MG/ML
4 INJECTION INTRAMUSCULAR; INTRAVENOUS
Status: COMPLETED | OUTPATIENT
Start: 2020-01-30 | End: 2020-01-30

## 2020-01-30 RX ORDER — TAMSULOSIN HYDROCHLORIDE 0.4 MG/1
0.4 CAPSULE ORAL DAILY
Status: DISCONTINUED | OUTPATIENT
Start: 2020-01-31 | End: 2020-01-31 | Stop reason: HOSPADM

## 2020-01-30 RX ORDER — OXYCODONE HYDROCHLORIDE 5 MG/1
5 TABLET ORAL EVERY 4 HOURS PRN
Status: DISCONTINUED | OUTPATIENT
Start: 2020-01-30 | End: 2020-01-31 | Stop reason: HOSPADM

## 2020-01-30 RX ORDER — MORPHINE SULFATE 2 MG/ML
6 INJECTION, SOLUTION INTRAMUSCULAR; INTRAVENOUS
Status: COMPLETED | OUTPATIENT
Start: 2020-01-30 | End: 2020-01-30

## 2020-01-30 RX ORDER — SODIUM CHLORIDE 9 MG/ML
INJECTION, SOLUTION INTRAVENOUS CONTINUOUS
Status: DISCONTINUED | OUTPATIENT
Start: 2020-01-30 | End: 2020-01-31 | Stop reason: HOSPADM

## 2020-01-30 RX ORDER — SODIUM CHLORIDE 0.9 % (FLUSH) 0.9 %
10 SYRINGE (ML) INJECTION
Status: DISCONTINUED | OUTPATIENT
Start: 2020-01-30 | End: 2020-01-31 | Stop reason: HOSPADM

## 2020-01-30 RX ADMIN — SODIUM CHLORIDE 1000 ML: 0.9 INJECTION, SOLUTION INTRAVENOUS at 05:01

## 2020-01-30 RX ADMIN — ONDANSETRON 4 MG: 2 INJECTION INTRAMUSCULAR; INTRAVENOUS at 05:01

## 2020-01-30 RX ADMIN — KETOROLAC TROMETHAMINE 10 MG: 30 INJECTION, SOLUTION INTRAMUSCULAR; INTRAVENOUS at 06:01

## 2020-01-30 RX ADMIN — MORPHINE SULFATE 6 MG: 2 INJECTION, SOLUTION INTRAMUSCULAR; INTRAVENOUS at 05:01

## 2020-01-30 NOTE — ED NOTES
Patient identifiers verified and correct for Mr Zarate  C/C: ABd pain left mid abdomen, constant non radiating SEE NN  APPEARANCE: awake and alert in NAD.Moaning, Diaphoretic  SKIN: warm, dry and intact. No breakdown or bruising.  MUSCULOSKELETAL: Patient moving all extremities spontaneously, no obvious swelling or deformities noted. Ambulates independently.  RESPIRATORY: Denies shortness of breath.Respirations unlabored. Positive chills,  CARDIAC: Denies CP, 2+ distal pulses; no peripheral edema  ABDOMEN Abdomen round, pain to abdomen, positive nausea and emesis, BM today  : voids spontaneously, denies difficulty  Neurologic: AAO x 4; follows commands equal strength in all extremities; denies numbness/tingling. Denies dizziness denies weakness

## 2020-01-30 NOTE — ED PROVIDER NOTES
Encounter Date: 1/30/2020       History     Chief Complaint   Patient presents with    Flank Pain     hx kidney stones     5:47 PM  Patient is a 44-year-old male that presents the ED with left-sided flank and abdominal pain onset a couple of hours ago PTA.  He is endorsing 10/10 pain with associated subjective fever, chills, and diaphoresis.  The pain started in his left upper quadrant but now he feels in his left flank and left lower quadrant.  Does not radiate into his legs.  Endorses nausea and vomiting.  No chest pain, shortness of breath, diarrhea.  States that this feels like his episodes of nephrolithiasis.  Has required surgery with stenting for previous episodes; last in 2018.  Has not had anything for pain relief.        Review of patient's allergies indicates:  No Known Allergies  Past Medical History:   Diagnosis Date    Chronic back pain     Chronic diarrhea     Hernia     TBI (traumatic brain injury) 2010    fake wall fell on head (no brain or skull injury per pt)     Past Surgical History:   Procedure Laterality Date    COLONOSCOPY N/A 5/29/2017    Procedure: COLONOSCOPY;  Surgeon: Sukhi Scanlon MD;  Location: [facility] (4TH FLR);  Service: Endoscopy;  Laterality: N/A;    CYSTOSCOPY W/ URETERAL STENT PLACEMENT Left 11/19/2018    Procedure: CYSTOSCOPY, WITH URETERAL STENT INSERTION;  Surgeon: Renee Garcia MD;  Location: [facility] OR 2ND FLR;  Service: Urology;  Laterality: Left;    CYSTOSCOPY W/ URETERAL STENT PLACEMENT  12/21/2018    Procedure: CYSTOSCOPY, WITH URETERAL STENT INSERTION;  Surgeon: Bhupinder Washington Jr., MD;  Location: [facility] OR 1ST FLR;  Service: Urology;;    CYSTOSCOPY W/ URETERAL STENT REMOVAL Left 12/21/2018    Procedure: CYSTOSCOPY, WITH URETERAL STENT REMOVAL;  Surgeon: Bhupinder Washington Jr., MD;  [facility];  Service: Urology;  Laterality: Left;    HERNIA REPAIR  2017    umbilical    LASER LITHOTRIPSY Left 12/21/2018    Procedure: LITHOTRIPSY, USING LASER  (mana);  Surgeon: Bhupinder Washington Jr., MD;  Location: [facility] OR 1ST FLR;  Service: Urology;  Laterality: Left;  90mins    LUMBAR EPIDURAL INJECTION      with steroid    RETROGRADE PYELOGRAPHY Left 2018    Procedure: PYELOGRAM, RETROGRADE;  Surgeon: Renee Garcia MD;  Location: [facility] OR 2ND FLR;  Service: Urology;  Laterality: Left;    RETROGRADE PYELOGRAPHY Left 2018    Procedure: PYELOGRAM, RETROGRADE;  Surgeon: Bhupinder Washington Jr., MD;  Location: [facility] OR 1ST FLR;  Service: Urology;  Laterality: Left;    URETEROSCOPY Left 2018    Procedure: URETEROSCOPY;  Surgeon: Bhupinder Washington Jr., MD;  [facility];  Service: Urology;  Laterality: Left;  90mins     Family History   Problem Relation Age of Onset    Diabetes Mother     Cancer Cousin 50        Cancer possibly stomach     Heart attack Neg Hx     Heart disease Neg Hx     Hypertension Neg Hx     Colon cancer Neg Hx     Esophageal cancer Neg Hx      Social History     Tobacco Use    Smoking status: Former Smoker     Last attempt to quit: 2007     Years since quittin.0    Smokeless tobacco: Never Used   Substance Use Topics    Alcohol use: Yes     Comment: rare    Drug use: No     Review of Systems   Constitutional: Positive for chills, diaphoresis and fever.   HENT: Negative for sore throat.    Respiratory: Negative for shortness of breath.    Cardiovascular: Negative for chest pain.   Gastrointestinal: Positive for abdominal pain, nausea and vomiting.   Genitourinary: Positive for flank pain. Negative for dysuria and hematuria.   Musculoskeletal: Negative for back pain.   Skin: Negative for rash.   Neurological: Negative for weakness.   Hematological: Does not bruise/bleed easily.       Physical Exam     Initial Vitals [20 1638]   BP Pulse Resp Temp SpO2   (!) 172/100 84 18 97.7 °F (36.5 °C) 100 %      MAP       --         Physical Exam    Vitals reviewed.  Constitutional: He appears well-developed and  well-nourished. He is not diaphoretic. He appears distressed.   HENT:   Head: Normocephalic and atraumatic.   Nose: Nose normal.   Eyes: Conjunctivae and EOM are normal.   Neck: Normal range of motion.   Cardiovascular: Normal rate and regular rhythm.   Pulses:       Dorsalis pedis pulses are 2+ on the right side, and 2+ on the left side.   Pulmonary/Chest: Breath sounds normal. No respiratory distress. He has no wheezes. He has no rales.   Abdominal: Soft. He exhibits no distension and no mass. There is tenderness in the left upper quadrant and left lower quadrant. There is guarding and CVA tenderness. There is no rigidity and no rebound.   Musculoskeletal: Normal range of motion.   Neurological: He is alert and oriented to person, place, and time. He has normal strength. No sensory deficit.   Skin: Skin is warm and dry. No erythema.   Psychiatric: He has a normal mood and affect. Thought content normal.         ED Course   Procedures  Labs Reviewed   URINALYSIS, REFLEX TO URINE CULTURE - Abnormal; Notable for the following components:       Result Value    Appearance, UA Hazy (*)     Protein, UA 1+ (*)     Ketones, UA Trace (*)     Occult Blood UA 3+ (*)     All other components within normal limits    Narrative:     Preferred Collection Type->Urine, Clean Catch   COMPREHENSIVE METABOLIC PANEL - Abnormal; Notable for the following components:    Total Bilirubin 1.2 (*)     All other components within normal limits   CBC W/ AUTO DIFFERENTIAL - Abnormal; Notable for the following components:    WBC 17.32 (*)     Mean Corpuscular Hemoglobin 31.4 (*)     Gran # (ANC) 14.6 (*)     Immature Grans (Abs) 0.07 (*)     Gran% 84.3 (*)     Lymph% 10.2 (*)     All other components within normal limits   URINALYSIS MICROSCOPIC - Abnormal; Notable for the following components:    RBC, UA 32 (*)     All other components within normal limits    Narrative:     Preferred Collection Type->Urine, Clean Catch   LIPASE          Imaging  Results          CT Renal Stone Study ABD Pelvis WO (Final result)  Result time 01/30/20 20:35:16    Final result by Marky Jerry MD (01/30/20 20:35:16)                 Impression:      Mild left-sided hydronephrosis secondary to a 13 mm calculus at the ureteropelvic junction (UPJ.)  Additional left renal punctate nephroliths.    Hepatosplenomegaly and hepatic steatosis.    Few additional findings as above.      Electronically signed by: Marky Jerry MD  Date:    01/30/2020  Time:    20:35             Narrative:    EXAMINATION:  CT RENAL STONE STUDY ABD PELVIS WO    CLINICAL HISTORY:  Flank pain, stone disease suspected;    TECHNIQUE:  Low dose axial images, sagittal and coronal reformations were obtained from the lung bases to the pubic symphysis.  Contrast was not administered.    COMPARISON:  Renal ultrasound 01/10/2019 and CT renal stone study 11/19/2018    FINDINGS:  Lung bases show minimal dependent atelectasis and left lower lobe small calcified granuloma unchanged.    Base of the heart is within normal limits.  Small hiatal hernia.    Liver is enlarged with diffuse parenchymal hypoattenuation and peripheral sparing consistent with fatty infiltration.  Noncontrast appearance of the gallbladder, pancreas, stomach, duodenum and bilateral adrenal glands are within normal limits.  No biliary ductal dilatation.  Spleen is enlarged without focal process seen.    Bilateral kidneys are stable in size, shape and location.  There is mild left-sided hydronephrosis secondary to a 13 mm calculus at the left ureteropelvic junction, with mild left-sided perinephric stranding grossly similar to prior CT exam dated 11/19/2018; however, most recent renal ultrasound during interval dated 1/10/19 did not definitively show left-sided hydronephrosis.  Additional punctate nephroliths seen at the left renal lower pole.  No radiodense calculus seen within the right collecting system, distal left ureter or urinary bladder.  No  right hydronephrosis or significant perinephric stranding.  Urinary bladder is within normal limits.  Prostate and seminal vesicles are within normal limits.  Pelvic phleboliths noted.    No ascites, free air or lymphadenopathy.  No significant atherosclerosis.  Aorta tapers normally.    Appendix and terminal ileum are within normal limits.  No evidence of bowel obstruction or inflammation.  No pneumatosis or portal venous gas.    Osseous structures appear stable without acute or destructive process seen.                                 Medical Decision Making:   History:   Old Medical Records: I decided to obtain old medical records.  Old Records Summarized: records from clinic visits and records from previous admission(s).       <> Summary of Records: CT in 2018 showed 1.3 cm stone at L UPJ. Aorta is normal in caliber.  Initial Assessment:   Patient is a 44-year-old male that presents the ED with left-sided flank and abdominal pain onset a couple of hours ago.    Differential Diagnosis:   Includes but is not limited to nephrolithiasis, renal colic, UTI, abdominal strain, muscle spasms, chronic back pain. Unlikely AAA or dissection, however will keep on differential.  Clinical Tests:   Lab Tests: Ordered and Reviewed  Radiological Study: Ordered and Reviewed  ED Management:  Will initiate workup, give medication for symptomatic improvement, and continue monitor.    UA with 3+ blood.  No infection.  CBC with leukocytosis at 17 with left shift.  No anemia.    CMP without electrolyte abnormalities.  Hyperbilirubinemia at 1.2.  No transaminitis.  Creatinine at 1.2.  Was 0.9 seven months ago.  Lipase within normal limits.      CT renal stone study shows mild left-sided hydronephrosis secondary to 13 mm calculus at UPJ.    Case discussed with Urology on call who will evaluate and give recommendations.    Patient placed in observation with Urology for further evaluation.  Refer to their note.  Consult appreciated.  Other:    I have discussed this case with another health care provider.      I have reviewed patient's chart and discussed this case with my supervising MD.     Vidya Mckay PA-C  Emergent Department  [facility]  Spectralink #[id number] or #[phone]                                   Clinical Impression:       ICD-10-CM ICD-9-CM   1. Ureterolithiasis N20.1 592.1   2. Ureteral stone N20.1 592.1   3. Hydronephrosis due to obstruction of ureter N13.2 591     593.4   4. Left flank pain R10.9 789.09   5. Leukocytosis, unspecified type D72.829 288.60   6. Hyperbilirubinemia E80.6 782.4   7. Hematuria, unspecified type R31.9 599.70         Disposition:   Disposition: Placed in Observation  Condition: Stable                     Vidya Mckay PA-C  01/30/20 2250

## 2020-01-30 NOTE — PROGRESS NOTES
"                            Physical Therapy Daily Treatment Note     Name: Sanchez Zarate  Clinic Number: [id number]    Therapy Diagnosis:   Encounter Diagnoses   Name Primary?    Bilateral low back pain, unspecified chronicity, unspecified whether sciatica present     Difficulty walking      Physician: Lino Fortune MD    Visit Date: 1/30/2020  Physician Orders: PT Eval and Treat   Medical Diagnosis:   M54.16 (ICD-10-CM) - Lumbar radiculopathy   Z98.890 (ICD-10-CM) - History of lumbar discectomy       Date of Surgery: Discectomy in 2017    Evaluation Date: 1/8/2020  Authorization Period Expiration: 1/2/2021  Plan of Care Certification Period: 2/29/2020  Visit # / Visits authorized: 8/6  FOTO 5/5  Time In: 7:05 AM   Time Out: 8:00 AM   Total Billable Time: 45 minutes (3 TE)    Precautions: Standard    Subjective   Pt reports that he feels ready to return to work. No pain at this time and is comfortable with his HEP. Pt will return to work on Monday and has a follow up with Dr. Fortune on 2/14/2020/       Remains compliant with HEP.  Response to previous treatment:NC  Functional change: NC    Pain: 1/10 minimal pain   Location: bilateral back (R>L)      Objective     Sanchez received therapeutic exercises to develop strength, ROM, flexibility, posture and core stabilization for 45 minutes including:    - SKTC    30"x3 B alternating  - Supine hamstring presses  BTB x 10  - PPT with walk outs   x 10  - Cross pattern bridging  x 10  - Med ball dead bugs   x 10  - Modified side bridges  x 15  - Clamshells    RTB x 20  - Childspose    10x5"  - T spine rotation from childspose position x 5 B  - Tall kneeling Hip thrusters  2x10 with purple cord resist  - Half kneel hip flexor stretch with triplanar motion x 5, cues for posterior pelvic tilt NOT PERFORMED TODAY.  -  press with PPT  2x10 8#  - Floor to shoulder box lift  x 15  (15 lbs box)  - Box carries 3x100 ft.    Sanchez received the following manual therapy " techniques: Joint mobilizations, Myofacial release and Soft tissue Mobilization were applied to the: Hip and Lumbar spine for 0 minutes, including:  Not performed today  - IASTM to lumbar paraspinals and superior glute max regions  - Gross hip mobilizations B    Home Exercises Provided and Patient Education Provided   Education provided:   - HEP review and added hip flexor stretching    Written Home Exercises Provided: .  Exercises were reviewed and Sanchez was able to demonstrate them prior to the end of the session.  Sanchez demonstrated good  understanding of the education provided.     Pt received a written copy of exercises to perform at home.   See EMR under patient instructions for exercises given.     Sanchez demonstrated good  understanding of the education provided.     Assessment   Pt completed session with no reports of increased pain in lower back. He has demonstrated the ability to meet work demands in short bouts and has been instructed in appropriate lifting techniques in order to safely return to vocational demands. He is appropriate for d/c at this time as he has met establishe goals and has an appropriate understanding of his HEP.    Sanchez is progressing well towards his goals.   Pt prognosis is Excellent.     Pt will continue to benefit from skilled outpatient physical therapy to address the deficits listed in the problem list box on initial evaluation, provide pt/family education and to maximize pt's level of independence in the home and community environment.   Pt's spiritual, cultural and educational needs considered and pt agreeable to plan of care and goals.    Anticipated barriers to physical therapy: None    Goals:  Short Term Goals (4 Weeks):  - Pt will increase Core/Hip strength to >4/5 for stability with standing ADL's - met  - Decrease gross Pain by 50% with squatting activity for improved tolerance to work demands  - met  - Pt independent with HEP to improve tolerance to exercise progressions.  - met     Long Term Goals (6-8 Weeks):  - Pt will increase T spine and Lumbar ROM to > 75% for improved tolerance to personal and work demands - met  - Pt will increase gross core/LE strength to 5/5 for improved stability and tolerance to vocational demands - met  - Decrease  Pain by 75% with return to PLOF - met  - Pt will report improvement in overall functional abilities, evidenced by improved score on  FOTO to 35% limitation or better. - d/c    Plan     D/c from skilled PT.    Freeman Chavez PT

## 2020-01-30 NOTE — ED TRIAGE NOTES
"Patient states constant abd pain to left mid abdomen x 2 hours, positive nausea and emesis, denies flank pain or back pain, resp rapid, moaning. BM "normal" Denies fevers, denies blood in urine  "

## 2020-01-31 ENCOUNTER — ANESTHESIA (OUTPATIENT)
Dept: SURGERY | Facility: HOSPITAL | Age: 45
End: 2020-01-31
Payer: COMMERCIAL

## 2020-01-31 ENCOUNTER — PATIENT MESSAGE (OUTPATIENT)
Dept: SURGERY | Facility: HOSPITAL | Age: 45
End: 2020-01-31

## 2020-01-31 VITALS
DIASTOLIC BLOOD PRESSURE: 84 MMHG | HEART RATE: 73 BPM | RESPIRATION RATE: 17 BRPM | OXYGEN SATURATION: 94 % | BODY MASS INDEX: 31.21 KG/M2 | HEIGHT: 70 IN | SYSTOLIC BLOOD PRESSURE: 130 MMHG | WEIGHT: 218 LBS | TEMPERATURE: 98 F

## 2020-01-31 LAB
ALBUMIN SERPL BCP-MCNC: 4 G/DL (ref 3.5–5.2)
ALP SERPL-CCNC: 76 U/L (ref 55–135)
ALT SERPL W/O P-5'-P-CCNC: 19 U/L (ref 10–44)
ANION GAP SERPL CALC-SCNC: 12 MMOL/L (ref 8–16)
AST SERPL-CCNC: 23 U/L (ref 10–40)
BASOPHILS # BLD AUTO: 0.04 K/UL (ref 0–0.2)
BASOPHILS NFR BLD: 0.3 % (ref 0–1.9)
BILIRUB SERPL-MCNC: 1.3 MG/DL (ref 0.1–1)
BUN SERPL-MCNC: 16 MG/DL (ref 6–20)
CALCIUM SERPL-MCNC: 8.6 MG/DL (ref 8.7–10.5)
CHLORIDE SERPL-SCNC: 106 MMOL/L (ref 95–110)
CO2 SERPL-SCNC: 19 MMOL/L (ref 23–29)
CREAT SERPL-MCNC: 1.3 MG/DL (ref 0.5–1.4)
DIFFERENTIAL METHOD: ABNORMAL
EOSINOPHIL # BLD AUTO: 0 K/UL (ref 0–0.5)
EOSINOPHIL NFR BLD: 0.1 % (ref 0–8)
ERYTHROCYTE [DISTWIDTH] IN BLOOD BY AUTOMATED COUNT: 13 % (ref 11.5–14.5)
EST. GFR  (AFRICAN AMERICAN): >60 ML/MIN/1.73 M^2
EST. GFR  (NON AFRICAN AMERICAN): >60 ML/MIN/1.73 M^2
GLUCOSE SERPL-MCNC: 100 MG/DL (ref 70–110)
HCT VFR BLD AUTO: 42.4 % (ref 40–54)
HGB BLD-MCNC: 14.3 G/DL (ref 14–18)
IMM GRANULOCYTES # BLD AUTO: 0.07 K/UL (ref 0–0.04)
IMM GRANULOCYTES NFR BLD AUTO: 0.5 % (ref 0–0.5)
LYMPHOCYTES # BLD AUTO: 1.3 K/UL (ref 1–4.8)
LYMPHOCYTES NFR BLD: 8.6 % (ref 18–48)
MCH RBC QN AUTO: 30.9 PG (ref 27–31)
MCHC RBC AUTO-ENTMCNC: 33.7 G/DL (ref 32–36)
MCV RBC AUTO: 92 FL (ref 82–98)
MONOCYTES # BLD AUTO: 0.9 K/UL (ref 0.3–1)
MONOCYTES NFR BLD: 5.8 % (ref 4–15)
NEUTROPHILS # BLD AUTO: 12.6 K/UL (ref 1.8–7.7)
NEUTROPHILS NFR BLD: 84.7 % (ref 38–73)
NRBC BLD-RTO: 0 /100 WBC
PLATELET # BLD AUTO: 207 K/UL (ref 150–350)
PMV BLD AUTO: 9.7 FL (ref 9.2–12.9)
POTASSIUM SERPL-SCNC: 3.7 MMOL/L (ref 3.5–5.1)
PROT SERPL-MCNC: 7 G/DL (ref 6–8.4)
RBC # BLD AUTO: 4.63 M/UL (ref 4.6–6.2)
SODIUM SERPL-SCNC: 137 MMOL/L (ref 136–145)
WBC # BLD AUTO: 14.9 K/UL (ref 3.9–12.7)

## 2020-01-31 PROCEDURE — D9220A PRA ANESTHESIA: Mod: ANES,,, | Performed by: ANESTHESIOLOGY

## 2020-01-31 PROCEDURE — 71000015 HC POSTOP RECOV 1ST HR: Performed by: UROLOGY

## 2020-01-31 PROCEDURE — 96361 HYDRATE IV INFUSION ADD-ON: CPT | Mod: 59

## 2020-01-31 PROCEDURE — 63600175 PHARM REV CODE 636 W HCPCS: Performed by: STUDENT IN AN ORGANIZED HEALTH CARE EDUCATION/TRAINING PROGRAM

## 2020-01-31 PROCEDURE — D9220A PRA ANESTHESIA: ICD-10-PCS | Mod: CRNA,,, | Performed by: NURSE ANESTHETIST, CERTIFIED REGISTERED

## 2020-01-31 PROCEDURE — 37000008 HC ANESTHESIA 1ST 15 MINUTES: Performed by: UROLOGY

## 2020-01-31 PROCEDURE — 63600175 PHARM REV CODE 636 W HCPCS: Performed by: NURSE ANESTHETIST, CERTIFIED REGISTERED

## 2020-01-31 PROCEDURE — 85025 COMPLETE CBC W/AUTO DIFF WBC: CPT

## 2020-01-31 PROCEDURE — 74420 UROGRAPHY RTRGR +-KUB: CPT | Mod: 26,,, | Performed by: UROLOGY

## 2020-01-31 PROCEDURE — 37000009 HC ANESTHESIA EA ADD 15 MINS: Performed by: UROLOGY

## 2020-01-31 PROCEDURE — 36000707: Performed by: UROLOGY

## 2020-01-31 PROCEDURE — C2617 STENT, NON-COR, TEM W/O DEL: HCPCS | Performed by: UROLOGY

## 2020-01-31 PROCEDURE — 25500020 PHARM REV CODE 255: Performed by: UROLOGY

## 2020-01-31 PROCEDURE — G0378 HOSPITAL OBSERVATION PER HR: HCPCS

## 2020-01-31 PROCEDURE — D9220A PRA ANESTHESIA: ICD-10-PCS | Mod: ANES,,, | Performed by: ANESTHESIOLOGY

## 2020-01-31 PROCEDURE — 82365 CALCULUS SPECTROSCOPY: CPT

## 2020-01-31 PROCEDURE — 25000003 PHARM REV CODE 250: Performed by: STUDENT IN AN ORGANIZED HEALTH CARE EDUCATION/TRAINING PROGRAM

## 2020-01-31 PROCEDURE — 36000706: Performed by: UROLOGY

## 2020-01-31 PROCEDURE — 71000044 HC DOSC ROUTINE RECOVERY FIRST HOUR: Performed by: UROLOGY

## 2020-01-31 PROCEDURE — 52356 PR CYSTO/URETERO W/LITHOTRIPSY: ICD-10-PCS | Mod: LT,,, | Performed by: UROLOGY

## 2020-01-31 PROCEDURE — 80053 COMPREHEN METABOLIC PANEL: CPT

## 2020-01-31 PROCEDURE — 36415 COLL VENOUS BLD VENIPUNCTURE: CPT

## 2020-01-31 PROCEDURE — C1769 GUIDE WIRE: HCPCS | Performed by: UROLOGY

## 2020-01-31 PROCEDURE — D9220A PRA ANESTHESIA: Mod: CRNA,,, | Performed by: NURSE ANESTHETIST, CERTIFIED REGISTERED

## 2020-01-31 PROCEDURE — 25000003 PHARM REV CODE 250: Performed by: NURSE ANESTHETIST, CERTIFIED REGISTERED

## 2020-01-31 PROCEDURE — 52356 CYSTO/URETERO W/LITHOTRIPSY: CPT | Mod: LT,,, | Performed by: UROLOGY

## 2020-01-31 PROCEDURE — 74420 PR  X-RAY RETROGRADE PYELOGRAM: ICD-10-PCS | Mod: 26,,, | Performed by: UROLOGY

## 2020-01-31 DEVICE — STENT URETERAL UNIV 6FR 26CM
Type: IMPLANTABLE DEVICE | Site: URETER | Status: NON-FUNCTIONAL
Removed: 2020-02-07

## 2020-01-31 RX ORDER — LIDOCAINE HYDROCHLORIDE 20 MG/ML
INJECTION INTRAVENOUS
Status: DISCONTINUED | OUTPATIENT
Start: 2020-01-31 | End: 2020-01-31

## 2020-01-31 RX ORDER — MIDAZOLAM HYDROCHLORIDE 1 MG/ML
INJECTION INTRAMUSCULAR; INTRAVENOUS
Status: DISCONTINUED
Start: 2020-01-31 | End: 2020-01-31 | Stop reason: HOSPADM

## 2020-01-31 RX ORDER — TAMSULOSIN HYDROCHLORIDE 0.4 MG/1
0.4 CAPSULE ORAL DAILY
Qty: 30 CAPSULE | Refills: 0 | Status: SHIPPED | OUTPATIENT
Start: 2020-01-31 | End: 2022-02-17 | Stop reason: CLARIF

## 2020-01-31 RX ORDER — ROCURONIUM BROMIDE 10 MG/ML
INJECTION, SOLUTION INTRAVENOUS
Status: DISCONTINUED | OUTPATIENT
Start: 2020-01-31 | End: 2020-01-31

## 2020-01-31 RX ORDER — SODIUM CHLORIDE 0.9 % (FLUSH) 0.9 %
2 SYRINGE (ML) INJECTION
Status: DISCONTINUED | OUTPATIENT
Start: 2020-01-31 | End: 2020-01-31 | Stop reason: HOSPADM

## 2020-01-31 RX ORDER — SUCCINYLCHOLINE CHLORIDE 20 MG/ML
INJECTION INTRAMUSCULAR; INTRAVENOUS
Status: DISCONTINUED | OUTPATIENT
Start: 2020-01-31 | End: 2020-01-31

## 2020-01-31 RX ORDER — CEFAZOLIN SODIUM 1 G/3ML
INJECTION, POWDER, FOR SOLUTION INTRAMUSCULAR; INTRAVENOUS
Status: DISCONTINUED | OUTPATIENT
Start: 2020-01-31 | End: 2020-01-31

## 2020-01-31 RX ORDER — ONDANSETRON 8 MG/1
8 TABLET, ORALLY DISINTEGRATING ORAL EVERY 8 HOURS PRN
Status: DISCONTINUED | OUTPATIENT
Start: 2020-01-31 | End: 2020-01-31 | Stop reason: HOSPADM

## 2020-01-31 RX ORDER — ONDANSETRON 2 MG/ML
4 INJECTION INTRAMUSCULAR; INTRAVENOUS ONCE AS NEEDED
Status: DISCONTINUED | OUTPATIENT
Start: 2020-01-31 | End: 2020-01-31 | Stop reason: HOSPADM

## 2020-01-31 RX ORDER — PROPOFOL 10 MG/ML
VIAL (ML) INTRAVENOUS
Status: DISCONTINUED | OUTPATIENT
Start: 2020-01-31 | End: 2020-01-31

## 2020-01-31 RX ORDER — KETAMINE HCL IN 0.9 % NACL 50 MG/5 ML
SYRINGE (ML) INTRAVENOUS
Status: COMPLETED
Start: 2020-01-31 | End: 2020-01-31

## 2020-01-31 RX ORDER — SODIUM CHLORIDE 9 MG/ML
INJECTION, SOLUTION INTRAVENOUS CONTINUOUS
Status: DISCONTINUED | OUTPATIENT
Start: 2020-01-31 | End: 2020-01-31 | Stop reason: HOSPADM

## 2020-01-31 RX ORDER — KETOROLAC TROMETHAMINE 30 MG/ML
INJECTION, SOLUTION INTRAMUSCULAR; INTRAVENOUS
Status: DISCONTINUED | OUTPATIENT
Start: 2020-01-31 | End: 2020-01-31

## 2020-01-31 RX ORDER — ONDANSETRON 2 MG/ML
INJECTION INTRAMUSCULAR; INTRAVENOUS
Status: DISCONTINUED | OUTPATIENT
Start: 2020-01-31 | End: 2020-01-31

## 2020-01-31 RX ORDER — FENTANYL CITRATE 50 UG/ML
INJECTION, SOLUTION INTRAMUSCULAR; INTRAVENOUS
Status: COMPLETED
Start: 2020-01-31 | End: 2020-01-31

## 2020-01-31 RX ORDER — FENTANYL CITRATE 50 UG/ML
INJECTION, SOLUTION INTRAMUSCULAR; INTRAVENOUS
Status: DISCONTINUED | OUTPATIENT
Start: 2020-01-31 | End: 2020-01-31

## 2020-01-31 RX ORDER — FENTANYL CITRATE 50 UG/ML
25 INJECTION, SOLUTION INTRAMUSCULAR; INTRAVENOUS EVERY 5 MIN PRN
Status: DISCONTINUED | OUTPATIENT
Start: 2020-01-31 | End: 2020-01-31 | Stop reason: HOSPADM

## 2020-01-31 RX ORDER — KETOROLAC TROMETHAMINE 30 MG/ML
INJECTION, SOLUTION INTRAMUSCULAR; INTRAVENOUS
Status: COMPLETED
Start: 2020-01-31 | End: 2020-01-31

## 2020-01-31 RX ORDER — KETAMINE HCL IN 0.9 % NACL 50 MG/5 ML
SYRINGE (ML) INTRAVENOUS
Status: DISCONTINUED | OUTPATIENT
Start: 2020-01-31 | End: 2020-01-31

## 2020-01-31 RX ADMIN — SODIUM CHLORIDE: 0.9 INJECTION, SOLUTION INTRAVENOUS at 12:01

## 2020-01-31 RX ADMIN — Medication 25 MG: at 01:01

## 2020-01-31 RX ADMIN — MIDAZOLAM HYDROCHLORIDE 2 MG: 1 INJECTION, SOLUTION INTRAMUSCULAR; INTRAVENOUS at 01:01

## 2020-01-31 RX ADMIN — TAMSULOSIN HYDROCHLORIDE 0.4 MG: 0.4 CAPSULE ORAL at 09:01

## 2020-01-31 RX ADMIN — ROCURONIUM BROMIDE 5 MG: 10 INJECTION, SOLUTION INTRAVENOUS at 01:01

## 2020-01-31 RX ADMIN — Medication 25 MG: at 02:01

## 2020-01-31 RX ADMIN — SODIUM CHLORIDE, SODIUM GLUCONATE, SODIUM ACETATE, POTASSIUM CHLORIDE, MAGNESIUM CHLORIDE, SODIUM PHOSPHATE, DIBASIC, AND POTASSIUM PHOSPHATE: .53; .5; .37; .037; .03; .012; .00082 INJECTION, SOLUTION INTRAVENOUS at 02:01

## 2020-01-31 RX ADMIN — OXYCODONE HYDROCHLORIDE 5 MG: 5 TABLET ORAL at 03:01

## 2020-01-31 RX ADMIN — KETOROLAC TROMETHAMINE 30 MG: 30 INJECTION, SOLUTION INTRAMUSCULAR; INTRAVENOUS at 02:01

## 2020-01-31 RX ADMIN — ACETAMINOPHEN 1000 MG: 500 TABLET ORAL at 05:01

## 2020-01-31 RX ADMIN — LIDOCAINE HYDROCHLORIDE 100 MG: 20 INJECTION, SOLUTION INTRAVENOUS at 01:01

## 2020-01-31 RX ADMIN — SUCCINYLCHOLINE CHLORIDE 180 MG: 20 INJECTION, SOLUTION INTRAMUSCULAR; INTRAVENOUS at 01:01

## 2020-01-31 RX ADMIN — PROPOFOL 150 MG: 10 INJECTION, EMULSION INTRAVENOUS at 01:01

## 2020-01-31 RX ADMIN — ACETAMINOPHEN 1000 MG: 500 TABLET ORAL at 12:01

## 2020-01-31 RX ADMIN — FENTANYL CITRATE 100 MCG: 50 INJECTION, SOLUTION INTRAMUSCULAR; INTRAVENOUS at 01:01

## 2020-01-31 RX ADMIN — OXYCODONE HYDROCHLORIDE 5 MG: 5 TABLET ORAL at 11:01

## 2020-01-31 RX ADMIN — ONDANSETRON 4 MG: 2 INJECTION INTRAMUSCULAR; INTRAVENOUS at 02:01

## 2020-01-31 RX ADMIN — SODIUM CHLORIDE: 0.9 INJECTION, SOLUTION INTRAVENOUS at 01:01

## 2020-01-31 RX ADMIN — CEFAZOLIN 2 G: 330 INJECTION, POWDER, FOR SOLUTION INTRAMUSCULAR; INTRAVENOUS at 01:01

## 2020-01-31 NOTE — PROGRESS NOTES
[facility]  Urology  Progress Note    Patient Name: Sanchez Zarate  MRN: [id number]  Admission Date: 1/30/2020  Hospital Length of Stay: 0 days  Code Status: Full Code   Attending Provider: Parminder Craig MD   Primary Care Physician: Anna Moore MD    Subjective:     HPI:  Mr. Zarate is a 44 year old male with a PMH of nephrolithiasis (s/p multiple ureteroscopies, most recently in 2018.) [place] [facility] [place] flank and abdominal pain since 3 PM on 1/30/2020, with associated nausea, vomiting, chills. Denies gross hematuria, dysuria, incomplete emptying, hesitancy, or fevers. A CT RSS shows a 1.2 cm stone in the left proximal ureter near the UPJ with mild proximal hydroureteronephrosis. UA shows +blood, negative for nitrites and LE, 32 RBC, 1 WBC, occasional bacteria. WBC elevated at 17. Cr elevated to 1.2 from baseline of 0.9. Vitals stable and WNL, pain currently controlled. Urology was consulted.     Interval History:   NAEON. Has been afebrile and HD stable. Denies significant pain this morning.   Has been NPO since midnight for ureteroscopy today. Leukocytosis mildly improved. Good urine output overnight.     Objective:     Temp:  [97.5 °F (36.4 °C)-99.4 °F (37.4 °C)] 98.3 °F (36.8 °C)  Pulse:  [59-84] 78  Resp:  [15-18] 18  SpO2:  [96 %-100 %] 96 %  BP: (126-172)/() 131/81     Body mass index is 31.28 kg/m².           Drains     None                 Physical Exam   Constitutional: He is oriented to person, place, and time. He appears well-developed and well-nourished.   HENT:   Head: Normocephalic and atraumatic.   Eyes: Conjunctivae are normal.   Neck: Normal range of motion.   Cardiovascular: Normal rate.    Pulmonary/Chest: Effort normal.   Abdominal: Soft. He exhibits no distension.   Minimal left CVA and LUQ tenderness   Musculoskeletal: Normal range of motion.   Neurological: He is alert and oriented to person, place, and time.   Skin: Skin is warm and dry.      Psychiatric: He has a normal mood and affect. His behavior is normal. Judgment and thought content normal.       Significant Labs:    BMP:  Recent Labs   Lab 01/30/20  1742      K 3.6      CO2 25   BUN 15   CREATININE 1.2   CALCIUM 9.6       CBC:   Recent Labs   Lab 01/30/20  1742 01/31/20  0428   WBC 17.32* 14.90*   HGB 16.0 14.3   HCT 46.5 42.4    207       CTRSS: 1/30/2020    Mild left-sided hydronephrosis secondary to a 13 mm calculus at the ureteropelvic junction (UPJ.)  Additional left renal punctate nephroliths.            Assessment/Plan:     * Ureteral stone  - left ureteroscopy, possible left ureteral stent placement today  - NPO  - multimodal pain regimen  - Flomax  - PRN Zofran        VTE Risk Mitigation (From admission, onward)         Ordered     Place sequential compression device  Until discontinued      01/30/20 2241     IP VTE HIGH RISK PATIENT  Once      01/30/20 2241                Kenji Wade MD  Urology  [facility]

## 2020-01-31 NOTE — ANESTHESIA PREPROCEDURE EVALUATION
[facility]  Anesthesia Pre-Operative Evaluation         Patient Name: Sanchez Zarate  YOB: 1975  MRN: [id number]    SUBJECTIVE:     01/31/2020    Pre-operative evaluation for Procedure(s) (LRB):  REMOVAL, CALCULUS, URETER, URETEROSCOPIC (Left)    Sanchez Zarate is a 44 y.o. male with lumbar disc disease and nephrolithiasis (s/p multiple ureteroscopies, most recently in 2018) who is admitted with left ureteral stone and mild hydronephrosis.    Patient now presents for the above procedure(s).      LDA:       Peripheral IV - Single Lumen 01/30/20 1740 18 G Left Antecubital (Active)   Number of days: 0       Previous airway:   Placement Date: 12/21/18; Placement Time: 1451; Method of Intubation: Direct laryngoscopy; Inserted by: CRNA; Airway Device: Endotracheal Tube; Mask Ventilation: Easy; Intubated: Postinduction; Blade: Viramontes #2; Airway Device Size: 7.5; Style: Cuffed; Cuff Inflation: Minimal occlusive pressure; Placement Verified By: Auscultation, Capnometry, ETT Condensation; Grade: Grade I; Complicating Factors: None; Intubation Findings: Positive EtCO2, Atraumatic/Condition of teeth unchanged, Bilateral breath sounds;  Depth of Insertion: 22; Securment: Lips; Complications: None; Breath Sounds: Equal Bilateral; Insertion Attempts: 1; Removal Date: 12/21/18;  Removal Time: 1553      Drips:   sodium chloride 0.9% 125 mL/hr at 01/31/20 0025       Patient Active Problem List   Diagnosis    Chronic low back pain    Intervertebral disc extrusion (Left L5-S1)    Neural foraminal stenosis of lumbar spine (bilateral L5-S1, L>R)    Left lumbar radiculopathy    Situational syncope    Chronic diarrhea    Peyronie's disease    DDD (degenerative disc disease), lumbar    Nephrolithiasis    Chronic pain    Lumbar disc herniation    Abdominal pain    Ureteral stone    Bilateral low back pain    Difficulty walking       Review of patient's allergies indicates:  No Known  Allergies    Current Inpatient Medications:   acetaminophen  1,000 mg Oral Q6H    tamsulosin  0.4 mg Oral Daily       No current facility-administered medications on file prior to encounter.      Current Outpatient Medications on File Prior to Encounter   Medication Sig Dispense Refill    meloxicam (MOBIC) 15 MG tablet Take 1 tablet (15 mg total) by mouth once daily. 30 tablet 1       Past Surgical History:   Procedure Laterality Date    COLONOSCOPY N/A 5/29/2017    Procedure: COLONOSCOPY;  Surgeon: Sukhi Scanlon MD;  Location: [facility] ENDO (4TH FLR);  Service: Endoscopy;  Laterality: N/A;    CYSTOSCOPY W/ URETERAL STENT PLACEMENT Left 11/19/2018    Procedure: CYSTOSCOPY, WITH URETERAL STENT INSERTION;  Surgeon: Renee Garcia MD;  Location: [facility] OR 2ND FLR;  Service: Urology;  Laterality: Left;    CYSTOSCOPY W/ URETERAL STENT PLACEMENT  12/21/2018    Procedure: CYSTOSCOPY, WITH URETERAL STENT INSERTION;  Surgeon: Bhupinder Washington Jr., MD;  [facility];  Service: Urology;;    CYSTOSCOPY W/ URETERAL STENT REMOVAL Left 12/21/2018    Procedure: CYSTOSCOPY, WITH URETERAL STENT REMOVAL;  Surgeon: Bhupinder Washington Jr., MD;  [facility];  Service: Urology;  Laterality: Left;    HERNIA REPAIR  2017    umbilical    LASER LITHOTRIPSY Left 12/21/2018    Procedure: LITHOTRIPSY, USING LASER (mana);  Surgeon: Bhupinder Washington Jr., MD;  [facility];  Service: Urology;  Laterality: Left;  90mins    LUMBAR EPIDURAL INJECTION      with steroid    RETROGRADE PYELOGRAPHY Left 11/19/2018    Procedure: PYELOGRAM, RETROGRADE;  Surgeon: Renee Garcia MD;  [facility];  Service: Urology;  Laterality: Left;    RETROGRADE PYELOGRAPHY Left 12/21/2018    Procedure: PYELOGRAM, RETROGRADE;  Surgeon: Bhupinder Washington Jr., MD;  [facility];  Service: Urology;  Laterality: Left;    URETEROSCOPY Left 12/21/2018    Procedure: URETEROSCOPY;  Surgeon: Bhupinder Washington  MD Hector;  Location: [address];  Service: Urology;  Laterality: Left;  90mins       Social History     Socioeconomic History    Marital status:      Spouse name: Not on file    Number of children: 2    Years of education: Not on file    Highest education level: Not on file   Occupational History    Occupation: self employed   Social Needs    Financial resource strain: Not on file    Food insecurity:     Worry: Not on file     Inability: Not on file    Transportation needs:     Medical: Not on file     Non-medical: Not on file   Tobacco Use    Smoking status: Former Smoker     Last attempt to quit: 2007     Years since quittin.0    Smokeless tobacco: Never Used   Substance and Sexual Activity    Alcohol use: Yes     Comment: rare    Drug use: No    Sexual activity: Yes     Partners: Female   Lifestyle    Physical activity:     Days per week: Not on file     Minutes per session: Not on file    Stress: Not on file   Relationships    Social connections:     Talks on phone: Not on file     Gets together: Not on file     Attends Worship service: Not on file     Active member of club or organization: Not on file     Attends meetings of clubs or organizations: Not on file     Relationship status: Not on file   Other Topics Concern    Not on file   Social History Narrative    Not on file       OBJECTIVE:     Vital Signs Range (Last 24H):  Temp:  [36.4 °C (97.5 °F)-37.4 °C (99.4 °F)]   Pulse:  [59-84]   Resp:  [15-18]   BP: (126-172)/()   SpO2:  [97 %-100 %]       Significant Labs:  Lab Results   Component Value Date    WBC 17.32 (H) 2020    HGB 16.0 2020    HCT 46.5 2020     2020    CHOL 160 2019    TRIG 185 (H) 2019    HDL 30 (L) 2019    ALT 23 2020    AST 19 2020     2020    K 3.6 2020     2020    CREATININE 1.2 2020    BUN 15 2020    CO2 25 2020    TSH 2.541 01/10/2019     PSA 0.48 01/10/2019    INR 1.0 02/09/2017    HGBA1C 5.1 01/10/2019         Diagnostic Studies:  No relevant studies.      Cardiac Studies:  EKG (6/26/2019):   Vent. Rate : 083 BPM     Atrial Rate : 083 BPM     P-R Int : 138 ms          QRS Dur : 088 ms      QT Int : 376 ms       P-R-T Axes : 051 046 044 degrees     QTc Int : 441 ms  Normal sinus rhythm  Normal ECG  When compared with ECG of 18-NOV-2018 23:02,  No significant change was found  Confirmed by BUD CHAPIN MD ([id number]) on 6/27/2019 7:56:00 AM    2D Echo:  No results found for this or any previous visit.      ASSESSMENT/PLAN:     Anesthesia Evaluation    I have reviewed the Patient Summary Reports.    I have reviewed the Nursing Notes.   I have reviewed the Medications.     Review of Systems  Anesthesia Hx:  No problems with previous Anesthesia Denies Hx of Anesthetic complications   Denies Personal Hx of Anesthesia complications.   Social:  Former Smoker  Tobacco Use: , quit smoking >10 years ago   Cardiovascular:   Denies Hypertension.  Denies MI.  Denies CAD.    Denies CABG/stent.  Denies Dysrhythmias.  no hyperlipidemia  Denies Coronary Artery Disease.   Denies Hypertension.    Pulmonary:   Denies COPD.  Denies Asthma.  Denies Shortness of breath.  Denies Recent URI.  Denies Asthma.  Denies Chronic Obstructive Pulmonary Disease (COPD).    Renal/:   Denies Chronic Renal Disease. renal calculi   Renal Symptoms/Infections/Stones:  Urolithiasis, left kidney, with hydronephrosis Denies Kidney Function/Disease    Hepatic/GI:   Denies GERD. Denies Liver Disease.  Denies Esophageal / Stomach Disorders  Denies Liver Disease    Musculoskeletal:  Spine Disorders: lumbar Degenerative disease and Disc disease  Lumbar Spine Disorders, Lumbar Disc Disease   Neurological:   Denies TIA. Denies CVA. Denies Seizures.  Denies Seizure Disorder  Denies CVA - Cerebrovasular Accident  Denies TIA - Transient Ischemic Attack    Endocrine:   Denies Diabetes. Denies  Hypothyroidism.  Denies Diabetes  Denies Thyroid Disease  Denies Metabolic Disorders      Physical Exam  General:  Well nourished, Obesity    Airway/Jaw/Neck:  Airway Findings: Mouth Opening: Normal Tongue: Normal  General Airway Assessment: Adult  Mallampati: III  Improves to II with phonation.  TM Distance: Normal, at least 6 cm  Jaw/Neck Findings:  Neck ROM: Normal ROM      Dental:  Dental Findings: In tact   Chest/Lungs:  Chest/Lungs Findings: Clear to auscultation, Normal Respiratory Rate     Heart/Vascular:  Heart Findings: Rate: Normal  Rhythm: Regular Rhythm  Sounds: Normal  Heart murmur: negative       Mental Status:  Mental Status Findings:  Cooperative, Alert and Oriented         Anesthesia Plan  Type of Anesthesia, risks & benefits discussed:  Anesthesia Type:  general  Patient's Preference:   Intra-op Monitoring Plan: standard ASA monitors  Intra-op Monitoring Plan Comments:   Post Op Pain Control Plan: per primary service following discharge from PACU, IV/PO Opioids PRN and multimodal analgesia  Post Op Pain Control Plan Comments:   Induction:   IV  Beta Blocker:  Patient is not currently on a Beta-Blocker (No further documentation required).       Informed Consent: Patient understands risks and agrees with Anesthesia plan.  Questions answered. Anesthesia consent signed with patient.  ASA Score: 2     Day of Surgery Review of History & Physical:    H&P update referred to the surgeon.         Ready For Surgery From Anesthesia Perspective.

## 2020-01-31 NOTE — TRANSFER OF CARE
"Anesthesia Transfer of Care Note    Patient: Sanchez Zarate    Procedure(s) Performed: Procedure(s) (LRB):  REMOVAL, CALCULUS, URETER, URETEROSCOPIC (Left)  CYSTOSCOPY (N/A)  PYELOGRAM, RETROGRADE (Left)  URETEROSCOPY (Left)  LITHOTRIPSY, USING LASER (Left)  STENT, URETERAL    Patient location: PACU    Anesthesia Type: general    Transport from OR: Transported from OR on 6-10 L/min O2 by face mask with adequate spontaneous ventilation    Post pain: adequate analgesia    Post assessment: no apparent anesthetic complications    Post vital signs: stable    Level of consciousness: sedated    Nausea/Vomiting: no nausea/vomiting    Complications: none    Transfer of care protocol was followed      Last vitals:   Visit Vitals  /78 (BP Location: Left arm, Patient Position: Lying)   Pulse 78   Temp 37.2 °C (98.9 °F) (Temporal)   Resp 18   Ht 5' 10" (1.778 m)   Wt 98.9 kg (218 lb)   SpO2 (!) 94%   BMI 31.28 kg/m²     "

## 2020-01-31 NOTE — SUBJECTIVE & OBJECTIVE
Past Medical History:   Diagnosis Date    Chronic back pain     Chronic diarrhea     Hernia     TBI (traumatic brain injury) 2010    fake wall fell on head (no brain or skull injury per pt)       Past Surgical History:   Procedure Laterality Date    COLONOSCOPY N/A 5/29/2017    Procedure: COLONOSCOPY;  Surgeon: Sukhi Scanlon MD;  Location: [facility] ENDO (4TH FLR);  Service: Endoscopy;  Laterality: N/A;    CYSTOSCOPY W/ URETERAL STENT PLACEMENT Left 11/19/2018    Procedure: CYSTOSCOPY, WITH URETERAL STENT INSERTION;  Surgeon: Renee Garcia MD;  Location: [facility] OR 2ND FLR;  Service: Urology;  Laterality: Left;    CYSTOSCOPY W/ URETERAL STENT PLACEMENT  12/21/2018    Procedure: CYSTOSCOPY, WITH URETERAL STENT INSERTION;  Surgeon: Bhupinder Washington Jr., MD;  Location: [facility] OR 1ST FLR;  Service: Urology;;    CYSTOSCOPY W/ URETERAL STENT REMOVAL Left 12/21/2018    Procedure: CYSTOSCOPY, WITH URETERAL STENT REMOVAL;  Surgeon: Bhupinder Washington Jr., MD;  Location: [facility] OR 1ST FLR;  Service: Urology;  Laterality: Left;    HERNIA REPAIR  2017    umbilical    LASER LITHOTRIPSY Left 12/21/2018    Procedure: LITHOTRIPSY, USING LASER (mana);  Surgeon: Bhupinder Washington Jr., MD;  Location: [facility] OR 1ST FLR;  Service: Urology;  Laterality: Left;  90mins    LUMBAR EPIDURAL INJECTION      with steroid    RETROGRADE PYELOGRAPHY Left 11/19/2018    Procedure: PYELOGRAM, RETROGRADE;  Surgeon: Renee Garcia MD;  Location: [facility] OR 2ND FLR;  Service: Urology;  Laterality: Left;    RETROGRADE PYELOGRAPHY Left 12/21/2018    Procedure: PYELOGRAM, RETROGRADE;  Surgeon: Bhupinder Washington Jr., MD;  Location: [facility] OR 1ST FLR;  Service: Urology;  Laterality: Left;    URETEROSCOPY Left 12/21/2018    Procedure: URETEROSCOPY;  Surgeon: Bhupinder Washington Jr., MD;  Location: [facility] OR 1ST FLR;  Service: Urology;  Laterality: Left;  90mins       Review of patient's allergies indicates:  No Known Allergies    Family History     Problem Relation  (Age of Onset)    Cancer Cousin (50)    Diabetes Mother          Tobacco Use    Smoking status: Former Smoker     Last attempt to quit: 2007     Years since quittin.0    Smokeless tobacco: Never Used   Substance and Sexual Activity    Alcohol use: Yes     Comment: rare    Drug use: No    Sexual activity: Yes     Partners: Female       Review of Systems   Constitutional: Positive for chills. Negative for fever.   HENT: Negative for trouble swallowing.    Respiratory: Negative for shortness of breath.    Cardiovascular: Negative for chest pain.   Gastrointestinal: Positive for abdominal pain, nausea and vomiting.   Genitourinary: Positive for flank pain. Negative for decreased urine volume, difficulty urinating, dysuria and hematuria.   Musculoskeletal: Negative for myalgias.   Skin: Negative for wound.   Neurological: Negative for weakness.   Hematological: Does not bruise/bleed easily.   Psychiatric/Behavioral: Negative for confusion.       Objective:     Temp:  [97.5 °F (36.4 °C)-99.4 °F (37.4 °C)] 99.4 °F (37.4 °C)  Pulse:  [66-84] 80  Resp:  [15-18] 16  SpO2:  [97 %-100 %] 99 %  BP: (126-172)/() 126/70     Body mass index is 30.13 kg/m².    Date 20 0700 - 20 0659   Shift 5145-3669 1234-3590 1582-6944 24 Hour Total   INTAKE   IV Piggyback  1000  1000   Shift Total(mL/kg)  1000(10.5)  1000(10.5)   OUTPUT   Shift Total(mL/kg)       Weight (kg)  95.3 95.3 95.3          Drains     None                 Physical Exam   Constitutional: He is oriented to person, place, and time. He appears well-developed and well-nourished.   HENT:   Head: Normocephalic and atraumatic.   Eyes: Conjunctivae are normal.   Neck: Normal range of motion.   Cardiovascular: Normal rate.    Pulmonary/Chest: Effort normal.   Abdominal: Soft. He exhibits no distension.   Minimal left CVA and LUQ tenderness   Musculoskeletal: Normal range of motion.   Neurological: He is alert and oriented to person, place, and time.    Skin: Skin is warm and dry.     Psychiatric: He has a normal mood and affect. His behavior is normal. Judgment and thought content normal.       Significant Labs:    BMP:  Recent Labs   Lab 01/30/20  1742      K 3.6      CO2 25   BUN 15   CREATININE 1.2   CALCIUM 9.6       CBC:  Recent Labs   Lab 01/30/20  1742   WBC 17.32*   HGB 16.0   HCT 46.5          Urine Studies:   Recent Labs   Lab 01/30/20  1742   COLORU Yellow   APPEARANCEUA Hazy*   PHUR 6.0   SPECGRAV 1.025   PROTEINUA 1+*   GLUCUA Negative   KETONESU Trace*   BILIRUBINUA Negative   OCCULTUA 3+*   NITRITE Negative   LEUKOCYTESUR Negative   RBCUA 32*   WBCUA 1   BACTERIA Occasional   HYALINECASTS 0       Significant Imaging:    All pertinent imaging results/findings from the past 24 hours have been reviewed.  CT: bilateral adrenals unremarkable; no renal masses; right kidney and ureter without stones or hydro; 1.2 cm left proximal ureteral stone near UPJ, 476 HU, SSD 11 cm, with mild proximal hydroureteronephrosis; no other stones noted; bladder nondistended, prostate unremarkable

## 2020-01-31 NOTE — SUBJECTIVE & OBJECTIVE
Interval History:   WYATT. Has been afebrile and HD stable. Denies significant pain this morning.   Has been NPO since midnight for ureteroscopy today. Leukocytosis mildly improved. Good urine output overnight.     Objective:     Temp:  [97.5 °F (36.4 °C)-99.4 °F (37.4 °C)] 98.3 °F (36.8 °C)  Pulse:  [59-84] 78  Resp:  [15-18] 18  SpO2:  [96 %-100 %] 96 %  BP: (126-172)/() 131/81     Body mass index is 31.28 kg/m².           Drains     None                 Physical Exam   Constitutional: He is oriented to person, place, and time. He appears well-developed and well-nourished.   HENT:   Head: Normocephalic and atraumatic.   Eyes: Conjunctivae are normal.   Neck: Normal range of motion.   Cardiovascular: Normal rate.    Pulmonary/Chest: Effort normal.   Abdominal: Soft. He exhibits no distension.   Minimal left CVA and LUQ tenderness   Musculoskeletal: Normal range of motion.   Neurological: He is alert and oriented to person, place, and time.   Skin: Skin is warm and dry.     Psychiatric: He has a normal mood and affect. His behavior is normal. Judgment and thought content normal.       Significant Labs:    BMP:  Recent Labs   Lab 01/30/20  1742      K 3.6      CO2 25   BUN 15   CREATININE 1.2   CALCIUM 9.6       CBC:   Recent Labs   Lab 01/30/20  1742 01/31/20  0428   WBC 17.32* 14.90*   HGB 16.0 14.3   HCT 46.5 42.4    207       CTRSS: 1/30/2020    Mild left-sided hydronephrosis secondary to a 13 mm calculus at the ureteropelvic junction (UPJ.)  Additional left renal punctate nephroliths.

## 2020-01-31 NOTE — ASSESSMENT & PLAN NOTE
- admit to Observation  - plan for likely left ureteroscopy, possible left ureteral stent placement tomorrow  - NPO at midnight  - multimodal pain regimen  - Flomax  - PRN Zofran

## 2020-01-31 NOTE — OP NOTE
[facility]  Operative Note    Date: 01/31/2020    Pre-Op Diagnosis:   - Left 1.2 cm UPJ stone  - RYLEY  - Nausea/vomiting      Patient Active Problem List    Diagnosis Date Noted    Bilateral low back pain 01/08/2020    Difficulty walking 01/08/2020    Ureteral stone 12/21/2018    Abdominal pain 11/19/2018    Lumbar disc herniation 04/09/2018    Chronic pain 08/17/2017    Nephrolithiasis 07/14/2017    DDD (degenerative disc disease), lumbar 06/12/2017    Peyronie's disease 05/04/2017    Situational syncope 01/04/2017    Chronic diarrhea 01/04/2017    Intervertebral disc extrusion (Left L5-S1) 01/19/2015    Neural foraminal stenosis of lumbar spine (bilateral L5-S1, L>R) 01/19/2015    Left lumbar radiculopathy 01/19/2015    Chronic low back pain 05/01/2014       Post-Op Diagnosis: same     Procedure(s) Performed:   1.  Left ureteroscopy  2.  Cystoscopy  3.  Laser lithotripsy  4.  Stone basket extraction  5.  Left ureteral stent placement   6.  Left retrograde pyelogram  7.  Fluoro < 1 hr    Specimen(s): stone for analysis     Staff Surgeon:  Parminder Craig MD     Assistant Surgeon: Shira Webber MD    Anesthesia: General endotracheal anesthesia    Indications: Sanchez Zarate is a 44 y.o. male with a left 1.2 cm UPJ stone, presenting for definitive stone management.  He currently does not have a JJ ureteral stent in place.    Findings:   - access sheath placed and stone fragmented using laser  - all significant fragments removed     Estimated Blood Loss: min    Drains: 6 Fr x 26 cm JJ ureteral stent without strings    Procedure in detail:  After informed consent was obtained, the patient was brought the the cystoscopy suite and placed in the supine position.  SCDs were applied and working.  Anesthesia was administered.  The patient was then placed in the dorsal lithotomy position and prepped and draped in the usual sterile fashion.      A rigid cystoscope in a 22 Fr sheath was  introduced into the patient's urethra.  This passed easily.  The entire urethra was visualized which showed no strictures or masses.  Formal cystoscopy was performed which revealed no masses or lesions suspicious for malignancy, no bladder stones, no bladder diverticuli, no trabeculations.  The ureteral orifices were visualized in the normal anatomic position bilaterally.      A motion wire was passed up the left ureteral orifice and up into the kidney.  This passed easily and placement was confirmed using fluoro.  A second glide wire was then passed up the left ureteral orifice again confirmed using fluoro.  The cystoscope was removed keeping the wires in place.    A 11/13 ureteral access sheath was then passed over the free wire under fluoroscopic guidance.  Subsequently, the flexible ureteroscope was passed into the patient's ureter through the access sheath under direct vision. Flexible pyeloscopy was performed systematically.  A stone was encountered at the level of the UPJ.      A 200 micron Tagwhat laser fiber was passed through the ureteroscope.  The stone was dusted using the laser.  The laser fiber was removed and a Nitinol tipless basket was introduced through the ureteroscope.  Stone fragments were removed and collected for specimen analysis.  A retrograde pyelogram was performed through the ureteroscope ensuring that all calices had been evaluated. The ureteroscope was removed keeping the motion wire in place.  The entire course of the ureter was visualized as the ureteroscope and access sheath were simultaneously removed.  There were no significant ureteral fragments left behind.     A 6 Fr x 26 cm JJ ureteral stent without strings was passed over the wire and up into the renal pelvis using fluoro.  When the coil appeared to be in good position in the kidney, the wire was removed.  Good coils were seen in the kidney and the bladder using fluoro.      The patient tolerated the procedure well and was  transferred to the recovery room in stable condition.      Disposition:  The patient will follow up with Rafa in 1 week for stent removal.      Shira Webber MD

## 2020-01-31 NOTE — NURSING TRANSFER
Patient transported to the fifth floor room 527. PIV saline locked Chart sent with patient. Transported by [facility] PCT.

## 2020-01-31 NOTE — ASSESSMENT & PLAN NOTE
- left ureteroscopy, possible left ureteral stent placement today  - NPO  - multimodal pain regimen  - Flomax  - PRN Zofran

## 2020-01-31 NOTE — CONSULTS
[facility]  Urology  Consult Note    Patient Name: Sanchez Zarate  MRN: [id number]  Admission Date: 1/30/2020  Hospital Length of Stay: 0   Code Status: Prior   Attending Provider: Jose Antonio Ga MD   Consulting Provider: Iban Loredo MD  Primary Care Physician: Anna Moore MD  Principal Problem:<principal problem not specified>    Inpatient consult to Urology  Consult performed by: Iban Loredo MD  Consult ordered by: Vidya Mckay PA-C          Subjective:     HPI:  Mr. Zarate is a 44 year old male with a PMH of nephrolithiasis (s/p multiple ureteroscopies, most recently in 2018.) He presented to the ED at [facility] with left flank and abdominal pain since 3 PM on 1/30/2020, with associated nausea, vomiting, chills. Denies gross hematuria, dysuria, incomplete emptying, hesitancy, or fevers. A CT RSS shows a 1.2 cm stone in the left proximal ureter near the UPJ with mild proximal hydroureteronephrosis. UA shows +blood, negative for nitrites and LE, 32 RBC, 1 WBC, occasional bacteria. WBC elevated at 17. Cr elevated to 1.2 from baseline of 0.9. Vitals stable and WNL, pain currently controlled. Urology was consulted.     Past Medical History:   Diagnosis Date    Chronic back pain     Chronic diarrhea     Hernia     TBI (traumatic brain injury) 2010    fake wall fell on head (no brain or skull injury per pt)       Past Surgical History:   Procedure Laterality Date    COLONOSCOPY N/A 5/29/2017    Procedure: COLONOSCOPY;  Surgeon: Sukhi Scanlon MD;  Location: [facility] (4TH FLR);  Service: Endoscopy;  Laterality: N/A;    CYSTOSCOPY W/ URETERAL STENT PLACEMENT Left 11/19/2018    Procedure: CYSTOSCOPY, WITH URETERAL STENT INSERTION;  Surgeon: Renee Garcia MD;  Location: [facility] OR [address] [place] [address];  Service: Urology;  Laterality: Left;    CYSTOSCOPY W/ URETERAL STENT PLACEMENT  12/21/2018    Procedure: CYSTOSCOPY, WITH URETERAL STENT INSERTION;  Surgeon: Bhupinder Washington Jr., MD;  Location:  [facility] OR 1ST FLR;  Service: Urology;;    CYSTOSCOPY W/ URETERAL STENT REMOVAL Left 2018    Procedure: CYSTOSCOPY, WITH URETERAL STENT REMOVAL;  Surgeon: Bhupinder Washington Jr., MD;  Location: [facility] OR 1ST FLR;  Service: Urology;  Laterality: Left;    HERNIA REPAIR  2017    umbilical    LASER LITHOTRIPSY Left 2018    Procedure: LITHOTRIPSY, USING LASER (mana);  Surgeon: Bhupinder Washington Jr., MD;  Location: [facility] OR 1ST FLR;  Service: Urology;  Laterality: Left;  90mins    LUMBAR EPIDURAL INJECTION      with steroid    RETROGRADE PYELOGRAPHY Left 2018    Procedure: PYELOGRAM, RETROGRADE;  Surgeon: Renee Garcia MD;  Location: [facility] OR 2ND FLR;  Service: Urology;  Laterality: Left;    RETROGRADE PYELOGRAPHY Left 2018    Procedure: PYELOGRAM, RETROGRADE;  Surgeon: Bhupinder Washington Jr., MD;  Location: [facility] OR 1ST FLR;  Service: Urology;  Laterality: Left;    URETEROSCOPY Left 2018    Procedure: URETEROSCOPY;  Surgeon: Bhupinder Washington Jr., MD;  Location: [facility] OR 1ST FLR;  Service: Urology;  Laterality: Left;  90mins       Review of patient's allergies indicates:  No Known Allergies    Family History     Problem Relation (Age of Onset)    Cancer Cousin (50)    Diabetes Mother          Tobacco Use    Smoking status: Former Smoker     Last attempt to quit: 2007     Years since quittin.0    Smokeless tobacco: Never Used   Substance and Sexual Activity    Alcohol use: Yes     Comment: rare    Drug use: No    Sexual activity: Yes     Partners: Female       Review of Systems   Constitutional: Positive for chills. Negative for fever.   HENT: Negative for trouble swallowing.    Respiratory: Negative for shortness of breath.    Cardiovascular: Negative for chest pain.   Gastrointestinal: Positive for abdominal pain, nausea and vomiting.   Genitourinary: Positive for flank pain. Negative for decreased urine volume, difficulty urinating, dysuria and hematuria.   Musculoskeletal: Negative  for myalgias.   Skin: Negative for wound.   Neurological: Negative for weakness.   Hematological: Does not bruise/bleed easily.   Psychiatric/Behavioral: Negative for confusion.       Objective:     Temp:  [97.5 °F (36.4 °C)-99.4 °F (37.4 °C)] 99.4 °F (37.4 °C)  Pulse:  [66-84] 80  Resp:  [15-18] 16  SpO2:  [97 %-100 %] 99 %  BP: (126-172)/() 126/70     Body mass index is 30.13 kg/m².    Date 01/30/20 0700 - 01/31/20 0659   Shift 1553-1932 7847-5894 6088-4867 24 Hour Total   INTAKE   IV Piggyback  1000  1000   Shift Total(mL/kg)  1000(10.5)  1000(10.5)   OUTPUT   Shift Total(mL/kg)       Weight (kg)  95.3 95.3 95.3          Drains     None                 Physical Exam   Constitutional: He is oriented to person, place, and time. He appears well-developed and well-nourished.   HENT:   Head: Normocephalic and atraumatic.   Eyes: Conjunctivae are normal.   Neck: Normal range of motion.   Cardiovascular: Normal rate.    Pulmonary/Chest: Effort normal.   Abdominal: Soft. He exhibits no distension.   Minimal left CVA and LUQ tenderness   Musculoskeletal: Normal range of motion.   Neurological: He is alert and oriented to person, place, and time.   Skin: Skin is warm and dry.     Psychiatric: He has a normal mood and affect. His behavior is normal. Judgment and thought content normal.       Significant Labs:    BMP:  Recent Labs   Lab 01/30/20  1742      K 3.6      CO2 25   BUN 15   CREATININE 1.2   CALCIUM 9.6       CBC:  Recent Labs   Lab 01/30/20  1742   WBC 17.32*   HGB 16.0   HCT 46.5          Urine Studies:   Recent Labs   Lab 01/30/20  1742   COLORU Yellow   APPEARANCEUA Hazy*   PHUR 6.0   SPECGRAV 1.025   PROTEINUA 1+*   GLUCUA Negative   KETONESU Trace*   BILIRUBINUA Negative   OCCULTUA 3+*   NITRITE Negative   LEUKOCYTESUR Negative   RBCUA 32*   WBCUA 1   BACTERIA Occasional   HYALINECASTS 0       Significant Imaging:    All pertinent imaging results/findings from the past 24 hours have  been reviewed.  CT: bilateral adrenals unremarkable; no renal masses; right kidney and ureter without stones or hydro; 1.2 cm left proximal ureteral stone near UPJ, 476 HU, SSD 11 cm, with mild proximal hydroureteronephrosis; no other stones noted; bladder nondistended, prostate unremarkable                    Assessment and Plan:     Ureteral stone  - admit to Observation  - plan for likely left ureteroscopy, possible left ureteral stent placement tomorrow  - NPO at midnight  - multimodal pain regimen  - Flomax  - PRN Zofran        VTE Risk Mitigation (From admission, onward)    None          Thank you for your consult. I will follow-up with patient. Please contact us if you have any additional questions.    Iban Loredo MD  Urology  [facility]    I have reviewed the notes, assessments, and/or procedures performed by Dr. Loredo, I concur with her/his documentation of Sanchez Zarate.    I have explained the indication, risks, benefits, and alternatives of the procedure in detail.  Risks including but not limited to bleeding, infection, injury to the urethra, bladder, ureter, need for additional treatments, inability or incomplete removal of kidney stones, pain, and discomfort related to the stent were discussed in depth with the patient.  The patient voices understanding and all questions have been answered.  The patient agrees to proceed as planned with left ureteroscopy, laser lithotripsy, and ureteral stent placement.

## 2020-01-31 NOTE — PLAN OF CARE
01/31/20 1043   Post-Acute Status   Post-Acute Authorization Other   Other Status No Post-Acute Service Needs   SW following patient for dc needs. No post acute care needs identified at this time. SW will continue following patient for needs. SW in communication with CM and patient care team.   Shell Blackburn, ADRI  [facility]

## 2020-01-31 NOTE — HPI
Mr. Zarate is a 44 year old male with a PMH of nephrolithiasis (s/p multiple ureteroscopies, most recently in 2018.) He presented to the ED at [facility] with left flank and abdominal pain since 3 PM on 1/30/2020, with associated nausea, vomiting, chills. Denies gross hematuria, dysuria, incomplete emptying, hesitancy, or fevers. A CT RSS shows a 1.2 cm stone in the left proximal ureter near the UPJ with mild proximal hydroureteronephrosis. UA shows +blood, negative for nitrites and LE, 32 RBC, 1 WBC, occasional bacteria. WBC elevated at 17. Cr elevated to 1.2 from baseline of 0.9. Vitals stable and WNL, pain currently controlled. Urology was consulted.

## 2020-01-31 NOTE — PLAN OF CARE
Pt AAOx4 and VSS. Pt is progressing with plan of care. Free of skin breakdown as the pt positioned/repositioned well independently. SCDs in place at all times. Incentive spirometer at bedside and pt instructed on its use. Pain controlled well with PRN meds. Frequent rounds made to assess pain and safety and no complaints at this time noted. Side rails up x 2. Bed locked. Call light within reach. No falls noted. Will continue to monitor.

## 2020-01-31 NOTE — ANESTHESIA PROCEDURE NOTES
Intubation  Performed by: Srini Wu CRNA  Authorized by: Mode John MD     Intubation:     Induction:  Intravenous    Intubated:  Postinduction    Mask Ventilation:  Easy mask    Attempts:  1    Attempted By:  CRNA    Method of Intubation:  Direct    Blade:  Viramontes 2    Laryngeal View Grade: Grade I - full view of chords      Difficult Airway Encountered?: No      Complications:  None    Airway Device Size:  7.5    Style/Cuff Inflation:  Cuffed    Inflation Amount (mL):  6    Tube secured:  24    Secured at:  The lips    Placement Verified By:  Capnometry    Complicating Factors:  None    Findings Post-Intubation:  BS equal bilateral and atraumatic/condition of teeth unchanged

## 2020-01-31 NOTE — DISCHARGE SUMMARY
[facility]  Urology  Discharge Summary      Patient Name: Sanchez Zarate  MRN: [id number]  Admission Date: 1/30/2020  Hospital Length of Stay: 0 days  Discharge Date and Time:  01/31/2020 2:53 PM  Attending Physician: Parmindre Craig MD   Discharging Provider: Shira Webber MD  Primary Care Physician: Anna Moore MD    HPI:   Mr. Zarate is a 44 year old male with a PMH of nephrolithiasis (s/p multiple ureteroscopies, most recently in 2018.) He presented to the ED at [facility] [place] with left flank and abdominal pain since 3 PM on 1/30/2020, with associated nausea, vomiting, chills. Denies gross hematuria, dysuria, incomplete emptying, hesitancy, or fevers. A CT RSS shows a 1.2 cm stone in the left proximal ureter near the UPJ with mild proximal hydroureteronephrosis. UA shows +blood, negative for nitrites and LE, 32 RBC, 1 WBC, occasional bacteria. WBC elevated at 17. Cr elevated to 1.2 from baseline of 0.9. Vitals stable and WNL, pain currently controlled. Urology was consulted.     Procedure(s) (LRB):  REMOVAL, CALCULUS, URETER, URETEROSCOPIC (Left)  CYSTOSCOPY (N/A)  PYELOGRAM, RETROGRADE (Left)  URETEROSCOPY (Left)  LITHOTRIPSY, USING LASER (Left)  STENT, URETERAL     Indwelling Lines/Drains at time of discharge:   Lines/Drains/Airways     Airway                 Airway - Non-Surgical 01/31/20 1348 Endotracheal Tube less than 1 day                Hospital Course (synopsis of major diagnoses, care, treatment, and services provided during the course of the hospital stay): Patient admitted from ED with nausea, vomitting and left flank pain secondary to a left UPJ stone. He was taken to the OR the next day for left URS which he tolerated well with no complications. He was discharged home following the procedure.     Consults:   Consults (From admission, onward)        Status Ordering Provider     Inpatient consult to Urology  Once     Provider:  (Not yet assigned)    Completed HANS HARMON           Significant Diagnostic Studies: see chart review    Pending Diagnostic Studies:     Procedure Component Value Units Date/Time    SURG FL Surgery Fluoro Usage [[id number]] Resulted:  01/31/20 1251    Order Status:  Sent Lab Status:  In process Updated:  01/31/20 1251          Final Active Diagnoses:    Diagnosis Date Noted POA    PRINCIPAL PROBLEM:  Ureteral stone [N20.1] 12/21/2018 Yes    Difficulty walking [R26.2] 01/08/2020 Yes    Chronic pain [G89.29] 08/17/2017 Yes    DDD (degenerative disc disease), lumbar [M51.36] 06/12/2017 Yes    Peyronie's disease [N48.6] 05/04/2017 Yes    Chronic diarrhea [K52.9] 01/04/2017 Yes    Neural foraminal stenosis of lumbar spine (bilateral L5-S1, L>R) [M99.83] 01/19/2015 Yes      Problems Resolved During this Admission:       Discharged Condition: good    Disposition: Home or Self Care    Follow Up:  Follow-up Information     Parminder Craig MD In 1 week.    Specialty:  Urology  Why:  stent removal  Contact information:  [address] [facility] [address]  [phone]                 Patient Instructions:      US Retroperitoneal Limited   Standing Status: Future Standing Exp. Date: 07/31/20     Order Specific Question Answer Comments   Reason for Exam: renal ultrasound      Diet general     Call MD for:  temperature >100.4     Call MD for:  persistent nausea and vomiting     Call MD for:  severe uncontrolled pain     No dressing needed     CYSTOSCOPY W/ STENT REMOVAL   Standing Status: Future Standing Exp. Date: 07/31/20     Activity as tolerated     Medications:  Reconciled Home Medications:      Medication List      START taking these medications    tamsulosin 0.4 mg Cap  Commonly known as:  FLOMAX  Take 1 capsule (0.4 mg total) by mouth once daily.        CONTINUE taking these medications    meloxicam 15 MG tablet  Commonly known as:  MOBIC  Take 1 tablet (15 mg total) by mouth once daily.            Time spent on the discharge of patient: 20  minutes    Shira Webber MD  Urology  [facility]

## 2020-01-31 NOTE — NURSING
Pt came up to floor, vs stable, pt ambulating around room, pt voided once before dc unable to measure pt voided in toilet, pt stable, ready to dc with wife, iv removed, dc instructions given pt verbalized understanding, pt would like to ambulate to car with wife

## 2020-02-01 NOTE — ANESTHESIA POSTPROCEDURE EVALUATION
Anesthesia Post Evaluation    Patient: Sanchez Zarate    Procedure(s) Performed: Procedure(s) (LRB):  REMOVAL, CALCULUS, URETER, URETEROSCOPIC (Left)  CYSTOSCOPY (N/A)  PYELOGRAM, RETROGRADE (Left)  URETEROSCOPY (Left)  LITHOTRIPSY, USING LASER (Left)  STENT, URETERAL    Final Anesthesia Type: general    Patient location during evaluation: PACU  Patient participation: Yes- Able to Participate  Level of consciousness: awake and alert  Post-procedure vital signs: reviewed and stable  Pain management: adequate  Airway patency: patent    PONV status at discharge: No PONV  Anesthetic complications: no      Cardiovascular status: blood pressure returned to baseline  Respiratory status: unassisted, room air and spontaneous ventilation  Hydration status: euvolemic  Follow-up not needed.          Vitals Value Taken Time   /84 1/31/2020  4:34 PM   Temp 36.6 °C (97.9 °F) 1/31/2020  4:34 PM   Pulse 73 1/31/2020  4:34 PM   Resp 17 1/31/2020  4:34 PM   SpO2 94 % 1/31/2020  4:34 PM         No case tracking events are documented in the log.      Pain/Tasha Score: Pain Rating Prior to Med Admin: 6 (1/31/2020 11:41 AM)  Pain Rating Post Med Admin: 0 (1/31/2020  4:00 PM)  Tasha Score: 10 (1/31/2020  3:45 PM)

## 2020-02-03 ENCOUNTER — PATIENT MESSAGE (OUTPATIENT)
Dept: UROLOGY | Facility: CLINIC | Age: 45
End: 2020-02-03

## 2020-02-03 LAB
COMPN STONE: NORMAL
SPECIMEN SOURCE: NORMAL
STONE ANALYSIS IR-IMP: NORMAL

## 2020-02-06 ENCOUNTER — PATIENT MESSAGE (OUTPATIENT)
Dept: UROLOGY | Facility: CLINIC | Age: 45
End: 2020-02-06

## 2020-02-07 ENCOUNTER — HOSPITAL ENCOUNTER (OUTPATIENT)
Dept: UROLOGY | Facility: HOSPITAL | Age: 45
Discharge: HOME | End: 2020-02-07
Attending: UROLOGY | Admitting: UROLOGY
Payer: COMMERCIAL

## 2020-02-07 VITALS
HEIGHT: 70 IN | SYSTOLIC BLOOD PRESSURE: 124 MMHG | RESPIRATION RATE: 18 BRPM | TEMPERATURE: 99 F | WEIGHT: 211.44 LBS | HEART RATE: 90 BPM | BODY MASS INDEX: 30.27 KG/M2 | DIASTOLIC BLOOD PRESSURE: 93 MMHG

## 2020-02-07 DIAGNOSIS — N20.1 URETEROLITHIASIS: Primary | ICD-10-CM

## 2020-02-07 PROCEDURE — 52310 CYSTOSCOPY AND TREATMENT: CPT

## 2020-02-07 PROCEDURE — 52310 CYSTOSCOPY AND TREATMENT: CPT | Mod: ,,, | Performed by: UROLOGY

## 2020-02-07 PROCEDURE — 52310 PR CYSTOSCOPY,REMV CALCULUS,SIMPLE: ICD-10-PCS | Mod: ,,, | Performed by: UROLOGY

## 2020-02-07 RX ORDER — LIDOCAINE HYDROCHLORIDE 20 MG/ML
JELLY TOPICAL
Status: COMPLETED | OUTPATIENT
Start: 2020-02-07 | End: 2020-02-07

## 2020-02-07 RX ORDER — SULFAMETHOXAZOLE AND TRIMETHOPRIM 800; 160 MG/1; MG/1
1 TABLET ORAL ONCE
Status: COMPLETED | OUTPATIENT
Start: 2020-02-07 | End: 2020-02-07

## 2020-02-07 RX ADMIN — SULFAMETHOXAZOLE AND TRIMETHOPRIM 1 TABLET: 800; 160 TABLET ORAL at 01:02

## 2020-02-07 RX ADMIN — LIDOCAINE HYDROCHLORIDE: 20 JELLY TOPICAL at 12:02

## 2020-02-07 NOTE — PATIENT INSTRUCTIONS

## 2020-02-07 NOTE — H&P
[facility]  Staff: Bassam Hines MD    Is this patient in a research study? No    HPI:  Sanchez Zarate is a 44 y.o. male with who presents for stent removal following left ureteroscopy on 1/31/20.      He is without complaints today.     ROS:  Neg except per HPI    Past Medical History:   Diagnosis Date    Chronic back pain     Chronic diarrhea     Hernia     TBI (traumatic brain injury) 2010    fake wall fell on head (no brain or skull injury per pt)       Past Surgical History:   Procedure Laterality Date    COLONOSCOPY N/A 5/29/2017    Procedure: COLONOSCOPY;  Surgeon: Sukhi Scanlon MD;  Location: [facility] ENDO (4TH FLR);  Service: Endoscopy;  Laterality: N/A;    CYSTOSCOPY N/A 1/31/2020    Procedure: CYSTOSCOPY;  Surgeon: Parminder Craig MD;  Location: [facility] OR 1ST FLR;  Service: Urology;  Laterality: N/A;    CYSTOSCOPY W/ URETERAL STENT PLACEMENT Left 11/19/2018    Procedure: CYSTOSCOPY, WITH URETERAL STENT INSERTION;  Surgeon: Renee Garcia MD;  Location: [facility] OR 2ND FLR;  Service: Urology;  Laterality: Left;    CYSTOSCOPY W/ URETERAL STENT PLACEMENT  12/21/2018    Procedure: CYSTOSCOPY, WITH URETERAL STENT INSERTION;  Surgeon: Bhupinder Washington Jr., MD;  Location: [facility] OR 1ST FLR;  Service: Urology;;    CYSTOSCOPY W/ URETERAL STENT REMOVAL Left 12/21/2018    Procedure: CYSTOSCOPY, WITH URETERAL STENT REMOVAL;  Surgeon: Bhupinder Washington Jr., MD;  Location: [facility] OR 1ST FLR;  Service: Urology;  Laterality: Left;    HERNIA REPAIR  2017    umbilical    LASER LITHOTRIPSY Left 12/21/2018    Procedure: LITHOTRIPSY, USING LASER (mana);  Surgeon: Bhupinder Washington Jr., MD;  Location: [facility] OR 1ST FLR;  Service: Urology;  Laterality: Left;  90mins    LASER LITHOTRIPSY Left 1/31/2020    Procedure: LITHOTRIPSY, USING LASER;  Surgeon: Parminder Craig MD;  Location: [facility] OR 1ST FLR;  Service: Urology;  Laterality: Left;    LUMBAR EPIDURAL INJECTION      with steroid    RETROGRADE PYELOGRAPHY Left  2018    Procedure: PYELOGRAM, RETROGRADE;  Surgeon: Renee Garcia MD;  Location: [facility] OR 2ND FLR;  Service: Urology;  Laterality: Left;    RETROGRADE PYELOGRAPHY Left 2018    Procedure: PYELOGRAM, RETROGRADE;  Surgeon: Bhupinder Washington Jr., MD;  Location: [facility] OR 1ST FLR;  Service: Urology;  Laterality: Left;    RETROGRADE PYELOGRAPHY Left 2020    Procedure: PYELOGRAM, RETROGRADE;  Surgeon: Parminder Craig MD;  Location: [facility] OR 1ST FLR;  Service: Urology;  Laterality: Left;    URETEROSCOPIC REMOVAL OF URETERIC CALCULUS Left 2020    Procedure: REMOVAL, CALCULUS, URETER, URETEROSCOPIC;  Surgeon: Parminder Craig MD;  Location: [facility] OR 1ST FLR;  Service: Urology;  Laterality: Left;    URETEROSCOPY Left 2018    Procedure: URETEROSCOPY;  Surgeon: Bhupinder Washington Jr., MD;  Location: [facility] OR 1ST FLR;  Service: Urology;  Laterality: Left;  90mins    URETEROSCOPY Left 2020    Procedure: URETEROSCOPY;  Surgeon: Parminder Craig MD;  Location: [facility] OR 1ST FLR;  Service: Urology;  Laterality: Left;       Social History     Socioeconomic History    Marital status:      Spouse name: Not on file    Number of children: 2    Years of education: Not on file    Highest education level: Not on file   Occupational History    Occupation: self employed   Social Needs    Financial resource strain: Not on file    Food insecurity:     Worry: Not on file     Inability: Not on file    Transportation needs:     Medical: Not on file     Non-medical: Not on file   Tobacco Use    Smoking status: Former Smoker     Last attempt to quit: 2007     Years since quittin.1    Smokeless tobacco: Never Used   Substance and Sexual Activity    Alcohol use: Yes     Comment: rare    Drug use: No    Sexual activity: Yes     Partners: Female   Lifestyle    Physical activity:     Days per week: Not on file     Minutes per session: Not on file    Stress: Not on file   Relationships     "Social connections:     Talks on phone: Not on file     Gets together: Not on file     Attends Christian service: Not on file     Active member of club or organization: Not on file     Attends meetings of clubs or organizations: Not on file     Relationship status: Not on file   Other Topics Concern    Not on file   Social History Narrative    Not on file       Family History   Problem Relation Age of Onset    Diabetes Mother     Cancer Cousin 50        Cancer possibly stomach     Heart attack Neg Hx     Heart disease Neg Hx     Hypertension Neg Hx     Colon cancer Neg Hx     Esophageal cancer Neg Hx        Review of patient's allergies indicates:  No Known Allergies    Current Outpatient Medications on File Prior to Encounter   Medication Sig Dispense Refill    meloxicam (MOBIC) 15 MG tablet Take 1 tablet (15 mg total) by mouth once daily. 30 tablet 1    tamsulosin (FLOMAX) 0.4 mg Cap Take 1 capsule (0.4 mg total) by mouth once daily. 30 capsule 0     No current facility-administered medications on file prior to encounter.        Physical Exam:  Estimated body mass index is 30.34 kg/m² as calculated from the following:    Height as of this encounter: 5' 10" (1.778 m).    Weight as of this encounter: 95.9 kg (211 lb 6.7 oz).    General: No acute distress, well developed. AAOx3  Head: Normocephalic, Atraumatic  Eyes: Extra-occular movements intact, No discharge  Lungs: normal respiratory effor  CV: regular rat  Abdomen: soft, non-tender, non-distended  : penis uncircumcised and normal  MSK: no edema, no deformities, normal ROM  Skin: skin color, texture, turgor normal.  Neurologic: no focal deficits, sensation intact    Labs:     Lab Results   Component Value Date    WBC 14.90 (H) 01/31/2020    HGB 14.3 01/31/2020    HCT 42.4 01/31/2020    MCV 92 01/31/2020     01/31/2020       BMP  Lab Results   Component Value Date     01/31/2020    K 3.7 01/31/2020     01/31/2020    CO2 19 (L) " 01/31/2020    BUN 16 01/31/2020    CREATININE 1.3 01/31/2020    CALCIUM 8.6 (L) 01/31/2020    ANIONGAP 12 01/31/2020    ESTGFRAFRICA >60.0 01/31/2020    EGFRNONAA >60.0 01/31/2020       Lab Results   Component Value Date    PSA 0.48 01/10/2019       Assessment: Sanchez Zarate is a 44 y.o. male with a stent in place following left ureteroscopy.     Plan:     1. Stent removal in office today  2. Consents signed   3. I have explained the risk, benefits, and alternatives of the procedure in detail. The patient voices understanding and all questions have been answered. The patient agrees to proceed as planned.   4. Bactrim prophylaxis      Shira Webber MD

## 2020-02-07 NOTE — PROCEDURES
[facility]  Operative Note    Date: 02/07/2020    Pre-Op Diagnosis: s/p ureteroscopy with stent in place    Patient Active Problem List   Diagnosis    Chronic low back pain    Intervertebral disc extrusion (Left L5-S1)    Neural foraminal stenosis of lumbar spine (bilateral L5-S1, L>R)    Left lumbar radiculopathy    Situational syncope    Chronic diarrhea    Peyronie's disease    DDD (degenerative disc disease), lumbar    Nephrolithiasis    Chronic pain    Lumbar disc herniation    Abdominal pain    Ureteral stone    Bilateral low back pain    Difficulty walking       Post-Op Diagnosis: same    Procedure(s) Performed:   1.  Cysto with left JJ ureteral stent removal    Specimen(s): none    Staff Surgeon: Bassam Hines MD    Assistant Surgeon: Shira Webber MD    Anesthesia: Local anesthesia    Indications: Sanchez Zarate is a 44 y.o. male with a left JJ ureteral stent following ureterosopy.      Findings: uncomplicated stent removal    Estimated Blood Loss: none    Drains: none    Procedure in Detail:  After risks, benefits and possible complications of the procedure were explained, the patient elected to undergo the procedure and informed consent was obtained. All questions were answered. The patient was placed in the dorsal lithotomy position and prepped and draped in the usual sterile fashion.     A flexible cystoscope was introduced into the bladder per urethra. This passed easily.  The entire urethra was visualized which showed no masses or strictures.  The stent was visualized and grasped with stent graspers. It was removed in its entirety.     The patient tolerated the procedure well.    Disposition:  The patient will follow up in 3 months with a renal US.      Shira Webber MD

## 2020-02-11 RX ORDER — MIDAZOLAM HYDROCHLORIDE 1 MG/ML
INJECTION, SOLUTION INTRAMUSCULAR; INTRAVENOUS
Status: DISCONTINUED | OUTPATIENT
Start: 2020-01-31 | End: 2020-02-11

## 2020-02-11 NOTE — ADDENDUM NOTE
Addendum  created 02/11/20 1038 by Srini Wu CRNA    Intraprocedure Meds edited, Orders acknowledged in Narrator

## 2020-02-13 ENCOUNTER — PATIENT OUTREACH (OUTPATIENT)
Dept: ADMINISTRATIVE | Facility: OTHER | Age: 45
End: 2020-02-13

## 2020-04-20 ENCOUNTER — DOCUMENTATION ONLY (OUTPATIENT)
Dept: REHABILITATION | Facility: HOSPITAL | Age: 45
End: 2020-04-20

## 2020-04-20 DIAGNOSIS — M54.50 BILATERAL LOW BACK PAIN, UNSPECIFIED CHRONICITY, UNSPECIFIED WHETHER SCIATICA PRESENT: ICD-10-CM

## 2020-04-20 DIAGNOSIS — R26.2 DIFFICULTY WALKING: ICD-10-CM

## 2020-04-20 NOTE — PROGRESS NOTES
Pt was evaluated on 1/8/2020 and was seen 8 times for PT. Pt has not attended PT since 1/30/2020. Pt was given HEP. Current status is unknown. Pt to be d/c'd at this time.

## 2020-05-12 ENCOUNTER — PATIENT MESSAGE (OUTPATIENT)
Dept: UROLOGY | Facility: CLINIC | Age: 45
End: 2020-05-12

## 2020-05-14 ENCOUNTER — HOSPITAL ENCOUNTER (OUTPATIENT)
Dept: RADIOLOGY | Facility: HOSPITAL | Age: 45
Discharge: HOME | End: 2020-05-14
Attending: STUDENT IN AN ORGANIZED HEALTH CARE EDUCATION/TRAINING PROGRAM
Payer: COMMERCIAL

## 2020-05-14 DIAGNOSIS — N20.1 URETEROLITHIASIS: ICD-10-CM

## 2020-05-14 PROCEDURE — 76775 US EXAM ABDO BACK WALL LIM: CPT | Mod: 26,,, | Performed by: RADIOLOGY

## 2020-05-14 PROCEDURE — 76775 US EXAM ABDO BACK WALL LIM: CPT | Mod: TC

## 2020-05-14 PROCEDURE — 76775 US RETROPERITONEAL LIMITED: ICD-10-PCS | Mod: 26,,, | Performed by: RADIOLOGY

## 2020-05-20 ENCOUNTER — LAB VISIT (OUTPATIENT)
Dept: LAB | Facility: HOSPITAL | Age: 45
End: 2020-05-20
Attending: UROLOGY
Payer: COMMERCIAL

## 2020-05-20 ENCOUNTER — OFFICE VISIT (OUTPATIENT)
Dept: UROLOGY | Facility: CLINIC | Age: 45
End: 2020-05-20
Payer: COMMERCIAL

## 2020-05-20 DIAGNOSIS — N20.1 URETEROLITHIASIS: Primary | ICD-10-CM

## 2020-05-20 DIAGNOSIS — N20.1 URETEROLITHIASIS: ICD-10-CM

## 2020-05-20 DIAGNOSIS — R82.994 HYPERCALCIURIA: ICD-10-CM

## 2020-05-20 LAB
PTH-INTACT SERPL-MCNC: 30 PG/ML (ref 9–77)
URATE SERPL-MCNC: 6.1 MG/DL (ref 3.4–7)

## 2020-05-20 PROCEDURE — 84550 ASSAY OF BLOOD/URIC ACID: CPT

## 2020-05-20 PROCEDURE — 36415 COLL VENOUS BLD VENIPUNCTURE: CPT

## 2020-05-20 PROCEDURE — 99214 OFFICE O/P EST MOD 30 MIN: CPT | Mod: 95,,, | Performed by: UROLOGY

## 2020-05-20 PROCEDURE — 83970 ASSAY OF PARATHORMONE: CPT

## 2020-05-20 PROCEDURE — 99214 PR OFFICE/OUTPT VISIT, EST, LEVL IV, 30-39 MIN: ICD-10-PCS | Mod: 95,,, | Performed by: UROLOGY

## 2020-05-20 NOTE — PROGRESS NOTES
Subjective:       Patient ID: Snachez Zarate is a 44 y.o. male.    Chief Complaint:  Kidney stones.      History of Present Illness  HPI  Patient is a 44 y.o. male who is established to our clinic and was initially referred by the ED, Vidya Mckay PA-C for evaluation of kidney stones.    The patient location is: home  The chief complaint leading to consultation is: kidney stone f/u    Visit type: audiovisual    Face to Face time with patient: 20  25 minutes of total time spent on the encounter, which includes face to face time and non-face to face time preparing to see the patient (eg, review of tests), Obtaining and/or reviewing separately obtained history, Documenting clinical information in the electronic or other health record, Independently interpreting results (not separately reported) and communicating results to the patient/family/caregiver, or Care coordination (not separately reported).         Each patient to whom he or she provides medical services by telemedicine is:  (1) informed of the relationship between the physician and patient and the respective role of any other health care provider with respect to management of the patient; and (2) notified that he or she may decline to receive medical services by telemedicine and may withdraw from such care at any time.    Notes:   Patient had left flank pain in 1/2020 and underwent a left ureteroscopy on 1/31/20.  He had his stent removed by cystoscopy on 2/10/20.      had a 24 hour urine in 1/11/19:  Urine volume:  3.06  (you need to increase your fluid intake, goal urine volume >2.5 liters/day)  PH:  6.1  Urinary Calcium:  274 (recommend low sodium diet and fish oil supplementation; may need HCTZ)  Urinary Oxalate:  57  (recommend dietary oxalate restriction)  Urinary citrate:  795     normal  Urinary uric acid: 1496   (recommend decreasing animal protein intake)  Urinary sodium:  287   (recommend dietary sodium/salt restriction)  Magnesium:  67--normal        Review of Systems  Review of Systems  All other systems reviewed and negative except pertinent positives noted in HPI.       Objective:     Physical Exam   Constitutional: He is oriented to person, place, and time. He appears well-developed and well-nourished. No distress.   HENT:   Head: Normocephalic and atraumatic.   Eyes: No scleral icterus.   Neck: No tracheal deviation present.   Pulmonary/Chest: Effort normal. No respiratory distress.   Neurological: He is alert and oriented to person, place, and time.   Psychiatric: He has a normal mood and affect. His behavior is normal. Judgment and thought content normal.       Lab Review  Lab Results   Component Value Date    COLORU Yellow 01/30/2020    SPECGRAV 1.025 01/30/2020    PHUR 6.0 01/30/2020    WBCUR n 07/12/2017    NITRITE Negative 01/30/2020    PROTEINUR n 07/12/2017    GLUCOSEUR n 07/12/2017    KETONESU Trace (A) 01/30/2020    UROBILINOGEN Negative 07/23/2017    BILIRUBINUR n 07/12/2017    RBCUR 50 07/12/2017         Assessment:        1. Ureterolithiasis    2. Hypercalciuria            Plan:     Ureterolithiasis  -     Kidney Stone Urine Panel One, 24-hour collection; Future; Expected date: 05/20/2020  -     Uric acid; Future; Expected date: 05/20/2020  -     PTH, intact; Future; Expected date: 05/20/2020    Hypercalciuria      -refer to nephrology  -  Stone Source Left Ureter VC   Stone Analysis Test Not Performed    Stone Analysis Comment 100% Uric acid      -likely will need potassium citrate +/- allopurinol but will defer to nephrology after more up to date 24 hour urine study is done.  -serum uric acid and PTH  -Ultrasound was independently reviewed today and reveals a question of an 8mm left renal stone (not overly convinced of a stone based on my read), no hydronephrosis.      -General risk factors for kidney stones and the conservative measures to prevent kidney stones in the future were discussed with the patient in detail.  The  patient was encouraged to drink 2-3 liters of water a day, limit iced tea and griselda as well as foods high in oxalate.  They were cautioned to try to limit salt and red meat intake.  We also discussed adding citrate to the diet with the addition of henrik or lemon juice to their water or alternatively with crystal light.

## 2020-07-20 ENCOUNTER — TELEPHONE (OUTPATIENT)
Dept: UROLOGY | Facility: HOSPITAL | Age: 45
End: 2020-07-20

## 2020-07-20 NOTE — TELEPHONE ENCOUNTER
Sent via portal the following message:    Here is  your 24 hour urine result:  Urine volume:  3.51L--great    PH:  7.66  Urinary Calcium:  580---EXTREMELY HIGH (recommend low sodium diet and fish oil supplementation)  Urinary Oxalate:  56--ELEVATED  (recommend dietary oxalate restriction)  Urinary citrate:  1039--Good       Urinary uric acid: 1.120--ELEVATED   (recommend decreasing animal protein intake)  Urinary sodium:  358---ELEVATED   (recommend dietary sodium/salt restriction)      L[facility] result should be scanned into media.

## 2020-10-05 ENCOUNTER — PATIENT MESSAGE (OUTPATIENT)
Dept: ADMINISTRATIVE | Facility: HOSPITAL | Age: 45
End: 2020-10-05

## 2021-01-04 ENCOUNTER — PATIENT MESSAGE (OUTPATIENT)
Dept: ADMINISTRATIVE | Facility: HOSPITAL | Age: 46
End: 2021-01-04

## 2021-04-05 ENCOUNTER — PATIENT MESSAGE (OUTPATIENT)
Dept: ADMINISTRATIVE | Facility: HOSPITAL | Age: 46
End: 2021-04-05

## 2021-04-06 ENCOUNTER — IMMUNIZATION (OUTPATIENT)
Dept: INTERNAL MEDICINE | Facility: CLINIC | Age: 46
End: 2021-04-06
Payer: COMMERCIAL

## 2021-04-06 DIAGNOSIS — Z23 NEED FOR VACCINATION: Primary | ICD-10-CM

## 2021-04-06 PROCEDURE — 0011A COVID-19, MRNA, LNP-S, PF, 100 MCG/0.5 ML DOSE VACCINE: ICD-10-PCS | Mod: CV19,S$GLB,, | Performed by: FAMILY MEDICINE

## 2021-04-06 PROCEDURE — 0011A COVID-19, MRNA, LNP-S, PF, 100 MCG/0.5 ML DOSE VACCINE: CPT | Mod: CV19,S$GLB,, | Performed by: FAMILY MEDICINE

## 2021-04-06 PROCEDURE — 91301 COVID-19, MRNA, LNP-S, PF, 100 MCG/0.5 ML DOSE VACCINE: ICD-10-PCS | Mod: S$GLB,,, | Performed by: FAMILY MEDICINE

## 2021-04-06 PROCEDURE — 91301 COVID-19, MRNA, LNP-S, PF, 100 MCG/0.5 ML DOSE VACCINE: CPT | Mod: S$GLB,,, | Performed by: FAMILY MEDICINE

## 2021-05-04 ENCOUNTER — IMMUNIZATION (OUTPATIENT)
Dept: INTERNAL MEDICINE | Facility: CLINIC | Age: 46
End: 2021-05-04
Payer: COMMERCIAL

## 2021-05-04 DIAGNOSIS — Z23 NEED FOR VACCINATION: Primary | ICD-10-CM

## 2021-05-04 PROCEDURE — 0012A COVID-19, MRNA, LNP-S, PF, 100 MCG/0.5 ML DOSE VACCINE: CPT | Mod: PBBFAC | Performed by: INTERNAL MEDICINE

## 2021-07-06 ENCOUNTER — PATIENT MESSAGE (OUTPATIENT)
Dept: ADMINISTRATIVE | Facility: HOSPITAL | Age: 46
End: 2021-07-06

## 2021-10-04 ENCOUNTER — PATIENT MESSAGE (OUTPATIENT)
Dept: ADMINISTRATIVE | Facility: HOSPITAL | Age: 46
End: 2021-10-04

## 2021-12-04 ENCOUNTER — IMMUNIZATION (OUTPATIENT)
Dept: INTERNAL MEDICINE | Facility: CLINIC | Age: 46
End: 2021-12-04
Payer: COMMERCIAL

## 2021-12-04 DIAGNOSIS — Z23 NEED FOR VACCINATION: Primary | ICD-10-CM

## 2021-12-04 PROCEDURE — 0004A COVID-19, MRNA, LNP-S, PF, 30 MCG/0.3 ML DOSE VACCINE: CPT | Mod: CV19,PBBFAC | Performed by: INTERNAL MEDICINE

## 2022-01-06 ENCOUNTER — OFFICE VISIT (OUTPATIENT)
Dept: URGENT CARE | Facility: CLINIC | Age: 47
End: 2022-01-06
Payer: COMMERCIAL

## 2022-01-06 VITALS
WEIGHT: 211 LBS | TEMPERATURE: 99 F | DIASTOLIC BLOOD PRESSURE: 84 MMHG | RESPIRATION RATE: 16 BRPM | HEIGHT: 70 IN | HEART RATE: 87 BPM | OXYGEN SATURATION: 97 % | SYSTOLIC BLOOD PRESSURE: 120 MMHG | BODY MASS INDEX: 30.21 KG/M2

## 2022-01-06 DIAGNOSIS — R52 GENERALIZED BODY ACHES: Primary | ICD-10-CM

## 2022-01-06 DIAGNOSIS — M53.3 COCCYDYNIA: ICD-10-CM

## 2022-01-06 PROCEDURE — 3079F PR MOST RECENT DIASTOLIC BLOOD PRESSURE 80-89 MM HG: ICD-10-PCS | Mod: CPTII,S$GLB,, | Performed by: INTERNAL MEDICINE

## 2022-01-06 PROCEDURE — 72220 XR SACRUM AND COCCYX: ICD-10-PCS | Mod: FY,S$GLB,, | Performed by: RADIOLOGY

## 2022-01-06 PROCEDURE — 1159F MED LIST DOCD IN RCRD: CPT | Mod: CPTII,S$GLB,, | Performed by: INTERNAL MEDICINE

## 2022-01-06 PROCEDURE — 3008F PR BODY MASS INDEX (BMI) DOCUMENTED: ICD-10-PCS | Mod: CPTII,S$GLB,, | Performed by: INTERNAL MEDICINE

## 2022-01-06 PROCEDURE — 3074F PR MOST RECENT SYSTOLIC BLOOD PRESSURE < 130 MM HG: ICD-10-PCS | Mod: CPTII,S$GLB,, | Performed by: INTERNAL MEDICINE

## 2022-01-06 PROCEDURE — 99214 OFFICE O/P EST MOD 30 MIN: CPT | Mod: 25,S$GLB,, | Performed by: INTERNAL MEDICINE

## 2022-01-06 PROCEDURE — 1160F RVW MEDS BY RX/DR IN RCRD: CPT | Mod: CPTII,S$GLB,, | Performed by: INTERNAL MEDICINE

## 2022-01-06 PROCEDURE — 99214 PR OFFICE/OUTPT VISIT, EST, LEVL IV, 30-39 MIN: ICD-10-PCS | Mod: 25,S$GLB,, | Performed by: INTERNAL MEDICINE

## 2022-01-06 PROCEDURE — 1160F PR REVIEW ALL MEDS BY PRESCRIBER/CLIN PHARMACIST DOCUMENTED: ICD-10-PCS | Mod: CPTII,S$GLB,, | Performed by: INTERNAL MEDICINE

## 2022-01-06 PROCEDURE — 72220 X-RAY EXAM SACRUM TAILBONE: CPT | Mod: FY,S$GLB,, | Performed by: RADIOLOGY

## 2022-01-06 PROCEDURE — 96372 PR INJECTION,THERAP/PROPH/DIAG2ST, IM OR SUBCUT: ICD-10-PCS | Mod: S$GLB,,, | Performed by: INTERNAL MEDICINE

## 2022-01-06 PROCEDURE — 96372 THER/PROPH/DIAG INJ SC/IM: CPT | Mod: S$GLB,,, | Performed by: INTERNAL MEDICINE

## 2022-01-06 PROCEDURE — 3074F SYST BP LT 130 MM HG: CPT | Mod: CPTII,S$GLB,, | Performed by: INTERNAL MEDICINE

## 2022-01-06 PROCEDURE — 3079F DIAST BP 80-89 MM HG: CPT | Mod: CPTII,S$GLB,, | Performed by: INTERNAL MEDICINE

## 2022-01-06 PROCEDURE — 3008F BODY MASS INDEX DOCD: CPT | Mod: CPTII,S$GLB,, | Performed by: INTERNAL MEDICINE

## 2022-01-06 PROCEDURE — 1159F PR MEDICATION LIST DOCUMENTED IN MEDICAL RECORD: ICD-10-PCS | Mod: CPTII,S$GLB,, | Performed by: INTERNAL MEDICINE

## 2022-01-06 RX ORDER — NAPROXEN 500 MG/1
500 TABLET ORAL 2 TIMES DAILY WITH MEALS
Qty: 20 TABLET | Refills: 0 | Status: SHIPPED | OUTPATIENT
Start: 2022-01-07 | End: 2022-01-17

## 2022-01-06 RX ORDER — KETOROLAC TROMETHAMINE 30 MG/ML
60 INJECTION, SOLUTION INTRAMUSCULAR; INTRAVENOUS
Status: COMPLETED | OUTPATIENT
Start: 2022-01-06 | End: 2022-01-06

## 2022-01-06 RX ADMIN — KETOROLAC TROMETHAMINE 60 MG: 30 INJECTION, SOLUTION INTRAMUSCULAR; INTRAVENOUS at 12:01

## 2022-01-06 NOTE — PROGRESS NOTES
"Subjective:       Patient ID: Sanchez Zarate is a 46 y.o. male.    Vitals:  height is 5' 10" (1.778 m) and weight is 95.7 kg (211 lb). His temperature is 98.5 °F (36.9 °C). His blood pressure is 120/84 and his pulse is 87. His respiration is 16 and oxygen saturation is 97%.     Chief Complaint: Back Pain    This is a 46 y.o. male who presents today with a chief complaint of   Back pain near [facility]. Pt took a long drive to [place] new years weekend and started when he got there and increased on his way back. Pt taking Ibuprofen.     Back Pain  This is a new problem. The current episode started 1 to 4 weeks ago. The problem occurs constantly. The problem has been gradually worsening since onset. The pain is present in the lumbar spine. The quality of the pain is described as aching, burning and cramping. The pain is at a severity of 9/10. The pain is severe. The pain is the same all the time. The symptoms are aggravated by bending, position, sitting, standing, twisting and lying down. He has tried ice and NSAIDs for the symptoms. The treatment provided no relief.       Musculoskeletal: Positive for pain, abnormal ROM of joint, back pain, pain with walking and muscle ache.       Objective:      Physical Exam   Constitutional: normal  HENT:   Head: Normocephalic and atraumatic.   Neck: Neck supple.   Abdominal: Normal appearance.   Musculoskeletal:      Comments: Tender coccyx to palpation   Neurological: He is alert.         Assessment:       1. Generalized body aches    2. Coccydynia          Plan:         Generalized body aches  -     POCT COVID-19 Rapid Screening    Coccydynia  -     XR SACRUM AND COCCYX; Future; Expected date: 01/06/2022  -     ketorolac injection 60 mg  -     naproxen (NAPROSYN) 500 MG tablet; Take 1 tablet (500 mg total) by mouth 2 (two) times daily with meals. for 10 days  Dispense: 20 tablet; Refill: 0                   "

## 2022-01-08 ENCOUNTER — HOSPITAL ENCOUNTER (EMERGENCY)
Facility: HOSPITAL | Age: 47
Discharge: HOME | End: 2022-01-08
Attending: EMERGENCY MEDICINE
Payer: COMMERCIAL

## 2022-01-08 VITALS
RESPIRATION RATE: 18 BRPM | TEMPERATURE: 99 F | WEIGHT: 220 LBS | HEART RATE: 100 BPM | HEIGHT: 70 IN | BODY MASS INDEX: 31.5 KG/M2 | SYSTOLIC BLOOD PRESSURE: 146 MMHG | DIASTOLIC BLOOD PRESSURE: 91 MMHG | OXYGEN SATURATION: 96 %

## 2022-01-08 DIAGNOSIS — K62.89 RECTAL PAIN: ICD-10-CM

## 2022-01-08 DIAGNOSIS — L05.91 PILONIDAL CYST WITHOUT INFECTION: Primary | ICD-10-CM

## 2022-01-08 PROCEDURE — 99284 EMERGENCY DEPT VISIT MOD MDM: CPT

## 2022-01-08 PROCEDURE — 99284 PR EMERGENCY DEPT VISIT,LEVEL IV: ICD-10-PCS | Mod: ,,, | Performed by: EMERGENCY MEDICINE

## 2022-01-08 PROCEDURE — 99284 EMERGENCY DEPT VISIT MOD MDM: CPT | Mod: ,,, | Performed by: EMERGENCY MEDICINE

## 2022-01-08 RX ORDER — OXYCODONE AND ACETAMINOPHEN 5; 325 MG/1; MG/1
1 TABLET ORAL EVERY 4 HOURS PRN
Qty: 12 TABLET | Refills: 0 | Status: SHIPPED | OUTPATIENT
Start: 2022-01-08 | End: 2022-01-11

## 2022-01-08 RX ORDER — POLYETHYLENE GLYCOL 3350 17 G/17G
17 POWDER, FOR SOLUTION ORAL DAILY
Qty: 14 EACH | Refills: 0 | Status: SHIPPED | OUTPATIENT
Start: 2022-01-08 | End: 2022-04-20

## 2022-01-08 NOTE — ED PROVIDER NOTES
Encounter Date: 1/8/2022       History     Chief Complaint   Patient presents with    Tailbone Pain     Went to Urgent Care on Thursday but states still same pain     HPI     47 yo male presents for tailbone bone. This has been ongoing since a long drive to [place] at [facility], gradually worsening. Worse with palpation, sitting, walking, bowel movements. Denies fever, chills, myalgias, change in BM or blood. No known change to skin / rash / drainage. No prior episode before. Seen at  a few days ago and diagnosed with coccydynia. Has been using donut pillow intermittently and naproxen BID since without improvement.        Review of patient's allergies indicates:  No Known Allergies  Past Medical History:   Diagnosis Date    Chronic back pain     Chronic diarrhea     Hernia     TBI (traumatic brain injury) 2010    fake wall fell on head (no brain or skull injury per pt)     Past Surgical History:   Procedure Laterality Date    COLONOSCOPY N/A 5/29/2017    Procedure: COLONOSCOPY;  Surgeon: Sukhi Scanlon MD;  Location: [facility] (4TH FLR);  Service: Endoscopy;  Laterality: N/A;    CYSTOSCOPY N/A 1/31/2020    Procedure: CYSTOSCOPY;  Surgeon: Parminder Craig MD;  Location: [facility] OR 1ST FLR;  Service: Urology;  Laterality: N/A;    CYSTOSCOPY W/ URETERAL STENT PLACEMENT Left 11/19/2018    Procedure: CYSTOSCOPY, WITH URETERAL STENT INSERTION;  Surgeon: Renee Garcia MD;  Location: [facility] OR 2ND FLR;  Service: Urology;  Laterality: Left;    CYSTOSCOPY W/ URETERAL STENT PLACEMENT  12/21/2018    Procedure: CYSTOSCOPY, WITH URETERAL STENT INSERTION;  Surgeon: Bhupinder Washington Jr., MD;  [facility];  Service: Urology;;    CYSTOSCOPY W/ URETERAL STENT REMOVAL Left 12/21/2018    Procedure: CYSTOSCOPY, WITH URETERAL STENT REMOVAL;  Surgeon: Bhupinder Washington Jr., MD;  [facility];  Service: Urology;  Laterality: Left;    HERNIA REPAIR  2017    umbilical    LASER LITHOTRIPSY Left  12/21/2018    Procedure: LITHOTRIPSY, USING LASER (mana);  Surgeon: Bhupinder Washington Jr., MD;  Location: [facility] OR 1ST FLR;  Service: Urology;  Laterality: Left;  90mins    LASER LITHOTRIPSY Left 1/31/2020    Procedure: LITHOTRIPSY, USING LASER;  Surgeon: Parminder Craig MD;  Location: [facility] OR 1ST FLR;  Service: Urology;  Laterality: Left;    LUMBAR EPIDURAL INJECTION      with steroid    RETROGRADE PYELOGRAPHY Left 11/19/2018    Procedure: PYELOGRAM, RETROGRADE;  Surgeon: Renee Garcia MD;  Location: [facility] OR 2ND FLR;  Service: Urology;  Laterality: Left;    RETROGRADE PYELOGRAPHY Left 12/21/2018    Procedure: PYELOGRAM, RETROGRADE;  Surgeon: Bhupinder Washington Jr., MD;  Location: [facility] OR 1ST FLR;  Service: Urology;  Laterality: Left;    RETROGRADE PYELOGRAPHY Left 1/31/2020    Procedure: PYELOGRAM, RETROGRADE;  Surgeon: Parminder Craig MD;  Location: [facility] OR 1ST FLR;  Service: Urology;  Laterality: Left;    URETEROSCOPIC REMOVAL OF URETERIC CALCULUS Left 1/31/2020    Procedure: REMOVAL, CALCULUS, URETER, URETEROSCOPIC;  Surgeon: Parminder Craig MD;  [facility]  Service: Urology;  Laterality: Left;    URETEROSCOPY Left 12/21/2018    Procedure: URETEROSCOPY;  Surgeon: Bhupinder Washington Jr., MD;  [facility];  Service: Urology;  Laterality: Left;  90mins    URETEROSCOPY Left 1/31/2020    Procedure: URETEROSCOPY;  Surgeon: Parminder Craig MD;  Location: [facility] OR [facility] FLR;  Service: Urology;  Laterality: Left;     Family History   Problem Relation Age of Onset    Diabetes Mother     Cancer Cousin 50        Cancer possibly stomach     Heart attack Neg Hx     Heart disease Neg Hx     Hypertension Neg Hx     Colon cancer Neg Hx     Esophageal cancer Neg Hx      Social History     Tobacco Use    Smoking status: Former Smoker     Quit date: 1/1/2007     Years since quitting: 15.0    Smokeless tobacco: Never Used   Substance Use Topics    Alcohol use: Yes     Comment:  rare    Drug use: No     Review of Systems     General: No fever.  No chills.  Eyes: No visual changes.  Head: No headache.    Integument: No rashes or lesions.  Chest: No shortness of breath.  Cardiovascular: No chest pain.  Abdomen: No abdominal pain.  No nausea or vomiting.  Urinary: No abnormal urination.  Neurologic: No focal weakness.  No numbness.  Hematologic: No easy bruising.  Endocrine: No excessive thirst or urination.      Physical Exam     Initial Vitals [01/08/22 1656]   BP Pulse Resp Temp SpO2   (!) 146/91 100 18 99.4 °F (37.4 °C) 96 %      MAP       --         Physical Exam     Appearance: No acute distress.  HEENT: Normocephalic. Atraumatic. No conjunctival injection. EOMI. PERRL.    Neck: No JVD. Neck supple.    Chest: Non-tender. No respiratory distress  Cardiovascular: Regular rate and rhythm. +2 radial pulses bilaterally.  Abdomen: Not distended  Rectal: conducted with RN Liyah as chaperone. Small firm moveable tender subcentimeter mass at midline 1 cm superior to rectum. No erythema, crepitus, increased warmth to the touch or fluctuance.    Musculoskeletal: Good range of motion all joints. No deformities.    Neurologic: Alert and oriented x 3.  Equal strength in upper and lower extremities bilaterally. Normal sensation. No facial droop. Normal speech.    Psych:  Appropriate, conversant   Integumentary: No rashes seen.  Good turgor.  No abrasions.  No ecchymoses.      ED Course   Procedures  Labs Reviewed - No data to display       Imaging Results    None          Medications - No data to display                       Clinical Impression:   Final diagnoses:  [L05.91] Pilonidal cyst without infection (Primary)  [K62.89] Rectal pain         45 yo male presents for perirectal pain. VSS, afeb,.  Small firm moveable tender subcentimeter mass at midline 1 cm superior to rectum. No erythema, crepitus, increased warmth to the touch or fluctuance. Concern for possible pilonidal cyst, but do not  suspect abscess given duration and current presentation. Advised continued NSAIDs, donut pillow, and amb ref to [facility] surgery for further assessment. Rx for miralax and percocet.    Prescribed percocet for no more than three days. Pt instructed to not drive, operate heavy machinery, or perform any tasks that require unaltered state. Instructed to not combine with anything else that makes the patient drowsy. Advised that opioids are chemically addictive and should only be taken if needed. Pt confirms understanding of all of the above and agrees to comply.    Discussed results, diagnosis, and treatment plan with pt; advised close follow-up with PCP. Reviewed strict return precautions. Pt confirms understanding and ability to comply.         ED Disposition Condition    Discharge Stable        ED Prescriptions     Medication Sig Dispense Start Date End Date Auth. Provider    oxyCODONE-acetaminophen (PERCOCET) 5-325 mg per tablet Take 1 tablet by mouth every 4 (four) hours as needed for Pain. 12 tablet 1/8/2022 1/11/2022 Gemma Biswas MD    polyethylene glycol (GLYCOLAX) 17 gram PwPk Take 17 g by mouth once daily. 14 each 1/8/2022  Gemma Biswas MD        Follow-up Information     Follow up With Specialties Details Why Contact Info Additional Information    Anna Moore MD Internal Medicine Schedule an appointment as soon as possible for a visit   [address] [facility] [place] [address]  [phone]       [facility]al [facility] Schedule an appointment as soon as possible for a visit   [address] [facility] [address] [phone] [facility] 4th Floor Please park in [address] and use [facility] elevator           Gemma Biswas MD  01/08/22 213       Gemma Biswas MD  01/08/22 2134

## 2022-01-08 NOTE — ED NOTES
Sanchez Zarate, an 46 y.o. male presents to the ED with reports of Tailbone Pain (Went to Urgent Care on Thursday but states still same pain)    Review of patient's allergies indicates:  No Known Allergies  Chief Complaint   Patient presents with    Tailbone Pain     Went to Urgent Care on Thursday but states still same pain     Past Medical History:   Diagnosis Date    Chronic back pain     Chronic diarrhea     Hernia     TBI (traumatic brain injury) 2010    fake wall fell on head (no brain or skull injury per pt)          Pt alert and oriented x4, VSS,  Airway intact, respirations even and nonlabored, no bowel or bladder incontinence. +2 pulses to all four extremities, no edema or deformities noted.   Physical Exam  Constitutional:       Appearance: He is not toxic-appearing.  HENT:     Head: Normocephalic and atraumatic.     Mouth/Throat:     Mouth: Mucous membranes are moist.  Eyes:     Extraocular Movements: Extraocular movements intact.     Conjunctiva/sclera: Conjunctivae normal.     Pupils: Pupils are equal, round, and reactive to light.  Neck:     Vascular: No JVD.  Cardiovascular:     Rate and Rhythm: Normal rate and regular rhythm.     Heart sounds: Normal heart sounds. No murmur. No friction rub. No gallop.    Pulmonary:     Effort: Pulmonary effort is normal. No respiratory distress.     Breath sounds: Normal breath sounds. No wheezing or rales.  Abdominal:     General: Bowel sounds are normal. There is no distension.     Palpations: Abdomen is soft.     Tenderness: There is no tenderness reported. There is no guarding or rebound.     Hernia: No hernia is present.  Skin:     General: Skin is warm and dry.     Findings: No rash.  Neurological:     General: No focal deficit present.     Mental Status: He is alert and oriented to person, place, and time.     Cranial Nerves: No cranial nerve deficit.     Sensory: No sensory deficit.

## 2022-01-09 ENCOUNTER — HOSPITAL ENCOUNTER (EMERGENCY)
Facility: HOSPITAL | Age: 47
Discharge: HOME | End: 2022-01-09
Attending: EMERGENCY MEDICINE
Payer: COMMERCIAL

## 2022-01-09 VITALS
HEART RATE: 91 BPM | RESPIRATION RATE: 16 BRPM | WEIGHT: 220 LBS | DIASTOLIC BLOOD PRESSURE: 92 MMHG | BODY MASS INDEX: 31.5 KG/M2 | TEMPERATURE: 99 F | SYSTOLIC BLOOD PRESSURE: 150 MMHG | OXYGEN SATURATION: 97 % | HEIGHT: 70 IN

## 2022-01-09 DIAGNOSIS — K61.0 PERIANAL ABSCESS: Primary | ICD-10-CM

## 2022-01-09 LAB
ALBUMIN SERPL BCP-MCNC: 4.1 G/DL (ref 3.5–5.2)
ALP SERPL-CCNC: 78 U/L (ref 55–135)
ALT SERPL W/O P-5'-P-CCNC: 22 U/L (ref 10–44)
ANION GAP SERPL CALC-SCNC: 10 MMOL/L (ref 8–16)
AST SERPL-CCNC: 18 U/L (ref 10–40)
BASOPHILS # BLD AUTO: 0.06 K/UL (ref 0–0.2)
BASOPHILS NFR BLD: 0.4 % (ref 0–1.9)
BILIRUB SERPL-MCNC: 1.1 MG/DL (ref 0.1–1)
BUN SERPL-MCNC: 15 MG/DL (ref 6–20)
CALCIUM SERPL-MCNC: 9.4 MG/DL (ref 8.7–10.5)
CHLORIDE SERPL-SCNC: 104 MMOL/L (ref 95–110)
CO2 SERPL-SCNC: 23 MMOL/L (ref 23–29)
CREAT SERPL-MCNC: 0.8 MG/DL (ref 0.5–1.4)
CRP SERPL-MCNC: 20.9 MG/L (ref 0–8.2)
CTP QC/QA: YES
DIFFERENTIAL METHOD: ABNORMAL
EOSINOPHIL # BLD AUTO: 0.1 K/UL (ref 0–0.5)
EOSINOPHIL NFR BLD: 0.3 % (ref 0–8)
ERYTHROCYTE [DISTWIDTH] IN BLOOD BY AUTOMATED COUNT: 12.6 % (ref 11.5–14.5)
ERYTHROCYTE [SEDIMENTATION RATE] IN BLOOD BY WESTERGREN METHOD: 21 MM/HR (ref 0–23)
EST. GFR  (AFRICAN AMERICAN): >60 ML/MIN/1.73 M^2
EST. GFR  (NON AFRICAN AMERICAN): >60 ML/MIN/1.73 M^2
GLUCOSE SERPL-MCNC: 86 MG/DL (ref 70–110)
HCT VFR BLD AUTO: 47.2 % (ref 40–54)
HGB BLD-MCNC: 16 G/DL (ref 14–18)
IMM GRANULOCYTES # BLD AUTO: 0.06 K/UL (ref 0–0.04)
IMM GRANULOCYTES NFR BLD AUTO: 0.4 % (ref 0–0.5)
LYMPHOCYTES # BLD AUTO: 1.8 K/UL (ref 1–4.8)
LYMPHOCYTES NFR BLD: 11.6 % (ref 18–48)
MCH RBC QN AUTO: 31.1 PG (ref 27–31)
MCHC RBC AUTO-ENTMCNC: 33.9 G/DL (ref 32–36)
MCV RBC AUTO: 92 FL (ref 82–98)
MONOCYTES # BLD AUTO: 0.9 K/UL (ref 0.3–1)
MONOCYTES NFR BLD: 5.6 % (ref 4–15)
NEUTROPHILS # BLD AUTO: 12.7 K/UL (ref 1.8–7.7)
NEUTROPHILS NFR BLD: 81.7 % (ref 38–73)
NRBC BLD-RTO: 0 /100 WBC
PLATELET # BLD AUTO: 241 K/UL (ref 150–450)
PMV BLD AUTO: 9.6 FL (ref 9.2–12.9)
POTASSIUM SERPL-SCNC: 4.2 MMOL/L (ref 3.5–5.1)
PROT SERPL-MCNC: 8 G/DL (ref 6–8.4)
RBC # BLD AUTO: 5.14 M/UL (ref 4.6–6.2)
SARS-COV-2 RDRP RESP QL NAA+PROBE: NEGATIVE
SODIUM SERPL-SCNC: 137 MMOL/L (ref 136–145)
WBC # BLD AUTO: 15.53 K/UL (ref 3.9–12.7)

## 2022-01-09 PROCEDURE — 99284 EMERGENCY DEPT VISIT MOD MDM: CPT | Mod: CS,,, | Performed by: EMERGENCY MEDICINE

## 2022-01-09 PROCEDURE — 85025 COMPLETE CBC W/AUTO DIFF WBC: CPT | Performed by: EMERGENCY MEDICINE

## 2022-01-09 PROCEDURE — 46050 I&D PERIANAL ABSCESS SUPFC: CPT

## 2022-01-09 PROCEDURE — 96375 TX/PRO/DX INJ NEW DRUG ADDON: CPT | Mod: 59

## 2022-01-09 PROCEDURE — 86140 C-REACTIVE PROTEIN: CPT | Performed by: EMERGENCY MEDICINE

## 2022-01-09 PROCEDURE — 63600175 PHARM REV CODE 636 W HCPCS: Performed by: EMERGENCY MEDICINE

## 2022-01-09 PROCEDURE — 99285 EMERGENCY DEPT VISIT HI MDM: CPT | Mod: 25

## 2022-01-09 PROCEDURE — 85652 RBC SED RATE AUTOMATED: CPT | Performed by: EMERGENCY MEDICINE

## 2022-01-09 PROCEDURE — 99284 PR EMERGENCY DEPT VISIT,LEVEL IV: ICD-10-PCS | Mod: CS,,, | Performed by: EMERGENCY MEDICINE

## 2022-01-09 PROCEDURE — 63600175 PHARM REV CODE 636 W HCPCS: Performed by: STUDENT IN AN ORGANIZED HEALTH CARE EDUCATION/TRAINING PROGRAM

## 2022-01-09 PROCEDURE — 25500020 PHARM REV CODE 255: Performed by: EMERGENCY MEDICINE

## 2022-01-09 PROCEDURE — 80053 COMPREHEN METABOLIC PANEL: CPT | Performed by: EMERGENCY MEDICINE

## 2022-01-09 PROCEDURE — U0002 COVID-19 LAB TEST NON-CDC: HCPCS | Performed by: EMERGENCY MEDICINE

## 2022-01-09 PROCEDURE — 96374 THER/PROPH/DIAG INJ IV PUSH: CPT

## 2022-01-09 PROCEDURE — 96376 TX/PRO/DX INJ SAME DRUG ADON: CPT

## 2022-01-09 RX ORDER — CHLORTHALIDONE 25 MG/1
25 TABLET ORAL
COMMUNITY
Start: 2021-04-06 | End: 2022-04-20

## 2022-01-09 RX ORDER — HYDROMORPHONE HYDROCHLORIDE 1 MG/ML
0.2 INJECTION, SOLUTION INTRAMUSCULAR; INTRAVENOUS; SUBCUTANEOUS ONCE
Status: COMPLETED | OUTPATIENT
Start: 2022-01-09 | End: 2022-01-09

## 2022-01-09 RX ORDER — CLOBETASOL PROPIONATE 0.46 MG/ML
SOLUTION TOPICAL
COMMUNITY
Start: 2021-08-24 | End: 2022-04-20 | Stop reason: SDUPTHER

## 2022-01-09 RX ORDER — ALLOPURINOL 100 MG/1
100 TABLET ORAL
COMMUNITY
Start: 2021-01-26 | End: 2022-01-26

## 2022-01-09 RX ORDER — CALCIUM CARBONATE/VITAMIN D3 500-10/5ML
1 LIQUID (ML) ORAL
COMMUNITY
Start: 2021-01-26 | End: 2022-01-26

## 2022-01-09 RX ORDER — PYRIDOXINE HCL (VITAMIN B6) 100 MG
100 TABLET ORAL
COMMUNITY
Start: 2021-01-26 | End: 2022-01-26

## 2022-01-09 RX ORDER — HYDROMORPHONE HYDROCHLORIDE 1 MG/ML
0.5 INJECTION, SOLUTION INTRAMUSCULAR; INTRAVENOUS; SUBCUTANEOUS ONCE
Status: COMPLETED | OUTPATIENT
Start: 2022-01-09 | End: 2022-01-09

## 2022-01-09 RX ORDER — AMOXICILLIN AND CLAVULANATE POTASSIUM 875; 125 MG/1; MG/1
1 TABLET, FILM COATED ORAL 2 TIMES DAILY
Qty: 10 TABLET | Refills: 0 | Status: ON HOLD | OUTPATIENT
Start: 2022-01-09 | End: 2022-01-11 | Stop reason: SDUPTHER

## 2022-01-09 RX ORDER — MORPHINE SULFATE 4 MG/ML
4 INJECTION, SOLUTION INTRAMUSCULAR; INTRAVENOUS
Status: COMPLETED | OUTPATIENT
Start: 2022-01-09 | End: 2022-01-09

## 2022-01-09 RX ADMIN — IOHEXOL 100 ML: 350 INJECTION, SOLUTION INTRAVENOUS at 01:01

## 2022-01-09 RX ADMIN — MORPHINE SULFATE 4 MG: 4 INJECTION INTRAVENOUS at 12:01

## 2022-01-09 RX ADMIN — HYDROMORPHONE HYDROCHLORIDE 0.5 MG: 1 INJECTION, SOLUTION INTRAMUSCULAR; INTRAVENOUS; SUBCUTANEOUS at 05:01

## 2022-01-09 RX ADMIN — HYDROMORPHONE HYDROCHLORIDE 0.2 MG: 1 INJECTION, SOLUTION INTRAMUSCULAR; INTRAVENOUS; SUBCUTANEOUS at 06:01

## 2022-01-09 NOTE — ED NOTES
LOC: Patient name and date of birth verified. The patient is awake, alert and aware of environment with an appropriate affect, the patient is oriented x 3 and speaking appropriately.   APPEARANCE: Patient resting comfortably, patient is clean and well groomed, patient's clothing is properly fastened. Pt appears to be in extreme pain  SKIN: The skin is warm and dry, color consistent with ethnicity, patient has normal skin turgor and moist mucus membranes, skin intact, no breakdown or bruising noted.  MUSCULOSKELETAL: Patient moving all extremities well, no obvious swelling or deformities noted.   RESPIRATORY: Respirations are spontaneous, patient has a normal effort and rate, no accessory muscle use noted.  CARDIAC: Patient has a normal rate and rhythm, no periphreal edema noted, capillary refill < 3 seconds.  ABDOMEN: Soft and non tender to palpation, no distention noted. Bowel sounds present in all four quadrants.  NEUROLOGIC: Eyes open spontaneously, behavior appropriate to situation, follows commands, facial expression symmetrical, bilateral hand grasp equal and even, purposeful motor response noted, normal sensation in all extremities when touched with a finger.

## 2022-01-09 NOTE — ED PROVIDER NOTES
Encounter Date: 1/9/2022    SCRIBE #1 NOTE: I Louisaor Edwarddiana, am scribing for, and in the presence of, Momo Andres Jr., MD.       History     Chief Complaint   Patient presents with    Tailbone Pain     Started after long drive, seen here yest     Sanchez Zarate is a 46 y.o. male with a PMHx of kidney stones who presents with a chief complaint of tailbone pain onset 10 days ago. Pain began on his way to vacation in a car. He took anaprox which did not alleviate pain. He has painful bowel movement. Patient went to urgent care for evaluation and was given Cortizone injection as well as anaprox. He went to another urgent care which diagnosed him with a pilonidal cyst. He took a percocet last night but states he was still in severe pain. Patient denies any allergies to medication. Patient reports no other identifying, alleviating, or exacerbating factors and denies fever, chills, or any other associated symptoms at this time.            The history is provided by medical records, the patient and the spouse. The history is limited by the condition of the patient and a language barrier. No  was used.     Review of patient's allergies indicates:  No Known Allergies  Past Medical History:   Diagnosis Date    Chronic back pain     Chronic diarrhea     Hernia     TBI (traumatic brain injury) 2010    fake wall fell on head (no brain or skull injury per pt)     Past Surgical History:   Procedure Laterality Date    COLONOSCOPY N/A 5/29/2017    Procedure: COLONOSCOPY;  Surgeon: Sukhi Scanlon MD;  Location: [facility] (4TH FLR);  Service: Endoscopy;  Laterality: N/A;    CYSTOSCOPY N/A 1/31/2020    Procedure: CYSTOSCOPY;  Surgeon: Parminder Craig MD;  Location: [facility] OR 1ST FLR;  Service: Urology;  Laterality: N/A;    CYSTOSCOPY W/ URETERAL STENT PLACEMENT Left 11/19/2018    Procedure: CYSTOSCOPY, WITH URETERAL STENT INSERTION;  Surgeon: Renee Garcia MD;  Location: [facility] OR 2ND FLR;   Service: Urology;  Laterality: Left;    CYSTOSCOPY W/ URETERAL STENT PLACEMENT  12/21/2018    Procedure: CYSTOSCOPY, WITH URETERAL STENT INSERTION;  Surgeon: Bhupinder Washington Jr., MD;  Location: [facility] OR 1ST FLR;  Service: Urology;;    CYSTOSCOPY W/ URETERAL STENT REMOVAL Left 12/21/2018    Procedure: CYSTOSCOPY, WITH URETERAL STENT REMOVAL;  Surgeon: Bhupinder Washington Jr., MD;  Location: [facility] OR 1ST FLR;  Service: Urology;  Laterality: Left;    HERNIA REPAIR  2017    umbilical    LASER LITHOTRIPSY Left 12/21/2018    Procedure: LITHOTRIPSY, USING LASER (mana);  Surgeon: Bhupinder Washington Jr., MD;  Location: [facility] OR 1ST FLR;  Service: Urology;  Laterality: Left;  90mins    LASER LITHOTRIPSY Left 1/31/2020    Procedure: LITHOTRIPSY, USING LASER;  Surgeon: Parminder Craig MD;  Location: [facility] OR 1ST FLR;  Service: Urology;  Laterality: Left;    LUMBAR EPIDURAL INJECTION      with steroid    RETROGRADE PYELOGRAPHY Left 11/19/2018    Procedure: PYELOGRAM, RETROGRADE;  Surgeon: Renee Garcia MD;  Location: [facility] OR 2ND FLR;  Service: Urology;  Laterality: Left;    RETROGRADE PYELOGRAPHY Left 12/21/2018    Procedure: PYELOGRAM, RETROGRADE;  Surgeon: Bhupinder Washington Jr., MD;  Location: [facility] OR 1ST FLR;  Service: Urology;  Laterality: Left;    RETROGRADE PYELOGRAPHY Left 1/31/2020    Procedure: PYELOGRAM, RETROGRADE;  Surgeon: Parminder Craig MD;  Location: [facility] OR 1ST FLR;  Service: Urology;  Laterality: Left;    URETEROSCOPIC REMOVAL OF URETERIC CALCULUS Left 1/31/2020    Procedure: REMOVAL, CALCULUS, URETER, URETEROSCOPIC;  Surgeon: Parminder Craig MD;  Location: [facility] OR 1ST FLR;  Service: Urology;  Laterality: Left;    URETEROSCOPY Left 12/21/2018    Procedure: URETEROSCOPY;  Surgeon: Bhupinder Washington Jr., MD;  Location: [facility] OR 1ST FLR;  Service: Urology;  Laterality: Left;  90mins    URETEROSCOPY Left 1/31/2020    Procedure: URETEROSCOPY;  Surgeon: Parminder Craig MD;  Location: [facility] OR 1ST FLR;   Service: Urology;  Laterality: Left;     Family History   Problem Relation Age of Onset    Diabetes Mother     Cancer Cousin 50        Cancer possibly stomach     Heart attack Neg Hx     Heart disease Neg Hx     Hypertension Neg Hx     Colon cancer Neg Hx     Esophageal cancer Neg Hx      Social History     Tobacco Use    Smoking status: Former Smoker     Quit date: 1/1/2007     Years since quitting: 15.0    Smokeless tobacco: Never Used   Substance Use Topics    Alcohol use: Yes     Comment: rare    Drug use: No     Review of Systems   Constitutional: Negative for fever.   HENT: Negative for sore throat.    Respiratory: Negative for shortness of breath.    Cardiovascular: Negative for chest pain.   Gastrointestinal: Negative for nausea.   Genitourinary: Negative for dysuria.   Musculoskeletal: Negative for back pain.        + tailbone pain   Skin: Negative for rash.        + pilonidal cyst on tailbone.   Neurological: Negative for weakness.   Hematological: Does not bruise/bleed easily.       Physical Exam     Initial Vitals [01/09/22 0919]   BP Pulse Resp Temp SpO2   (!) 144/94 93 18 99.6 °F (37.6 °C) 97 %      MAP       --         Physical Exam    Constitutional: He appears well-developed and well-nourished.   HENT:   Head: Normocephalic and atraumatic.   Eyes: Conjunctivae, EOM and lids are normal. Pupils are equal, round, and reactive to light.   Neck: Trachea normal.   Normal range of motion.   Full passive range of motion without pain.     Genitourinary:    Genitourinary Comments: Fluctuance and severe tenderness to palpation at anus around 6 o'clock. No abnormal drainage from sight.     Musculoskeletal:      Cervical back: Full passive range of motion without pain and normal range of motion.     Neurological: He is alert.   Skin: Skin is intact.   Psychiatric: He has a normal mood and affect. His speech is normal and behavior is normal. Judgment and thought content normal.         ED Course    Procedures  Labs Reviewed   CBC W/ AUTO DIFFERENTIAL - Abnormal; Notable for the following components:       Result Value    WBC 15.53 (*)     MCH 31.1 (*)     Gran # (ANC) 12.7 (*)     Immature Grans (Abs) 0.06 (*)     Gran % 81.7 (*)     Lymph % 11.6 (*)     All other components within normal limits   COMPREHENSIVE METABOLIC PANEL - Abnormal; Notable for the following components:    Total Bilirubin 1.1 (*)     All other components within normal limits   C-REACTIVE PROTEIN - Abnormal; Notable for the following components:    CRP 20.9 (*)     All other components within normal limits   SEDIMENTATION RATE   SARS-COV-2 RDRP GENE    Narrative:     This test utilizes isothermal nucleic acid amplification   technology to detect the SARS-CoV-2 RdRp nucleic acid segment.   The analytical sensitivity (limit of detection) is 125 genome   equivalents/mL.   A POSITIVE result implies infection with the SARS-CoV-2 virus;   the patient is presumed to be contagious.     A NEGATIVE result means that SARS-CoV-2 nucleic acids are not   present above the limit of detection. A NEGATIVE result should be   treated as presumptive. It does not rule out the possibility of   COVID-19 and should not be the sole basis for treatment decisions.   If COVID-19 is strongly suspected based on clinical and exposure   history, re-testing using an alternate molecular assay should be   considered.   This test is only for use under the Food and Drug   Administration s Emergency Use Authorization (EUA).   Commercial kits are provided by [employer].   Performance characteristics of the EUA have been independently   verified by [facility] Department of   Pathology and Laboratory Medicine.   _________________________________________________________________   The authorized Fact Sheet for Healthcare Providers and the authorized Fact   Sheet for Patients of the ID NOW COVID-19 are available on the FDA   website:      https://www.fda.gov/media/580874/download  https://www.fda.gov/media/458401/download              Imaging Results          CT Pelvis With Contrast (Final result)  Result time 01/09/22 13:44:47    Final result by Patric Ivory MD (01/09/22 13:44:47)                 Impression:      1. Findings most concerning for posterior perianal abscess as described.  2. Additional findings above.      Electronically signed by: Patric Ivory MD  Date:    01/09/2022  Time:    13:44             Narrative:    EXAMINATION:  CT PELVIS WITH  CONTRAST    CLINICAL HISTORY:  Anal/rectal abscess;    TECHNIQUE:  Axial images of the pelvis were obtained at 3.75 mm intervals following administration of 100 cc omni 350 IV contrast.  Coronal and sagittal reformatted images were reviewed.    COMPARISON:  CT 01/30/2020    FINDINGS:  The visualized portions of the distal ureters are unremarkable.  The urinary bladder is distended without wall thickening.  The prostate is not enlarged.  The visualized large bowel is grossly unremarkable.  The visualized small bowel is unremarkable.  The appendix is unremarkable.  There are a few scattered shotty periaortic lymph nodes.  No significant inguinal lymphadenopathy.    No acute osseous abnormality.    There is a rim enhancing fluid collection abutting the posterior aspect of the anise measuring approximately 1.8 x 2.0 x 1.3 cm concerning for posterior perianal abscess.  There is induration of the surrounding soft tissues.  No extension to the peritoneal cavity.                                 Medications   morphine injection 4 mg (4 mg Intravenous Given 1/9/22 1227)   iohexoL (OMNIPAQUE 350) injection 100 mL (100 mLs Intravenous Given 1/9/22 1338)   HYDROmorphone injection 0.5 mg (0.5 mg Intravenous Given 1/9/22 1759)   HYDROmorphone injection 0.2 mg (0.2 mg Intravenous Given 1/9/22 1827)     Medical Decision Making:   History:   Old Medical Records: I decided to obtain old medical  records.  Initial Assessment:   Sanchez Zarate is a 46 y.o. male with a PMHx of kidney stones who presents with a chief complaint of tailbone pain onset 10 days ago. /94  PE noted for fluctuation and severe tenderness to palpation at anus around 6 o'clock. No abnormal drainage from sight.  Differential Diagnosis:   DDx includes but is not limited to: Anal abscess and rectal abscess.    Clinical Tests:   Lab Tests: Reviewed and Ordered  Radiological Study: Reviewed and Ordered  ED Management:  Plan is to obtain CT pelvis, labs, and reasess.          Scribe Attestation:   Scribe #1: I performed the above scribed service and the documentation accurately describes the services I performed. I attest to the accuracy of the note.        ED Course as of 01/10/22 2021   Sun Jan 09, 2022   1402 CT noted for anal abscess will consult colorectal surgery continue to reassess. [DC]   5616 Spoke with colorectal will come to evaluate patient. Currently in a surgery  [DC]      ED Course User Index  [DC] Momo Andres Jr., MD             Clinical Impression:   Final diagnoses:  [K61.0] Perianal abscess (Primary)          ED Disposition Condition    Discharge Stable        ED Prescriptions     Medication Sig Dispense Start Date End Date Auth. Provider    amoxicillin-clavulanate 875-125mg (AUGMENTIN) 875-125 mg per tablet Take 1 tablet by mouth 2 (two) times daily. for 5 days 10 tablet 1/9/2022 1/14/2022 Paddy Benedict MD        Follow-up Information     Follow up With Specialties Details Why Contact Info Additional Information    [facility] [address] [facility] Colon [facility] Rectal [facility] Schedule an appointment as soon as possible for a visit   [address]  [place] [phone] [facility] Please park in [facility] and use [facility] elevator           Momo Andres Jr., MD  01/10/22 2019       Momo Andres Jr., MD  01/10/22 2021

## 2022-01-09 NOTE — ED NOTES
Per physician, patient does not need to be on isolation, COVID swab ordered after moved to consult room

## 2022-01-10 ENCOUNTER — TELEPHONE (OUTPATIENT)
Dept: SURGERY | Facility: CLINIC | Age: 47
End: 2022-01-10
Payer: COMMERCIAL

## 2022-01-10 NOTE — TELEPHONE ENCOUNTER
Called pt about message concerning pilonidal cyst and rectal abscess. Scheduled an appt for tomorrow at 1:20 pm with Abby.

## 2022-01-10 NOTE — SUBJECTIVE & OBJECTIVE
No current facility-administered medications on file prior to encounter.     Current Outpatient Medications on File Prior to Encounter   Medication Sig    allopurinoL (ZYLOPRIM) 100 MG tablet Take 100 mg by mouth.    chlorthalidone (HYGROTEN) 25 MG Tab Take 25 mg by mouth.    magnesium oxide 400 mg magnesium Cap Take 1 capsule by mouth.    pyridoxine, vitamin B6, (B-6) 100 MG Tab Take 100 mg by mouth.    clobetasoL (TEMOVATE) 0.05 % external solution APPLY TO AFFECTED AREAS ON SCALP THREE TO FOUR TIMES A WEEK AT NIGHT.    meloxicam (MOBIC) 15 MG tablet Take 1 tablet (15 mg total) by mouth once daily.    naproxen (NAPROSYN) 500 MG tablet Take 1 tablet (500 mg total) by mouth 2 (two) times daily with meals. for 10 days    oxyCODONE-acetaminophen (PERCOCET) 5-325 mg per tablet Take 1 tablet by mouth every 4 (four) hours as needed for Pain.    polyethylene glycol (GLYCOLAX) 17 gram PwPk Take 17 g by mouth once daily.    tamsulosin (FLOMAX) 0.4 mg Cap Take 1 capsule (0.4 mg total) by mouth once daily.       Review of patient's allergies indicates:  No Known Allergies    Past Medical History:   Diagnosis Date    Chronic back pain     Chronic diarrhea     Hernia     TBI (traumatic brain injury) 2010    fake wall fell on head (no brain or skull injury per pt)     Past Surgical History:   Procedure Laterality Date    COLONOSCOPY N/A 5/29/2017    Procedure: COLONOSCOPY;  Surgeon: Sukhi Scanlon MD;  Location: [facility] (4TH FLR);  Service: Endoscopy;  Laterality: N/A;    CYSTOSCOPY N/A 1/31/2020    Procedure: CYSTOSCOPY;  Surgeon: Parminder Craig MD;  [address]  Service: Urology;  Laterality: N/A;    CYSTOSCOPY W/ URETERAL STENT PLACEMENT Left 11/19/2018    Procedure: CYSTOSCOPY, WITH URETERAL STENT INSERTION;  Surgeon: Renee Garcia MD;  Location: [facility] OR 2ND FLR;  Service: Urology;  Laterality: Left;    CYSTOSCOPY W/ URETERAL STENT PLACEMENT  12/21/2018    Procedure: CYSTOSCOPY,  WITH URETERAL STENT INSERTION;  Surgeon: Bhupinder Washington Jr., MD;  Location: [facility] OR 1ST FLR;  Service: Urology;;    CYSTOSCOPY W/ URETERAL STENT REMOVAL Left 12/21/2018    Procedure: CYSTOSCOPY, WITH URETERAL STENT REMOVAL;  Surgeon: Bhupinder Washington Jr., MD;  Location: [facility] OR 1ST FLR;  Service: Urology;  Laterality: Left;    HERNIA REPAIR  2017    umbilical    LASER LITHOTRIPSY Left 12/21/2018    Procedure: LITHOTRIPSY, USING LASER (mana);  Surgeon: Bhupinder Washington Jr., MD;  Location: [facility] OR 1ST FLR;  Service: Urology;  Laterality: Left;  90mins    LASER LITHOTRIPSY Left 1/31/2020    Procedure: LITHOTRIPSY, USING LASER;  Surgeon: Parminder Craig MD;  Location: [facility] OR 1ST FLR;  Service: Urology;  Laterality: Left;    LUMBAR EPIDURAL INJECTION      with steroid    RETROGRADE PYELOGRAPHY Left 11/19/2018    Procedure: PYELOGRAM, RETROGRADE;  Surgeon: Renee Garcia MD;  Location: [facility] OR 2ND FLR;  Service: Urology;  Laterality: Left;    RETROGRADE PYELOGRAPHY Left 12/21/2018    Procedure: PYELOGRAM, RETROGRADE;  Surgeon: Bhupinder Washington Jr., MD;  Location: [facility] OR 1ST FLR;  Service: Urology;  Laterality: Left;    RETROGRADE PYELOGRAPHY Left 1/31/2020    Procedure: PYELOGRAM, RETROGRADE;  Surgeon: Parminder Craig MD;  Location: [facility] OR 1ST FLR;  Service: Urology;  Laterality: Left;    URETEROSCOPIC REMOVAL OF URETERIC CALCULUS Left 1/31/2020    Procedure: REMOVAL, CALCULUS, URETER, URETEROSCOPIC;  Surgeon: Parminder Craig MD;  Location: [facility] OR 1ST FLR;  Service: Urology;  Laterality: Left;    URETEROSCOPY Left 12/21/2018    Procedure: URETEROSCOPY;  Surgeon: Bhupinder Washington Jr., MD;  Location: [facility] OR 1ST FLR;  Service: Urology;  Laterality: Left;  90mins    URETEROSCOPY Left 1/31/2020    Procedure: URETEROSCOPY;  Surgeon: Parminder Craig MD;  Location: [facility] OR 1ST FLR;  Service: Urology;  Laterality: Left;     Family History     Problem Relation (Age of Onset)    Cancer Cousin (50)     Diabetes Mother        Tobacco Use    Smoking status: Former Smoker     Quit date: 1/1/2007     Years since quitting: 15.0    Smokeless tobacco: Never Used   Substance and Sexual Activity    Alcohol use: Yes     Comment: rare    Drug use: No    Sexual activity: Yes     Partners: Female     Review of Systems   Constitutional: Negative for activity change, appetite change, chills, diaphoresis and fatigue.   Respiratory: Negative for cough and shortness of breath.    Cardiovascular: Negative for chest pain and palpitations.   Gastrointestinal: Negative for abdominal distention, abdominal pain, constipation, diarrhea, nausea and vomiting.   Genitourinary: Negative for difficulty urinating and enuresis.        Painful tailbone, buttocks     Musculoskeletal: Negative for arthralgias, back pain and gait problem.   Skin: Negative for color change and wound.   Neurological: Negative for dizziness and weakness.   Hematological: Negative for adenopathy. Does not bruise/bleed easily.   All other systems reviewed and are negative.    Objective:     Vital Signs (Most Recent):  Temp: 99.6 °F (37.6 °C) (01/09/22 0919)  Pulse: 82 (01/09/22 1720)  Resp: 18 (01/09/22 1827)  BP: 121/79 (01/09/22 1720)  SpO2: 98 % (01/09/22 1720) Vital Signs (24h Range):  Temp:  [99.6 °F (37.6 °C)] 99.6 °F (37.6 °C)  Pulse:  [82-93] 82  Resp:  [18] 18  SpO2:  [97 %-98 %] 98 %  BP: (121-144)/(79-94) 121/79     Weight: 99.8 kg (220 lb)  Body mass index is 31.57 kg/m².    Physical Exam  Vitals and nursing note reviewed.   Constitutional:       General: He is not in acute distress.     Appearance: Normal appearance. He is not ill-appearing or toxic-appearing.   Cardiovascular:      Rate and Rhythm: Normal rate and regular rhythm.      Pulses: Normal pulses.   Pulmonary:      Effort: Pulmonary effort is normal.   Abdominal:      General: Abdomen is flat. There is no distension.      Tenderness: There is no abdominal tenderness.    Genitourinary:      Musculoskeletal:         General: Normal range of motion.   Skin:     General: Skin is warm.      Capillary Refill: Capillary refill takes less than 2 seconds.   Neurological:      General: No focal deficit present.      Mental Status: He is alert and oriented to person, place, and time.         Significant Labs:  I have reviewed all pertinent lab results within the past 24 hours.  CBC:   Recent Labs   Lab 01/09/22  1217   WBC 15.53*   RBC 5.14   HGB 16.0   HCT 47.2      MCV 92   MCH 31.1*   MCHC 33.9     BMP:   Recent Labs   Lab 01/09/22  1217   GLU 86      K 4.2      CO2 23   BUN 15   CREATININE 0.8   CALCIUM 9.4     CMP:   Recent Labs   Lab 01/09/22  1217   GLU 86   CALCIUM 9.4   ALBUMIN 4.1   PROT 8.0      K 4.2   CO2 23      BUN 15   CREATININE 0.8   ALKPHOS 78   ALT 22   AST 18   BILITOT 1.1*     LFTs:   Recent Labs   Lab 01/09/22  1217   ALT 22   AST 18   ALKPHOS 78   BILITOT 1.1*   PROT 8.0   ALBUMIN 4.1     Coagulation: No results for input(s): LABPROT, INR, APTT in the last 168 hours.  Cardiac markers: No results for input(s): CKMB, CPKMB, TROPONINT, TROPONINI, MYOGLOBIN in the last 168 hours.    Significant Diagnostics:  I have reviewed all pertinent imaging results/findings within the past 24 hours.

## 2022-01-10 NOTE — CONSULTS
[facility] - Emergency Dept  General Surgery  Consult Note    Patient Name: Sanchez Zarate  MRN: [id number]  Code Status: Prior  Admission Date: 1/9/2022  Hospital Length of Stay: 0 days  Attending Physician: No att. providers found  Primary Care Provider: Anna Moore MD    Patient information was obtained from patient, past medical records and ER records.     Inpatient consult to Colorectal Surgery  Consult performed by: Maggie Steward MD  Consult ordered by: Momo Anrdes Jr., MD        Subjective:     Principal Problem: perianal abscess     History of Present Illness: Sanchez Zarate is a 47 yo male with 10 day history of tailbone/buttocks pain. Reports pain started after taking a long drive to [place]. Reports pain with bowel movements and sitting. Pain medications do not alleviate pain. He was seen in the urgent care on 1/6and diagnosed with coccageal indlammation. He was then seen in the ED yesterday and diagnosed with pilonidal cyst. Unfortunately he had continued pain and came to the ED for evaluation. No reported fever, chills, nausea, vomiting, change in bowel movements.     In ED patient is afebrile. WBC 15. CT scan performed revealed rim enhancing fluid collection abutting the posterior aspect of the anise measuring approximately 1.8 x 2.0 x 1.3 cm concerning for posterior perianal abscess.       No current facility-administered medications on file prior to encounter.     Current Outpatient Medications on File Prior to Encounter   Medication Sig    allopurinoL (ZYLOPRIM) 100 MG tablet Take 100 mg by mouth.    chlorthalidone (HYGROTEN) 25 MG Tab Take 25 mg by mouth.    magnesium oxide 400 mg magnesium Cap Take 1 capsule by mouth.    pyridoxine, vitamin B6, (B-6) 100 MG Tab Take 100 mg by mouth.    clobetasoL (TEMOVATE) 0.05 % external solution APPLY TO AFFECTED AREAS ON SCALP THREE TO FOUR TIMES A WEEK AT NIGHT.    meloxicam (MOBIC) 15 MG tablet Take 1 tablet (15 mg total) by mouth once daily.     naproxen (NAPROSYN) 500 MG tablet Take 1 tablet (500 mg total) by mouth 2 (two) times daily with meals. for 10 days    oxyCODONE-acetaminophen (PERCOCET) 5-325 mg per tablet Take 1 tablet by mouth every 4 (four) hours as needed for Pain.    polyethylene glycol (GLYCOLAX) 17 gram PwPk Take 17 g by mouth once daily.    tamsulosin (FLOMAX) 0.4 mg Cap Take 1 capsule (0.4 mg total) by mouth once daily.       Review of patient's allergies indicates:  No Known Allergies    Past Medical History:   Diagnosis Date    Chronic back pain     Chronic diarrhea     Hernia     TBI (traumatic brain injury) 2010    fake wall fell on head (no brain or skull injury per pt)     Past Surgical History:   Procedure Laterality Date    COLONOSCOPY N/A 5/29/2017    Procedure: COLONOSCOPY;  Surgeon: Sukhi Scanlon MD;  Location: [facility] (4TH FLR);  Service: Endoscopy;  Laterality: N/A;    CYSTOSCOPY N/A 1/31/2020    Procedure: CYSTOSCOPY;  Surgeon: Parminder Craig MD;  [address]  Service: Urology;  Laterality: N/A;    CYSTOSCOPY W/ URETERAL STENT PLACEMENT Left 11/19/2018    Procedure: CYSTOSCOPY, WITH URETERAL STENT INSERTION;  Surgeon: Renee Garcia MD;  Location: [facility] OR 2ND FLR;  Service: Urology;  Laterality: Left;    CYSTOSCOPY W/ URETERAL STENT PLACEMENT  12/21/2018    Procedure: CYSTOSCOPY, WITH URETERAL STENT INSERTION;  Surgeon: Bhupinder Washington Jr., MD;  [facility];  Service: Urology;;    CYSTOSCOPY W/ URETERAL STENT REMOVAL Left 12/21/2018    Procedure: CYSTOSCOPY, WITH URETERAL STENT REMOVAL;  Surgeon: Bhupinder Washington Jr., MD;  [facility];  Service: Urology;  Laterality: Left;    HERNIA REPAIR  2017    umbilical    LASER LITHOTRIPSY Left 12/21/2018    Procedure: LITHOTRIPSY, USING LASER (mana);  Surgeon: Bhupinder Washington Jr., MD;  [facility];  Service: Urology;  Laterality: Left;  90mins    LASER LITHOTRIPSY Left 1/31/2020    Procedure:  LITHOTRIPSY, USING LASER;  Surgeon: Parminder Craig MD;  Location: [facility] OR 1ST FLR;  Service: Urology;  Laterality: Left;    LUMBAR EPIDURAL INJECTION      with steroid    RETROGRADE PYELOGRAPHY Left 11/19/2018    Procedure: PYELOGRAM, RETROGRADE;  Surgeon: Renee Garcia MD;  Location: [facility] OR 2ND FLR;  Service: Urology;  Laterality: Left;    RETROGRADE PYELOGRAPHY Left 12/21/2018    Procedure: PYELOGRAM, RETROGRADE;  Surgeon: Bhupinder Washington Jr., MD;  Location: [facility] OR 1ST FLR;  Service: Urology;  Laterality: Left;    RETROGRADE PYELOGRAPHY Left 1/31/2020    Procedure: PYELOGRAM, RETROGRADE;  Surgeon: Parminder Craig MD;  Location: [facility] OR 1ST FLR;  Service: Urology;  Laterality: Left;    URETEROSCOPIC REMOVAL OF URETERIC CALCULUS Left 1/31/2020    Procedure: REMOVAL, CALCULUS, URETER, URETEROSCOPIC;  Surgeon: Parminder Craig MD;  [facility];  Service: Urology;  Laterality: Left;    URETEROSCOPY Left 12/21/2018    Procedure: URETEROSCOPY;  Surgeon: Bhupinder Washington Jr., MD;  Location: [facility] OR 1ST FLR;  Service: Urology;  Laterality: Left;  90mins    URETEROSCOPY Left 1/31/2020    Procedure: URETEROSCOPY;  Surgeon: Parminder Craig MD;  [facility];  Service: Urology;  Laterality: Left;     Family History     Problem Relation (Age of Onset)    Cancer Cousin (50)    Diabetes Mother        Tobacco Use    Smoking status: Former Smoker     Quit date: 1/1/2007     Years since quitting: 15.0    Smokeless tobacco: Never Used   Substance and Sexual Activity    Alcohol use: Yes     Comment: rare    Drug use: No    Sexual activity: Yes     Partners: Female     Review of Systems   Constitutional: Negative for activity change, appetite change, chills, diaphoresis and fatigue.   Respiratory: Negative for cough and shortness of breath.    Cardiovascular: Negative for chest pain and palpitations.   Gastrointestinal: Negative for abdominal distention, abdominal pain,  constipation, diarrhea, nausea and vomiting.   Genitourinary: Negative for difficulty urinating and enuresis.        Painful tailbone, buttocks     Musculoskeletal: Negative for arthralgias, back pain and gait problem.   Skin: Negative for color change and wound.   Neurological: Negative for dizziness and weakness.   Hematological: Negative for adenopathy. Does not bruise/bleed easily.   All other systems reviewed and are negative.    Objective:     Vital Signs (Most Recent):  Temp: 99.6 °F (37.6 °C) (01/09/22 0919)  Pulse: 82 (01/09/22 1720)  Resp: 18 (01/09/22 1827)  BP: 121/79 (01/09/22 1720)  SpO2: 98 % (01/09/22 1720) Vital Signs (24h Range):  Temp:  [99.6 °F (37.6 °C)] 99.6 °F (37.6 °C)  Pulse:  [82-93] 82  Resp:  [18] 18  SpO2:  [97 %-98 %] 98 %  BP: (121-144)/(79-94) 121/79     Weight: 99.8 kg (220 lb)  Body mass index is 31.57 kg/m².    Physical Exam  Vitals and nursing note reviewed.   Constitutional:       General: He is not in acute distress.     Appearance: Normal appearance. He is not ill-appearing or toxic-appearing.   Cardiovascular:      Rate and Rhythm: Normal rate and regular rhythm.      Pulses: Normal pulses.   Pulmonary:      Effort: Pulmonary effort is normal.   Abdominal:      General: Abdomen is flat. There is no distension.      Tenderness: There is no abdominal tenderness.   Genitourinary:      Musculoskeletal:         General: Normal range of motion.   Skin:     General: Skin is warm.      Capillary Refill: Capillary refill takes less than 2 seconds.   Neurological:      General: No focal deficit present.      Mental Status: He is alert and oriented to person, place, and time.         Significant Labs:  I have reviewed all pertinent lab results within the past 24 hours.  CBC:   Recent Labs   Lab 01/09/22  1217   WBC 15.53*   RBC 5.14   HGB 16.0   HCT 47.2      MCV 92   MCH 31.1*   MCHC 33.9     BMP:   Recent Labs   Lab 01/09/22  1217   GLU 86      K 4.2      CO2 23    BUN 15   CREATININE 0.8   CALCIUM 9.4     CMP:   Recent Labs   Lab 01/09/22  1217   GLU 86   CALCIUM 9.4   ALBUMIN 4.1   PROT 8.0      K 4.2   CO2 23      BUN 15   CREATININE 0.8   ALKPHOS 78   ALT 22   AST 18   BILITOT 1.1*     LFTs:   Recent Labs   Lab 01/09/22  1217   ALT 22   AST 18   ALKPHOS 78   BILITOT 1.1*   PROT 8.0   ALBUMIN 4.1     Coagulation: No results for input(s): LABPROT, INR, APTT in the last 168 hours.  Cardiac markers: No results for input(s): CKMB, CPKMB, TROPONINT, TROPONINI, MYOGLOBIN in the last 168 hours.    Significant Diagnostics:  I have reviewed all pertinent imaging results/findings within the past 24 hours.    Procedure:     Incison & Drainage Procedure Note  Procedure - Incision and Drainage  Resident - Bin    Patient positioned appropriately, 30cc 1% lidocaine with epinephrine was used as a local anesthetic. #11 blade scalpal used for two separate incision- related to patient motion and difficulty with patient compliance. Additional local anesthetic injected into surrounding viable tissue. Small amount of drainage of pus. Wound covered with gauze. Procedure tolerated without complications.   Assessment/Plan:     Perianal abscess  Sanchez Zarate is a 45 yo male with 10 day history of tailbone/buttocks pain found to have 1.8 x 2.0 x 1.3 cm perianal fluid collection concerning for abscess.     -I&D performed in ED. Patient extremely uncomfortable during procedure and extremely difficult to examine. 30 cc lidocaine injected in a focal area without much relief. Incision made with some purulence expressed however unable to fully assess if cavity completely drained.   -Recommend sitz baths, hot showers and warm compresses to facilitate drainage.   -Recommend 5 days PO abx   -Dicussed that if pain continues or worsens, or systemic symptoms to come back to ED for evaluation. Can be evaluated in CRS clinic if needed.       VTE Risk Mitigation (From admission, onward)    None           Thank you for your consult.    Maggie Steward MD  General Surgery  [facility] - [facility] Dept

## 2022-01-10 NOTE — ASSESSMENT & PLAN NOTE
Sanchez Zarate is a 45 yo male with 10 day history of tailbone/buttocks pain found to have 1.8 x 2.0 x 1.3 cm perianal fluid collection concerning for abscess.     -I&D performed in ED. Patient extremely uncomfortable during procedure and extremely difficult to examine. 30 cc lidocaine injected in a focal area without much relief. Incision made with some purulence expressed however unable to fully assess if cavity completely drained.   -Recommend sitz baths, hot showers and warm compresses to facilitate drainage.   -Recommend 5 days PO abx   -Dicussed that if pain continues or worsens, or systemic symptoms to come back to ED for evaluation. Can be evaluated in CRS clinic if needed.

## 2022-01-10 NOTE — HPI
Sanchez Zarate is a 45 yo male with 10 day history of tailbone/buttocks pain. Reports pain started after taking a long drive to [place]. Reports pain with bowel movements and sitting. Pain medications do not alleviate pain. He was seen in the urgent care on 1/6and diagnosed with coccageal indlammation. He was then seen in the ED yesterday and diagnosed with pilonidal cyst. Unfortunately he had continued pain and came to the ED for evaluation. No reported fever, chills, nausea, vomiting, change in bowel movements.     In ED patient is afebrile. WBC 15. CT scan performed revealed rim enhancing fluid collection abutting the posterior aspect of the anise measuring approximately 1.8 x 2.0 x 1.3 cm concerning for posterior perianal abscess.

## 2022-01-11 ENCOUNTER — HOSPITAL ENCOUNTER (OUTPATIENT)
Facility: HOSPITAL | Age: 47
Discharge: HOME | End: 2022-01-11
Attending: STUDENT IN AN ORGANIZED HEALTH CARE EDUCATION/TRAINING PROGRAM | Admitting: STUDENT IN AN ORGANIZED HEALTH CARE EDUCATION/TRAINING PROGRAM
Payer: COMMERCIAL

## 2022-01-11 ENCOUNTER — ANESTHESIA (OUTPATIENT)
Dept: SURGERY | Facility: HOSPITAL | Age: 47
End: 2022-01-11
Payer: COMMERCIAL

## 2022-01-11 ENCOUNTER — OFFICE VISIT (OUTPATIENT)
Dept: SURGERY | Facility: CLINIC | Age: 47
End: 2022-01-11
Payer: COMMERCIAL

## 2022-01-11 ENCOUNTER — ANESTHESIA EVENT (OUTPATIENT)
Dept: SURGERY | Facility: HOSPITAL | Age: 47
End: 2022-01-11
Payer: COMMERCIAL

## 2022-01-11 VITALS
SYSTOLIC BLOOD PRESSURE: 180 MMHG | HEIGHT: 70 IN | HEART RATE: 113 BPM | WEIGHT: 218.38 LBS | DIASTOLIC BLOOD PRESSURE: 97 MMHG | BODY MASS INDEX: 31.26 KG/M2

## 2022-01-11 VITALS
HEIGHT: 70 IN | DIASTOLIC BLOOD PRESSURE: 75 MMHG | WEIGHT: 218.25 LBS | RESPIRATION RATE: 20 BRPM | BODY MASS INDEX: 31.25 KG/M2 | SYSTOLIC BLOOD PRESSURE: 120 MMHG | HEART RATE: 89 BPM | OXYGEN SATURATION: 95 % | TEMPERATURE: 98 F

## 2022-01-11 DIAGNOSIS — K61.0 PERIANAL ABSCESS: Primary | ICD-10-CM

## 2022-01-11 DIAGNOSIS — K61.1 PERIRECTAL ABSCESS: Primary | ICD-10-CM

## 2022-01-11 PROCEDURE — 46020 PLACEMENT OF SETON: CPT | Mod: ,,, | Performed by: STUDENT IN AN ORGANIZED HEALTH CARE EDUCATION/TRAINING PROGRAM

## 2022-01-11 PROCEDURE — D9220A PRA ANESTHESIA: Mod: ANES,,, | Performed by: SURGERY

## 2022-01-11 PROCEDURE — 25000003 PHARM REV CODE 250: Performed by: STUDENT IN AN ORGANIZED HEALTH CARE EDUCATION/TRAINING PROGRAM

## 2022-01-11 PROCEDURE — 46020 PR PLACEMENT,SETON: ICD-10-PCS | Mod: ,,, | Performed by: STUDENT IN AN ORGANIZED HEALTH CARE EDUCATION/TRAINING PROGRAM

## 2022-01-11 PROCEDURE — S0030 INJECTION, METRONIDAZOLE: HCPCS | Performed by: NURSE ANESTHETIST, CERTIFIED REGISTERED

## 2022-01-11 PROCEDURE — 99999 PR PBB SHADOW E&M-EST. PATIENT-LVL III: ICD-10-PCS | Mod: PBBFAC,,, | Performed by: STUDENT IN AN ORGANIZED HEALTH CARE EDUCATION/TRAINING PROGRAM

## 2022-01-11 PROCEDURE — D9220A PRA ANESTHESIA: Mod: CRNA,,, | Performed by: NURSE ANESTHETIST, CERTIFIED REGISTERED

## 2022-01-11 PROCEDURE — 99204 OFFICE O/P NEW MOD 45 MIN: CPT | Mod: 25,S$GLB,, | Performed by: STUDENT IN AN ORGANIZED HEALTH CARE EDUCATION/TRAINING PROGRAM

## 2022-01-11 PROCEDURE — 37000008 HC ANESTHESIA 1ST 15 MINUTES: Performed by: STUDENT IN AN ORGANIZED HEALTH CARE EDUCATION/TRAINING PROGRAM

## 2022-01-11 PROCEDURE — 99204 PR OFFICE/OUTPT VISIT, NEW, LEVL IV, 45-59 MIN: ICD-10-PCS | Mod: 25,S$GLB,, | Performed by: STUDENT IN AN ORGANIZED HEALTH CARE EDUCATION/TRAINING PROGRAM

## 2022-01-11 PROCEDURE — 63600175 PHARM REV CODE 636 W HCPCS: Performed by: NURSE ANESTHETIST, CERTIFIED REGISTERED

## 2022-01-11 PROCEDURE — 25000003 PHARM REV CODE 250: Performed by: NURSE ANESTHETIST, CERTIFIED REGISTERED

## 2022-01-11 PROCEDURE — 3008F BODY MASS INDEX DOCD: CPT | Mod: CPTII,S$GLB,, | Performed by: STUDENT IN AN ORGANIZED HEALTH CARE EDUCATION/TRAINING PROGRAM

## 2022-01-11 PROCEDURE — 25000003 PHARM REV CODE 250: Performed by: NURSE PRACTITIONER

## 2022-01-11 PROCEDURE — D9220A PRA ANESTHESIA: ICD-10-PCS | Mod: ANES,,, | Performed by: SURGERY

## 2022-01-11 PROCEDURE — 3080F PR MOST RECENT DIASTOLIC BLOOD PRESSURE >= 90 MM HG: ICD-10-PCS | Mod: CPTII,S$GLB,, | Performed by: STUDENT IN AN ORGANIZED HEALTH CARE EDUCATION/TRAINING PROGRAM

## 2022-01-11 PROCEDURE — 36000705 HC OR TIME LEV I EA ADD 15 MIN: Performed by: STUDENT IN AN ORGANIZED HEALTH CARE EDUCATION/TRAINING PROGRAM

## 2022-01-11 PROCEDURE — D9220A PRA ANESTHESIA: ICD-10-PCS | Mod: CRNA,,, | Performed by: NURSE ANESTHETIST, CERTIFIED REGISTERED

## 2022-01-11 PROCEDURE — 3077F PR MOST RECENT SYSTOLIC BLOOD PRESSURE >= 140 MM HG: ICD-10-PCS | Mod: CPTII,S$GLB,, | Performed by: STUDENT IN AN ORGANIZED HEALTH CARE EDUCATION/TRAINING PROGRAM

## 2022-01-11 PROCEDURE — 3080F DIAST BP >= 90 MM HG: CPT | Mod: CPTII,S$GLB,, | Performed by: STUDENT IN AN ORGANIZED HEALTH CARE EDUCATION/TRAINING PROGRAM

## 2022-01-11 PROCEDURE — 3008F PR BODY MASS INDEX (BMI) DOCUMENTED: ICD-10-PCS | Mod: CPTII,S$GLB,, | Performed by: STUDENT IN AN ORGANIZED HEALTH CARE EDUCATION/TRAINING PROGRAM

## 2022-01-11 PROCEDURE — 1159F MED LIST DOCD IN RCRD: CPT | Mod: CPTII,S$GLB,, | Performed by: STUDENT IN AN ORGANIZED HEALTH CARE EDUCATION/TRAINING PROGRAM

## 2022-01-11 PROCEDURE — 36000704 HC OR TIME LEV I 1ST 15 MIN: Performed by: STUDENT IN AN ORGANIZED HEALTH CARE EDUCATION/TRAINING PROGRAM

## 2022-01-11 PROCEDURE — 99999 PR PBB SHADOW E&M-EST. PATIENT-LVL III: CPT | Mod: PBBFAC,,, | Performed by: STUDENT IN AN ORGANIZED HEALTH CARE EDUCATION/TRAINING PROGRAM

## 2022-01-11 PROCEDURE — 63600175 PHARM REV CODE 636 W HCPCS: Performed by: STUDENT IN AN ORGANIZED HEALTH CARE EDUCATION/TRAINING PROGRAM

## 2022-01-11 PROCEDURE — 3077F SYST BP >= 140 MM HG: CPT | Mod: CPTII,S$GLB,, | Performed by: STUDENT IN AN ORGANIZED HEALTH CARE EDUCATION/TRAINING PROGRAM

## 2022-01-11 PROCEDURE — 71000016 HC POSTOP RECOV ADDL HR: Performed by: STUDENT IN AN ORGANIZED HEALTH CARE EDUCATION/TRAINING PROGRAM

## 2022-01-11 PROCEDURE — 1159F PR MEDICATION LIST DOCUMENTED IN MEDICAL RECORD: ICD-10-PCS | Mod: CPTII,S$GLB,, | Performed by: STUDENT IN AN ORGANIZED HEALTH CARE EDUCATION/TRAINING PROGRAM

## 2022-01-11 PROCEDURE — 63600175 PHARM REV CODE 636 W HCPCS

## 2022-01-11 PROCEDURE — 37000009 HC ANESTHESIA EA ADD 15 MINS: Performed by: STUDENT IN AN ORGANIZED HEALTH CARE EDUCATION/TRAINING PROGRAM

## 2022-01-11 PROCEDURE — 71000044 HC DOSC ROUTINE RECOVERY FIRST HOUR: Performed by: STUDENT IN AN ORGANIZED HEALTH CARE EDUCATION/TRAINING PROGRAM

## 2022-01-11 PROCEDURE — 71000015 HC POSTOP RECOV 1ST HR: Performed by: STUDENT IN AN ORGANIZED HEALTH CARE EDUCATION/TRAINING PROGRAM

## 2022-01-11 RX ORDER — LIDOCAINE HYDROCHLORIDE 10 MG/ML
1 INJECTION, SOLUTION EPIDURAL; INFILTRATION; INTRACAUDAL; PERINEURAL ONCE
Status: DISCONTINUED | OUTPATIENT
Start: 2022-01-11 | End: 2022-01-11 | Stop reason: HOSPADM

## 2022-01-11 RX ORDER — METRONIDAZOLE 500 MG/100ML
INJECTION, SOLUTION INTRAVENOUS
Status: DISCONTINUED | OUTPATIENT
Start: 2022-01-11 | End: 2022-01-11

## 2022-01-11 RX ORDER — CEFAZOLIN SODIUM 2 G/50ML
2 SOLUTION INTRAVENOUS
Status: DISCONTINUED | OUTPATIENT
Start: 2022-01-11 | End: 2022-01-11 | Stop reason: HOSPADM

## 2022-01-11 RX ORDER — LIDOCAINE HYDROCHLORIDE 20 MG/ML
INJECTION, SOLUTION EPIDURAL; INFILTRATION; INTRACAUDAL; PERINEURAL
Status: DISCONTINUED | OUTPATIENT
Start: 2022-01-11 | End: 2022-01-11

## 2022-01-11 RX ORDER — HYDROCODONE BITARTRATE AND ACETAMINOPHEN 5; 325 MG/1; MG/1
1 TABLET ORAL ONCE AS NEEDED
Status: DISCONTINUED | OUTPATIENT
Start: 2022-01-11 | End: 2022-01-11 | Stop reason: HOSPADM

## 2022-01-11 RX ORDER — LIDOCAINE HYDROCHLORIDE 10 MG/ML
INJECTION, SOLUTION EPIDURAL; INFILTRATION; INTRACAUDAL; PERINEURAL
Status: DISCONTINUED | OUTPATIENT
Start: 2022-01-11 | End: 2022-01-11 | Stop reason: HOSPADM

## 2022-01-11 RX ORDER — FENTANYL CITRATE 50 UG/ML
INJECTION, SOLUTION INTRAMUSCULAR; INTRAVENOUS
Status: DISCONTINUED | OUTPATIENT
Start: 2022-01-11 | End: 2022-01-11

## 2022-01-11 RX ORDER — PROCHLORPERAZINE EDISYLATE 5 MG/ML
5 INJECTION INTRAMUSCULAR; INTRAVENOUS EVERY 30 MIN PRN
Status: DISCONTINUED | OUTPATIENT
Start: 2022-01-11 | End: 2022-01-11 | Stop reason: HOSPADM

## 2022-01-11 RX ORDER — PROPOFOL 10 MG/ML
VIAL (ML) INTRAVENOUS
Status: DISCONTINUED | OUTPATIENT
Start: 2022-01-11 | End: 2022-01-11

## 2022-01-11 RX ORDER — ONDANSETRON 2 MG/ML
INJECTION INTRAMUSCULAR; INTRAVENOUS
Status: DISCONTINUED | OUTPATIENT
Start: 2022-01-11 | End: 2022-01-11

## 2022-01-11 RX ORDER — MUPIROCIN 20 MG/G
OINTMENT TOPICAL
Status: DISCONTINUED | OUTPATIENT
Start: 2022-01-11 | End: 2022-01-11 | Stop reason: HOSPADM

## 2022-01-11 RX ORDER — HYDROMORPHONE HYDROCHLORIDE 1 MG/ML
0.3 INJECTION, SOLUTION INTRAMUSCULAR; INTRAVENOUS; SUBCUTANEOUS EVERY 10 MIN PRN
Status: DISCONTINUED | OUTPATIENT
Start: 2022-01-11 | End: 2022-01-11 | Stop reason: HOSPADM

## 2022-01-11 RX ORDER — KETAMINE HCL IN 0.9 % NACL 50 MG/5 ML
SYRINGE (ML) INTRAVENOUS
Status: DISCONTINUED | OUTPATIENT
Start: 2022-01-11 | End: 2022-01-11

## 2022-01-11 RX ORDER — METRONIDAZOLE 500 MG/100ML
500 INJECTION, SOLUTION INTRAVENOUS
Status: DISCONTINUED | OUTPATIENT
Start: 2022-01-11 | End: 2022-01-11 | Stop reason: HOSPADM

## 2022-01-11 RX ORDER — MIDAZOLAM HYDROCHLORIDE 1 MG/ML
INJECTION, SOLUTION INTRAMUSCULAR; INTRAVENOUS
Status: DISCONTINUED | OUTPATIENT
Start: 2022-01-11 | End: 2022-01-11

## 2022-01-11 RX ORDER — BUPIVACAINE HYDROCHLORIDE AND EPINEPHRINE 2.5; 5 MG/ML; UG/ML
INJECTION, SOLUTION EPIDURAL; INFILTRATION; INTRACAUDAL; PERINEURAL
Status: DISCONTINUED | OUTPATIENT
Start: 2022-01-11 | End: 2022-01-11 | Stop reason: HOSPADM

## 2022-01-11 RX ORDER — AMOXICILLIN AND CLAVULANATE POTASSIUM 875; 125 MG/1; MG/1
1 TABLET, FILM COATED ORAL 2 TIMES DAILY
Qty: 10 TABLET | Refills: 0 | Status: SHIPPED | OUTPATIENT
Start: 2022-01-11 | End: 2022-01-16

## 2022-01-11 RX ORDER — SODIUM CHLORIDE 9 MG/ML
INJECTION, SOLUTION INTRAVENOUS CONTINUOUS
Status: DISCONTINUED | OUTPATIENT
Start: 2022-01-11 | End: 2022-01-11 | Stop reason: HOSPADM

## 2022-01-11 RX ORDER — FAMOTIDINE 10 MG/ML
INJECTION INTRAVENOUS
Status: DISCONTINUED | OUTPATIENT
Start: 2022-01-11 | End: 2022-01-11

## 2022-01-11 RX ORDER — FENTANYL CITRATE 50 UG/ML
25 INJECTION, SOLUTION INTRAMUSCULAR; INTRAVENOUS EVERY 5 MIN PRN
Status: DISCONTINUED | OUTPATIENT
Start: 2022-01-11 | End: 2022-01-11 | Stop reason: HOSPADM

## 2022-01-11 RX ORDER — OXYCODONE HYDROCHLORIDE 5 MG/1
5 TABLET ORAL EVERY 4 HOURS PRN
Qty: 15 TABLET | Refills: 0 | Status: ON HOLD | OUTPATIENT
Start: 2022-01-11 | End: 2022-02-18 | Stop reason: SDUPTHER

## 2022-01-11 RX ADMIN — PROPOFOL 50 MG: 10 INJECTION, EMULSION INTRAVENOUS at 03:01

## 2022-01-11 RX ADMIN — PROPOFOL 100 MG: 10 INJECTION, EMULSION INTRAVENOUS at 03:01

## 2022-01-11 RX ADMIN — FENTANYL CITRATE 50 MCG: 50 INJECTION INTRAMUSCULAR; INTRAVENOUS at 03:01

## 2022-01-11 RX ADMIN — SODIUM CHLORIDE: 0.9 INJECTION, SOLUTION INTRAVENOUS at 03:01

## 2022-01-11 RX ADMIN — LIDOCAINE HYDROCHLORIDE 80 MG: 20 INJECTION, SOLUTION EPIDURAL; INFILTRATION; INTRACAUDAL at 03:01

## 2022-01-11 RX ADMIN — FENTANYL CITRATE 25 MCG: 50 INJECTION INTRAMUSCULAR; INTRAVENOUS at 04:01

## 2022-01-11 RX ADMIN — FENTANYL CITRATE 50 MCG: 50 INJECTION INTRAMUSCULAR; INTRAVENOUS at 04:01

## 2022-01-11 RX ADMIN — FAMOTIDINE 20 MG: 10 INJECTION INTRAVENOUS at 03:01

## 2022-01-11 RX ADMIN — Medication 30 MG: at 03:01

## 2022-01-11 RX ADMIN — ONDANSETRON 4 MG: 2 INJECTION INTRAMUSCULAR; INTRAVENOUS at 03:01

## 2022-01-11 RX ADMIN — MUPIROCIN: 20 OINTMENT TOPICAL at 03:01

## 2022-01-11 RX ADMIN — MIDAZOLAM 4 MG: 1 INJECTION INTRAMUSCULAR; INTRAVENOUS at 03:01

## 2022-01-11 RX ADMIN — METRONIDAZOLE 500 MG: 500 INJECTION, SOLUTION INTRAVENOUS at 03:01

## 2022-01-11 RX ADMIN — CEFAZOLIN SODIUM 2 G: 1 INJECTION, POWDER, FOR SOLUTION INTRAMUSCULAR; INTRAVENOUS at 03:01

## 2022-01-11 NOTE — TRANSFER OF CARE
"Anesthesia Transfer of Care Note    Patient: Sanchez Zarate    Procedure(s) Performed: Procedure(s) (LRB):  INCISION AND DRAINAGE, PERIRECTAL REGION (lithotomy) (N/A)  PLACEMENT, SETON STITCH    Patient location: PACU    Anesthesia Type: general    Transport from OR: Transported from OR on 6-10 L/min O2 by face mask with adequate spontaneous ventilation    Post pain: adequate analgesia    Post assessment: no apparent anesthetic complications and tolerated procedure well    Post vital signs: stable    Level of consciousness: awake and alert    Nausea/Vomiting: no nausea/vomiting    Complications: none    Transfer of care protocol was followed      Last vitals:   Visit Vitals  /81   Pulse 100   Temp 36.7 °C (98 °F) (Skin)   Resp 20   Ht 5' 10" (1.778 m)   Wt 99 kg (218 lb 4.1 oz)   SpO2 99%   BMI 31.32 kg/m²     "

## 2022-01-11 NOTE — PATIENT INSTRUCTIONS
Anal Surgery Post Operative Instructions:    You had an exam under anesthesia, incision and drainage of abscess, and fistulotomy with seton placement performed today.     Wound care:  It is normal to have drainage and intermittent bleeding (especially with bowel movements) over the next few weeks as the wound heals. Please wear a pad to help with drainage. There is a rubber loop (blue) in the fistula in your anus. Do NOT attempt to remove the rubber loop.    You may place gauze in your underwear around your anus to prevent soiling of clothes, but not dressing is required.     Sitz baths three times per day. Shower daily. You may rinse the area after bowel movements and pad dry afterwards.     There are no activity or dietary restrictions.     Medications:  Oxycodone 5 mg (pain medication) has been prescribed. I recommended alternating over the counter Tylenol (not to exceed 3 grams in a 24 hour period) and Ibuprofen (not to exceed 2 grams in a 24 hour period) as first line for pain medication as these will not cause constipation. If you need to take Oxycodone please remember to wean quickly and ensure you are taking a stool softener.      You were previously prescribed an antibiotic. You have been provided with an additional prescription of the same antibiotic. Please take this for a total of 10 days and discard the remaining pills.    Please take Miralax 1 capful at night to prevent constipation associated with surgery and pain medications.      Follow up:  Return to clinic in next Friday, 1/21/22 for follow-up and wound check. Our clinic will call you to arrange the appointment and let you know of any changes.

## 2022-01-11 NOTE — OP NOTE
[facility]  Colon and Rectal Surgery    [address]  [place], [place]  Tel: [phone]  Fax: [phone]  [email or web address]   MD Paddy Solorzano MD Brian Kann, MD W. Forrest Johnston, MD Matthew Giglia, MD Jennifer Paruch, MD William Kethman, MD     Patient name: Sanchez Zarate   YOB: 1975   MRN: [id number]  Date of surgery: 01/11/2022    Operative Report    Pre-operative diagnosis: Perirectal abscess  Post-operative diagnosis: Same as above    Procedure:  1. Anal examination under anesthesia  2. Incision and drainage of perirectal abscess   3. Seton placement    Findings:  1. Posterior midline perirectal abscess just anterior to prior incision from attempted drainage, 50-75 mL of purulence drained, fistula to dentate present, seton placed - amenable to future fistulotomy    Surgeon: Nomi Mora MD  Assistant: Noah Smith MD    Indication: Mr. Zarate is a 46 year old man with no significant medical history who is scheduled to undergo a perianal examination under anesthesia for known perirectal abscess. The benefits, risks, and alternatives were discussed with the patient, they were given the opportunity to ask questions and they elected to proceed with operative intervention after signing written consent.    Procedure:  After pre-operative assessment and review of informed consent, the patient was taken to the operating room and received monitored anesthesia care. Pre-operative antibiotics were administered and the patient was placed in lithotomy position. The perineum was prepped and draped in the usual sterile fashion and a timeout was performed according to [facility] Quality and Safety guidelines.      A pudendal nerve block was performed with 0.25% Marcaine:1% Lidocaine with epinephrine (1:1) by palpating the ischial spine and injecting 5 mL of local approximately 1 cm anterior and medially. A perianal block was then performed with  5 mL of local in 4 quadrant surrounding the anus.    A digital exam was performed initially and then a thorough four quadrant systematic, anoscopic exam was performed with a Scott-Carmichael retractor, findings discussed above.    Fullness in the posterior anal canal appreciated, on palpation purulence drained from the dentate line. Prior incision had been made just posterior to the cavity - I extended this anterior and entered a cavity and purulence drained. The cavity was irrigated with Betadine and a seton was placed through this cavity into the internal opening and secured with an 0-Silk suture.    Instrument, needle, and sponge counts were correct.    The procedure was completed without complication and was well-tolerated. The patient was then brought to the post-anesthesia care unit in stable condition. I was present for the entire operation and discussed the surgery with the patients family at the end of the case.    Complications: None  Estimated blood loss: Minimal  Disposition: PACU      Nomi Mora MD - Staff Surgeon  Department of Colon & Rectal Surgery  [facility]

## 2022-01-11 NOTE — INTERVAL H&P NOTE
The patient has been examined and the H&P has been reviewed:    I concur with the findings and no changes have occurred since H&P was written.    Surgery risks, benefits and alternative options discussed and understood by patient/family.

## 2022-01-11 NOTE — BRIEF OP NOTE
[facility] ([facility])  Brief Operative Note    Surgery Date: 1/11/2022     Surgeon(s) and Role:     * Nomi Mora MD - Primary     * Noah Smith MD - Resident - Assisting        Pre-op Diagnosis:  Perirectal abscess [K61.1]    Post-op Diagnosis:  Post-Op Diagnosis Codes:     * Perirectal abscess [K61.1]    Procedure(s) (LRB):  INCISION AND DRAINAGE, PERIRECTAL REGION (lithotomy) (N/A)  PLACEMENT, SETON STITCH    Anesthesia: General/MAC    Operative Findings: Posterior midline fistula with associated abscess cavity. Approximately 50 cc of purulent material able to be expressed. Seton placed.     Estimated Blood Loss: * No values recorded between 1/11/2022  3:49 PM and 1/11/2022  4:18 PM *         Specimens:   Specimen (24h ago, onward)            None            Discharge Note    OUTCOME: Patient tolerated treatment/procedure well without complication and is now ready for discharge.    DISPOSITION: Home or Self Care    FINAL DIAGNOSIS:  Perianal abscess    FOLLOWUP: In clinic    DISCHARGE INSTRUCTIONS:    Discharge Procedure Orders   Diet Adult Regular     No dressing needed     Notify your health care provider if you experience any of the following:  temperature >100.4     Notify your health care provider if you experience any of the following:  persistent nausea and vomiting or diarrhea     Notify your health care provider if you experience any of the following:  severe uncontrolled pain     Notify your health care provider if you experience any of the following:  redness, tenderness, or signs of infection (pain, swelling, redness, odor or green/yellow discharge around incision site)     Notify your health care provider if you experience any of the following:  difficulty breathing or increased cough     Notify your health care provider if you experience any of the following:  severe persistent headache     Notify your health care provider if you experience any of the following:  worsening rash      Notify your health care provider if you experience any of the following:  persistent dizziness, light-headedness, or visual disturbances     Notify your health care provider if you experience any of the following:  increased confusion or weakness     Activity as tolerated     Shower on day dressing removed (No bath)

## 2022-01-11 NOTE — H&P (VIEW-ONLY)
[facility]  Colon and Rectal Surgery    [address]  [place], [place]  Tel: [phone]  Fax: [phone]  [email or web address]   MD Paddy Solorzano MD Brian Kann, MD W. Forrest Johnston, MD Matthew Giglia, MD Jennifer Paruch, MD William Kethman, MD     Patient name: Sanchez Zarate   YOB: 1975   MRN: [id number]  Date of visit: 01/11/2022    Dear Wandy Gomez and Teresa,    It was a pleasure seeing Mr. Zarate in the Colon and Rectal Surgery clinic here at [facility].     As you know, Mr. Zarate is a 46 year old man with no significant medical history who presents for evaluation of perianal abscess. He underwent possible drainage by Dr. Steward (Dr. Gomez) in the ED on 1/9/2022. He has not had improvement in his pain since the procedure. He is on antibiotics. He went to the ED/urgent care three times before now. He is having chills but no fevers and is in severe pain.    Last colonoscopy: 5/2017 (Complete)  The perianal and digital rectal examinations were normal.   The colon (entire examined portion) appeared normal. Biopsies for histology were taken with a cold forceps from the entire colon for evaluation of microscopic colitis. Normal  The terminal ileum appeared normal.     CT Pelvis - 1/9/2022 - Images reviewed and interpreted independently   Posterior perianal abscess    The patient was informed of the availability of a certified  without charge. A certified  was not necessary for this visit.    Review of Systems  See pertinent review of systems above    Past Medical History:   Diagnosis Date    Chronic back pain     Chronic diarrhea     Hernia     TBI (traumatic brain injury) 2010    fake wall fell on head (no brain or skull injury per pt)     Past Surgical History:   Procedure Laterality Date    COLONOSCOPY N/A 5/29/2017    Procedure: COLONOSCOPY;  Surgeon: Sukhi Scanlon MD;  Location: [facility]  (4TH FLR);  Service: Endoscopy;  Laterality: N/A;    CYSTOSCOPY N/A 1/31/2020    Procedure: CYSTOSCOPY;  Surgeon: Parminder Craig MD;  Location: [facility] OR 1ST FLR;  Service: Urology;  Laterality: N/A;    CYSTOSCOPY W/ URETERAL STENT PLACEMENT Left 11/19/2018    Procedure: CYSTOSCOPY, WITH URETERAL STENT INSERTION;  Surgeon: Renee Garcia MD;  Location: [facility] OR 2ND FLR;  Service: Urology;  Laterality: Left;    CYSTOSCOPY W/ URETERAL STENT PLACEMENT  12/21/2018    Procedure: CYSTOSCOPY, WITH URETERAL STENT INSERTION;  Surgeon: Bhupinder Washington Jr., MD;  Location: [facility] OR 1ST FLR;  Service: Urology;;    CYSTOSCOPY W/ URETERAL STENT REMOVAL Left 12/21/2018    Procedure: CYSTOSCOPY, WITH URETERAL STENT REMOVAL;  Surgeon: Bhupinder Washington Jr., MD;  Location: [facility] OR 1ST FLR;  Service: Urology;  Laterality: Left;    HERNIA REPAIR  2017    umbilical    LASER LITHOTRIPSY Left 12/21/2018    Procedure: LITHOTRIPSY, USING LASER (mana);  Surgeon: Bhupinder Washington Jr., MD;  Location: [facility] OR 1ST FLR;  Service: Urology;  Laterality: Left;  90mins    LASER LITHOTRIPSY Left 1/31/2020    Procedure: LITHOTRIPSY, USING LASER;  Surgeon: Parminder Craig MD;  Location: [facility] OR 1ST FLR;  Service: Urology;  Laterality: Left;    LUMBAR EPIDURAL INJECTION      with steroid    RETROGRADE PYELOGRAPHY Left 11/19/2018    Procedure: PYELOGRAM, RETROGRADE;  Surgeon: Renee Garcia MD;  Location: [facility] OR 2ND FLR;  Service: Urology;  Laterality: Left;    RETROGRADE PYELOGRAPHY Left 12/21/2018    Procedure: PYELOGRAM, RETROGRADE;  Surgeon: Bhupinder Washington Jr., MD;  Location: [facility] OR 1ST FLR;  Service: Urology;  Laterality: Left;    RETROGRADE PYELOGRAPHY Left 1/31/2020    Procedure: PYELOGRAM, RETROGRADE;  Surgeon: Parminder Craig MD;  Location: [facility] OR 1ST FLR;  Service: Urology;  Laterality: Left;    URETEROSCOPIC REMOVAL OF URETERIC CALCULUS Left 1/31/2020    Procedure: REMOVAL, CALCULUS, URETER, URETEROSCOPIC;  Surgeon:  Parminder Craig MD;  Location: [facility] OR [address] [place] [address];  Service: Urology;  Laterality: Left;    URETEROSCOPY Left 12/21/2018    Procedure: URETEROSCOPY;  Surgeon: Bhupinder Washington Jr., MD;  Location: [facility] OR [address] [place] [address];  Service: Urology;  Laterality: Left;  90mins    URETEROSCOPY Left 1/31/2020    Procedure: URETEROSCOPY;  Surgeon: Parminder Craig MD;  Location: [facility] OR [address] [place] [address];  Service: Urology;  Laterality: Left;     Family History   Problem Relation Age of Onset    Diabetes Mother     Cancer Cousin 50        Cancer possibly stomach     Heart attack Neg Hx     Heart disease Neg Hx     Hypertension Neg Hx     Colon cancer Neg Hx     Esophageal cancer Neg Hx      Social History     Tobacco Use    Smoking status: Former Smoker     Quit date: 1/1/2007     Years since quitting: 15.0    Smokeless tobacco: Never Used   Substance Use Topics    Alcohol use: Yes     Comment: rare    Drug use: No     Review of patient's allergies indicates:  No Known Allergies    Current Outpatient Medications on File Prior to Visit   Medication Sig Dispense Refill    allopurinoL (ZYLOPRIM) 100 MG tablet Take 100 mg by mouth.      amoxicillin-clavulanate 875-125mg (AUGMENTIN) 875-125 mg per tablet Take 1 tablet by mouth 2 (two) times daily. for 5 days 10 tablet 0    chlorthalidone (HYGROTEN) 25 MG Tab Take 25 mg by mouth.      clobetasoL (TEMOVATE) 0.05 % external solution APPLY TO AFFECTED AREAS ON SCALP THREE TO FOUR TIMES A WEEK AT NIGHT.      magnesium oxide 400 mg magnesium Cap Take 1 capsule by mouth.      meloxicam (MOBIC) 15 MG tablet Take 1 tablet (15 mg total) by mouth once daily. 30 tablet 1    naproxen (NAPROSYN) 500 MG tablet Take 1 tablet (500 mg total) by mouth 2 (two) times daily with meals. for 10 days 20 tablet 0    oxyCODONE-acetaminophen (PERCOCET) 5-325 mg per tablet Take 1 tablet by mouth every 4 (four) hours as needed for Pain. 12 tablet 0    polyethylene glycol (GLYCOLAX) 17 gram PwPk Take 17 g by  "mouth once daily. 14 each 0    pyridoxine, vitamin B6, (B-6) 100 MG Tab Take 100 mg by mouth.      tamsulosin (FLOMAX) 0.4 mg Cap Take 1 capsule (0.4 mg total) by mouth once daily. 30 capsule 0     No current facility-administered medications on file prior to visit.     Physical Examination  BP (!) 180/97 (BP Location: Right arm, Patient Position: Standing, BP Method: Large (Automatic))   Pulse (!) 113   Ht 5' 10" (1.778 m)   Wt 99.1 kg (218 lb 6.4 oz)   BMI 31.34 kg/m²      A chaperone was present for the physical examination.    Constitutional: well developed, no cough, no dyspnea, alert, and no acute distress    Head: Normocephalic, no lesions, without obvious abnormality  Eye: Normal external eye, conjunctiva, and lids  Cardiovascular: regular rate and regular rhythm  Respiratory: normal air entry  Gastrointestinal: soft, non-tender    Perianal Skin: Posterior perianal fullness appreciated with tenderness, small incision without drainage     Musculoskeletal: full range of motion without pain  Neurologic: alert, oriented, normal speech, no focal findings or movement disorder noted  Psychiatric: appropriate, normal mood    Assessment and Plan of Care    Thank you again for referring Mr. Zarate to my care. In summary, Mr. Zarate is a 46 year old man presenting with a perirectal abscess - drainage was previously attempted without relief. We discussed treatment options and have provided the following recommendations:    1. Perirectal abscess drainage, possible seton - benefits and risks and possibility of staged approach discussed, consent signed  2. Added-on for today, he has had nothing to eat today, small sip of water with medications  3. COVID test negative yesterday    Please do not hesitate to contact me if you have any questions.      Nomi Mora MD - Staff Surgeon  Department of Colon & Rectal Surgery  [facility]  "

## 2022-01-11 NOTE — PROGRESS NOTES
[facility]  Colon and Rectal Surgery    [address]  [place], [place]  Tel: [phone]  Fax: [phone]  [email or web address]   MD Paddy Solorzano MD Brian Kann, MD W. Forrest Johnston, MD Matthew Giglia, MD Jennifer Paruch, MD William Kethman, MD     Patient name: Sanchez Zarate   YOB: 1975   MRN: [id number]  Date of visit: 01/11/2022    Dear Wandy Gomez and Teresa,    It was a pleasure seeing Mr. Zarate in the Colon and Rectal Surgery clinic here at [facility].     As you know, Mr. Zarate is a 46 year old man with no significant medical history who presents for evaluation of perianal abscess. He underwent possible drainage by Dr. Steward (Dr. Gomez) in the ED on 1/9/2022. He has not had improvement in his pain since the procedure. He is on antibiotics. He went to the ED/urgent care three times before now. He is having chills but no fevers and is in severe pain.    Last colonoscopy: 5/2017 (Complete)  The perianal and digital rectal examinations were normal.   The colon (entire examined portion) appeared normal. Biopsies for histology were taken with a cold forceps from the entire colon for evaluation of microscopic colitis. Normal  The terminal ileum appeared normal.     CT Pelvis - 1/9/2022 - Images reviewed and interpreted independently   Posterior perianal abscess    The patient was informed of the availability of a certified  without charge. A certified  was not necessary for this visit.    Review of Systems  See pertinent review of systems above    Past Medical History:   Diagnosis Date    Chronic back pain     Chronic diarrhea     Hernia     TBI (traumatic brain injury) 2010    fake wall fell on head (no brain or skull injury per pt)     Past Surgical History:   Procedure Laterality Date    COLONOSCOPY N/A 5/29/2017    Procedure: COLONOSCOPY;  Surgeon: Sukhi Scanlon MD;  Location: [facility]  (4TH FLR);  Service: Endoscopy;  Laterality: N/A;    CYSTOSCOPY N/A 1/31/2020    Procedure: CYSTOSCOPY;  Surgeon: Parminder Craig MD;  Location: [facility] OR 1ST FLR;  Service: Urology;  Laterality: N/A;    CYSTOSCOPY W/ URETERAL STENT PLACEMENT Left 11/19/2018    Procedure: CYSTOSCOPY, WITH URETERAL STENT INSERTION;  Surgeon: Renee Garcia MD;  Location: [facility] OR 2ND FLR;  Service: Urology;  Laterality: Left;    CYSTOSCOPY W/ URETERAL STENT PLACEMENT  12/21/2018    Procedure: CYSTOSCOPY, WITH URETERAL STENT INSERTION;  Surgeon: Bhupinder Washington Jr., MD;  Location: [facility] OR 1ST FLR;  Service: Urology;;    CYSTOSCOPY W/ URETERAL STENT REMOVAL Left 12/21/2018    Procedure: CYSTOSCOPY, WITH URETERAL STENT REMOVAL;  Surgeon: Bhupinder Washington Jr., MD;  Location: [facility] OR 1ST FLR;  Service: Urology;  Laterality: Left;    HERNIA REPAIR  2017    umbilical    LASER LITHOTRIPSY Left 12/21/2018    Procedure: LITHOTRIPSY, USING LASER (mana);  Surgeon: Bhupinder Washington Jr., MD;  Location: [facility] OR 1ST FLR;  Service: Urology;  Laterality: Left;  90mins    LASER LITHOTRIPSY Left 1/31/2020    Procedure: LITHOTRIPSY, USING LASER;  Surgeon: Parminder Craig MD;  Location: [facility] OR 1ST FLR;  Service: Urology;  Laterality: Left;    LUMBAR EPIDURAL INJECTION      with steroid    RETROGRADE PYELOGRAPHY Left 11/19/2018    Procedure: PYELOGRAM, RETROGRADE;  Surgeon: Renee Garcia MD;  Location: [facility] OR 2ND FLR;  Service: Urology;  Laterality: Left;    RETROGRADE PYELOGRAPHY Left 12/21/2018    Procedure: PYELOGRAM, RETROGRADE;  Surgeon: Bhupinder Washington Jr., MD;  Location: [facility] OR 1ST FLR;  Service: Urology;  Laterality: Left;    RETROGRADE PYELOGRAPHY Left 1/31/2020    Procedure: PYELOGRAM, RETROGRADE;  Surgeon: Parminder Craig MD;  Location: [facility] OR 1ST FLR;  Service: Urology;  Laterality: Left;    URETEROSCOPIC REMOVAL OF URETERIC CALCULUS Left 1/31/2020    Procedure: REMOVAL, CALCULUS, URETER, URETEROSCOPIC;  Surgeon:  Parminder Craig MD;  Location: [facility] OR [address] [place] [address];  Service: Urology;  Laterality: Left;    URETEROSCOPY Left 12/21/2018    Procedure: URETEROSCOPY;  Surgeon: Bhupinder Washington Jr., MD;  Location: [facility] OR [address] [place] [address];  Service: Urology;  Laterality: Left;  90mins    URETEROSCOPY Left 1/31/2020    Procedure: URETEROSCOPY;  Surgeon: Parminder Craig MD;  Location: [facility] OR [address] [place] [address];  Service: Urology;  Laterality: Left;     Family History   Problem Relation Age of Onset    Diabetes Mother     Cancer Cousin 50        Cancer possibly stomach     Heart attack Neg Hx     Heart disease Neg Hx     Hypertension Neg Hx     Colon cancer Neg Hx     Esophageal cancer Neg Hx      Social History     Tobacco Use    Smoking status: Former Smoker     Quit date: 1/1/2007     Years since quitting: 15.0    Smokeless tobacco: Never Used   Substance Use Topics    Alcohol use: Yes     Comment: rare    Drug use: No     Review of patient's allergies indicates:  No Known Allergies    Current Outpatient Medications on File Prior to Visit   Medication Sig Dispense Refill    allopurinoL (ZYLOPRIM) 100 MG tablet Take 100 mg by mouth.      amoxicillin-clavulanate 875-125mg (AUGMENTIN) 875-125 mg per tablet Take 1 tablet by mouth 2 (two) times daily. for 5 days 10 tablet 0    chlorthalidone (HYGROTEN) 25 MG Tab Take 25 mg by mouth.      clobetasoL (TEMOVATE) 0.05 % external solution APPLY TO AFFECTED AREAS ON SCALP THREE TO FOUR TIMES A WEEK AT NIGHT.      magnesium oxide 400 mg magnesium Cap Take 1 capsule by mouth.      meloxicam (MOBIC) 15 MG tablet Take 1 tablet (15 mg total) by mouth once daily. 30 tablet 1    naproxen (NAPROSYN) 500 MG tablet Take 1 tablet (500 mg total) by mouth 2 (two) times daily with meals. for 10 days 20 tablet 0    oxyCODONE-acetaminophen (PERCOCET) 5-325 mg per tablet Take 1 tablet by mouth every 4 (four) hours as needed for Pain. 12 tablet 0    polyethylene glycol (GLYCOLAX) 17 gram PwPk Take 17 g by  "mouth once daily. 14 each 0    pyridoxine, vitamin B6, (B-6) 100 MG Tab Take 100 mg by mouth.      tamsulosin (FLOMAX) 0.4 mg Cap Take 1 capsule (0.4 mg total) by mouth once daily. 30 capsule 0     No current facility-administered medications on file prior to visit.     Physical Examination  BP (!) 180/97 (BP Location: Right arm, Patient Position: Standing, BP Method: Large (Automatic))   Pulse (!) 113   Ht 5' 10" (1.778 m)   Wt 99.1 kg (218 lb 6.4 oz)   BMI 31.34 kg/m²      A chaperone was present for the physical examination.    Constitutional: well developed, no cough, no dyspnea, alert, and no acute distress    Head: Normocephalic, no lesions, without obvious abnormality  Eye: Normal external eye, conjunctiva, and lids  Cardiovascular: regular rate and regular rhythm  Respiratory: normal air entry  Gastrointestinal: soft, non-tender    Perianal Skin: Posterior perianal fullness appreciated with tenderness, small incision without drainage     Musculoskeletal: full range of motion without pain  Neurologic: alert, oriented, normal speech, no focal findings or movement disorder noted  Psychiatric: appropriate, normal mood    Assessment and Plan of Care    Thank you again for referring Mr. Zarate to my care. In summary, Mr. Zarate is a 46 year old man presenting with a perirectal abscess - drainage was previously attempted without relief. We discussed treatment options and have provided the following recommendations:    1. Perirectal abscess drainage, possible seton - benefits and risks and possibility of staged approach discussed, consent signed  2. Added-on for today, he has had nothing to eat today, small sip of water with medications  3. COVID test negative yesterday    Please do not hesitate to contact me if you have any questions.      Nomi Mora MD - Staff Surgeon  Department of Colon & Rectal Surgery  [facility]  "

## 2022-01-11 NOTE — ANESTHESIA PREPROCEDURE EVALUATION
[facility]  Anesthesia Pre-Operative Evaluation         Patient Name: Sanchez Zarate  YOB: 1975  MRN: [id number]    SUBJECTIVE:     01/11/2022    Procedure(s) (LRB):  INCISION AND DRAINAGE, PERIRECTAL REGION (lithotomy) (N/A)    Sanchez Zarate is a 46 y.o. male here for above procedure    Drips:    sodium chloride 0.9%         Patient Active Problem List   Diagnosis    Chronic low back pain    Intervertebral disc extrusion (Left L5-S1)    Neural foraminal stenosis of lumbar spine (bilateral L5-S1, L>R)    Left lumbar radiculopathy    Situational syncope    Chronic diarrhea    Peyronie's disease    DDD (degenerative disc disease), lumbar    Nephrolithiasis    Chronic pain    Lumbar disc herniation    Abdominal pain    Ureteral stone    Perianal abscess       Review of patient's allergies indicates:  No Known Allergies    No current facility-administered medications on file prior to encounter.     Current Outpatient Medications on File Prior to Encounter   Medication Sig Dispense Refill    amoxicillin-clavulanate 875-125mg (AUGMENTIN) 875-125 mg per tablet Take 1 tablet by mouth 2 (two) times daily. for 5 days 10 tablet 0    oxyCODONE-acetaminophen (PERCOCET) 5-325 mg per tablet Take 1 tablet by mouth every 4 (four) hours as needed for Pain. 12 tablet 0    polyethylene glycol (GLYCOLAX) 17 gram PwPk Take 17 g by mouth once daily. 14 each 0    allopurinoL (ZYLOPRIM) 100 MG tablet Take 100 mg by mouth.      chlorthalidone (HYGROTEN) 25 MG Tab Take 25 mg by mouth.      clobetasoL (TEMOVATE) 0.05 % external solution APPLY TO AFFECTED AREAS ON SCALP THREE TO FOUR TIMES A WEEK AT NIGHT.      magnesium oxide 400 mg magnesium Cap Take 1 capsule by mouth.      meloxicam (MOBIC) 15 MG tablet Take 1 tablet (15 mg total) by mouth once daily. 30 tablet 1    naproxen (NAPROSYN) 500 MG tablet Take 1 tablet (500 mg total) by mouth 2 (two) times daily with  meals. for 10 days 20 tablet 0    pyridoxine, vitamin B6, (B-6) 100 MG Tab Take 100 mg by mouth.      tamsulosin (FLOMAX) 0.4 mg Cap Take 1 capsule (0.4 mg total) by mouth once daily. 30 capsule 0       Past Surgical History:   Procedure Laterality Date    COLONOSCOPY N/A 5/29/2017    Procedure: COLONOSCOPY;  Surgeon: Sukhi Scanlon MD;  Location: [facility] ENDO (4TH FLR);  Service: Endoscopy;  Laterality: N/A;    CYSTOSCOPY N/A 1/31/2020    Procedure: CYSTOSCOPY;  Surgeon: Parminder Craig MD;  Location: [facility] OR 1ST FLR;  Service: Urology;  Laterality: N/A;    CYSTOSCOPY W/ URETERAL STENT PLACEMENT Left 11/19/2018    Procedure: CYSTOSCOPY, WITH URETERAL STENT INSERTION;  Surgeon: Renee Garcia MD;  Location: [facility] OR 2ND FLR;  Service: Urology;  Laterality: Left;    CYSTOSCOPY W/ URETERAL STENT PLACEMENT  12/21/2018    Procedure: CYSTOSCOPY, WITH URETERAL STENT INSERTION;  Surgeon: Bhupinder Washington Jr., MD;  Location: [facility] OR 1ST FLR;  Service: Urology;;    CYSTOSCOPY W/ URETERAL STENT REMOVAL Left 12/21/2018    Procedure: CYSTOSCOPY, WITH URETERAL STENT REMOVAL;  Surgeon: Bhupinder Washington Jr., MD;  Location: [facility] OR 1ST FLR;  Service: Urology;  Laterality: Left;    HERNIA REPAIR  2017    umbilical    LASER LITHOTRIPSY Left 12/21/2018    Procedure: LITHOTRIPSY, USING LASER (mana);  Surgeon: Bhupinder Washington Jr., MD;  Location: [facility] OR 1ST FLR;  Service: Urology;  Laterality: Left;  90mins    LASER LITHOTRIPSY Left 1/31/2020    Procedure: LITHOTRIPSY, USING LASER;  Surgeon: Parminder Craig MD;  Location: [facility] OR 1ST FLR;  Service: Urology;  Laterality: Left;    LUMBAR EPIDURAL INJECTION      with steroid    RETROGRADE PYELOGRAPHY Left 11/19/2018    Procedure: PYELOGRAM, RETROGRADE;  Surgeon: Renee Garcia MD;  Location: [facility] OR 2ND FLR;  Service: Urology;  Laterality: Left;    RETROGRADE PYELOGRAPHY Left 12/21/2018    Procedure: PYELOGRAM, RETROGRADE;  Surgeon: Bhupinder Washington Jr., MD;   Location: [facility] OR 1ST FLR;  Service: Urology;  Laterality: Left;    RETROGRADE PYELOGRAPHY Left 1/31/2020    Procedure: PYELOGRAM, RETROGRADE;  Surgeon: Parminder Craig MD;  Location: [facility] OR [facility] FLR;  Service: Urology;  Laterality: Left;    URETEROSCOPIC REMOVAL OF URETERIC CALCULUS Left 1/31/2020    Procedure: REMOVAL, CALCULUS, URETER, URETEROSCOPIC;  Surgeon: Parminder Craig MD;  [facility];  Service: Urology;  Laterality: Left;    URETEROSCOPY Left 12/21/2018    Procedure: URETEROSCOPY;  Surgeon: Bhupinder Washington Jr., MD;  [facility];  Service: Urology;  Laterality: Left;  90mins    URETEROSCOPY Left 1/31/2020    Procedure: URETEROSCOPY;  Surgeon: Parminder Craig MD;  [facility];  Service: Urology;  Laterality: Left;       Social History     Socioeconomic History    Marital status:     Number of children: 2   Occupational History    Occupation: self employed   Tobacco Use    Smoking status: Former Smoker     Quit date: 1/1/2007     Years since quitting: 15.0    Smokeless tobacco: Never Used   Substance and Sexual Activity    Alcohol use: Yes     Comment: rare    Drug use: No    Sexual activity: Yes     Partners: Female         OBJECTIVE:     Vital Signs Range (Last 24H):  Temp:  [36.7 °C (98 °F)] 36.7 °C (98 °F)  Pulse:  [] 98  Resp:  [18] 18  SpO2:  [98 %] 98 %  BP: (135-180)/(81-97) 135/81    Significant Labs:  Lab Results   Component Value Date    WBC 15.53 (H) 01/09/2022    HGB 16.0 01/09/2022    HCT 47.2 01/09/2022     01/09/2022    CHOL 160 06/27/2019    TRIG 185 (H) 06/27/2019    HDL 30 (L) 06/27/2019    ALT 22 01/09/2022    AST 18 01/09/2022     01/09/2022    K 4.2 01/09/2022     01/09/2022    CREATININE 0.8 01/09/2022    BUN 15 01/09/2022    CO2 23 01/09/2022    TSH 2.541 01/10/2019    PSA 0.48 01/10/2019    INR 1.0 02/09/2017    HGBA1C 5.1 01/10/2019       Diagnostic Studies:    EKG:   Results for orders  placed or performed in visit on 06/26/19   IN OFFICE EKG 12-LEAD (to [place])    Collection Time: 06/26/19  2:59 PM    Narrative    Test Reason : Z00.00,    Vent. Rate : 083 BPM     Atrial Rate : 083 BPM     P-R Int : 138 ms          QRS Dur : 088 ms      QT Int : 376 ms       P-R-T Axes : 051 046 044 degrees     QTc Int : 441 ms    Normal sinus rhythm  Normal ECG  When compared with ECG of 18-NOV-2018 23:02,  No significant change was found  Confirmed by BUD CHAPIN MD ([id number]) on 6/27/2019 7:56:00 AM    Referred By: NASH ANAYA           Confirmed By:BUD CHAPIN MD     Previous airway:     1/31/2020:     Intubation:     Induction:  Intravenous    Intubated:  Postinduction    Mask Ventilation:  Easy mask    Attempts:  1    Attempted By:  CRNA    Method of Intubation:  Direct    Blade:  Viramontes 2    Laryngeal View Grade: Grade I - full view of chords      Difficult Airway Encountered?: No      Complications:  None    Airway Device Size:  7.5    Anesthesia Evaluation    I have reviewed the Patient Summary Reports.    I have reviewed the Nursing Notes. I have reviewed the NPO Status.   I have reviewed the Medications.     Review of Systems  Anesthesia Hx:  No problems with previous Anesthesia  History of prior surgery of interest to airway management or planning:  Denies Personal Hx of Anesthesia complications.   Social:  Former Smoker    Hematology/Oncology:  Hematology Normal   Oncology Normal     EENT/Dental:EENT/Dental Normal   Cardiovascular:  Cardiovascular Normal Exercise tolerance: good  Denies Hypertension.  Denies MI.   no hyperlipidemia        Pulmonary:  Pulmonary Normal  Denies COPD.  Denies Asthma.    Renal/:   renal calculi    Hepatic/GI:   Denies GERD. Perirectal abscess   Musculoskeletal:   Arthritis  S/p lumbar spine surgery    Neurological:  Neurology Normal Denies TIA.  Denies CVA. Denies Seizures.    Endocrine:   BMI 31.3   Dermatological:  Skin Normal        Physical  Exam  General:  Well nourished In obvious pain    Airway/Jaw/Neck:  Airway Findings: Mouth Opening: Normal General Airway Assessment: Adult  Mallampati: II  TM Distance: Normal, at least 6 cm  Jaw/Neck Findings:     Neck ROM: Normal ROM      Dental:  Dental Findings: In tact   Chest/Lungs:  Chest/Lungs Findings: Normal Respiratory Rate     Heart/Vascular:  Heart Findings: Rate: Normal  Rhythm: Regular Rhythm        Mental Status:  Mental Status Findings:  Cooperative, Alert and Oriented         Anesthesia Plan  Type of Anesthesia, risks & benefits discussed:  Anesthesia Type:  general, MAC    Patient's Preference:   Plan Factors:          Intra-op Monitoring Plan: standard ASA monitors  Intra-op Monitoring Plan Comments:   Post Op Pain Control Plan: IV/PO Opioids PRN, per primary service following discharge from PACU and multimodal analgesia  Post Op Pain Control Plan Comments:     Induction:   IV  Beta Blocker:  Patient is not currently on a Beta-Blocker (No further documentation required).       Informed Consent: Patient understands risks and agrees with Anesthesia plan.  Questions answered. Anesthesia consent signed with patient.  ASA Score: 2     Day of Surgery Review of History & Physical:    H&P update referred to the surgeon.     Anesthesia Plan Notes:   NPO: Last solid, apple evening of 1/10/2022.  Water about noon on day of procedure.  Pt reports limited oral intake during past 3-d 2/2 concern of painful bowel movements. Reports that he has been drinking adequate water.     Meds: None          Ready For Surgery From Anesthesia Perspective.

## 2022-01-12 ENCOUNTER — TELEPHONE (OUTPATIENT)
Dept: SURGERY | Facility: CLINIC | Age: 47
End: 2022-01-12
Payer: COMMERCIAL

## 2022-01-12 NOTE — ANESTHESIA POSTPROCEDURE EVALUATION
Anesthesia Post Evaluation    Patient: Sanchez Zarate    Procedure(s) Performed: Procedure(s) (LRB):  INCISION AND DRAINAGE, PERIRECTAL REGION (lithotomy) (N/A)  PLACEMENT, SETON STITCH    Final Anesthesia Type: general      Patient location during evaluation: PACU  Patient participation: Yes- Able to Participate  Level of consciousness: awake and alert  Post-procedure vital signs: reviewed and stable  Pain management: adequate  Airway patency: patent    PONV status at discharge: No PONV  Anesthetic complications: no      Cardiovascular status: blood pressure returned to baseline  Respiratory status: unassisted and spontaneous ventilation  Hydration status: euvolemic  Follow-up not needed.          Vitals Value Taken Time   /75 01/11/22 1801   Temp 36.7 °C (98 °F) 01/11/22 1800   Pulse 87 01/11/22 1810   Resp 25 01/11/22 1809   SpO2 94 % 01/11/22 1810   Vitals shown include unvalidated device data.      No case tracking events are documented in the log.      Pain/Tasha Score: Pain Rating Prior to Med Admin: 7 (1/11/2022  4:59 PM)  Pain Rating Post Med Admin: 4 (1/11/2022  6:34 PM)  Tasha Score: 10 (1/11/2022  6:34 PM)

## 2022-01-12 NOTE — PLAN OF CARE
Discharge instructions reviewed with pt at bedside and over the phone with wife. Understanding verbalized. Complaints of pain relieved with PRN medications. Pt able to tolerate po intake. Bedside delivery of medications occurred. To be transported to car by RN.

## 2022-01-20 ENCOUNTER — TELEPHONE (OUTPATIENT)
Dept: SURGERY | Facility: CLINIC | Age: 47
End: 2022-01-20
Payer: COMMERCIAL

## 2022-01-21 ENCOUNTER — OFFICE VISIT (OUTPATIENT)
Dept: SURGERY | Facility: CLINIC | Age: 47
End: 2022-01-21
Payer: COMMERCIAL

## 2022-01-21 VITALS
HEART RATE: 78 BPM | WEIGHT: 221.81 LBS | SYSTOLIC BLOOD PRESSURE: 124 MMHG | BODY MASS INDEX: 31.82 KG/M2 | DIASTOLIC BLOOD PRESSURE: 78 MMHG

## 2022-01-21 DIAGNOSIS — K61.1 PERIRECTAL ABSCESS: Primary | ICD-10-CM

## 2022-01-21 PROCEDURE — 3074F SYST BP LT 130 MM HG: CPT | Mod: CPTII,S$GLB,, | Performed by: STUDENT IN AN ORGANIZED HEALTH CARE EDUCATION/TRAINING PROGRAM

## 2022-01-21 PROCEDURE — 99024 POSTOP FOLLOW-UP VISIT: CPT | Mod: S$GLB,,, | Performed by: STUDENT IN AN ORGANIZED HEALTH CARE EDUCATION/TRAINING PROGRAM

## 2022-01-21 PROCEDURE — 3078F PR MOST RECENT DIASTOLIC BLOOD PRESSURE < 80 MM HG: ICD-10-PCS | Mod: CPTII,S$GLB,, | Performed by: STUDENT IN AN ORGANIZED HEALTH CARE EDUCATION/TRAINING PROGRAM

## 2022-01-21 PROCEDURE — 99024 PR POST-OP FOLLOW-UP VISIT: ICD-10-PCS | Mod: S$GLB,,, | Performed by: STUDENT IN AN ORGANIZED HEALTH CARE EDUCATION/TRAINING PROGRAM

## 2022-01-21 PROCEDURE — 99999 PR PBB SHADOW E&M-EST. PATIENT-LVL III: CPT | Mod: PBBFAC,,, | Performed by: STUDENT IN AN ORGANIZED HEALTH CARE EDUCATION/TRAINING PROGRAM

## 2022-01-21 PROCEDURE — 1159F PR MEDICATION LIST DOCUMENTED IN MEDICAL RECORD: ICD-10-PCS | Mod: CPTII,S$GLB,, | Performed by: STUDENT IN AN ORGANIZED HEALTH CARE EDUCATION/TRAINING PROGRAM

## 2022-01-21 PROCEDURE — 3078F DIAST BP <80 MM HG: CPT | Mod: CPTII,S$GLB,, | Performed by: STUDENT IN AN ORGANIZED HEALTH CARE EDUCATION/TRAINING PROGRAM

## 2022-01-21 PROCEDURE — 3008F BODY MASS INDEX DOCD: CPT | Mod: CPTII,S$GLB,, | Performed by: STUDENT IN AN ORGANIZED HEALTH CARE EDUCATION/TRAINING PROGRAM

## 2022-01-21 PROCEDURE — 99999 PR PBB SHADOW E&M-EST. PATIENT-LVL III: ICD-10-PCS | Mod: PBBFAC,,, | Performed by: STUDENT IN AN ORGANIZED HEALTH CARE EDUCATION/TRAINING PROGRAM

## 2022-01-21 PROCEDURE — 3074F PR MOST RECENT SYSTOLIC BLOOD PRESSURE < 130 MM HG: ICD-10-PCS | Mod: CPTII,S$GLB,, | Performed by: STUDENT IN AN ORGANIZED HEALTH CARE EDUCATION/TRAINING PROGRAM

## 2022-01-21 PROCEDURE — 3008F PR BODY MASS INDEX (BMI) DOCUMENTED: ICD-10-PCS | Mod: CPTII,S$GLB,, | Performed by: STUDENT IN AN ORGANIZED HEALTH CARE EDUCATION/TRAINING PROGRAM

## 2022-01-21 PROCEDURE — 1159F MED LIST DOCD IN RCRD: CPT | Mod: CPTII,S$GLB,, | Performed by: STUDENT IN AN ORGANIZED HEALTH CARE EDUCATION/TRAINING PROGRAM

## 2022-01-21 NOTE — PROGRESS NOTES
[facility]  Colon and Rectal Surgery    [address]  [place], [place]  Tel: [phone]  Fax: [phone]  [email or web address]   MD Paddy Solorzano MD Brian Kann, MD W. Forrest Johnston, MD Matthew Giglia, MD Jennifer Paruch, MD William Kethman, MD     Patient name: Sanchez Zarate   YOB: 1975   MRN: [id number]  Date of visit: 01/21/2022    Dear Wandy Gomez and Teresa,    It was a pleasure seeing Mr. Zarate in the Colon and Rectal Surgery clinic here at [facility].     As you know, Mr. Zarate is a 46 year old man with no significant medical history who presents for evaluation of perianal abscess. He underwent possible drainage by Dr. Steward (Dr. Gomez) in the ED on 1/9/2022. He has not had improvement in his pain since the procedure. He is on antibiotics. He went to the ED/urgent care three times before now. He is having chills but no fevers and is in severe pain.    Last colonoscopy: 5/2017 (Complete)  The perianal and digital rectal examinations were normal.   The colon (entire examined portion) appeared normal. Biopsies for histology were taken with a cold forceps from the entire colon for evaluation of microscopic colitis. Normal  The terminal ileum appeared normal.     CT Pelvis - 1/9/2022 - Images reviewed and interpreted independently   Posterior perianal abscess    1/21/2022  Here for post-operative follow-up after incision and drainage of perirectal abscess with seton placement on 1/11/2022. He is doing better - pain is improved, drainage less. Still uncomfortable from drain.    The patient was informed of the availability of a certified  without charge. A certified  was not necessary for this visit.    Review of Systems  See pertinent review of systems above    Past Medical History:   Diagnosis Date    Chronic back pain     Chronic diarrhea     Hernia     TBI (traumatic brain injury) 2010    fake  wall fell on head (no brain or skull injury per pt)     Past Surgical History:   Procedure Laterality Date    COLONOSCOPY N/A 5/29/2017    Procedure: COLONOSCOPY;  Surgeon: Sukhi Scanlon MD;  Location: [facility] ENDO (4TH FLR);  Service: Endoscopy;  Laterality: N/A;    CYSTOSCOPY N/A 1/31/2020    Procedure: CYSTOSCOPY;  Surgeon: Parminder Craig MD;  Location: [facility] OR 1ST FLR;  Service: Urology;  Laterality: N/A;    CYSTOSCOPY W/ URETERAL STENT PLACEMENT Left 11/19/2018    Procedure: CYSTOSCOPY, WITH URETERAL STENT INSERTION;  Surgeon: Renee Garcia MD;  Location: [facility] OR 2ND FLR;  Service: Urology;  Laterality: Left;    CYSTOSCOPY W/ URETERAL STENT PLACEMENT  12/21/2018    Procedure: CYSTOSCOPY, WITH URETERAL STENT INSERTION;  Surgeon: Bhupinder Washington Jr., MD;  Location: [facility] OR 1ST FLR;  Service: Urology;;    CYSTOSCOPY W/ URETERAL STENT REMOVAL Left 12/21/2018    Procedure: CYSTOSCOPY, WITH URETERAL STENT REMOVAL;  Surgeon: Bhupinder Washington Jr., MD;  Location: [facility] OR 1ST FLR;  Service: Urology;  Laterality: Left;    HERNIA REPAIR  2017    umbilical    INCISION AND DRAINAGE OF PERIRECTAL REGION N/A 1/11/2022    Procedure: INCISION AND DRAINAGE, PERIRECTAL REGION (lithotomy);  Surgeon: Nomi Mora MD;  Location: [facility] OR 2ND FLR;  Service: Colon and Rectal;  Laterality: N/A;    INSERTION OF SETON STITCH  1/11/2022    Procedure: PLACEMENT, SETON STITCH;  Surgeon: Nomi Mora MD;  Location: [facility] OR 2ND FLR;  Service: Colon and Rectal;;    LASER LITHOTRIPSY Left 12/21/2018    Procedure: LITHOTRIPSY, USING LASER (mana);  Surgeon: Bhupinder Washington Jr., MD;  Location: [facility] OR 1ST FLR;  Service: Urology;  Laterality: Left;  90mins    LASER LITHOTRIPSY Left 1/31/2020    Procedure: LITHOTRIPSY, USING LASER;  Surgeon: Parminder Craig MD;  Location: [facility] OR 1ST FLR;  Service: Urology;  Laterality: Left;    LUMBAR EPIDURAL INJECTION      with steroid    RETROGRADE PYELOGRAPHY Left 11/19/2018     Procedure: PYELOGRAM, RETROGRADE;  Surgeon: Renee Garcia MD;  Location: [facility] OR 2ND FLR;  Service: Urology;  Laterality: Left;    RETROGRADE PYELOGRAPHY Left 12/21/2018    Procedure: PYELOGRAM, RETROGRADE;  Surgeon: Bhupinder Washington Jr., MD;  Location: [facility] OR 1ST FLR;  Service: Urology;  Laterality: Left;    RETROGRADE PYELOGRAPHY Left 1/31/2020    Procedure: PYELOGRAM, RETROGRADE;  Surgeon: Parminder Craig MD;  Location: [facility] OR 1ST FLR;  Service: Urology;  Laterality: Left;    URETEROSCOPIC REMOVAL OF URETERIC CALCULUS Left 1/31/2020    Procedure: REMOVAL, CALCULUS, URETER, URETEROSCOPIC;  Surgeon: Parminder Craig MD;  Location: [facility] OR 1ST FLR;  Service: Urology;  Laterality: Left;    URETEROSCOPY Left 12/21/2018    Procedure: URETEROSCOPY;  Surgeon: Bhupinder Washington Jr., MD;  [facility]  Service: Urology;  Laterality: Left;  90mins    URETEROSCOPY Left 1/31/2020    Procedure: URETEROSCOPY;  Surgeon: Parminder Craig MD;  [facility];  Service: Urology;  Laterality: Left;     Family History   Problem Relation Age of Onset    Diabetes Mother     Cancer Cousin 50        Cancer possibly stomach     Heart attack Neg Hx     Heart disease Neg Hx     Hypertension Neg Hx     Colon cancer Neg Hx     Esophageal cancer Neg Hx      Social History     Tobacco Use    Smoking status: Former Smoker     Quit date: 1/1/2007     Years since quitting: 15.0    Smokeless tobacco: Never Used   Substance Use Topics    Alcohol use: Yes     Comment: rare    Drug use: No     Review of patient's allergies indicates:  No Known Allergies    Current Outpatient Medications on File Prior to Visit   Medication Sig Dispense Refill    allopurinoL (ZYLOPRIM) 100 MG tablet Take 100 mg by mouth.      chlorthalidone (HYGROTEN) 25 MG Tab Take 25 mg by mouth.      clobetasoL (TEMOVATE) 0.05 % external solution APPLY TO AFFECTED AREAS ON SCALP THREE TO FOUR TIMES A WEEK AT NIGHT.       magnesium oxide 400 mg magnesium Cap Take 1 capsule by mouth.      meloxicam (MOBIC) 15 MG tablet Take 1 tablet (15 mg total) by mouth once daily. (Patient not taking: Reported on 1/21/2022) 30 tablet 1    oxyCODONE (ROXICODONE) 5 MG immediate release tablet Take 1 tablet (5 mg total) by mouth every 4 (four) hours as needed for Pain. (Patient not taking: Reported on 1/21/2022) 15 tablet 0    polyethylene glycol (GLYCOLAX) 17 gram PwPk Take 17 g by mouth once daily. (Patient not taking: Reported on 1/21/2022) 14 each 0    pyridoxine, vitamin B6, (B-6) 100 MG Tab Take 100 mg by mouth.      tamsulosin (FLOMAX) 0.4 mg Cap Take 1 capsule (0.4 mg total) by mouth once daily. (Patient not taking: Reported on 1/21/2022) 30 capsule 0     No current facility-administered medications on file prior to visit.     Physical Examination  /78 (BP Location: Right arm, Patient Position: Sitting, BP Method: Medium (Automatic))   Pulse 78   Wt 100.6 kg (221 lb 12.5 oz)   BMI 31.82 kg/m²      A chaperone was present for the physical examination.    Constitutional: well developed, no cough, no dyspnea, alert, and no acute distress    Head: Normocephalic, no lesions, without obvious abnormality  Eye: Normal external eye, conjunctiva, and lids  Cardiovascular: regular rate and regular rhythm  Respiratory: normal air entry  Gastrointestinal: soft, non-tender    Perianal Skin: Wound is well-healed, area soft, no induration or erythema, seton trimmed    Musculoskeletal: full range of motion without pain  Neurologic: alert, oriented, normal speech, no focal findings or movement disorder noted  Psychiatric: appropriate, normal mood    Assessment and Plan of Care    Thank you again for referring Mr. Zarate to my care. In summary, Mr. Zarate is a 46 year old man presenting with a perirectal abscess with associated firstula. We discussed treatment options and have provided the following recommendations:    1. Follow-up in 3 weeks to  assess wound - plan for future fistulotomy    Please do not hesitate to contact me if you have any questions.      Nomi Mora MD - Staff Surgeon  Department of Colon & Rectal Surgery  [facility]

## 2022-01-25 ENCOUNTER — PATIENT MESSAGE (OUTPATIENT)
Dept: ADMINISTRATIVE | Facility: HOSPITAL | Age: 47
End: 2022-01-25
Payer: COMMERCIAL

## 2022-02-10 NOTE — H&P (VIEW-ONLY)
[facility]  Colon and Rectal Surgery    [address]  [place], [place]  Tel: [phone]  Fax: [phone]  [email or web address]   MD Paddy Solorzano MD Brian Kann, MD W. Forrest Johnston, MD Matthew Giglia, MD Jennifer Paruch, MD William Kethman, MD     Patient name: Sanchez Zarate   YOB: 1975   MRN: [id number]  Date of visit: 02/11/2022    Dear Wandy Gomez and Teresa,    It was a pleasure seeing Mr. Zarate in the Colon and Rectal Surgery clinic here at [facility].     As you know, Mr. Zarate is a 46 year old man with no significant medical history who presents for evaluation of perianal abscess. He underwent possible drainage by Dr. Steward (Dr. Gomez) in the ED on 1/9/2022. He has not had improvement in his pain since the procedure. He is on antibiotics. He went to the ED/urgent care three times before now. He is having chills but no fevers and is in severe pain.    Last colonoscopy: 5/2017 (Complete)  The perianal and digital rectal examinations were normal.   The colon (entire examined portion) appeared normal. Biopsies for histology were taken with a cold forceps from the entire colon for evaluation of microscopic colitis. Normal  The terminal ileum appeared normal.     CT Pelvis - 1/9/2022 - Images reviewed and interpreted independently   Posterior perianal abscess    1/21/2022  Here for post-operative follow-up after incision and drainage of perirectal abscess with seton placement on 1/11/2022. He is doing better - pain is improved, drainage less. Still uncomfortable from drain.    2/11/2022  Here to assess wound healing and plan for fistulotomy. He is doing well - minimal drainage. He has no fecal incontinence.    The patient was informed of the availability of a certified  without charge. A certified  was not necessary for this visit.    Review of Systems  See pertinent review of systems above    Past  Medical History:   Diagnosis Date    Chronic back pain     Chronic diarrhea     Hernia     TBI (traumatic brain injury) 2010    fake wall fell on head (no brain or skull injury per pt)     Past Surgical History:   Procedure Laterality Date    COLONOSCOPY N/A 5/29/2017    Procedure: COLONOSCOPY;  Surgeon: Sukhi Scanlon MD;  Location: [facility] (4TH FLR);  Service: Endoscopy;  Laterality: N/A;    CYSTOSCOPY N/A 1/31/2020    Procedure: CYSTOSCOPY;  Surgeon: Parminder Craig MD;  Location: [facility] OR 1ST FLR;  Service: Urology;  Laterality: N/A;    CYSTOSCOPY W/ URETERAL STENT PLACEMENT Left 11/19/2018    Procedure: CYSTOSCOPY, WITH URETERAL STENT INSERTION;  Surgeon: Renee Garcia MD;  Location: [facility] OR 2ND FLR;  Service: Urology;  Laterality: Left;    CYSTOSCOPY W/ URETERAL STENT PLACEMENT  12/21/2018    Procedure: CYSTOSCOPY, WITH URETERAL STENT INSERTION;  Surgeon: Bhupinder Washington Jr., MD;  Location: [facility] OR 1ST FLR;  Service: Urology;;    CYSTOSCOPY W/ URETERAL STENT REMOVAL Left 12/21/2018    Procedure: CYSTOSCOPY, WITH URETERAL STENT REMOVAL;  Surgeon: Bhupinder Washington Jr., MD;  Location: [facility] OR 1ST FLR;  Service: Urology;  Laterality: Left;    HERNIA REPAIR  2017    umbilical    INCISION AND DRAINAGE OF PERIRECTAL REGION N/A 1/11/2022    Procedure: INCISION AND DRAINAGE, PERIRECTAL REGION (lithotomy);  Surgeon: Nomi Mora MD;  Location: [facility] OR 2ND FLR;  Service: Colon and Rectal;  Laterality: N/A;    INSERTION OF SETON STITCH  1/11/2022    Procedure: PLACEMENT, SETON STITCH;  Surgeon: Nomi Mora MD;  Location: [facility] OR 2ND FLR;  Service: Colon and Rectal;;    LASER LITHOTRIPSY Left 12/21/2018    Procedure: LITHOTRIPSY, USING LASER (mana);  Surgeon: Bhupinder Washington Jr., MD;  Location: [facility] OR 1ST FLR;  Service: Urology;  Laterality: Left;  90mins    LASER LITHOTRIPSY Left 1/31/2020    Procedure: LITHOTRIPSY, USING LASER;  Surgeon: Parminder Craig MD;  Location: [facility] OR  1ST FLR;  Service: Urology;  Laterality: Left;    LUMBAR EPIDURAL INJECTION      with steroid    RETROGRADE PYELOGRAPHY Left 11/19/2018    Procedure: PYELOGRAM, RETROGRADE;  Surgeon: Renee Garcia MD;  Location: [facility] OR 2ND FLR;  Service: Urology;  Laterality: Left;    RETROGRADE PYELOGRAPHY Left 12/21/2018    Procedure: PYELOGRAM, RETROGRADE;  Surgeon: Bhupinder Washington Jr., MD;  Location: [facility] OR 1ST FLR;  Service: Urology;  Laterality: Left;    RETROGRADE PYELOGRAPHY Left 1/31/2020    Procedure: PYELOGRAM, RETROGRADE;  Surgeon: Parminder Craig MD;  Location: [facility] OR 1ST FLR;  Service: Urology;  Laterality: Left;    URETEROSCOPIC REMOVAL OF URETERIC CALCULUS Left 1/31/2020    Procedure: REMOVAL, CALCULUS, URETER, URETEROSCOPIC;  Surgeon: Parminder Craig MD;  Location: [facility] OR 1ST FLR;  Service: Urology;  Laterality: Left;    URETEROSCOPY Left 12/21/2018    Procedure: URETEROSCOPY;  Surgeon: Bhupinder Washington Jr., MD;  Location: [facility] OR 1ST FLR;  Service: Urology;  Laterality: Left;  90mins    URETEROSCOPY Left 1/31/2020    Procedure: URETEROSCOPY;  Surgeon: Parminder Craig MD;  [facility];  Service: Urology;  Laterality: Left;     Family History   Problem Relation Age of Onset    Diabetes Mother     Cancer Cousin 50        Cancer possibly stomach     Heart attack Neg Hx     Heart disease Neg Hx     Hypertension Neg Hx     Colon cancer Neg Hx     Esophageal cancer Neg Hx      Social History     Tobacco Use    Smoking status: Former Smoker     Quit date: 1/1/2007     Years since quitting: 15.1    Smokeless tobacco: Never Used   Substance Use Topics    Alcohol use: Yes     Comment: rare    Drug use: No     Review of patient's allergies indicates:  No Known Allergies    Current Outpatient Medications on File Prior to Visit   Medication Sig Dispense Refill    chlorthalidone (HYGROTEN) 25 MG Tab Take 25 mg by mouth.      clobetasoL (TEMOVATE) 0.05 % external solution APPLY TO  "AFFECTED AREAS ON SCALP THREE TO FOUR TIMES A WEEK AT NIGHT.      meloxicam (MOBIC) 15 MG tablet Take 1 tablet (15 mg total) by mouth once daily. (Patient not taking: No sig reported) 30 tablet 1    oxyCODONE (ROXICODONE) 5 MG immediate release tablet Take 1 tablet (5 mg total) by mouth every 4 (four) hours as needed for Pain. (Patient not taking: No sig reported) 15 tablet 0    polyethylene glycol (GLYCOLAX) 17 gram PwPk Take 17 g by mouth once daily. (Patient not taking: No sig reported) 14 each 0    tamsulosin (FLOMAX) 0.4 mg Cap Take 1 capsule (0.4 mg total) by mouth once daily. (Patient not taking: No sig reported) 30 capsule 0     No current facility-administered medications on file prior to visit.     Physical Examination  /88 (BP Location: Right arm, Patient Position: Sitting, BP Method: Large (Automatic))   Pulse 67   Ht 5' 10" (1.778 m)   Wt 101.6 kg (223 lb 15.8 oz)   BMI 32.14 kg/m²      A chaperone was present for the physical examination.    Constitutional: well developed, no cough, no dyspnea, alert, and no acute distress    Head: Normocephalic, no lesions, without obvious abnormality  Eye: Normal external eye, conjunctiva, and lids  Cardiovascular: regular rate and regular rhythm  Respiratory: normal air entry  Gastrointestinal: soft, non-tender    Perianal Skin: Wound healed, seton in place    Musculoskeletal: full range of motion without pain  Neurologic: alert, oriented, normal speech, no focal findings or movement disorder noted  Psychiatric: appropriate, normal mood    Assessment and Plan of Care    Thank you again for referring Mr. Zarate to my care. In summary, Mr. Zarate is a 46 year old man presenting with a perirectal abscess with associated firstula. We discussed treatment options and have provided the following recommendations:    1. Management of perianal fistulas discussed - plan for likely fistulotomy (discussed possibility of LIFT/advancement flap), benefits and risks " discussed, importance of post-operative wound care, consent obtained    Please do not hesitate to contact me if you have any questions.      Noim Mora MD - Staff Surgeon  Department of Colon & Rectal Surgery  [facility]

## 2022-02-10 NOTE — PROGRESS NOTES
[facility]  Colon and Rectal Surgery    [address]  [place], [place]  Tel: [phone]  Fax: [phone]  [email or web address]   MD Paddy Solorzano MD Brian Kann, MD W. Forrest Johnston, MD Matthew Giglia, MD Jennifer Paruch, MD William Kethman, MD     Patient name: Sanchez Zarate   YOB: 1975   MRN: [id number]  Date of visit: 02/11/2022    Dear Wandy Gomez and Teresa,    It was a pleasure seeing Mr. Zarate in the Colon and Rectal Surgery clinic here at [facility].     As you know, Mr. Zarate is a 46 year old man with no significant medical history who presents for evaluation of perianal abscess. He underwent possible drainage by Dr. Steward (Dr. Gomez) in the ED on 1/9/2022. He has not had improvement in his pain since the procedure. He is on antibiotics. He went to the ED/urgent care three times before now. He is having chills but no fevers and is in severe pain.    Last colonoscopy: 5/2017 (Complete)  The perianal and digital rectal examinations were normal.   The colon (entire examined portion) appeared normal. Biopsies for histology were taken with a cold forceps from the entire colon for evaluation of microscopic colitis. Normal  The terminal ileum appeared normal.     CT Pelvis - 1/9/2022 - Images reviewed and interpreted independently   Posterior perianal abscess    1/21/2022  Here for post-operative follow-up after incision and drainage of perirectal abscess with seton placement on 1/11/2022. He is doing better - pain is improved, drainage less. Still uncomfortable from drain.    2/11/2022  Here to assess wound healing and plan for fistulotomy. He is doing well - minimal drainage. He has no fecal incontinence.    The patient was informed of the availability of a certified  without charge. A certified  was not necessary for this visit.    Review of Systems  See pertinent review of systems above    Past  Medical History:   Diagnosis Date    Chronic back pain     Chronic diarrhea     Hernia     TBI (traumatic brain injury) 2010    fake wall fell on head (no brain or skull injury per pt)     Past Surgical History:   Procedure Laterality Date    COLONOSCOPY N/A 5/29/2017    Procedure: COLONOSCOPY;  Surgeon: Sukhi Scanlon MD;  Location: [facility] (4TH FLR);  Service: Endoscopy;  Laterality: N/A;    CYSTOSCOPY N/A 1/31/2020    Procedure: CYSTOSCOPY;  Surgeon: Parminder Craig MD;  Location: [facility] OR 1ST FLR;  Service: Urology;  Laterality: N/A;    CYSTOSCOPY W/ URETERAL STENT PLACEMENT Left 11/19/2018    Procedure: CYSTOSCOPY, WITH URETERAL STENT INSERTION;  Surgeon: Renee Garcia MD;  Location: [facility] OR 2ND FLR;  Service: Urology;  Laterality: Left;    CYSTOSCOPY W/ URETERAL STENT PLACEMENT  12/21/2018    Procedure: CYSTOSCOPY, WITH URETERAL STENT INSERTION;  Surgeon: Bhupinder Washington Jr., MD;  Location: [facility] OR 1ST FLR;  Service: Urology;;    CYSTOSCOPY W/ URETERAL STENT REMOVAL Left 12/21/2018    Procedure: CYSTOSCOPY, WITH URETERAL STENT REMOVAL;  Surgeon: Bhupinder Washington Jr., MD;  Location: [facility] OR 1ST FLR;  Service: Urology;  Laterality: Left;    HERNIA REPAIR  2017    umbilical    INCISION AND DRAINAGE OF PERIRECTAL REGION N/A 1/11/2022    Procedure: INCISION AND DRAINAGE, PERIRECTAL REGION (lithotomy);  Surgeon: Nomi Mora MD;  Location: [facility] OR 2ND FLR;  Service: Colon and Rectal;  Laterality: N/A;    INSERTION OF SETON STITCH  1/11/2022    Procedure: PLACEMENT, SETON STITCH;  Surgeon: Nomi Mora MD;  Location: [facility] OR 2ND FLR;  Service: Colon and Rectal;;    LASER LITHOTRIPSY Left 12/21/2018    Procedure: LITHOTRIPSY, USING LASER (mana);  Surgeon: Bhupinder Washington Jr., MD;  Location: [facility] OR 1ST FLR;  Service: Urology;  Laterality: Left;  90mins    LASER LITHOTRIPSY Left 1/31/2020    Procedure: LITHOTRIPSY, USING LASER;  Surgeon: Parminder Craig MD;  Location: [facility] OR  1ST FLR;  Service: Urology;  Laterality: Left;    LUMBAR EPIDURAL INJECTION      with steroid    RETROGRADE PYELOGRAPHY Left 11/19/2018    Procedure: PYELOGRAM, RETROGRADE;  Surgeon: Renee Garcia MD;  Location: [facility] OR 2ND FLR;  Service: Urology;  Laterality: Left;    RETROGRADE PYELOGRAPHY Left 12/21/2018    Procedure: PYELOGRAM, RETROGRADE;  Surgeon: Bhupinder Washington Jr., MD;  Location: [facility] OR 1ST FLR;  Service: Urology;  Laterality: Left;    RETROGRADE PYELOGRAPHY Left 1/31/2020    Procedure: PYELOGRAM, RETROGRADE;  Surgeon: Parminder Craig MD;  Location: [facility] OR 1ST FLR;  Service: Urology;  Laterality: Left;    URETEROSCOPIC REMOVAL OF URETERIC CALCULUS Left 1/31/2020    Procedure: REMOVAL, CALCULUS, URETER, URETEROSCOPIC;  Surgeon: Parmindre Craig MD;  Location: [facility] OR 1ST FLR;  Service: Urology;  Laterality: Left;    URETEROSCOPY Left 12/21/2018    Procedure: URETEROSCOPY;  Surgeon: Bhupinder Washington Jr., MD;  Location: [facility] OR 1ST FLR;  Service: Urology;  Laterality: Left;  90mins    URETEROSCOPY Left 1/31/2020    Procedure: URETEROSCOPY;  Surgeon: Parminder Craig MD;  [facility];  Service: Urology;  Laterality: Left;     Family History   Problem Relation Age of Onset    Diabetes Mother     Cancer Cousin 50        Cancer possibly stomach     Heart attack Neg Hx     Heart disease Neg Hx     Hypertension Neg Hx     Colon cancer Neg Hx     Esophageal cancer Neg Hx      Social History     Tobacco Use    Smoking status: Former Smoker     Quit date: 1/1/2007     Years since quitting: 15.1    Smokeless tobacco: Never Used   Substance Use Topics    Alcohol use: Yes     Comment: rare    Drug use: No     Review of patient's allergies indicates:  No Known Allergies    Current Outpatient Medications on File Prior to Visit   Medication Sig Dispense Refill    chlorthalidone (HYGROTEN) 25 MG Tab Take 25 mg by mouth.      clobetasoL (TEMOVATE) 0.05 % external solution APPLY TO  "AFFECTED AREAS ON SCALP THREE TO FOUR TIMES A WEEK AT NIGHT.      meloxicam (MOBIC) 15 MG tablet Take 1 tablet (15 mg total) by mouth once daily. (Patient not taking: No sig reported) 30 tablet 1    oxyCODONE (ROXICODONE) 5 MG immediate release tablet Take 1 tablet (5 mg total) by mouth every 4 (four) hours as needed for Pain. (Patient not taking: No sig reported) 15 tablet 0    polyethylene glycol (GLYCOLAX) 17 gram PwPk Take 17 g by mouth once daily. (Patient not taking: No sig reported) 14 each 0    tamsulosin (FLOMAX) 0.4 mg Cap Take 1 capsule (0.4 mg total) by mouth once daily. (Patient not taking: No sig reported) 30 capsule 0     No current facility-administered medications on file prior to visit.     Physical Examination  /88 (BP Location: Right arm, Patient Position: Sitting, BP Method: Large (Automatic))   Pulse 67   Ht 5' 10" (1.778 m)   Wt 101.6 kg (223 lb 15.8 oz)   BMI 32.14 kg/m²      A chaperone was present for the physical examination.    Constitutional: well developed, no cough, no dyspnea, alert, and no acute distress    Head: Normocephalic, no lesions, without obvious abnormality  Eye: Normal external eye, conjunctiva, and lids  Cardiovascular: regular rate and regular rhythm  Respiratory: normal air entry  Gastrointestinal: soft, non-tender    Perianal Skin: Wound healed, seton in place    Musculoskeletal: full range of motion without pain  Neurologic: alert, oriented, normal speech, no focal findings or movement disorder noted  Psychiatric: appropriate, normal mood    Assessment and Plan of Care    Thank you again for referring Mr. Zarate to my care. In summary, Mr. Zarate is a 46 year old man presenting with a perirectal abscess with associated firstula. We discussed treatment options and have provided the following recommendations:    1. Management of perianal fistulas discussed - plan for likely fistulotomy (discussed possibility of LIFT/advancement flap), benefits and risks " discussed, importance of post-operative wound care, consent obtained    Please do not hesitate to contact me if you have any questions.      Nomi Mora MD - Staff Surgeon  Department of Colon & Rectal Surgery  [facility]

## 2022-02-11 ENCOUNTER — PATIENT MESSAGE (OUTPATIENT)
Dept: SURGERY | Facility: HOSPITAL | Age: 47
End: 2022-02-11
Payer: COMMERCIAL

## 2022-02-11 ENCOUNTER — OFFICE VISIT (OUTPATIENT)
Dept: SURGERY | Facility: CLINIC | Age: 47
End: 2022-02-11
Payer: COMMERCIAL

## 2022-02-11 ENCOUNTER — TELEPHONE (OUTPATIENT)
Dept: SURGERY | Facility: CLINIC | Age: 47
End: 2022-02-11

## 2022-02-11 VITALS
HEART RATE: 67 BPM | HEIGHT: 70 IN | DIASTOLIC BLOOD PRESSURE: 88 MMHG | SYSTOLIC BLOOD PRESSURE: 127 MMHG | WEIGHT: 224 LBS | BODY MASS INDEX: 32.07 KG/M2

## 2022-02-11 DIAGNOSIS — Z01.818 PRE-OP TESTING: ICD-10-CM

## 2022-02-11 DIAGNOSIS — K61.1 PERIRECTAL ABSCESS: Primary | ICD-10-CM

## 2022-02-11 PROCEDURE — 3008F BODY MASS INDEX DOCD: CPT | Mod: CPTII,S$GLB,, | Performed by: STUDENT IN AN ORGANIZED HEALTH CARE EDUCATION/TRAINING PROGRAM

## 2022-02-11 PROCEDURE — 99213 PR OFFICE/OUTPT VISIT, EST, LEVL III, 20-29 MIN: ICD-10-PCS | Mod: S$GLB,,, | Performed by: STUDENT IN AN ORGANIZED HEALTH CARE EDUCATION/TRAINING PROGRAM

## 2022-02-11 PROCEDURE — 3008F PR BODY MASS INDEX (BMI) DOCUMENTED: ICD-10-PCS | Mod: CPTII,S$GLB,, | Performed by: STUDENT IN AN ORGANIZED HEALTH CARE EDUCATION/TRAINING PROGRAM

## 2022-02-11 PROCEDURE — 3074F SYST BP LT 130 MM HG: CPT | Mod: CPTII,S$GLB,, | Performed by: STUDENT IN AN ORGANIZED HEALTH CARE EDUCATION/TRAINING PROGRAM

## 2022-02-11 PROCEDURE — 1159F PR MEDICATION LIST DOCUMENTED IN MEDICAL RECORD: ICD-10-PCS | Mod: CPTII,S$GLB,, | Performed by: STUDENT IN AN ORGANIZED HEALTH CARE EDUCATION/TRAINING PROGRAM

## 2022-02-11 PROCEDURE — 99999 PR PBB SHADOW E&M-EST. PATIENT-LVL IV: CPT | Mod: PBBFAC,,, | Performed by: STUDENT IN AN ORGANIZED HEALTH CARE EDUCATION/TRAINING PROGRAM

## 2022-02-11 PROCEDURE — 3079F DIAST BP 80-89 MM HG: CPT | Mod: CPTII,S$GLB,, | Performed by: STUDENT IN AN ORGANIZED HEALTH CARE EDUCATION/TRAINING PROGRAM

## 2022-02-11 PROCEDURE — 3079F PR MOST RECENT DIASTOLIC BLOOD PRESSURE 80-89 MM HG: ICD-10-PCS | Mod: CPTII,S$GLB,, | Performed by: STUDENT IN AN ORGANIZED HEALTH CARE EDUCATION/TRAINING PROGRAM

## 2022-02-11 PROCEDURE — 1159F MED LIST DOCD IN RCRD: CPT | Mod: CPTII,S$GLB,, | Performed by: STUDENT IN AN ORGANIZED HEALTH CARE EDUCATION/TRAINING PROGRAM

## 2022-02-11 PROCEDURE — 99999 PR PBB SHADOW E&M-EST. PATIENT-LVL IV: ICD-10-PCS | Mod: PBBFAC,,, | Performed by: STUDENT IN AN ORGANIZED HEALTH CARE EDUCATION/TRAINING PROGRAM

## 2022-02-11 PROCEDURE — 99213 OFFICE O/P EST LOW 20 MIN: CPT | Mod: S$GLB,,, | Performed by: STUDENT IN AN ORGANIZED HEALTH CARE EDUCATION/TRAINING PROGRAM

## 2022-02-11 PROCEDURE — 3074F PR MOST RECENT SYSTOLIC BLOOD PRESSURE < 130 MM HG: ICD-10-PCS | Mod: CPTII,S$GLB,, | Performed by: STUDENT IN AN ORGANIZED HEALTH CARE EDUCATION/TRAINING PROGRAM

## 2022-02-15 ENCOUNTER — LAB VISIT (OUTPATIENT)
Dept: PRIMARY CARE CLINIC | Facility: CLINIC | Age: 47
End: 2022-02-15
Payer: COMMERCIAL

## 2022-02-15 DIAGNOSIS — Z01.818 PRE-OP TESTING: ICD-10-CM

## 2022-02-15 PROCEDURE — U0005 INFEC AGEN DETEC AMPLI PROBE: HCPCS | Performed by: STUDENT IN AN ORGANIZED HEALTH CARE EDUCATION/TRAINING PROGRAM

## 2022-02-15 PROCEDURE — U0003 INFECTIOUS AGENT DETECTION BY NUCLEIC ACID (DNA OR RNA); SEVERE ACUTE RESPIRATORY SYNDROME CORONAVIRUS 2 (SARS-COV-2) (CORONAVIRUS DISEASE [COVID-19]), AMPLIFIED PROBE TECHNIQUE, MAKING USE OF HIGH THROUGHPUT TECHNOLOGIES AS DESCRIBED BY CMS-2020-01-R: HCPCS | Performed by: STUDENT IN AN ORGANIZED HEALTH CARE EDUCATION/TRAINING PROGRAM

## 2022-02-16 LAB
SARS-COV-2 RNA RESP QL NAA+PROBE: NOT DETECTED
SARS-COV-2- CYCLE NUMBER: NORMAL

## 2022-02-17 ENCOUNTER — TELEPHONE (OUTPATIENT)
Dept: SURGERY | Facility: CLINIC | Age: 47
End: 2022-02-17
Payer: COMMERCIAL

## 2022-02-17 ENCOUNTER — PATIENT MESSAGE (OUTPATIENT)
Dept: SURGERY | Facility: HOSPITAL | Age: 47
End: 2022-02-17
Payer: COMMERCIAL

## 2022-02-17 NOTE — PRE-PROCEDURE INSTRUCTIONS
PREOP INSTRUCTIONS:Nothing to eat or drink for 8 hours before surgery.Instructed to follow the surgeon's instructions if they differ from these.Shower instructions as well as directions to the Surgery Center were given.Encouraged to wear loose fitting,comfortable clothing.Medication instructions for pm prior to and am of procedure reviewed.Instructed to avoid taking vitamins,supplements,aspirin and ibuprofen the morning of surgery.    Patient denies any side effects or issues with anesthesia or sedation.     Patient does not know arrival time.Explained that this information comes from the surgeon's office and if they haven't heard from them by 3 pm to call the office.Patient stated an understanding.

## 2022-02-18 ENCOUNTER — PATIENT MESSAGE (OUTPATIENT)
Dept: SURGERY | Facility: HOSPITAL | Age: 47
End: 2022-02-18

## 2022-02-18 ENCOUNTER — ANESTHESIA (OUTPATIENT)
Dept: SURGERY | Facility: HOSPITAL | Age: 47
End: 2022-02-18
Payer: COMMERCIAL

## 2022-02-18 ENCOUNTER — HOSPITAL ENCOUNTER (OUTPATIENT)
Facility: HOSPITAL | Age: 47
Discharge: HOME | End: 2022-02-18
Attending: STUDENT IN AN ORGANIZED HEALTH CARE EDUCATION/TRAINING PROGRAM | Admitting: STUDENT IN AN ORGANIZED HEALTH CARE EDUCATION/TRAINING PROGRAM
Payer: COMMERCIAL

## 2022-02-18 ENCOUNTER — ANESTHESIA EVENT (OUTPATIENT)
Dept: SURGERY | Facility: HOSPITAL | Age: 47
End: 2022-02-18
Payer: COMMERCIAL

## 2022-02-18 VITALS
DIASTOLIC BLOOD PRESSURE: 77 MMHG | TEMPERATURE: 97 F | OXYGEN SATURATION: 96 % | SYSTOLIC BLOOD PRESSURE: 119 MMHG | WEIGHT: 220 LBS | RESPIRATION RATE: 18 BRPM | HEIGHT: 70 IN | HEART RATE: 73 BPM | BODY MASS INDEX: 31.5 KG/M2

## 2022-02-18 DIAGNOSIS — K60.3 ANAL FISTULA: Primary | ICD-10-CM

## 2022-02-18 PROCEDURE — 71000015 HC POSTOP RECOV 1ST HR: Performed by: STUDENT IN AN ORGANIZED HEALTH CARE EDUCATION/TRAINING PROGRAM

## 2022-02-18 PROCEDURE — 71000016 HC POSTOP RECOV ADDL HR: Performed by: STUDENT IN AN ORGANIZED HEALTH CARE EDUCATION/TRAINING PROGRAM

## 2022-02-18 PROCEDURE — D9220A PRA ANESTHESIA: Mod: ,,, | Performed by: ANESTHESIOLOGY

## 2022-02-18 PROCEDURE — 36000707: Performed by: STUDENT IN AN ORGANIZED HEALTH CARE EDUCATION/TRAINING PROGRAM

## 2022-02-18 PROCEDURE — 46030 REMOVAL ANAL SETON OTH MRK: CPT | Mod: ,,, | Performed by: STUDENT IN AN ORGANIZED HEALTH CARE EDUCATION/TRAINING PROGRAM

## 2022-02-18 PROCEDURE — 63600175 PHARM REV CODE 636 W HCPCS: Performed by: STUDENT IN AN ORGANIZED HEALTH CARE EDUCATION/TRAINING PROGRAM

## 2022-02-18 PROCEDURE — 36000706: Performed by: STUDENT IN AN ORGANIZED HEALTH CARE EDUCATION/TRAINING PROGRAM

## 2022-02-18 PROCEDURE — 25000003 PHARM REV CODE 250: Performed by: STUDENT IN AN ORGANIZED HEALTH CARE EDUCATION/TRAINING PROGRAM

## 2022-02-18 PROCEDURE — 71000044 HC DOSC ROUTINE RECOVERY FIRST HOUR: Performed by: STUDENT IN AN ORGANIZED HEALTH CARE EDUCATION/TRAINING PROGRAM

## 2022-02-18 PROCEDURE — 37000008 HC ANESTHESIA 1ST 15 MINUTES: Performed by: STUDENT IN AN ORGANIZED HEALTH CARE EDUCATION/TRAINING PROGRAM

## 2022-02-18 PROCEDURE — D9220A PRA ANESTHESIA: ICD-10-PCS | Mod: ,,, | Performed by: ANESTHESIOLOGY

## 2022-02-18 PROCEDURE — 46030 PR REMOVAL OF RECTAL MARKER: ICD-10-PCS | Mod: ,,, | Performed by: STUDENT IN AN ORGANIZED HEALTH CARE EDUCATION/TRAINING PROGRAM

## 2022-02-18 PROCEDURE — 37000009 HC ANESTHESIA EA ADD 15 MINS: Performed by: STUDENT IN AN ORGANIZED HEALTH CARE EDUCATION/TRAINING PROGRAM

## 2022-02-18 RX ORDER — MIDAZOLAM HYDROCHLORIDE 1 MG/ML
INJECTION, SOLUTION INTRAMUSCULAR; INTRAVENOUS
Status: DISCONTINUED | OUTPATIENT
Start: 2022-02-18 | End: 2022-02-18

## 2022-02-18 RX ORDER — OXYCODONE HYDROCHLORIDE 5 MG/1
5 TABLET ORAL EVERY 4 HOURS PRN
Qty: 20 TABLET | Refills: 0 | Status: SHIPPED | OUTPATIENT
Start: 2022-02-18 | End: 2022-04-20

## 2022-02-18 RX ORDER — LIDOCAINE HYDROCHLORIDE 10 MG/ML
1 INJECTION, SOLUTION EPIDURAL; INFILTRATION; INTRACAUDAL; PERINEURAL ONCE
Status: DISCONTINUED | OUTPATIENT
Start: 2022-02-18 | End: 2023-05-18

## 2022-02-18 RX ORDER — SODIUM CHLORIDE 0.9 % (FLUSH) 0.9 %
3 SYRINGE (ML) INJECTION
Status: DISCONTINUED | OUTPATIENT
Start: 2022-02-18 | End: 2022-02-18 | Stop reason: HOSPADM

## 2022-02-18 RX ORDER — FENTANYL CITRATE 50 UG/ML
INJECTION, SOLUTION INTRAMUSCULAR; INTRAVENOUS
Status: DISCONTINUED | OUTPATIENT
Start: 2022-02-18 | End: 2022-02-18

## 2022-02-18 RX ORDER — LIDOCAINE HYDROCHLORIDE 20 MG/ML
INJECTION, SOLUTION EPIDURAL; INFILTRATION; INTRACAUDAL; PERINEURAL
Status: DISCONTINUED | OUTPATIENT
Start: 2022-02-18 | End: 2022-02-18

## 2022-02-18 RX ORDER — MUPIROCIN 20 MG/G
OINTMENT TOPICAL
Status: DISCONTINUED | OUTPATIENT
Start: 2022-02-18 | End: 2023-05-18

## 2022-02-18 RX ORDER — PROPOFOL 10 MG/ML
VIAL (ML) INTRAVENOUS CONTINUOUS PRN
Status: DISCONTINUED | OUTPATIENT
Start: 2022-02-18 | End: 2022-02-18

## 2022-02-18 RX ORDER — BUPIVACAINE HYDROCHLORIDE 5 MG/ML
INJECTION, SOLUTION EPIDURAL; INTRACAUDAL
Status: DISCONTINUED | OUTPATIENT
Start: 2022-02-18 | End: 2022-02-18 | Stop reason: HOSPADM

## 2022-02-18 RX ORDER — HYDROMORPHONE HYDROCHLORIDE 1 MG/ML
0.2 INJECTION, SOLUTION INTRAMUSCULAR; INTRAVENOUS; SUBCUTANEOUS EVERY 5 MIN PRN
Status: DISCONTINUED | OUTPATIENT
Start: 2022-02-18 | End: 2022-02-18 | Stop reason: HOSPADM

## 2022-02-18 RX ORDER — LIDOCAINE HYDROCHLORIDE AND EPINEPHRINE 10; 10 MG/ML; UG/ML
INJECTION, SOLUTION INFILTRATION; PERINEURAL
Status: DISCONTINUED | OUTPATIENT
Start: 2022-02-18 | End: 2022-02-18 | Stop reason: HOSPADM

## 2022-02-18 RX ORDER — SODIUM CHLORIDE 9 MG/ML
INJECTION, SOLUTION INTRAVENOUS CONTINUOUS
Status: DISCONTINUED | OUTPATIENT
Start: 2022-02-18 | End: 2023-05-18

## 2022-02-18 RX ORDER — PROPOFOL 10 MG/ML
VIAL (ML) INTRAVENOUS
Status: DISCONTINUED | OUTPATIENT
Start: 2022-02-18 | End: 2022-02-18

## 2022-02-18 RX ADMIN — MIDAZOLAM 2 MG: 1 INJECTION INTRAMUSCULAR; INTRAVENOUS at 01:02

## 2022-02-18 RX ADMIN — PROPOFOL 30 MG: 10 INJECTION, EMULSION INTRAVENOUS at 01:02

## 2022-02-18 RX ADMIN — Medication 125 MCG/KG/MIN: at 01:02

## 2022-02-18 RX ADMIN — SODIUM CHLORIDE: 0.9 INJECTION, SOLUTION INTRAVENOUS at 01:02

## 2022-02-18 RX ADMIN — FENTANYL CITRATE 50 MCG: 50 INJECTION INTRAMUSCULAR; INTRAVENOUS at 01:02

## 2022-02-18 RX ADMIN — LIDOCAINE HYDROCHLORIDE 100 MG: 20 INJECTION, SOLUTION EPIDURAL; INFILTRATION; INTRACAUDAL at 01:02

## 2022-02-18 NOTE — OP NOTE
[facility]  Colon and Rectal Surgery    [address]  [place], [place]  Tel: [phone]  Fax: [phone]  [email or web address]   MD Paddy Solorzano MD Brian Kann, MD W. Forrest Johnston, MD Matthew Giglia, MD Jennifer Paruch, MD William Kethman, MD Danielle Kay, MD     Patient name: Sanchez Drake   YOB: 1975   MRN: [id number]  Date of surgery: 02/18/2022    Operative Report    Pre-operative diagnosis: Perianal fistula  Post-operative diagnosis: Same as above    Procedure:  1. Pudendal nerve and perianal block  2. Anal examination under anesthesia  3. Fistulotomy    Findings:  1. Posterior just right of midline superficial perianal fistula - fistulotomy performed    Surgeon: Nomi Mora MD  Assistant: Lilibeth Niño MD    Indication: Mr. Zarate is a 46 year old man with no significant medical history who underwent perianal abscess drainage and seton placement in 1/2022 - here now for fistulotomy. The benefits, risks, and alternatives were discussed with the patient, they were given the opportunity to ask questions and they elected to proceed with operative intervention after signing written consent.    Procedure:  After pre-operative assessment and review of informed consent, the patient was taken to the operating room and received monitored anesthesia care. The patient was placed in lithotomy position. The perineum was prepped and draped in the usual sterile fashion and a timeout was performed according to [facility] guidelines.      A pudendal nerve block was performed with 0.25% Marcaine:1% Lidocaine with epinephrine (1:1) by palpating the ischial spine and injecting 5 mL of local approximately 1 cm anterior and medially. A perianal block was then performed with 5 mL of local in 4 quadrant surrounding the anus.    A digital exam was performed initially and then a thorough four quadrant systematic, anoscopic  exam was performed with a Hill-Carmichael retractor, findings discussed above.    The fistula was identified and a probe was placed - the seton was cut and removed. A fistulotomy was performed to the right of midline after assessment of sphincter involvement (minimal). The granulation tissue was cauterized and hemostasis achieved.    Instrument, needle, and sponge counts were correct.    The procedure was completed without complication and was well-tolerated. The patient was then brought to the post-anesthesia care unit in stable condition. I was present for the entire operation and discussed the surgery with the patients family at the end of the case.    Complications: None  Estimated blood loss: Minimal  Disposition: PACU      Nomi Mora MD - Staff Surgeon  Department of Colon & Rectal Surgery  [facility]

## 2022-02-18 NOTE — DISCHARGE INSTRUCTIONS
Patient Education       Rectal Exam Under Anesthesia   Why is this procedure done?   A rectal exam under anesthesia lets your doctor examine your rectum while your muscles relax. Doctors give anesthesia to keep you comfortable and asleep during the procedure. Your doctor may order this test to:  · Check for problems in your anus or rectum like fissures or fistulas  · Look at your anus and rectum for areas that bleed  · Find the cause of changes in your bowel movement  · Find the cause of belly pain  · Find and treat polyps or tumor  · Get small pieces of the bowel for tests (biopsy)  What will the results be?   Your doctor will learn more about any problems you may have with your anus and rectum.  What happens before the procedure?   · Your doctor will take your history and do an exam. The doctor may order tests for your stool. Talk to your doctor about:  ? All the drugs you are taking. Be sure to include all prescription, over-the-counter, vitamins, and herbal supplements. Bring a list of drugs you take with you.  ? Tell the doctor if you have any drug allergy.  ? Any bleeding problems. Be sure to tell your doctor if you are taking any drugs that may cause bleeding. Some of these are warfarin, rivaroxaban, apixaban, ticagrelor, clopidogrel, ketorolac, ibuprofen, naproxen, or aspirin. Certain vitamins and herbs, such as garlic and fish oil, may also add to the risk for bleeding. You may need to stop these drugs as well. Talk to your doctor about them.  ? When you need to stop eating or drinking before your procedure.  · You may need to have a laxative or enema the night before the test. Sometimes, you will need a second enema.  · Your doctor may suggest a clear liquid diet a few days before the test. Do not drink liquids that have red or purple dye in them.  · Clear liquids include such things as:  ? Broth  ? Water  ? Plain coffee or tea ? no milk or creamer  ? Gelatin, jello, popsicles  ? Clear fruit juices without  pulp  ? Clear drinks, such as lemon-lime soda and sports drinks  · Ask a family member or friend to drive you home. You will not be allowed to drive after your procedure.  What happens during the procedure?   · Once you are in the operating room, the staff will put an IV in your arm to give you fluids and drugs. You will be given a drug to make you sleepy. It will also help you stay pain free during the procedure.  · You will lie on your side with your knees bent and drawn up toward your chest.  · The doctor will first use a gloved finger to examine your anus and rectum. Your doctor may need to use other special tools to check the area more closely or farther inside or treat what they find. These include:  ? A small thin tube with a light and a camera on it. Your doctor puts this gently into your anus. Depending on the type of tool, your doctor may move the tool farther into your rectum or bowel.  ? The doctor may put small amounts of air into your bowel.  ? Tools to take small tissue samples, remove small growths, or treat hemorrhoids.  · The doctor takes all the tools out at the end of the procedure.  · This test takes 15 to 20 minutes.  What happens after the procedure?   · You may feel some pressure or cramps for a short time from the extra air in the colon. That feeling will go away as you pass the air from your colon.  · The doctor will send any tissue to a lab to be checked.  · You can go home after your procedure.  What care is needed at home?   · Ask your doctor what you need to do when you go home. Make sure you ask questions if you do not understand everything the doctor says.  · You can often resume your normal activities and diet the day after your exam. Drink plenty of water to prevent constipation.  · You may have a small amount of bleeding during the first few days after your procedure.  What follow-up care is needed?   · Your doctor may ask you to make visits to the office to check on your progress.  Be sure to keep these visits.  · If you had a biopsy, talk to your doctor about when the test results will be back. Together you can make a plan for more care.  What problems could happen?   · Bleeding  · Injury inside your colon  · Infection  When do I need to call the doctor?   · Signs of infection. These include a fever of 100.4°F (38°C) or higher, chills.  · Changes in your belly or bowel habits:  ? Bad bleeding from the rectum  ? Black, tarry, or bloody bowel movements  ? Trouble passing gas or having a bowel movement  ? Pain or swelling in the belly  · Dizziness  · Upset stomach  Where can I learn more?   NHS Choices  https://www.nhs.uk/conditions/rectal-examination/   Last Reviewed Date   2021-09-09  Consumer Information Use and Disclaimer   This information is not specific medical advice and does not replace information you receive from your health care provider. This is only a brief summary of general information. It does NOT include all information about conditions, illnesses, injuries, tests, procedures, treatments, therapies, discharge instructions or life-style choices that may apply to you. You must talk with your health care provider for complete information about your health and treatment options. This information should not be used to decide whether or not to accept your health care providers advice, instructions or recommendations. Only your health care provider has the knowledge and training to provide advice that is right for you.  Copyright   Copyright © 2021 [employer] and its affiliates and/or licensors. All rights reserved.

## 2022-02-18 NOTE — BRIEF OP NOTE
[facility] - Surgery ([facility])  Brief Operative Note    Surgery Date: 2/18/2022     Surgeon(s) and Role:     * Nomi Mora MD - Primary     * Lilibeth Niño MD - Fellow - Assisting        Pre-op Diagnosis:  Perirectal abscess [K61.1]    Post-op Diagnosis:  Post-Op Diagnosis Codes:     * Perirectal abscess [K61.1]    Procedure(s) (LRB):  FISTULOTOMY, RECTUM (N/A)    Anesthesia: General/MAC    Operative Findings: Posterior perianal fistula just to right of midline. Less than 30% of sphincter complex involved. Seton removed, fistulotomy performed.    Estimated Blood Loss: 5cc         Specimens:   Specimen (24h ago, onward)            None            Discharge Note    OUTCOME: Patient tolerated treatment/procedure well without complication and is now ready for discharge.    DISPOSITION: Home or Self Care    FINAL DIAGNOSIS: Anal Fistula    FOLLOWUP: In clinic    DISCHARGE INSTRUCTIONS:    Discharge Procedure Orders   Diet Adult Regular     Activity as tolerated

## 2022-02-18 NOTE — TRANSFER OF CARE
"Anesthesia Transfer of Care Note    Patient: Sanchez Drake    Procedure(s) Performed: Procedure(s) (LRB):  FISTULOTOMY, RECTUM (N/A)    Patient location: [facility]    Anesthesia Type: general    Transport from OR: Transported from OR on 6-10 L/min O2 by face mask with adequate spontaneous ventilation    Post pain: adequate analgesia    Post assessment: no apparent anesthetic complications and tolerated procedure well    Post vital signs: stable    Level of consciousness: responds to stimulation and sedated    Nausea/Vomiting: no nausea/vomiting    Complications: none    Transfer of care protocol was followed      Last vitals:   Visit Vitals  /79 (BP Location: Right arm, Patient Position: Lying)   Pulse 80   Temp 36.2 °C (97.2 °F) (Temporal)   Resp 18   Ht 5' 10" (1.778 m)   Wt 99.8 kg (220 lb)   SpO2 97%   BMI 31.57 kg/m²     "

## 2022-02-18 NOTE — PATIENT INSTRUCTIONS
Anal Surgery Post Op Instructions:    You had an exam under anesthesia and fistulotomy performed today.     Wound care:  Expect some drainage over the next few weeks as the wound heals.  Please wear a pad to help with drainage.     You have no activity restrictions.   No dietary restrictions.    Medications:  A narcotic pain medication has been prescribed.  Please start to wean the pain medication over the following few days.  You can take tylenol instead of the pain medication.      Please take miralax 1 capful at night to prevent constipation associated with narcotic pain medications.      Follow up:  Return to clinic in 1-2 weeks for follow up and wound check.

## 2022-02-18 NOTE — ANESTHESIA PREPROCEDURE EVALUATION
02/18/2022  Sanchez Drake is a 46 y.o., male.    Anesthesia Evaluation    I have reviewed the Patient Summary Reports.         Review of Systems  Anesthesia Hx:  No problems with previous Anesthesia    Social:  Non-Smoker    Hematology/Oncology:  Hematology Normal   Oncology Normal     EENT/Dental:EENT/Dental Normal   Cardiovascular:  Cardiovascular Normal     Pulmonary:  Pulmonary Normal    Renal/:   renal calculi    Hepatic/GI:  Hepatic/GI Normal    Musculoskeletal:  Spine Disorders: lumbar    Neurological:   Neuromuscular Disease, (Brain injury)    Endocrine:  Endocrine Normal    Dermatological:  Skin Normal    Psych:  Psychiatric Normal           Physical Exam  General:  Well nourished    Airway/Jaw/Neck:  Airway Findings: Mouth Opening: Normal Tongue: Normal  General Airway Assessment: Adult  Mallampati: II  Improves to II with phonation.  TM Distance: Normal, at least 6 cm  Jaw/Neck Findings:  Neck ROM: Normal ROM      Dental:  Dental Findings: In tact   Chest/Lungs:  Chest/Lungs Findings: Clear to auscultation, Normal Respiratory Rate     Heart/Vascular:  Heart Findings: Rate: Normal  Rhythm: Regular Rhythm  Sounds: Normal             Anesthesia Plan  Type of Anesthesia, risks & benefits discussed:  Anesthesia Type:  general    Patient's Preference: General  Plan Factors:          Intra-op Monitoring Plan: standard ASA monitors  Intra-op Monitoring Plan Comments:   Post Op Pain Control Plan: multimodal analgesia  Post Op Pain Control Plan Comments:     Induction:   IV  Beta Blocker:  Patient is not currently on a Beta-Blocker (No further documentation required).       Informed Consent: Patient understands risks and agrees with Anesthesia plan.  Questions answered. Anesthesia consent signed with patient.  ASA Score: 2     Day of Surgery Review of History & Physical: I have interviewed and  examined the patient. I have reviewed the patient's H&P dated:  There are no significant changes.          Ready For Surgery From Anesthesia Perspective.

## 2022-02-19 ENCOUNTER — PATIENT MESSAGE (OUTPATIENT)
Dept: SURGERY | Facility: CLINIC | Age: 47
End: 2022-02-19
Payer: COMMERCIAL

## 2022-02-20 NOTE — ANESTHESIA POSTPROCEDURE EVALUATION
Anesthesia Post Evaluation    Patient: Sanchez Drake    Procedure(s) Performed: Procedure(s) (LRB):  FISTULOTOMY, RECTUM (N/A)    Final Anesthesia Type: general      Patient location during evaluation: PACU  Patient participation: Yes- Able to Participate  Level of consciousness: awake and alert  Post-procedure vital signs: reviewed and stable  Pain control: Pain has been treated.  Airway patency: patent    PONV status: PONV absent or treated.  Anesthetic complications: no      Cardiovascular status: hemodynamically stable  Respiratory status: spontaneous ventilation  Hydration status: euvolemic  Follow-up not needed.          Vitals Value Taken Time   /77 02/18/22 1630   Temp 36.3 °C (97.4 °F) 02/18/22 1630   Pulse 73 02/18/22 1630   Resp 18 02/18/22 1430   SpO2 96 % 02/18/22 1630         No case tracking events are documented in the log.      Pain/Tasha Score: No data recorded

## 2022-02-21 ENCOUNTER — TELEPHONE (OUTPATIENT)
Dept: SURGERY | Facility: CLINIC | Age: 47
End: 2022-02-21
Payer: COMMERCIAL

## 2022-02-21 ENCOUNTER — PATIENT MESSAGE (OUTPATIENT)
Dept: SURGERY | Facility: CLINIC | Age: 47
End: 2022-02-21
Payer: COMMERCIAL

## 2022-02-21 DIAGNOSIS — H57.89 RED EYES: Primary | ICD-10-CM

## 2022-02-21 NOTE — TELEPHONE ENCOUNTER
Called and spoke to Yanni - suspect ocular symptoms are related to valsalva in OR with airway issues - he is not having any vision changes and it does appear to be clearing    Urgent referral to Ophthalmology placed    Answered questions regarding wound care    My cell phone provided - I am going to try to reach out to Ophthalmology myself to discuss with them      Nomi Mora MD - Staff Surgeon  Department of Colon & Rectal Surgery  [facility]

## 2022-02-21 NOTE — TELEPHONE ENCOUNTER
Called pt's wife. Referral for opthalmology placed. She would also like a call from Dr. Mora. Message sent to him.     Thanks  Lorrie

## 2022-02-21 NOTE — TELEPHONE ENCOUNTER
Spoke with Dr. Payan - reviewed discussion with patient's wife    Continue to monitor for now - warning signs discussed, they have my cell if any issues arise      Nomi Mora MD - Staff Surgeon  Department of Colon & Rectal Surgery  [facility]

## 2022-02-21 NOTE — PROGRESS NOTES
Returned pt's wife's call. Pt woke day following procedure with red, swollen, painful eyes. States no vision changes at this time. opth referral placed. She verbalized understanding. She would like Dr. Mora to return her call. Message placed to him.

## 2022-02-24 NOTE — PROGRESS NOTES
[facility]  Colon and Rectal Surgery    [address]  [place], [place]  Tel: [phone]  Fax: [phone]  [email or web address]   MD Paddy Solorzano MD Brian Kann, MD W. Forrest Johnston, MD Matthew Giglia, MD Jennifer Paruch, MD William Kethman, MD     Patient name: Sanchez Drake   YOB: 1975   MRN: [id number]  Date of visit: 02/25/2022    Dear Wandy Gomez and Teresa,    It was a pleasure seeing Mr. Drake in the Colon and Rectal Surgery clinic here at [facility].     As you know, Mr. Drake is a 46 year old man with no significant medical history who presents for evaluation of perianal abscess. He underwent possible drainage by Dr. Steward (Dr. Gomez) in the ED on 1/9/2022. He has not had improvement in his pain since the procedure. He is on antibiotics. He went to the ED/urgent care three times before now. He is having chills but no fevers and is in severe pain.    Last colonoscopy: 5/2017 (Complete)  The perianal and digital rectal examinations were normal.   The colon (entire examined portion) appeared normal. Biopsies for histology were taken with a cold forceps from the entire colon for evaluation of microscopic colitis. Normal  The terminal ileum appeared normal.     CT Pelvis - 1/9/2022 - Images reviewed and interpreted independently   Posterior perianal abscess    1/21/2022  Here for post-operative follow-up after incision and drainage of perirectal abscess with seton placement on 1/11/2022. He is doing better - pain is improved, drainage less. Still uncomfortable from drain.    2/11/2022  Here to assess wound healing and plan for fistulotomy. He is doing well - minimal drainage. He has no fecal incontinence.    2/25/2022  Here today for post-operative follow-up of fistulotomy on 2/18/2022 - the procedure was complicated by development of bilateral conjunctival hemorrhage, discussed with Ophthalmology  - this is improving. He has otherwise recovered well - here for wound check.     The patient was informed of the availability of a certified  without charge. A certified  was not necessary for this visit.    Review of Systems  See pertinent review of systems above    Past Medical History:   Diagnosis Date    Chronic back pain     Chronic diarrhea     Hernia     TBI (traumatic brain injury) 2010    fake wall fell on head (no brain or skull injury per pt)     Past Surgical History:   Procedure Laterality Date    COLONOSCOPY N/A 5/29/2017    Procedure: COLONOSCOPY;  Surgeon: Sukhi Scanlon MD;  Location: [facility] ENDO (4TH FLR);  Service: Endoscopy;  Laterality: N/A;    CYSTOSCOPY N/A 1/31/2020    Procedure: CYSTOSCOPY;  Surgeon: Parminder Craig MD;  Location: [facility] OR 1ST FLR;  Service: Urology;  Laterality: N/A;    CYSTOSCOPY W/ URETERAL STENT PLACEMENT Left 11/19/2018    Procedure: CYSTOSCOPY, WITH URETERAL STENT INSERTION;  Surgeon: Renee Garcia MD;  Location: [facility] OR 2ND FLR;  Service: Urology;  Laterality: Left;    CYSTOSCOPY W/ URETERAL STENT PLACEMENT  12/21/2018    Procedure: CYSTOSCOPY, WITH URETERAL STENT INSERTION;  Surgeon: Bhupinder Washington Jr., MD;  Location: [facility] OR 1ST FLR;  Service: Urology;;    CYSTOSCOPY W/ URETERAL STENT REMOVAL Left 12/21/2018    Procedure: CYSTOSCOPY, WITH URETERAL STENT REMOVAL;  Surgeon: Bhupinder Washington Jr., MD;  Location: [facility] OR [facility] FLR;  Service: Urology;  Laterality: Left;    HERNIA REPAIR  2017    umbilical    INCISION AND DRAINAGE OF PERIRECTAL REGION N/A 1/11/2022    Procedure: INCISION AND DRAINAGE, PERIRECTAL REGION (lithotomy);  Surgeon: Nomi Mora MD;  Location: [facility] OR 2ND FLR;  Service: Colon and Rectal;  Laterality: N/A;    INSERTION OF SETON STITCH  1/11/2022    Procedure: PLACEMENT, SETON STITCH;  Surgeon: Nomi Mora MD;  Location: [facility] OR 2ND FLR;  Service: Colon and Rectal;;    LASER LITHOTRIPSY  Left 12/21/2018    Procedure: LITHOTRIPSY, USING LASER (mana);  Surgeon: Bhupinder Washington Jr., MD;  Location: [facility] OR 1ST FLR;  Service: Urology;  Laterality: Left;  90mins    LASER LITHOTRIPSY Left 1/31/2020    Procedure: LITHOTRIPSY, USING LASER;  Surgeon: Parminder Craig MD;  Location: [facility] OR 1ST FLR;  Service: Urology;  Laterality: Left;    LUMBAR EPIDURAL INJECTION      with steroid    RECTAL FISTULOTOMY N/A 2/18/2022    Procedure: FISTULOTOMY, RECTUM;  Surgeon: Nomi Mora MD;  Location: [facility] OR 2ND FLR;  Service: Colon and Rectal;  Laterality: N/A;    RETROGRADE PYELOGRAPHY Left 11/19/2018    Procedure: PYELOGRAM, RETROGRADE;  Surgeon: Renee Garcia MD;  Location: [facility] OR 2ND FLR;  Service: Urology;  Laterality: Left;    RETROGRADE PYELOGRAPHY Left 12/21/2018    Procedure: PYELOGRAM, RETROGRADE;  Surgeon: Bhupinder Washington Jr., MD;  [facility];  Service: Urology;  Laterality: Left;    RETROGRADE PYELOGRAPHY Left 1/31/2020    Procedure: PYELOGRAM, RETROGRADE;  Surgeon: Parminder Craig MD;  [facility];  Service: Urology;  Laterality: Left;    URETEROSCOPIC REMOVAL OF URETERIC CALCULUS Left 1/31/2020    Procedure: REMOVAL, CALCULUS, URETER, URETEROSCOPIC;  Surgeon: Parminder Craig MD;  [facility];  Service: Urology;  Laterality: Left;    URETEROSCOPY Left 12/21/2018    Procedure: URETEROSCOPY;  Surgeon: Bhupinder Washington Jr., MD;  [facility];  Service: Urology;  Laterality: Left;  90mins    URETEROSCOPY Left 1/31/2020    Procedure: URETEROSCOPY;  Surgeon: Parminder Craig MD;  [facility];  Service: Urology;  Laterality: Left;     Family History   Problem Relation Age of Onset    Diabetes Mother     Cancer Cousin 50        Cancer possibly stomach     Heart attack Neg Hx     Heart disease Neg Hx     Hypertension Neg Hx     Colon cancer Neg Hx     Esophageal cancer Neg Hx      Social History     Tobacco  "Use    Smoking status: Former Smoker     Quit date: 1/1/2007     Years since quitting: 15.1    Smokeless tobacco: Never Used   Substance Use Topics    Alcohol use: Yes     Comment: rare    Drug use: No     Review of patient's allergies indicates:  No Known Allergies    Current Outpatient Medications on File Prior to Visit   Medication Sig Dispense Refill    chlorthalidone (HYGROTEN) 25 MG Tab Take 25 mg by mouth.      clobetasoL (TEMOVATE) 0.05 % external solution APPLY TO AFFECTED AREAS ON SCALP THREE TO FOUR TIMES A WEEK AT NIGHT.      oxyCODONE (ROXICODONE) 5 MG immediate release tablet Take 1 tablet (5 mg total) by mouth every 4 (four) hours as needed for Pain. (Patient not taking: Reported on 2/25/2022) 20 tablet 0    polyethylene glycol (GLYCOLAX) 17 gram PwPk Take 17 g by mouth once daily. (Patient not taking: No sig reported) 14 each 0     Current Facility-Administered Medications on File Prior to Visit   Medication Dose Route Frequency Provider Last Rate Last Admin    0.9%  NaCl infusion   Intravenous Continuous Joelle Milligan NP        LIDOcaine (PF) 10 mg/ml (1%) injection 10 mg  1 mL Intradermal Once Joelle Milligan NP        mupirocin 2 % ointment   Nasal On Call Procedure Joelle Milligan NP         Physical Examination  /76 (BP Location: Left arm, Patient Position: Sitting, BP Method: Large (Automatic))   Pulse 91   Ht 5' 10" (1.778 m)   Wt 101.6 kg (223 lb 15.8 oz)   BMI 32.14 kg/m²      A chaperone was present for the physical examination.    Constitutional: well developed, no cough, no dyspnea, alert, and no acute distress    Head: Normocephalic, no lesions, without obvious abnormality  Eye: Bilateral conjunctival hemorrhage - improving  Cardiovascular: regular rate and regular rhythm  Respiratory: normal air entry  Gastrointestinal: soft, non-tender    Perianal Skin: Healing well - repacked    Musculoskeletal: full range of motion without pain  Neurologic: alert, " oriented, normal speech, no focal findings or movement disorder noted  Psychiatric: appropriate, normal mood    Assessment and Plan of Care    Thank you again for referring Mr. Drake to my care. In summary, Mr. Drake is a 46 year old man presenting with a perirectal abscess with associated fistula - treated now with fistulotomy, will need close monitoring to insure heals well. We discussed treatment options and have provided the following recommendations:    1. Follow-up in 1.5 weeks    Please do not hesitate to contact me if you have any questions.      Nomi Mora MD - Staff Surgeon  Department of Colon & Rectal Surgery  [facility]

## 2022-02-25 ENCOUNTER — OFFICE VISIT (OUTPATIENT)
Dept: SURGERY | Facility: CLINIC | Age: 47
End: 2022-02-25
Payer: COMMERCIAL

## 2022-02-25 VITALS
HEIGHT: 70 IN | DIASTOLIC BLOOD PRESSURE: 76 MMHG | HEART RATE: 91 BPM | BODY MASS INDEX: 32.07 KG/M2 | WEIGHT: 224 LBS | SYSTOLIC BLOOD PRESSURE: 134 MMHG

## 2022-02-25 DIAGNOSIS — K61.1 PERIRECTAL ABSCESS: Primary | ICD-10-CM

## 2022-02-25 PROCEDURE — 1159F MED LIST DOCD IN RCRD: CPT | Mod: CPTII,S$GLB,, | Performed by: STUDENT IN AN ORGANIZED HEALTH CARE EDUCATION/TRAINING PROGRAM

## 2022-02-25 PROCEDURE — 99999 PR PBB SHADOW E&M-EST. PATIENT-LVL III: ICD-10-PCS | Mod: PBBFAC,,, | Performed by: STUDENT IN AN ORGANIZED HEALTH CARE EDUCATION/TRAINING PROGRAM

## 2022-02-25 PROCEDURE — 99999 PR PBB SHADOW E&M-EST. PATIENT-LVL III: CPT | Mod: PBBFAC,,, | Performed by: STUDENT IN AN ORGANIZED HEALTH CARE EDUCATION/TRAINING PROGRAM

## 2022-02-25 PROCEDURE — 3075F PR MOST RECENT SYSTOLIC BLOOD PRESS GE 130-139MM HG: ICD-10-PCS | Mod: CPTII,S$GLB,, | Performed by: STUDENT IN AN ORGANIZED HEALTH CARE EDUCATION/TRAINING PROGRAM

## 2022-02-25 PROCEDURE — 99024 POSTOP FOLLOW-UP VISIT: CPT | Mod: S$GLB,,, | Performed by: STUDENT IN AN ORGANIZED HEALTH CARE EDUCATION/TRAINING PROGRAM

## 2022-02-25 PROCEDURE — 1159F PR MEDICATION LIST DOCUMENTED IN MEDICAL RECORD: ICD-10-PCS | Mod: CPTII,S$GLB,, | Performed by: STUDENT IN AN ORGANIZED HEALTH CARE EDUCATION/TRAINING PROGRAM

## 2022-02-25 PROCEDURE — 3075F SYST BP GE 130 - 139MM HG: CPT | Mod: CPTII,S$GLB,, | Performed by: STUDENT IN AN ORGANIZED HEALTH CARE EDUCATION/TRAINING PROGRAM

## 2022-02-25 PROCEDURE — 3008F BODY MASS INDEX DOCD: CPT | Mod: CPTII,S$GLB,, | Performed by: STUDENT IN AN ORGANIZED HEALTH CARE EDUCATION/TRAINING PROGRAM

## 2022-02-25 PROCEDURE — 3078F PR MOST RECENT DIASTOLIC BLOOD PRESSURE < 80 MM HG: ICD-10-PCS | Mod: CPTII,S$GLB,, | Performed by: STUDENT IN AN ORGANIZED HEALTH CARE EDUCATION/TRAINING PROGRAM

## 2022-02-25 PROCEDURE — 99024 PR POST-OP FOLLOW-UP VISIT: ICD-10-PCS | Mod: S$GLB,,, | Performed by: STUDENT IN AN ORGANIZED HEALTH CARE EDUCATION/TRAINING PROGRAM

## 2022-02-25 PROCEDURE — 3008F PR BODY MASS INDEX (BMI) DOCUMENTED: ICD-10-PCS | Mod: CPTII,S$GLB,, | Performed by: STUDENT IN AN ORGANIZED HEALTH CARE EDUCATION/TRAINING PROGRAM

## 2022-02-25 PROCEDURE — 3078F DIAST BP <80 MM HG: CPT | Mod: CPTII,S$GLB,, | Performed by: STUDENT IN AN ORGANIZED HEALTH CARE EDUCATION/TRAINING PROGRAM

## 2022-03-08 ENCOUNTER — OFFICE VISIT (OUTPATIENT)
Dept: SURGERY | Facility: CLINIC | Age: 47
End: 2022-03-08
Payer: COMMERCIAL

## 2022-03-08 VITALS
DIASTOLIC BLOOD PRESSURE: 88 MMHG | SYSTOLIC BLOOD PRESSURE: 138 MMHG | BODY MASS INDEX: 32.48 KG/M2 | WEIGHT: 226.88 LBS | HEIGHT: 70 IN | HEART RATE: 95 BPM

## 2022-03-08 DIAGNOSIS — K61.1 PERIRECTAL ABSCESS: Primary | ICD-10-CM

## 2022-03-08 PROCEDURE — 3075F SYST BP GE 130 - 139MM HG: CPT | Mod: CPTII,S$GLB,, | Performed by: STUDENT IN AN ORGANIZED HEALTH CARE EDUCATION/TRAINING PROGRAM

## 2022-03-08 PROCEDURE — 99999 PR PBB SHADOW E&M-EST. PATIENT-LVL III: ICD-10-PCS | Mod: PBBFAC,,, | Performed by: STUDENT IN AN ORGANIZED HEALTH CARE EDUCATION/TRAINING PROGRAM

## 2022-03-08 PROCEDURE — 3079F PR MOST RECENT DIASTOLIC BLOOD PRESSURE 80-89 MM HG: ICD-10-PCS | Mod: CPTII,S$GLB,, | Performed by: STUDENT IN AN ORGANIZED HEALTH CARE EDUCATION/TRAINING PROGRAM

## 2022-03-08 PROCEDURE — 3008F BODY MASS INDEX DOCD: CPT | Mod: CPTII,S$GLB,, | Performed by: STUDENT IN AN ORGANIZED HEALTH CARE EDUCATION/TRAINING PROGRAM

## 2022-03-08 PROCEDURE — 3008F PR BODY MASS INDEX (BMI) DOCUMENTED: ICD-10-PCS | Mod: CPTII,S$GLB,, | Performed by: STUDENT IN AN ORGANIZED HEALTH CARE EDUCATION/TRAINING PROGRAM

## 2022-03-08 PROCEDURE — 99212 PR OFFICE/OUTPT VISIT, EST, LEVL II, 10-19 MIN: ICD-10-PCS | Mod: S$GLB,,, | Performed by: STUDENT IN AN ORGANIZED HEALTH CARE EDUCATION/TRAINING PROGRAM

## 2022-03-08 PROCEDURE — 3079F DIAST BP 80-89 MM HG: CPT | Mod: CPTII,S$GLB,, | Performed by: STUDENT IN AN ORGANIZED HEALTH CARE EDUCATION/TRAINING PROGRAM

## 2022-03-08 PROCEDURE — 99212 OFFICE O/P EST SF 10 MIN: CPT | Mod: S$GLB,,, | Performed by: STUDENT IN AN ORGANIZED HEALTH CARE EDUCATION/TRAINING PROGRAM

## 2022-03-08 PROCEDURE — 3075F PR MOST RECENT SYSTOLIC BLOOD PRESS GE 130-139MM HG: ICD-10-PCS | Mod: CPTII,S$GLB,, | Performed by: STUDENT IN AN ORGANIZED HEALTH CARE EDUCATION/TRAINING PROGRAM

## 2022-03-08 PROCEDURE — 99999 PR PBB SHADOW E&M-EST. PATIENT-LVL III: CPT | Mod: PBBFAC,,, | Performed by: STUDENT IN AN ORGANIZED HEALTH CARE EDUCATION/TRAINING PROGRAM

## 2022-03-08 PROCEDURE — 1159F MED LIST DOCD IN RCRD: CPT | Mod: CPTII,S$GLB,, | Performed by: STUDENT IN AN ORGANIZED HEALTH CARE EDUCATION/TRAINING PROGRAM

## 2022-03-08 PROCEDURE — 1159F PR MEDICATION LIST DOCUMENTED IN MEDICAL RECORD: ICD-10-PCS | Mod: CPTII,S$GLB,, | Performed by: STUDENT IN AN ORGANIZED HEALTH CARE EDUCATION/TRAINING PROGRAM

## 2022-04-07 ENCOUNTER — TELEPHONE (OUTPATIENT)
Dept: SURGERY | Facility: CLINIC | Age: 47
End: 2022-04-07
Payer: COMMERCIAL

## 2022-04-07 NOTE — PROGRESS NOTES
[facility]  Colon and Rectal Surgery    [address]  [place], [place]  Tel: [phone]  Fax: [phone]  [email or web address]   MD Paddy Solorzano MD Brian Kann, MD W. Forrest Johnston, MD Matthew Giglia, MD Jennifer Paruch, MD William Kethman, MD     Patient name: Sanchez Drake   YOB: 1975   MRN: [id number]  Date of visit: 04/08/2022    Dear Wandy Gomez and Teresa,    It was a pleasure seeing Mr. Drake in the Colon and Rectal Surgery clinic here at [facility].     As you know, Mr. Drake is a 46 year old man with no significant medical history who presents for evaluation of perianal abscess. He underwent possible drainage by Dr. Steward (Dr. Gomez) in the ED on 1/9/2022. He has not had improvement in his pain since the procedure. He is on antibiotics. He went to the ED/urgent care three times before now. He is having chills but no fevers and is in severe pain.    Last colonoscopy: 5/2017 (Complete)  The perianal and digital rectal examinations were normal.   The colon (entire examined portion) appeared normal. Biopsies for histology were taken with a cold forceps from the entire colon for evaluation of microscopic colitis. Normal  The terminal ileum appeared normal.     CT Pelvis - 1/9/2022 - Images reviewed and interpreted independently   Posterior perianal abscess    1/21/2022  Here for post-operative follow-up after incision and drainage of perirectal abscess with seton placement on 1/11/2022. He is doing better - pain is improved, drainage less. Still uncomfortable from drain.    2/11/2022  Here to assess wound healing and plan for fistulotomy. He is doing well - minimal drainage. He has no fecal incontinence.    2/25/2022  Here today for post-operative follow-up of fistulotomy on 2/18/2022 - the procedure was complicated by development of bilateral conjunctival hemorrhage, discussed with Ophthalmology  - this is improving. He has otherwise recovered well - here for wound check.     3/8/2022  Here today for early interval wound check. Drainage and discoloration of eyes is improving.    4/8/2022  Here today for wound evaluation after fistulotomy. He is doing well - eyes have resolved, no , no drainage.    The patient was informed of the availability of a certified  without charge. A certified  was not necessary for this visit.    Review of Systems  See pertinent review of systems above    Past Medical History:   Diagnosis Date    Chronic back pain     Chronic diarrhea     Hernia     TBI (traumatic brain injury) 2010    fake wall fell on head (no brain or skull injury per pt)     Past Surgical History:   Procedure Laterality Date    COLONOSCOPY N/A 5/29/2017    Procedure: COLONOSCOPY;  Surgeon: Sukhi Scanlon MD;  Location: [facility] (4TH FLR);  Service: Endoscopy;  Laterality: N/A;    CYSTOSCOPY N/A 1/31/2020    Procedure: CYSTOSCOPY;  Surgeon: Parminder Craig MD;  Location: [facility] OR 1ST FLR;  Service: Urology;  Laterality: N/A;    CYSTOSCOPY W/ URETERAL STENT PLACEMENT Left 11/19/2018    Procedure: CYSTOSCOPY, WITH URETERAL STENT INSERTION;  Surgeon: Renee Garcia MD;  Location: [facility] OR 2ND FLR;  Service: Urology;  Laterality: Left;    CYSTOSCOPY W/ URETERAL STENT PLACEMENT  12/21/2018    Procedure: CYSTOSCOPY, WITH URETERAL STENT INSERTION;  Surgeon: Bhupinder Washington Jr., MD;  [facility];  Service: Urology;;    CYSTOSCOPY W/ URETERAL STENT REMOVAL Left 12/21/2018    Procedure: CYSTOSCOPY, WITH URETERAL STENT REMOVAL;  Surgeon: Bhupinder Washington Jr., MD;  [facility];  Service: Urology;  Laterality: Left;    HERNIA REPAIR  2017    umbilical    INCISION AND DRAINAGE OF PERIRECTAL REGION N/A 1/11/2022    Procedure: INCISION AND DRAINAGE, PERIRECTAL REGION (lithotomy);  Surgeon: Nomi Mora MD;  Location: [facility] OR 2ND FLR;  Service:  Colon and Rectal;  Laterality: N/A;    INSERTION OF SETON STITCH  1/11/2022    Procedure: PLACEMENT, SETON STITCH;  Surgeon: Nomi Mora MD;  Location: [facility] OR 2ND FLR;  Service: Colon and Rectal;;    LASER LITHOTRIPSY Left 12/21/2018    Procedure: LITHOTRIPSY, USING LASER (mana);  Surgeon: Bhupinder Washington Jr., MD;  Location: [facility] OR 1ST FLR;  Service: Urology;  Laterality: Left;  90mins    LASER LITHOTRIPSY Left 1/31/2020    Procedure: LITHOTRIPSY, USING LASER;  Surgeon: Parminder Craig MD;  [facility]: [facility] OR 1ST FLR;  Service: Urology;  Laterality: Left;    LUMBAR EPIDURAL INJECTION      with steroid    RECTAL FISTULOTOMY N/A 2/18/2022    Procedure: FISTULOTOMY, RECTUM;  Surgeon: Nomi Mora MD;  Location: [facility] OR 2ND FLR;  Service: Colon and Rectal;  Laterality: N/A;    RETROGRADE PYELOGRAPHY Left 11/19/2018    Procedure: PYELOGRAM, RETROGRADE;  Surgeon: Renee Garcia MD;  Location: [facility] OR 2ND FLR;  Service: Urology;  Laterality: Left;    RETROGRADE PYELOGRAPHY Left 12/21/2018    Procedure: PYELOGRAM, RETROGRADE;  Surgeon: Bhupinder Washington Jr., MD;  Location: [facility] OR 1ST FLR;  Service: Urology;  Laterality: Left;    RETROGRADE PYELOGRAPHY Left 1/31/2020    Procedure: PYELOGRAM, RETROGRADE;  Surgeon: Parminder Craig MD;  Location: [facility] OR 1ST FLR;  Service: Urology;  Laterality: Left;    URETEROSCOPIC REMOVAL OF URETERIC CALCULUS Left 1/31/2020    Procedure: REMOVAL, CALCULUS, URETER, URETEROSCOPIC;  Surgeon: Parminder Craig MD;  [facility] 1ST FLR;  Service: Urology;  Laterality: Left;    URETEROSCOPY Left 12/21/2018    Procedure: URETEROSCOPY;  Surgeon: Bhupinder Washington Jr., MD;  Location: [facility] OR 1ST FLR;  Service: Urology;  Laterality: Left;  90mins    URETEROSCOPY Left 1/31/2020    Procedure: URETEROSCOPY;  Surgeon: Parminder Craig MD;  Location: [facility] OR 1ST FLR;  Service: Urology;  Laterality: Left;     Family History   Problem Relation Age of Onset     "Diabetes Mother     Cancer Cousin 50        Cancer possibly stomach     Heart attack Neg Hx     Heart disease Neg Hx     Hypertension Neg Hx     Colon cancer Neg Hx     Esophageal cancer Neg Hx      Social History     Tobacco Use    Smoking status: Former Smoker     Quit date: 1/1/2007     Years since quitting: 15.2    Smokeless tobacco: Never Used   Substance Use Topics    Alcohol use: Yes     Comment: rare    Drug use: No     Review of patient's allergies indicates:  No Known Allergies    Current Outpatient Medications on File Prior to Visit   Medication Sig Dispense Refill    chlorthalidone (HYGROTEN) 25 MG Tab Take 25 mg by mouth.      clobetasoL (TEMOVATE) 0.05 % external solution APPLY TO AFFECTED AREAS ON SCALP THREE TO FOUR TIMES A WEEK AT NIGHT.      oxyCODONE (ROXICODONE) 5 MG immediate release tablet Take 1 tablet (5 mg total) by mouth every 4 (four) hours as needed for Pain. 20 tablet 0    polyethylene glycol (GLYCOLAX) 17 gram PwPk Take 17 g by mouth once daily. 14 each 0     Current Facility-Administered Medications on File Prior to Visit   Medication Dose Route Frequency Provider Last Rate Last Admin    0.9%  NaCl infusion   Intravenous Continuous Joelle Milligan NP        LIDOcaine (PF) 10 mg/ml (1%) injection 10 mg  1 mL Intradermal Once Joelle Milligan NP        mupirocin 2 % ointment   Nasal On Call Procedure Joelle Milligan NP         Physical Examination  /89 (BP Location: Left arm, Patient Position: Sitting, BP Method: Medium (Automatic))   Pulse 78   Ht 5' 10" (1.778 m)   Wt 101.7 kg (224 lb 3.3 oz)   BMI 32.17 kg/m²      A chaperone was present for the physical examination.    Constitutional: well developed, no cough, no dyspnea, alert, and no acute distress    Head: Normocephalic, no lesions, without obvious abnormality  Eye: Bilateral conjunctival hemorrhage - significantly improved  Cardiovascular: regular rate and regular rhythm  Respiratory: normal " air entry  Gastrointestinal: soft, non-tender    Perianal Skin: Healed    Musculoskeletal: full range of motion without pain  Neurologic: alert, oriented, normal speech, no focal findings or movement disorder noted  Psychiatric: appropriate, normal mood    Assessment and Plan of Care    Thank you again for referring Mr. Drake to my care. In summary, Mr. Drake is a 46 year old man presenting with a perirectal abscess with associated fistula - treated now with fistulotomy, doing well. We discussed treatment options and have provided the following recommendations:    1. Follow-up as needed    Please do not hesitate to contact me if you have any questions.      Nomi Mora MD - Staff Surgeon  Department of Colon & Rectal Surgery  [facility]

## 2022-04-08 ENCOUNTER — OFFICE VISIT (OUTPATIENT)
Dept: SURGERY | Facility: CLINIC | Age: 47
End: 2022-04-08
Payer: COMMERCIAL

## 2022-04-08 VITALS
DIASTOLIC BLOOD PRESSURE: 89 MMHG | SYSTOLIC BLOOD PRESSURE: 137 MMHG | WEIGHT: 224.19 LBS | BODY MASS INDEX: 32.1 KG/M2 | HEIGHT: 70 IN | HEART RATE: 78 BPM

## 2022-04-08 DIAGNOSIS — K61.1 PERIRECTAL ABSCESS: Primary | ICD-10-CM

## 2022-04-08 PROCEDURE — 99999 PR PBB SHADOW E&M-EST. PATIENT-LVL III: CPT | Mod: PBBFAC,,, | Performed by: STUDENT IN AN ORGANIZED HEALTH CARE EDUCATION/TRAINING PROGRAM

## 2022-04-08 PROCEDURE — 3008F BODY MASS INDEX DOCD: CPT | Mod: CPTII,S$GLB,, | Performed by: STUDENT IN AN ORGANIZED HEALTH CARE EDUCATION/TRAINING PROGRAM

## 2022-04-08 PROCEDURE — 3079F PR MOST RECENT DIASTOLIC BLOOD PRESSURE 80-89 MM HG: ICD-10-PCS | Mod: CPTII,S$GLB,, | Performed by: STUDENT IN AN ORGANIZED HEALTH CARE EDUCATION/TRAINING PROGRAM

## 2022-04-08 PROCEDURE — 3079F DIAST BP 80-89 MM HG: CPT | Mod: CPTII,S$GLB,, | Performed by: STUDENT IN AN ORGANIZED HEALTH CARE EDUCATION/TRAINING PROGRAM

## 2022-04-08 PROCEDURE — 3075F SYST BP GE 130 - 139MM HG: CPT | Mod: CPTII,S$GLB,, | Performed by: STUDENT IN AN ORGANIZED HEALTH CARE EDUCATION/TRAINING PROGRAM

## 2022-04-08 PROCEDURE — 99212 OFFICE O/P EST SF 10 MIN: CPT | Mod: S$GLB,,, | Performed by: STUDENT IN AN ORGANIZED HEALTH CARE EDUCATION/TRAINING PROGRAM

## 2022-04-08 PROCEDURE — 1159F PR MEDICATION LIST DOCUMENTED IN MEDICAL RECORD: ICD-10-PCS | Mod: CPTII,S$GLB,, | Performed by: STUDENT IN AN ORGANIZED HEALTH CARE EDUCATION/TRAINING PROGRAM

## 2022-04-08 PROCEDURE — 99999 PR PBB SHADOW E&M-EST. PATIENT-LVL III: ICD-10-PCS | Mod: PBBFAC,,, | Performed by: STUDENT IN AN ORGANIZED HEALTH CARE EDUCATION/TRAINING PROGRAM

## 2022-04-08 PROCEDURE — 99212 PR OFFICE/OUTPT VISIT, EST, LEVL II, 10-19 MIN: ICD-10-PCS | Mod: S$GLB,,, | Performed by: STUDENT IN AN ORGANIZED HEALTH CARE EDUCATION/TRAINING PROGRAM

## 2022-04-08 PROCEDURE — 1159F MED LIST DOCD IN RCRD: CPT | Mod: CPTII,S$GLB,, | Performed by: STUDENT IN AN ORGANIZED HEALTH CARE EDUCATION/TRAINING PROGRAM

## 2022-04-08 PROCEDURE — 3075F PR MOST RECENT SYSTOLIC BLOOD PRESS GE 130-139MM HG: ICD-10-PCS | Mod: CPTII,S$GLB,, | Performed by: STUDENT IN AN ORGANIZED HEALTH CARE EDUCATION/TRAINING PROGRAM

## 2022-04-08 PROCEDURE — 3008F PR BODY MASS INDEX (BMI) DOCUMENTED: ICD-10-PCS | Mod: CPTII,S$GLB,, | Performed by: STUDENT IN AN ORGANIZED HEALTH CARE EDUCATION/TRAINING PROGRAM

## 2022-04-20 ENCOUNTER — OFFICE VISIT (OUTPATIENT)
Dept: INTERNAL MEDICINE | Facility: CLINIC | Age: 47
End: 2022-04-20
Payer: COMMERCIAL

## 2022-04-20 VITALS
TEMPERATURE: 98 F | DIASTOLIC BLOOD PRESSURE: 80 MMHG | WEIGHT: 222 LBS | HEART RATE: 109 BPM | SYSTOLIC BLOOD PRESSURE: 118 MMHG | HEIGHT: 70 IN | RESPIRATION RATE: 23 BRPM | OXYGEN SATURATION: 95 % | BODY MASS INDEX: 31.78 KG/M2

## 2022-04-20 DIAGNOSIS — N48.6 PEYRONIE'S DISEASE: ICD-10-CM

## 2022-04-20 DIAGNOSIS — M51.36 DDD (DEGENERATIVE DISC DISEASE), LUMBAR: ICD-10-CM

## 2022-04-20 DIAGNOSIS — Z01.00 ROUTINE EYE EXAM: ICD-10-CM

## 2022-04-20 DIAGNOSIS — Z00.00 ANNUAL PHYSICAL EXAM: Primary | ICD-10-CM

## 2022-04-20 PROBLEM — N20.1 URETERAL STONE: Status: RESOLVED | Noted: 2018-12-21 | Resolved: 2022-04-20

## 2022-04-20 PROBLEM — N20.0 NEPHROLITHIASIS: Status: RESOLVED | Noted: 2017-07-14 | Resolved: 2022-04-20

## 2022-04-20 PROCEDURE — 1160F PR REVIEW ALL MEDS BY PRESCRIBER/CLIN PHARMACIST DOCUMENTED: ICD-10-PCS | Mod: CPTII,S$GLB,, | Performed by: HOSPITALIST

## 2022-04-20 PROCEDURE — 3074F SYST BP LT 130 MM HG: CPT | Mod: CPTII,S$GLB,, | Performed by: HOSPITALIST

## 2022-04-20 PROCEDURE — 99396 PREV VISIT EST AGE 40-64: CPT | Mod: S$GLB,,, | Performed by: HOSPITALIST

## 2022-04-20 PROCEDURE — 3079F PR MOST RECENT DIASTOLIC BLOOD PRESSURE 80-89 MM HG: ICD-10-PCS | Mod: CPTII,S$GLB,, | Performed by: HOSPITALIST

## 2022-04-20 PROCEDURE — 99396 PR PREVENTIVE VISIT,EST,40-64: ICD-10-PCS | Mod: S$GLB,,, | Performed by: HOSPITALIST

## 2022-04-20 PROCEDURE — 1159F PR MEDICATION LIST DOCUMENTED IN MEDICAL RECORD: ICD-10-PCS | Mod: CPTII,S$GLB,, | Performed by: HOSPITALIST

## 2022-04-20 PROCEDURE — 1159F MED LIST DOCD IN RCRD: CPT | Mod: CPTII,S$GLB,, | Performed by: HOSPITALIST

## 2022-04-20 PROCEDURE — 3079F DIAST BP 80-89 MM HG: CPT | Mod: CPTII,S$GLB,, | Performed by: HOSPITALIST

## 2022-04-20 PROCEDURE — 99999 PR PBB SHADOW E&M-EST. PATIENT-LVL IV: ICD-10-PCS | Mod: PBBFAC,,, | Performed by: HOSPITALIST

## 2022-04-20 PROCEDURE — 1160F RVW MEDS BY RX/DR IN RCRD: CPT | Mod: CPTII,S$GLB,, | Performed by: HOSPITALIST

## 2022-04-20 PROCEDURE — 3074F PR MOST RECENT SYSTOLIC BLOOD PRESSURE < 130 MM HG: ICD-10-PCS | Mod: CPTII,S$GLB,, | Performed by: HOSPITALIST

## 2022-04-20 PROCEDURE — 3008F BODY MASS INDEX DOCD: CPT | Mod: CPTII,S$GLB,, | Performed by: HOSPITALIST

## 2022-04-20 PROCEDURE — 3008F PR BODY MASS INDEX (BMI) DOCUMENTED: ICD-10-PCS | Mod: CPTII,S$GLB,, | Performed by: HOSPITALIST

## 2022-04-20 PROCEDURE — 99999 PR PBB SHADOW E&M-EST. PATIENT-LVL IV: CPT | Mod: PBBFAC,,, | Performed by: HOSPITALIST

## 2022-04-20 RX ORDER — CLOBETASOL PROPIONATE 0.46 MG/ML
SOLUTION TOPICAL
Qty: 50 ML | Refills: 2 | Status: SHIPPED | OUTPATIENT
Start: 2022-04-20 | End: 2022-06-23 | Stop reason: SDUPTHER

## 2022-04-20 NOTE — PROGRESS NOTES
Subjective:     @Patient ID: Sanchez Drake is a 46 y.o. male.    Chief Complaint: No chief complaint on file.    HPI  47 yo male with pmhx of lumbar DDD, kidney stone presents for annual exam. Pt reports he is well  Accompanied today with his wife. Recently recovered from perirectal abscess with associated fistula  s/p fistulotomy. Recently seen in [facility] clinic on 4/8 and sx have improved.       Lipid disorders/ASCVD risk (ages >/= 45 or >/= 20 if increased risk ): ordered  DM (>45y yearly or if obese, HTN): A1c ordered  Eye exam: reading glasses at night. Due for eye exam:   Prostate (per discussion with patient starting at 50y or 45y if high risk): ordered       Vaccines:   Influenza (yearly): Defer  Tetanus (every 10 yrs - 1st tdap) done  Covid19: due      Exercise: walking, physical exercise with job  Diet: regular     Review of Systems   Constitutional: Negative for activity change and unexpected weight change.   HENT: Negative for hearing loss, rhinorrhea and trouble swallowing.    Eyes: Negative for discharge and visual disturbance.   Respiratory: Negative for chest tightness and wheezing.    Cardiovascular: Negative for chest pain and palpitations.   Gastrointestinal: Negative for blood in stool, constipation, diarrhea and vomiting.   Endocrine: Negative for polydipsia and polyuria.   Genitourinary: Negative for difficulty urinating, hematuria and urgency.   Musculoskeletal: Negative for arthralgias, joint swelling and neck pain.   Neurological: Negative for weakness and headaches.   Psychiatric/Behavioral: Negative for confusion and dysphoric mood.     Past medical history, surgical history, and family medical history reviewed and updated as appropriate.    Medications and allergies reviewed.     Objective:     There were no vitals filed for this visit.  There is no height or weight on file to calculate BMI.  Physical Exam  Vitals reviewed.   Constitutional:       General: He is not in acute  distress.     Appearance: He is well-developed.   HENT:      Head: Normocephalic and atraumatic.      Right Ear: Tympanic membrane normal.      Left Ear: Tympanic membrane normal.      Mouth/Throat:      Mouth: Mucous membranes are moist.      Pharynx: No oropharyngeal exudate.   Eyes:      General:         Right eye: No discharge.         Left eye: No discharge.      Conjunctiva/sclera: Conjunctivae normal.   Cardiovascular:      Rate and Rhythm: Normal rate and regular rhythm.      Heart sounds: No murmur heard.    No friction rub.   Pulmonary:      Effort: Pulmonary effort is normal.      Breath sounds: Normal breath sounds.   Abdominal:      General: Bowel sounds are normal. There is no distension.      Palpations: Abdomen is soft.      Tenderness: There is no abdominal tenderness.   Musculoskeletal:         General: Normal range of motion.      Cervical back: Normal range of motion and neck supple.      Right lower leg: No edema.      Left lower leg: No edema.   Lymphadenopathy:      Cervical: No cervical adenopathy.   Skin:     General: Skin is warm and dry.   Neurological:      Mental Status: He is alert and oriented to person, place, and time.   Psychiatric:         Mood and Affect: Mood normal.         Behavior: Behavior normal.         Lab Results   Component Value Date    WBC 15.53 (H) 01/09/2022    HGB 16.0 01/09/2022    HCT 47.2 01/09/2022     01/09/2022    CHOL 160 06/27/2019    TRIG 185 (H) 06/27/2019    HDL 30 (L) 06/27/2019    ALT 22 01/09/2022    AST 18 01/09/2022     01/09/2022    K 4.2 01/09/2022     01/09/2022    CREATININE 0.8 01/09/2022    BUN 15 01/09/2022    CO2 23 01/09/2022    TSH 2.541 01/10/2019    PSA 0.48 01/10/2019    INR 1.0 02/09/2017    HGBA1C 5.1 01/10/2019       Assessment:     1. Annual physical exam    2. DDD (degenerative disc disease), lumbar    3. Peyronie's disease    4. Routine eye exam      Plan:   Sanchez was seen today for annual exam.    Diagnoses and  all orders for this visit:    Annual physical exam  -     Comprehensive Metabolic Panel; Future  -     CBC Auto Differential; Future  -     TSH; Future  -     Lipid Panel; Future  -     Hemoglobin A1C; Future  -     PSA, Screening; Future    DDD (degenerative disc disease), lumbar  - stable. Continue to monitor       Peyronie's disease  -  Stable. Continue to f/u with urology prn     Routine eye exam  -     Ambulatory referral/consult to Optometry; Future    Other orders  -     clobetasoL (TEMOVATE) 0.05 % external solution; APPLY TO AFFECTED AREAS ON SCALP THREE TO FOUR TIMES A WEEK AT NIGHT.          rtc 1 year and prn     Anna Moore MD  Internal Medicine    4/20/2022

## 2022-04-26 ENCOUNTER — LAB VISIT (OUTPATIENT)
Dept: LAB | Facility: HOSPITAL | Age: 47
End: 2022-04-26
Attending: HOSPITALIST
Payer: COMMERCIAL

## 2022-04-26 DIAGNOSIS — Z00.00 ANNUAL PHYSICAL EXAM: ICD-10-CM

## 2022-04-26 LAB
ALBUMIN SERPL BCP-MCNC: 4 G/DL (ref 3.5–5.2)
ALP SERPL-CCNC: 88 U/L (ref 55–135)
ALT SERPL W/O P-5'-P-CCNC: 41 U/L (ref 10–44)
ANION GAP SERPL CALC-SCNC: 7 MMOL/L (ref 8–16)
AST SERPL-CCNC: 26 U/L (ref 10–40)
BASOPHILS # BLD AUTO: 0.1 K/UL (ref 0–0.2)
BASOPHILS NFR BLD: 1.2 % (ref 0–1.9)
BILIRUB SERPL-MCNC: 0.6 MG/DL (ref 0.1–1)
BUN SERPL-MCNC: 11 MG/DL (ref 6–20)
CALCIUM SERPL-MCNC: 9 MG/DL (ref 8.7–10.5)
CHLORIDE SERPL-SCNC: 106 MMOL/L (ref 95–110)
CHOLEST SERPL-MCNC: 151 MG/DL (ref 120–199)
CHOLEST/HDLC SERPL: 5.6 {RATIO} (ref 2–5)
CO2 SERPL-SCNC: 26 MMOL/L (ref 23–29)
COMPLEXED PSA SERPL-MCNC: 0.29 NG/ML (ref 0–4)
CREAT SERPL-MCNC: 0.8 MG/DL (ref 0.5–1.4)
DIFFERENTIAL METHOD: NORMAL
EOSINOPHIL # BLD AUTO: 0.3 K/UL (ref 0–0.5)
EOSINOPHIL NFR BLD: 3.5 % (ref 0–8)
ERYTHROCYTE [DISTWIDTH] IN BLOOD BY AUTOMATED COUNT: 14.3 % (ref 11.5–14.5)
EST. GFR  (AFRICAN AMERICAN): >60 ML/MIN/1.73 M^2
EST. GFR  (NON AFRICAN AMERICAN): >60 ML/MIN/1.73 M^2
ESTIMATED AVG GLUCOSE: 97 MG/DL (ref 68–131)
GLUCOSE SERPL-MCNC: 96 MG/DL (ref 70–110)
HBA1C MFR BLD: 5 % (ref 4–5.6)
HCT VFR BLD AUTO: 47.1 % (ref 40–54)
HDLC SERPL-MCNC: 27 MG/DL (ref 40–75)
HDLC SERPL: 17.9 % (ref 20–50)
HGB BLD-MCNC: 15.4 G/DL (ref 14–18)
IMM GRANULOCYTES # BLD AUTO: 0.03 K/UL (ref 0–0.04)
IMM GRANULOCYTES NFR BLD AUTO: 0.4 % (ref 0–0.5)
LDLC SERPL CALC-MCNC: 92.2 MG/DL (ref 63–159)
LYMPHOCYTES # BLD AUTO: 2.4 K/UL (ref 1–4.8)
LYMPHOCYTES NFR BLD: 29.8 % (ref 18–48)
MCH RBC QN AUTO: 30.4 PG (ref 27–31)
MCHC RBC AUTO-ENTMCNC: 32.7 G/DL (ref 32–36)
MCV RBC AUTO: 93 FL (ref 82–98)
MONOCYTES # BLD AUTO: 0.5 K/UL (ref 0.3–1)
MONOCYTES NFR BLD: 6.7 % (ref 4–15)
NEUTROPHILS # BLD AUTO: 4.7 K/UL (ref 1.8–7.7)
NEUTROPHILS NFR BLD: 58.4 % (ref 38–73)
NONHDLC SERPL-MCNC: 124 MG/DL
NRBC BLD-RTO: 0 /100 WBC
PLATELET # BLD AUTO: 240 K/UL (ref 150–450)
PMV BLD AUTO: 9.7 FL (ref 9.2–12.9)
POTASSIUM SERPL-SCNC: 3.8 MMOL/L (ref 3.5–5.1)
PROT SERPL-MCNC: 7.1 G/DL (ref 6–8.4)
RBC # BLD AUTO: 5.06 M/UL (ref 4.6–6.2)
SODIUM SERPL-SCNC: 139 MMOL/L (ref 136–145)
TRIGL SERPL-MCNC: 159 MG/DL (ref 30–150)
TSH SERPL DL<=0.005 MIU/L-ACNC: 1.77 UIU/ML (ref 0.4–4)
WBC # BLD AUTO: 8.03 K/UL (ref 3.9–12.7)

## 2022-04-26 PROCEDURE — 80061 LIPID PANEL: CPT | Performed by: HOSPITALIST

## 2022-04-26 PROCEDURE — 84153 ASSAY OF PSA TOTAL: CPT | Performed by: HOSPITALIST

## 2022-04-26 PROCEDURE — 85025 COMPLETE CBC W/AUTO DIFF WBC: CPT | Performed by: HOSPITALIST

## 2022-04-26 PROCEDURE — 80053 COMPREHEN METABOLIC PANEL: CPT | Performed by: HOSPITALIST

## 2022-04-26 PROCEDURE — 84443 ASSAY THYROID STIM HORMONE: CPT | Performed by: HOSPITALIST

## 2022-04-26 PROCEDURE — 36415 COLL VENOUS BLD VENIPUNCTURE: CPT | Mod: PO | Performed by: HOSPITALIST

## 2022-04-26 PROCEDURE — 83036 HEMOGLOBIN GLYCOSYLATED A1C: CPT | Performed by: HOSPITALIST

## 2022-05-10 ENCOUNTER — PATIENT MESSAGE (OUTPATIENT)
Dept: UROLOGY | Facility: CLINIC | Age: 47
End: 2022-05-10
Payer: COMMERCIAL

## 2022-06-23 RX ORDER — CLOBETASOL PROPIONATE 0.46 MG/ML
SOLUTION TOPICAL
Qty: 50 ML | Refills: 3 | Status: SHIPPED | OUTPATIENT
Start: 2022-06-23 | End: 2023-03-23 | Stop reason: SDUPTHER

## 2022-06-23 NOTE — TELEPHONE ENCOUNTER
No new care gaps identified.  [facility] Embedded Care Gaps. Reference number: [id number]. 6/23/2022   6:12:21 AM CDT

## 2022-07-14 ENCOUNTER — HOSPITAL ENCOUNTER (EMERGENCY)
Facility: HOSPITAL | Age: 47
Discharge: HOME | End: 2022-07-14
Attending: EMERGENCY MEDICINE
Payer: COMMERCIAL

## 2022-07-14 VITALS
HEIGHT: 70 IN | WEIGHT: 216 LBS | DIASTOLIC BLOOD PRESSURE: 93 MMHG | RESPIRATION RATE: 20 BRPM | SYSTOLIC BLOOD PRESSURE: 129 MMHG | BODY MASS INDEX: 30.92 KG/M2 | OXYGEN SATURATION: 98 % | HEART RATE: 99 BPM | TEMPERATURE: 99 F

## 2022-07-14 DIAGNOSIS — L02.416 ABSCESS OF LEFT THIGH: Primary | ICD-10-CM

## 2022-07-14 PROCEDURE — 10060 PR DRAIN SKIN ABSCESS SIMPLE: ICD-10-PCS | Mod: LT,,, | Performed by: PHYSICIAN ASSISTANT

## 2022-07-14 PROCEDURE — 10060 I&D ABSCESS SIMPLE/SINGLE: CPT | Mod: LT,,, | Performed by: PHYSICIAN ASSISTANT

## 2022-07-14 PROCEDURE — 25000003 PHARM REV CODE 250: Performed by: PHYSICIAN ASSISTANT

## 2022-07-14 PROCEDURE — 10060 I&D ABSCESS SIMPLE/SINGLE: CPT | Mod: LT

## 2022-07-14 PROCEDURE — 99283 EMERGENCY DEPT VISIT LOW MDM: CPT | Mod: 25

## 2022-07-14 PROCEDURE — 99284 PR EMERGENCY DEPT VISIT,LEVEL IV: ICD-10-PCS | Mod: 25,,, | Performed by: PHYSICIAN ASSISTANT

## 2022-07-14 PROCEDURE — 99284 EMERGENCY DEPT VISIT MOD MDM: CPT | Mod: 25,,, | Performed by: PHYSICIAN ASSISTANT

## 2022-07-14 RX ORDER — SULFAMETHOXAZOLE AND TRIMETHOPRIM 800; 160 MG/1; MG/1
1 TABLET ORAL EVERY 12 HOURS
Qty: 14 TABLET | Refills: 0 | Status: SHIPPED | OUTPATIENT
Start: 2022-07-14 | End: 2022-07-21

## 2022-07-14 RX ORDER — SULFAMETHOXAZOLE AND TRIMETHOPRIM 800; 160 MG/1; MG/1
1 TABLET ORAL
Status: COMPLETED | OUTPATIENT
Start: 2022-07-14 | End: 2022-07-14

## 2022-07-14 RX ORDER — LIDOCAINE HYDROCHLORIDE 10 MG/ML
10 INJECTION INFILTRATION; PERINEURAL ONCE
Status: COMPLETED | OUTPATIENT
Start: 2022-07-14 | End: 2022-07-14

## 2022-07-14 RX ADMIN — SULFAMETHOXAZOLE AND TRIMETHOPRIM 1 TABLET: 800; 160 TABLET ORAL at 08:07

## 2022-07-14 RX ADMIN — LIDOCAINE HYDROCHLORIDE 10 ML: 10 INJECTION, SOLUTION INFILTRATION; PERINEURAL at 08:07

## 2022-07-14 NOTE — Clinical Note
"Sanchez Drake (Raul) was seen and treated in our emergency department on 7/14/2022.  He may return to work on 07/15/2022.       If you have any questions or concerns, please don't hesitate to call.      Vidya Mckay PA-C"

## 2022-07-14 NOTE — DISCHARGE INSTRUCTIONS
Change dressing every 4-6 hours.   Take bactrim every 12 hours for the next 7 days. You were given first dose in the ER.  Take ibuprofen 800 mg every 6 hours as needed with foods for anti-inflammatory relief.  You can take acetaminophen/tylenol 650 mg every 6 hours or 1000 mg every 8 hours for added relief.  Apply ice to the area for 10-20 minutes every 4 hours. You can apply heat 2 days after for the same duration and frequency.  Follow up with PCP if your symptoms do not improve.   Return to the ER for new or worsening symptoms.

## 2022-07-14 NOTE — ED NOTES
Two patient identifiers have been checked and are correct.      Pt to ED c/o abscess to left upper thight x 1 week. Reports throbbing pain to site. Denies discharge.     Appearance: Pt awake, alert & oriented to person, place & time. Pt in no acute distress at present time. Pt is clean and well groomed with clothes appropriately fastened.   Skin: Skin warm, dry & intact. Color consistent with ethnicity. Mucous membranes moist. No breakdown or brusing noted.   Musculoskeletal: Patient moving all extremities well, no obvious swelling or deformities noted.   Respiratory: Respirations spontaneous, even, and non-labored. Visible chest rise noted. Airway is open and patent. No accessory muscle use noted.   Neurologic: Sensation is intact. Speech is clear and appropriate. Eyes open spontaneously, behavior appropriate to situation, follows commands, facial expression symmetrical, bilateral hand grasp equal and even, purposeful motor response noted.  Cardiac: All peripheral pulses present. No Bilateral lower extremity edema. Cap refill is <3 seconds.  Abdomen: Abdomen soft, non-tender to palpation.   : Pt reports no dysuria or hematuria.

## 2022-07-14 NOTE — ED PROVIDER NOTES
Encounter Date: 7/14/2022       History     Chief Complaint   Patient presents with    Abscess     To the left groin/leg area X 1 week that has grown in size.     8:21 AM  Patient is a 46-year-old male with history of perianal abscess who presents to [facility] ED with abscess to left medial thigh.     He has noted pain to the area for 1 week.  Reports increase in size, erythema, and pain which prompted patient to come to the area.  Denies any fever or chills. Complaining of 9/10 pain.  Did not take any medications today.        Review of patient's allergies indicates:  No Known Allergies  Past Medical History:   Diagnosis Date    Chronic back pain     Chronic diarrhea     Hernia     Nephrolithiasis 7/14/2017    TBI (traumatic brain injury) 2010    fake wall fell on head (no brain or skull injury per pt)     Past Surgical History:   Procedure Laterality Date    COLONOSCOPY N/A 5/29/2017    Procedure: COLONOSCOPY;  Surgeon: Sukhi Scanlon MD;  Location: [facility] (4TH FLR);  Service: Endoscopy;  Laterality: N/A;    CYSTOSCOPY N/A 1/31/2020    Procedure: CYSTOSCOPY;  Surgeon: Parminder Craig MD;  Location: [facility] OR 1ST FLR;  Service: Urology;  Laterality: N/A;    CYSTOSCOPY W/ URETERAL STENT PLACEMENT Left 11/19/2018    Procedure: CYSTOSCOPY, WITH URETERAL STENT INSERTION;  Surgeon: Renee Garcia MD;  Location: [facility] OR 2ND FLR;  Service: Urology;  Laterality: Left;    CYSTOSCOPY W/ URETERAL STENT PLACEMENT  12/21/2018    Procedure: CYSTOSCOPY, WITH URETERAL STENT INSERTION;  Surgeon: Bhupinder Washington Jr., MD;  Location: [facility] OR 1ST FLR;  Service: Urology;;    CYSTOSCOPY W/ URETERAL STENT REMOVAL Left 12/21/2018    Procedure: CYSTOSCOPY, WITH URETERAL STENT REMOVAL;  Surgeon: Bhupinder Washington Jr., MD;  [facility];  Service: Urology;  Laterality: Left;    HERNIA REPAIR  2017    umbilical    INCISION AND DRAINAGE OF PERIRECTAL REGION N/A 1/11/2022    Procedure: INCISION AND DRAINAGE,  PERIRECTAL REGION (lithotomy);  Surgeon: Nomi Mora MD;  Location: [facility] OR 2ND FLR;  Service: Colon and Rectal;  Laterality: N/A;    INSERTION OF SETON STITCH  1/11/2022    Procedure: PLACEMENT, SETON STITCH;  Surgeon: Nomi Mora MD;  Location: [facility] OR 2ND FLR;  Service: Colon and Rectal;;    LASER LITHOTRIPSY Left 12/21/2018    Procedure: LITHOTRIPSY, USING LASER (mana);  Surgeon: Bhupinder Washington Jr., MD;  Location: [facility] OR 1ST FLR;  Service: Urology;  Laterality: Left;  90mins    LASER LITHOTRIPSY Left 1/31/2020    Procedure: LITHOTRIPSY, USING LASER;  Surgeon: Parminder Craig MD;  [facility] 1ST FLR;  Service: Urology;  Laterality: Left;    LUMBAR EPIDURAL INJECTION      with steroid    RECTAL FISTULOTOMY N/A 2/18/2022    Procedure: FISTULOTOMY, RECTUM;  Surgeon: Nomi Mora MD;  Location: [facility] OR 2ND FLR;  Service: Colon and Rectal;  Laterality: N/A;    RETROGRADE PYELOGRAPHY Left 11/19/2018    Procedure: PYELOGRAM, RETROGRADE;  Surgeon: Renee Garcia MD;  [facility] 2ND FLR;  Service: Urology;  Laterality: Left;    RETROGRADE PYELOGRAPHY Left 12/21/2018    Procedure: PYELOGRAM, RETROGRADE;  Surgeon: Bhupinder Washington Jr., MD;  Location: [facility] OR 1ST FLR;  Service: Urology;  Laterality: Left;    RETROGRADE PYELOGRAPHY Left 1/31/2020    Procedure: PYELOGRAM, RETROGRADE;  Surgeon: Parminder Craig MD;  [facility] 1ST FLR;  Service: Urology;  Laterality: Left;    URETEROSCOPIC REMOVAL OF URETERIC CALCULUS Left 1/31/2020    Procedure: REMOVAL, CALCULUS, URETER, URETEROSCOPIC;  Surgeon: Parminder Craig MD;  Location: [facility] OR 1ST FLR;  Service: Urology;  Laterality: Left;    URETEROSCOPY Left 12/21/2018    Procedure: URETEROSCOPY;  Surgeon: Bhupinder Washington Jr., MD;  [facility] 1ST FLR;  Service: Urology;  Laterality: Left;  90mins    URETEROSCOPY Left 1/31/2020    Procedure: URETEROSCOPY;  Surgeon: Parminder Craig MD;  Location: [address]  FLR;  Service: Urology;  Laterality: Left;     Family History   Problem Relation Age of Onset    Diabetes Mother     Cancer Cousin 50        Cancer possibly stomach     Heart attack Neg Hx     Heart disease Neg Hx     Hypertension Neg Hx     Colon cancer Neg Hx     Esophageal cancer Neg Hx      Social History     Tobacco Use    Smoking status: Former Smoker     Quit date: 1/1/2007     Years since quitting: 15.5    Smokeless tobacco: Never Used   Substance Use Topics    Alcohol use: Yes     Comment: rare    Drug use: No     Review of Systems   Constitutional: Negative for fever.   HENT: Negative for sore throat.    Respiratory: Negative for shortness of breath.    Cardiovascular: Negative for chest pain.   Gastrointestinal: Negative for nausea.   Genitourinary: Negative for dysuria.   Musculoskeletal: Negative for back pain.   Skin: Positive for wound. Negative for rash.   Neurological: Negative for weakness.   Hematological: Does not bruise/bleed easily.       Physical Exam     Initial Vitals [07/14/22 0745]   BP Pulse Resp Temp SpO2   (!) 129/93 99 20 99.3 °F (37.4 °C) 98 %      MAP       --         Physical Exam    Vitals reviewed.  Constitutional: He appears well-developed and well-nourished. He is not diaphoretic. He is cooperative.  Non-toxic appearance. He does not have a sickly appearance. He does not appear ill. No distress. Face mask in place.   HENT:   Head: Normocephalic and atraumatic.   Nose: Nose normal.   Mouth/Throat: No trismus in the jaw.   Eyes: Conjunctivae and EOM are normal.   Neck:   Normal range of motion.  Pulmonary/Chest: No accessory muscle usage. No tachypnea. No respiratory distress.   Abdominal: He exhibits no distension.   Musculoskeletal:         General: Normal range of motion.      Cervical back: Normal range of motion.     Neurological: He is alert. He has normal strength.   Skin: Skin is dry. Abscess noted. No pallor.   1 x 1 cm abscess to L upper medial thigh. No open  wounds or draining. Surrounding erythema and tenderness.          ED Course   I & D - Incision and Drainage  Location procedure was performed: [facility] EMERGENCY DEPARTMENT  Performed by: Vidya Mckay PA-C  Authorized by: Deana Colon MD   Type: abscess    Anesthesia:  Local Anesthetic: lidocaine 1% without epinephrine  Anesthetic total: 2.5 mL  Incision type: single straight  Complexity: simple  Drainage: pus  Drainage amount: moderate  Wound treatment: incision,  wound left open,  deloculation,  drainage and  expression of material  Patient tolerance: Patient tolerated the procedure well with no immediate complications        Labs Reviewed - No data to display       Imaging Results    None          Medications   LIDOcaine HCL 10 mg/ml (1%) injection 10 mL (10 mLs Intradermal Given by Other 7/14/22 0836)   sulfamethoxazole-trimethoprim 800-160mg per tablet 1 tablet (1 tablet Oral Given 7/14/22 0835)     Medical Decision Making:   History:   Old Medical Records: I decided to obtain old medical records.  Old Records Summarized: records from previous admission(s).  Initial Assessment:   Patient is a 46-year-old male with history of perianal abscess who presents to [facility] ED with abscess to left medial thigh.   Differential Diagnosis:   Includes but is not limited to abscess, cellulitis.  ED Management:  Will perform incision and drainage.  Will treat with Bactrim.             ED Course as of 07/15/22 0714   Thu Jul 14, 2022   0808 BP(!): 129/93 [CL]   0808 Temp: 99.3 °F (37.4 °C) [CL]   0808 Pulse: 99 [CL]   0808 Resp: 20 [CL]   0808 SpO2: 98 % [CL]      ED Course User Index  [CL] Vidya Mckay PA-C           Patient tolerated procedure well.  See procedure note.  Will prescribe Bactrim.  Proper wound care and instructions provided.  Follow-up with PCP.  Clinical Impression:   Final diagnoses:  [L02.416] Abscess of left thigh (Primary)          ED Disposition Condition    Discharge Stable        ED Prescriptions      Medication Sig Dispense Start Date End Date Auth. Provider    sulfamethoxazole-trimethoprim 800-160mg (BACTRIM DS) 800-160 mg Tab Take 1 tablet by mouth every 12 (twelve) hours. for 7 days 14 tablet 7/14/2022 7/21/2022 Vidya Mckay PA-C        Follow-up Information     Follow up With Specialties Details Why Contact Info    Anna Moore MD Internal Medicine Schedule an appointment as soon as possible for a visit   [address] [facility] [address]  [phone]      [facility] - Emergency Dept Emergency Medicine  If symptoms worsen [address] [facility] [address] [phone]        Future Appointments   Date Time Provider Department Center   8/4/2022  1:20 PM Jose Francisco Segundo OD [facility]          Vidya Mckay PA-C  07/15/22 0714

## 2022-08-04 ENCOUNTER — OFFICE VISIT (OUTPATIENT)
Dept: OPTOMETRY | Facility: CLINIC | Age: 47
End: 2022-08-04
Payer: COMMERCIAL

## 2022-08-04 DIAGNOSIS — Z01.00 ROUTINE EYE EXAM: Primary | ICD-10-CM

## 2022-08-04 DIAGNOSIS — H52.4 PRESBYOPIA OF BOTH EYES: ICD-10-CM

## 2022-08-04 PROCEDURE — 92015 DETERMINE REFRACTIVE STATE: CPT | Mod: S$GLB,,, | Performed by: OPTOMETRIST

## 2022-08-04 PROCEDURE — 3044F PR MOST RECENT HEMOGLOBIN A1C LEVEL <7.0%: ICD-10-PCS | Mod: CPTII,S$GLB,, | Performed by: OPTOMETRIST

## 2022-08-04 PROCEDURE — 92004 PR EYE EXAM, NEW PATIENT,COMPREHESV: ICD-10-PCS | Mod: S$GLB,,, | Performed by: OPTOMETRIST

## 2022-08-04 PROCEDURE — 3044F HG A1C LEVEL LT 7.0%: CPT | Mod: CPTII,S$GLB,, | Performed by: OPTOMETRIST

## 2022-08-04 PROCEDURE — 99999 PR PBB SHADOW E&M-EST. PATIENT-LVL II: CPT | Mod: PBBFAC,,, | Performed by: OPTOMETRIST

## 2022-08-04 PROCEDURE — 99999 PR PBB SHADOW E&M-EST. PATIENT-LVL II: ICD-10-PCS | Mod: PBBFAC,,, | Performed by: OPTOMETRIST

## 2022-08-04 PROCEDURE — 1159F MED LIST DOCD IN RCRD: CPT | Mod: CPTII,S$GLB,, | Performed by: OPTOMETRIST

## 2022-08-04 PROCEDURE — 1159F PR MEDICATION LIST DOCUMENTED IN MEDICAL RECORD: ICD-10-PCS | Mod: CPTII,S$GLB,, | Performed by: OPTOMETRIST

## 2022-08-04 PROCEDURE — 92004 COMPRE OPH EXAM NEW PT 1/>: CPT | Mod: S$GLB,,, | Performed by: OPTOMETRIST

## 2022-08-04 PROCEDURE — 92015 PR REFRACTION: ICD-10-PCS | Mod: S$GLB,,, | Performed by: OPTOMETRIST

## 2022-08-04 NOTE — PROGRESS NOTES
HPI     Annual eye exam  KATHY 3 yrs Dr Lee     GTTS:None    Patient is here for annual eye exam  Pt states he wears otc readers  Pt states he have blurry vision occasionally   Pt denied flashes and floaters    Last edited by Jose Francisco Segundo, OD on 8/4/2022  1:52 PM. (History)            Assessment /Plan     For exam results, see Encounter Report.    Routine eye exam  -     Ambulatory referral/consult to Optometry  -Annual DFE    Presbyopia of both eyes  Eyeglass Final Rx     Eyeglass Final Rx       Sphere Cylinder    Right +1.75 Sphere    Left +1.75 Sphere    Type: SVL    Expiration Date: 8/4/2023                  RTC 1 yr

## 2023-01-05 ENCOUNTER — HOSPITAL ENCOUNTER (EMERGENCY)
Facility: HOSPITAL | Age: 48
Discharge: HOME | End: 2023-01-06
Attending: EMERGENCY MEDICINE
Payer: COMMERCIAL

## 2023-01-05 DIAGNOSIS — N20.1 URETEROLITHIASIS: Primary | ICD-10-CM

## 2023-01-05 PROBLEM — N39.0 URINARY TRACT INFECTIOUS DISEASE: Status: ACTIVE | Noted: 2020-11-29

## 2023-01-05 PROBLEM — E55.9 VITAMIN D DEFICIENCY: Status: ACTIVE | Noted: 2020-11-29

## 2023-01-05 LAB
ALBUMIN SERPL BCP-MCNC: 4.6 G/DL (ref 3.5–5.2)
ALP SERPL-CCNC: 89 U/L (ref 55–135)
ALT SERPL W/O P-5'-P-CCNC: 49 U/L (ref 10–44)
ANION GAP SERPL CALC-SCNC: 14 MMOL/L (ref 8–16)
AST SERPL-CCNC: 69 U/L (ref 10–40)
BACTERIA #/AREA URNS AUTO: ABNORMAL /HPF
BASOPHILS # BLD AUTO: 0.08 K/UL (ref 0–0.2)
BASOPHILS NFR BLD: 0.9 % (ref 0–1.9)
BILIRUB SERPL-MCNC: 1 MG/DL (ref 0.1–1)
BILIRUB UR QL STRIP: NEGATIVE
BUN SERPL-MCNC: 19 MG/DL (ref 6–20)
CALCIUM SERPL-MCNC: 10 MG/DL (ref 8.7–10.5)
CHLORIDE SERPL-SCNC: 104 MMOL/L (ref 95–110)
CLARITY UR REFRACT.AUTO: ABNORMAL
CO2 SERPL-SCNC: 22 MMOL/L (ref 23–29)
COLOR UR AUTO: YELLOW
CREAT SERPL-MCNC: 1.2 MG/DL (ref 0.5–1.4)
DIFFERENTIAL METHOD: ABNORMAL
EOSINOPHIL # BLD AUTO: 0.1 K/UL (ref 0–0.5)
EOSINOPHIL NFR BLD: 1.4 % (ref 0–8)
ERYTHROCYTE [DISTWIDTH] IN BLOOD BY AUTOMATED COUNT: 12.9 % (ref 11.5–14.5)
EST. GFR  (NO RACE VARIABLE): >60 ML/MIN/1.73 M^2
GLUCOSE SERPL-MCNC: 137 MG/DL (ref 70–110)
GLUCOSE UR QL STRIP: NEGATIVE
HCT VFR BLD AUTO: 46.5 % (ref 40–54)
HCV AB SERPL QL IA: NORMAL
HGB BLD-MCNC: 16.1 G/DL (ref 14–18)
HGB UR QL STRIP: ABNORMAL
HIV 1+2 AB+HIV1 P24 AG SERPL QL IA: NORMAL
HYALINE CASTS UR QL AUTO: 0 /LPF
IMM GRANULOCYTES # BLD AUTO: 0.02 K/UL (ref 0–0.04)
IMM GRANULOCYTES NFR BLD AUTO: 0.2 % (ref 0–0.5)
KETONES UR QL STRIP: ABNORMAL
LEUKOCYTE ESTERASE UR QL STRIP: NEGATIVE
LYMPHOCYTES # BLD AUTO: 2.7 K/UL (ref 1–4.8)
LYMPHOCYTES NFR BLD: 29.1 % (ref 18–48)
MCH RBC QN AUTO: 31.4 PG (ref 27–31)
MCHC RBC AUTO-ENTMCNC: 34.6 G/DL (ref 32–36)
MCV RBC AUTO: 91 FL (ref 82–98)
MICROSCOPIC COMMENT: ABNORMAL
MONOCYTES # BLD AUTO: 0.6 K/UL (ref 0.3–1)
MONOCYTES NFR BLD: 6.3 % (ref 4–15)
NEUTROPHILS # BLD AUTO: 5.7 K/UL (ref 1.8–7.7)
NEUTROPHILS NFR BLD: 62.1 % (ref 38–73)
NITRITE UR QL STRIP: NEGATIVE
NRBC BLD-RTO: 0 /100 WBC
PH UR STRIP: 6 [PH] (ref 5–8)
PLATELET # BLD AUTO: 230 K/UL (ref 150–450)
PMV BLD AUTO: 9.8 FL (ref 9.2–12.9)
POTASSIUM SERPL-SCNC: 3.5 MMOL/L (ref 3.5–5.1)
PROT SERPL-MCNC: 7.9 G/DL (ref 6–8.4)
PROT UR QL STRIP: ABNORMAL
RBC # BLD AUTO: 5.13 M/UL (ref 4.6–6.2)
RBC #/AREA URNS AUTO: >100 /HPF (ref 0–4)
SODIUM SERPL-SCNC: 140 MMOL/L (ref 136–145)
SP GR UR STRIP: 1.02 (ref 1–1.03)
SQUAMOUS #/AREA URNS AUTO: 0 /HPF
URN SPEC COLLECT METH UR: ABNORMAL
WBC # BLD AUTO: 9.19 K/UL (ref 3.9–12.7)
WBC #/AREA URNS AUTO: 0 /HPF (ref 0–5)
YEAST UR QL AUTO: ABNORMAL

## 2023-01-05 PROCEDURE — 96376 TX/PRO/DX INJ SAME DRUG ADON: CPT

## 2023-01-05 PROCEDURE — 99285 PR EMERGENCY DEPT VISIT,LEVEL V: ICD-10-PCS | Mod: ,,, | Performed by: PHYSICIAN ASSISTANT

## 2023-01-05 PROCEDURE — 99285 EMERGENCY DEPT VISIT HI MDM: CPT | Mod: ,,, | Performed by: PHYSICIAN ASSISTANT

## 2023-01-05 PROCEDURE — 86803 HEPATITIS C AB TEST: CPT | Performed by: PHYSICIAN ASSISTANT

## 2023-01-05 PROCEDURE — 81001 URINALYSIS AUTO W/SCOPE: CPT | Performed by: EMERGENCY MEDICINE

## 2023-01-05 PROCEDURE — 25000003 PHARM REV CODE 250: Performed by: PHYSICIAN ASSISTANT

## 2023-01-05 PROCEDURE — 85025 COMPLETE CBC W/AUTO DIFF WBC: CPT | Performed by: EMERGENCY MEDICINE

## 2023-01-05 PROCEDURE — 63600175 PHARM REV CODE 636 W HCPCS: Performed by: EMERGENCY MEDICINE

## 2023-01-05 PROCEDURE — 63600175 PHARM REV CODE 636 W HCPCS: Performed by: PHYSICIAN ASSISTANT

## 2023-01-05 PROCEDURE — 96361 HYDRATE IV INFUSION ADD-ON: CPT

## 2023-01-05 PROCEDURE — 80053 COMPREHEN METABOLIC PANEL: CPT | Performed by: EMERGENCY MEDICINE

## 2023-01-05 PROCEDURE — 96374 THER/PROPH/DIAG INJ IV PUSH: CPT

## 2023-01-05 PROCEDURE — 99285 EMERGENCY DEPT VISIT HI MDM: CPT | Mod: 25

## 2023-01-05 PROCEDURE — 96375 TX/PRO/DX INJ NEW DRUG ADDON: CPT

## 2023-01-05 PROCEDURE — 87389 HIV-1 AG W/HIV-1&-2 AB AG IA: CPT | Performed by: PHYSICIAN ASSISTANT

## 2023-01-05 RX ORDER — TAMSULOSIN HYDROCHLORIDE 0.4 MG/1
0.8 CAPSULE ORAL
Status: COMPLETED | OUTPATIENT
Start: 2023-01-05 | End: 2023-01-05

## 2023-01-05 RX ORDER — KETOROLAC TROMETHAMINE 30 MG/ML
10 INJECTION, SOLUTION INTRAMUSCULAR; INTRAVENOUS
Status: COMPLETED | OUTPATIENT
Start: 2023-01-05 | End: 2023-01-05

## 2023-01-05 RX ORDER — HYDROCODONE BITARTRATE AND ACETAMINOPHEN 5; 325 MG/1; MG/1
1 TABLET ORAL EVERY 6 HOURS PRN
Qty: 12 TABLET | Refills: 0 | Status: SHIPPED | OUTPATIENT
Start: 2023-01-05 | End: 2023-02-27

## 2023-01-05 RX ORDER — MORPHINE SULFATE 4 MG/ML
8 INJECTION, SOLUTION INTRAMUSCULAR; INTRAVENOUS
Status: COMPLETED | OUTPATIENT
Start: 2023-01-05 | End: 2023-01-05

## 2023-01-05 RX ORDER — ONDANSETRON 4 MG/1
4 TABLET, ORALLY DISINTEGRATING ORAL EVERY 6 HOURS PRN
Qty: 15 TABLET | Refills: 0 | Status: SHIPPED | OUTPATIENT
Start: 2023-01-05 | End: 2023-04-27

## 2023-01-05 RX ORDER — TAMSULOSIN HYDROCHLORIDE 0.4 MG/1
0.4 CAPSULE ORAL DAILY
Qty: 10 CAPSULE | Refills: 0 | Status: SHIPPED | OUTPATIENT
Start: 2023-01-05 | End: 2023-01-27

## 2023-01-05 RX ORDER — ONDANSETRON 2 MG/ML
4 INJECTION INTRAMUSCULAR; INTRAVENOUS
Status: COMPLETED | OUTPATIENT
Start: 2023-01-05 | End: 2023-01-05

## 2023-01-05 RX ORDER — HYDROCODONE BITARTRATE AND ACETAMINOPHEN 5; 325 MG/1; MG/1
1 TABLET ORAL EVERY 6 HOURS PRN
Qty: 12 TABLET | Refills: 0 | Status: CANCELLED | OUTPATIENT
Start: 2023-01-05

## 2023-01-05 RX ORDER — KETOROLAC TROMETHAMINE 10 MG/1
10 TABLET, FILM COATED ORAL EVERY 6 HOURS PRN
Qty: 20 TABLET | Refills: 0 | Status: SHIPPED | OUTPATIENT
Start: 2023-01-05 | End: 2023-01-10

## 2023-01-05 RX ORDER — KETOROLAC TROMETHAMINE 30 MG/ML
10 INJECTION, SOLUTION INTRAMUSCULAR; INTRAVENOUS ONCE
Status: COMPLETED | OUTPATIENT
Start: 2023-01-05 | End: 2023-01-05

## 2023-01-05 RX ADMIN — ONDANSETRON 4 MG: 2 INJECTION INTRAMUSCULAR; INTRAVENOUS at 11:01

## 2023-01-05 RX ADMIN — SODIUM CHLORIDE, POTASSIUM CHLORIDE, SODIUM LACTATE AND CALCIUM CHLORIDE 1000 ML: 600; 310; 30; 20 INJECTION, SOLUTION INTRAVENOUS at 09:01

## 2023-01-05 RX ADMIN — KETOROLAC TROMETHAMINE 10 MG: 30 INJECTION, SOLUTION INTRAMUSCULAR; INTRAVENOUS at 08:01

## 2023-01-05 RX ADMIN — MORPHINE SULFATE 8 MG: 4 INJECTION INTRAVENOUS at 11:01

## 2023-01-05 RX ADMIN — ONDANSETRON 4 MG: 2 INJECTION INTRAMUSCULAR; INTRAVENOUS at 08:01

## 2023-01-05 RX ADMIN — KETOROLAC TROMETHAMINE 10 MG: 30 INJECTION, SOLUTION INTRAMUSCULAR; INTRAVENOUS at 10:01

## 2023-01-05 RX ADMIN — TAMSULOSIN HYDROCHLORIDE 0.8 MG: 0.4 CAPSULE ORAL at 10:01

## 2023-01-06 VITALS
RESPIRATION RATE: 18 BRPM | HEART RATE: 84 BPM | DIASTOLIC BLOOD PRESSURE: 73 MMHG | BODY MASS INDEX: 31.57 KG/M2 | SYSTOLIC BLOOD PRESSURE: 152 MMHG | TEMPERATURE: 98 F | OXYGEN SATURATION: 96 % | WEIGHT: 220 LBS

## 2023-01-06 PROCEDURE — 25000003 PHARM REV CODE 250: Performed by: PHYSICIAN ASSISTANT

## 2023-01-06 RX ORDER — ACETAMINOPHEN 500 MG
1000 TABLET ORAL
Status: COMPLETED | OUTPATIENT
Start: 2023-01-06 | End: 2023-01-06

## 2023-01-06 RX ADMIN — ACETAMINOPHEN 1000 MG: 500 TABLET ORAL at 12:01

## 2023-01-06 NOTE — FIRST PROVIDER EVALUATION
Medical screening examination initiated.  I have conducted a focused provider triage encounter, findings are as follows:    Brief history of present illness:  47 M here for LLQ pain x 1 hr with associated nausea, - vomiting. Hx of stones    There were no vitals filed for this visit.    Pertinent physical exam:  uncomfortable, appears in pain.  Holding LLQ.    Brief workup plan:  labs, CT, IVF, toradol IV    Preliminary workup initiated; this workup will be continued and followed by the physician or advanced practice provider that is assigned to the patient when roomed.    FATUMA Caballero MD  Staff ED Physician  01/05/2023 7:31 PM

## 2023-01-06 NOTE — ED PROVIDER NOTES
Encounter Date: 1/5/2023       History     Chief Complaint   Patient presents with    Flank Pain     Pt c/o L flank, L lower abdominal pain, and L testicle pain x1 hour. Hx of kidney stones. Denies fever, n/v     47-year-old male with history of nephrolithiasis, back pain, TBI presents for sudden onset, severe left flank pain radiating down to his left lower abdomen which started an hour prior to arrival.  Reports associated nausea and vomiting.  Denies fever, dysuria, numbness or weakness or scrotal swelling.    Review of patient's allergies indicates:  No Known Allergies  Past Medical History:   Diagnosis Date    Chronic back pain     Chronic diarrhea     Hernia     Nephrolithiasis 7/14/2017    TBI (traumatic brain injury) 2010    fake wall fell on head (no brain or skull injury per pt)     Past Surgical History:   Procedure Laterality Date    COLONOSCOPY N/A 5/29/2017    Procedure: COLONOSCOPY;  Surgeon: Sukhi Scanlon MD;  Location: [facility] (4TH FLR);  Service: Endoscopy;  Laterality: N/A;    CYSTOSCOPY N/A 1/31/2020    Procedure: CYSTOSCOPY;  Surgeon: Parminder Criag MD;  Location: [facility] OR 1ST FLR;  Service: Urology;  Laterality: N/A;    CYSTOSCOPY W/ URETERAL STENT PLACEMENT Left 11/19/2018    Procedure: CYSTOSCOPY, WITH URETERAL STENT INSERTION;  Surgeon: Renee Garcia MD;  Location: [facility] OR 2ND FLR;  Service: Urology;  Laterality: Left;    CYSTOSCOPY W/ URETERAL STENT PLACEMENT  12/21/2018    Procedure: CYSTOSCOPY, WITH URETERAL STENT INSERTION;  Surgeon: Bhupinder Washington Jr., MD;  Location: [facility] OR 1ST FLR;  Service: Urology;;    CYSTOSCOPY W/ URETERAL STENT REMOVAL Left 12/21/2018    Procedure: CYSTOSCOPY, WITH URETERAL STENT REMOVAL;  Surgeon: Bhupinder Washington Jr., MD;  Location: [facility] OR 1ST FLR;  Service: Urology;  Laterality: Left;    HERNIA REPAIR  2017    umbilical    INCISION AND DRAINAGE OF PERIRECTAL REGION N/A 1/11/2022    Procedure: INCISION AND DRAINAGE, PERIRECTAL REGION (lithotomy);   Surgeon: Nomi Mora MD;  Location: [facility] OR 2ND FLR;  Service: Colon and Rectal;  Laterality: N/A;    INSERTION OF SETON STITCH  1/11/2022    Procedure: PLACEMENT, SETON STITCH;  Surgeon: Nomi Mora MD;  Location: [facility] OR 2ND FLR;  Service: Colon and Rectal;;    LASER LITHOTRIPSY Left 12/21/2018    Procedure: LITHOTRIPSY, USING LASER (mana);  Surgeon: Bhupinder Washington Jr., MD;  Location: [facility] OR 1ST FLR;  Service: Urology;  Laterality: Left;  90mins    LASER LITHOTRIPSY Left 1/31/2020    Procedure: LITHOTRIPSY, USING LASER;  Surgeon: Parminder Craig MD;  Location: [facility] OR 1ST FLR;  Service: Urology;  Laterality: Left;    LUMBAR EPIDURAL INJECTION      with steroid    RECTAL FISTULOTOMY N/A 2/18/2022    Procedure: FISTULOTOMY, RECTUM;  Surgeon: Nomi Mora MD;  Location: [facility] OR 2ND FLR;  Service: Colon and Rectal;  Laterality: N/A;    RETROGRADE PYELOGRAPHY Left 11/19/2018    Procedure: PYELOGRAM, RETROGRADE;  Surgeon: Renee Garcia MD;  Location: [facility] OR 2ND FLR;  Service: Urology;  Laterality: Left;    RETROGRADE PYELOGRAPHY Left 12/21/2018    Procedure: PYELOGRAM, RETROGRADE;  Surgeon: Bhupinder Washington Jr., MD;  [facility] 1ST FLR;  Service: Urology;  Laterality: Left;    RETROGRADE PYELOGRAPHY Left 1/31/2020    Procedure: PYELOGRAM, RETROGRADE;  Surgeon: Parminder Craig MD;  [facility] 1ST FLR;  Service: Urology;  Laterality: Left;    URETEROSCOPIC REMOVAL OF URETERIC CALCULUS Left 1/31/2020    Procedure: REMOVAL, CALCULUS, URETER, URETEROSCOPIC;  Surgeon: Parminder Craig MD;  [facility] 1ST FLR;  Service: Urology;  Laterality: Left;    URETEROSCOPY Left 12/21/2018    Procedure: URETEROSCOPY;  Surgeon: Bhupinder Washington Jr., MD;  [facility] 1ST FLR;  Service: Urology;  Laterality: Left;  90mins    URETEROSCOPY Left 1/31/2020    Procedure: URETEROSCOPY;  Surgeon: Parminder Craig MD;  Location: [facility] OR [address] [place] [address];  Service: Urology;  Laterality: Left;      Family History   Problem Relation Age of Onset    Diabetes Mother     Cancer Cousin 50        Cancer possibly stomach     Heart attack Neg Hx     Heart disease Neg Hx     Hypertension Neg Hx     Colon cancer Neg Hx     Esophageal cancer Neg Hx      Social History     Tobacco Use    Smoking status: Former     Types: Cigarettes     Quit date: 2007     Years since quittin.0    Smokeless tobacco: Never   Substance Use Topics    Alcohol use: Yes     Comment: rare    Drug use: No     Review of Systems   Constitutional:  Negative for fever.   Respiratory:  Negative for shortness of breath.    Cardiovascular:  Negative for chest pain.   Gastrointestinal:  Positive for abdominal pain, nausea and vomiting.   Genitourinary:  Positive for flank pain. Negative for dysuria.   Neurological:  Negative for headaches.     Physical Exam     Initial Vitals [23 1930]   BP Pulse Resp Temp SpO2   (!) 143/79 60 (!) 22 97.6 °F (36.4 °C) 100 %      MAP       --         Physical Exam    Nursing note and vitals reviewed.  Constitutional: He appears well-developed and well-nourished. He is not diaphoretic. He appears distressed.   Emesis bag in hand, writhing around on exam table   HENT:   Head: Normocephalic and atraumatic.   Eyes: EOM are normal. Pupils are equal, round, and reactive to light.   Neck: Neck supple.   Normal range of motion.  Cardiovascular:  Normal rate, regular rhythm, normal heart sounds and intact distal pulses.     Exam reveals no gallop and no friction rub.       No murmur heard.  Intact distal pulses   Pulmonary/Chest: Breath sounds normal. Tachypnea noted. No respiratory distress. He has no wheezes. He has no rhonchi. He has no rales. He exhibits no tenderness.   Abdominal: Abdomen is soft. Bowel sounds are normal. He exhibits no distension and no mass. There is no abdominal tenderness.   No CVA or abdominal tenderness There is no rebound and no guarding.   Musculoskeletal:         General: Normal  range of motion.      Cervical back: Normal range of motion and neck supple.     Neurological: He is alert and oriented to person, place, and time.   Skin: Skin is warm and dry.   Psychiatric: He has a normal mood and affect.       ED Course   Procedures  Labs Reviewed   CBC W/ AUTO DIFFERENTIAL - Abnormal; Notable for the following components:       Result Value    MCH 31.4 (*)     All other components within normal limits    Narrative:     Release to patient->Immediate   COMPREHENSIVE METABOLIC PANEL - Abnormal; Notable for the following components:    CO2 22 (*)     Glucose 137 (*)     AST 69 (*)     ALT 49 (*)     All other components within normal limits    Narrative:     Release to patient->Immediate   URINALYSIS, REFLEX TO URINE CULTURE - Abnormal; Notable for the following components:    Appearance, UA Hazy (*)     Protein, UA 1+ (*)     Ketones, UA 1+ (*)     Occult Blood UA 3+ (*)     All other components within normal limits    Narrative:     Specimen Source->Urine   URINALYSIS MICROSCOPIC - Abnormal; Notable for the following components:    RBC, UA >100 (*)     All other components within normal limits    Narrative:     Specimen Source->Urine   HIV 1 / 2 ANTIBODY    Narrative:     Release to patient->Immediate   HEPATITIS C ANTIBODY    Narrative:     Release to patient->Immediate          Imaging Results               CT Renal Stone Study ABD Pelvis WO (Final result)  Result time 01/05/23 22:04:56      Final result by Emeterio Richardson MD (01/05/23 22:04:56)                   Impression:      12 mm stone in the left proximal ureter with mild-to-moderate obstructive uropathy.  Although similar findings were present on 1/30/2020, this is presumably a new stone as stone extraction was performed 01/31/2020.    Punctate nonobstructing stones in the lower pole of the left kidney.    Hepatosplenomegaly and hepatic steatosis.    This report was flagged in Epic as abnormal.      Electronically signed by: Emeterio  MD Debra  Date:    01/05/2023  Time:    22:04               Narrative:    EXAMINATION:  CT RENAL STONE STUDY ABD PELVIS WO    CLINICAL HISTORY:  Flank pain, kidney stone suspected;Nephrolithiasis, uncomplicated;    TECHNIQUE:  Low dose axial images, sagittal and coronal reformations were obtained from the lung bases to the pubic symphysis.  Oral contrast was not administered.    COMPARISON:  CT, 01/30/2020.    FINDINGS:  Lower chest: Heart size is normal. Prominent dependent subsegmental atelectasis.  No consolidation or pleural effusion.  No pericardial effusion.    Abdomen:    Evaluation of the solid abdominal organs and bowel is limited in the absence of IV contrast.    Liver is mildly enlarged and demonstrates normal contour.  There is diffuse hypoattenuation of the liver parenchyma consistent with steatosis.  Gallbladder is unremarkable. No calcified gallstones. No intra-or extrahepatic biliary ductal dilatation.    Spleen is enlarged, measuring approximately 16 cm in length.  Adrenal glands and pancreas are unremarkable.    Asymmetric enlargement of the left kidney when compared to the right side.  1.2 cm obstructing stone in the left proximal ureter with mild-to-moderate left hydroureteronephrosis.  Minimal left perinephric fat stranding.  At least 2 punctate nonobstructing stones in the lower pole of the left kidney.  No right-sided renal stones identified.  No right hydronephrosis.    Small hiatal hernia.  No distended loops of small bowel. Appendix is normal.  Scattered colonic diverticula without CT findings of acute diverticulitis.    Abdominal aorta is normal in course and caliber.    No abdominal lymphadenopathy.    No abdominal free fluid or pneumoperitoneum.    Pelvis: Urinary bladder, rectum, and prostate are unremarkable.  No free fluid in the pelvis.    Bones and soft tissues: No aggressive osseous lesions. Extraperitoneal soft tissues are negative for acute finding.                                        Medications   morphine injection 8 mg (has no administration in time range)   ondansetron injection 4 mg (has no administration in time range)   ondansetron injection 4 mg (4 mg Intravenous Given 1/5/23 2015)   ketorolac injection 9.999 mg (9.999 mg Intravenous Given 1/5/23 2016)   lactated ringers bolus 1,000 mL (0 mLs Intravenous Stopped 1/5/23 2209)   tamsulosin 24 hr capsule 0.8 mg (0.8 mg Oral Given 1/5/23 2212)   ketorolac injection 9.999 mg (9.999 mg Intravenous Given 1/5/23 2216)     Medical Decision Making:   History:   Old Medical Records: I decided to obtain old medical records.  Old Records Summarized: records from previous admission(s).       <> Summary of Records: Most recent ED visit in July for thigh abscess.  Patient underwent ureteroscopic calculus removal with Dr. Craig in 2020 and ureteral stenting with Dr. Garcia in 2018  Initial Assessment:   47-year-old male presenting for severe left-sided flank pain.  Initially mildly hypertensive 143/79 trauma tachypneic and pacing around exam room.  Differential Diagnosis:   Ureterolithiasis  RYLEY  UTI   Dehydration   Electrolyte derangement     Clinical Tests:   Lab Tests: Ordered and Reviewed  Radiological Study: Ordered and Reviewed  ED Management:  Will check labs, give fluids, antiemetics, analgesics, do CT renal stone study and reassess.    Patient reports improvement of symptoms after therapy, however pain did return throughout ED stay.  Lab workup is notable for microscopic hematuria without signs of infection.  Case discussed with Urology, will likely need outpatient procedure but no indication for emergent surgery at this time.  Dispo pending reassessment of pain control.  I am signing out to Marky Gonzalez PA-C.    11:08 PM  Lorna Walters PA-C      Other:   I have discussed this case with another health care provider.       <> Summary of the Discussion: Case discussed with Urology-see ED course           ED Course as of 01/05/23  2302   Thu Jan 05, 2023 2043 Significant improvement of symptoms after therapy. [CC]   2132 CT Renal Stone Study ABD Pelvis WO  Per my independent interpretation, CT shows hydronephrosis secondary to large ureteral stone [CC]   2139 RBC, UA(!): >100 [CC]   2219 Creatinine: 1.2 [CC]   2236 I discussed this patient with Urology, will refer for outpatient ureteroscopy, no indication for emergent surgery [CC]   2243 Patient reports that pain is returning.  Will give additional analgesics [CC]      ED Course User Index  [CC] Lorna Walters PA-C                 Clinical Impression:   Final diagnoses:  [N20.1] Ureterolithiasis (Primary)        ED Disposition Condition    Discharge Stable          ED Prescriptions       Medication Sig Dispense Start Date End Date Auth. Provider    HYDROcodone-acetaminophen (NORCO) 5-325 mg per tablet Take 1 tablet by mouth every 6 (six) hours as needed for Pain. 12 tablet 1/5/2023 -- Lorna Walters PA-C    ketorolac (TORADOL) 10 mg tablet Take 1 tablet (10 mg total) by mouth every 6 (six) hours as needed for Pain. 20 tablet 1/5/2023 1/10/2023 Lorna Walters PA-C    ondansetron (ZOFRAN-ODT) 4 MG TbDL Take 1 tablet (4 mg total) by mouth every 6 (six) hours as needed (nausea). 15 tablet 1/5/2023 -- Lorna Walters PA-C    tamsulosin (FLOMAX) 0.4 mg Cap Take 1 capsule (0.4 mg total) by mouth once daily. for 10 days 10 capsule 1/5/2023 1/15/2023 Lorna Walters PA-C          Follow-up Information       Follow up With Specialties Details Why Contact Info    [facility] UROLOGY Urology Schedule an appointment as soon as possible for a visit in 1 week  [address] [facility] [address]  [phone]    [facility] - Emergency Dept Emergency Medicine Go to  If symptoms worsen [address] [facility] [id number] [phone]             Lorna Walters PA-C  01/05/23 3734

## 2023-01-06 NOTE — PROVIDER PROGRESS NOTES - EMERGENCY DEPT.
Encounter Date: 1/5/2023    ED Physician Progress Notes          Patient signed out to me by my colleague with instructions to follow-up pending work-up. Please see main ED note for previous ED stay documentation. Patient signed out to me with Urology and pain reassessment pending.    Patient sleeping on reassessment. Reports pain has improved. Outpatient follow-up with Urology appropriate. Informed patient his prescriptions were sent to his pharmacy. Urinary strainer provided. Patient expresses understanding and agreeable to the plan. Return to ED precautions given for new, worsening, or concerning symptoms.    ED Diagnosis:  Final diagnoses:  [N20.1] Ureterolithiasis (Primary)    ED Disposition:   ED Disposition Condition    Discharge Stable

## 2023-01-06 NOTE — DISCHARGE INSTRUCTIONS
Diagnosis:  Kidney stones    Your CT scan shows a large 12 millimeter stone in your left ureter.  Your lab tests do not show any concerning findings.  I am prescribing medicine to help the stone pass called Flomax as well as nausea and pain and this in.  Make sure to stay hydrated.    I sent a referral to our Urology doctors for follow-up.  Please call to schedule follow-up appointment as soon as possible.  If you start have any worsening symptoms, cannot hold down fluids, start to have a fever or any other concerns please return to the emergency department.

## 2023-01-06 NOTE — ED TRIAGE NOTES
Sanchez Drake, a 47 y.o. male presents to the ED w/ complaint of     Triage note:  Chief Complaint   Patient presents with    Flank Pain     Pt c/o L flank, L lower abdominal pain, and L testicle pain x1 hour. Hx of kidney stones. Denies fever, n/v     Review of patient's allergies indicates:  No Known Allergies  Past Medical History:   Diagnosis Date    Chronic back pain     Chronic diarrhea     Hernia     Nephrolithiasis 7/14/2017    TBI (traumatic brain injury) 2010    fake wall fell on head (no brain or skull injury per pt)

## 2023-01-11 ENCOUNTER — HOSPITAL ENCOUNTER (OUTPATIENT)
Dept: RADIOLOGY | Facility: HOSPITAL | Age: 48
Discharge: HOME | End: 2023-01-11
Attending: UROLOGY
Payer: COMMERCIAL

## 2023-01-11 ENCOUNTER — OFFICE VISIT (OUTPATIENT)
Dept: UROLOGY | Facility: CLINIC | Age: 48
End: 2023-01-11
Payer: COMMERCIAL

## 2023-01-11 VITALS
HEIGHT: 70 IN | BODY MASS INDEX: 32.32 KG/M2 | DIASTOLIC BLOOD PRESSURE: 73 MMHG | SYSTOLIC BLOOD PRESSURE: 124 MMHG | WEIGHT: 225.75 LBS | HEART RATE: 80 BPM

## 2023-01-11 DIAGNOSIS — N20.1 URETEROLITHIASIS: ICD-10-CM

## 2023-01-11 DIAGNOSIS — N20.0 URIC ACID RENAL CALCULUS: Primary | ICD-10-CM

## 2023-01-11 PROCEDURE — 1159F PR MEDICATION LIST DOCUMENTED IN MEDICAL RECORD: ICD-10-PCS | Mod: CPTII,S$GLB,, | Performed by: UROLOGY

## 2023-01-11 PROCEDURE — 99214 PR OFFICE/OUTPT VISIT, EST, LEVL IV, 30-39 MIN: ICD-10-PCS | Mod: S$GLB,,, | Performed by: UROLOGY

## 2023-01-11 PROCEDURE — 99999 PR PBB SHADOW E&M-EST. PATIENT-LVL IV: ICD-10-PCS | Mod: PBBFAC,,, | Performed by: UROLOGY

## 2023-01-11 PROCEDURE — 3078F DIAST BP <80 MM HG: CPT | Mod: CPTII,S$GLB,, | Performed by: UROLOGY

## 2023-01-11 PROCEDURE — 99999 PR PBB SHADOW E&M-EST. PATIENT-LVL IV: CPT | Mod: PBBFAC,,, | Performed by: UROLOGY

## 2023-01-11 PROCEDURE — 3008F PR BODY MASS INDEX (BMI) DOCUMENTED: ICD-10-PCS | Mod: CPTII,S$GLB,, | Performed by: UROLOGY

## 2023-01-11 PROCEDURE — 74018 RADEX ABDOMEN 1 VIEW: CPT | Mod: 26,,, | Performed by: INTERNAL MEDICINE

## 2023-01-11 PROCEDURE — 99214 OFFICE O/P EST MOD 30 MIN: CPT | Mod: S$GLB,,, | Performed by: UROLOGY

## 2023-01-11 PROCEDURE — 3008F BODY MASS INDEX DOCD: CPT | Mod: CPTII,S$GLB,, | Performed by: UROLOGY

## 2023-01-11 PROCEDURE — 74018 XR ABDOMEN AP 1 VIEW: ICD-10-PCS | Mod: 26,,, | Performed by: INTERNAL MEDICINE

## 2023-01-11 PROCEDURE — 1159F MED LIST DOCD IN RCRD: CPT | Mod: CPTII,S$GLB,, | Performed by: UROLOGY

## 2023-01-11 PROCEDURE — 74018 RADEX ABDOMEN 1 VIEW: CPT | Mod: TC

## 2023-01-11 PROCEDURE — 3074F PR MOST RECENT SYSTOLIC BLOOD PRESSURE < 130 MM HG: ICD-10-PCS | Mod: CPTII,S$GLB,, | Performed by: UROLOGY

## 2023-01-11 PROCEDURE — 3074F SYST BP LT 130 MM HG: CPT | Mod: CPTII,S$GLB,, | Performed by: UROLOGY

## 2023-01-11 PROCEDURE — 3078F PR MOST RECENT DIASTOLIC BLOOD PRESSURE < 80 MM HG: ICD-10-PCS | Mod: CPTII,S$GLB,, | Performed by: UROLOGY

## 2023-01-11 RX ORDER — HYDROCODONE BITARTRATE AND ACETAMINOPHEN 10; 325 MG/1; MG/1
1 TABLET ORAL EVERY 8 HOURS PRN
Qty: 15 TABLET | Refills: 0 | Status: SHIPPED | OUTPATIENT
Start: 2023-01-11 | End: 2023-02-27

## 2023-01-11 RX ORDER — POTASSIUM CITRATE 10 MEQ/1
20 TABLET, EXTENDED RELEASE ORAL
Qty: 180 TABLET | Refills: 11 | Status: SHIPPED | OUTPATIENT
Start: 2023-01-11 | End: 2024-01-11

## 2023-01-11 RX ORDER — KETOROLAC TROMETHAMINE 10 MG/1
10 TABLET, FILM COATED ORAL EVERY 6 HOURS
Qty: 20 TABLET | Refills: 0 | Status: SHIPPED | OUTPATIENT
Start: 2023-01-11 | End: 2023-04-27

## 2023-01-11 NOTE — PROGRESS NOTES
Subjective:       Patient ID: Sanchez Drake is a 47 y.o. male.    Chief Complaint: ureterolithiasis (Follow up history of kidney stones , left flank pain , he had ct/scan 0131/2023. He have a 12 mm kidney stone)    HPI patient has a history of left flank pain and CT scan demonstrated a 1.1 cm upper 3rd ureteral stone.  He would like to take Toradol but he would like to have a few hydrocodone signs for nighttime.  He says he is takes half a pill to a pill and goes to sleep.  No fever chills nausea vomiting  .  Has a history of uric acid stones and calcium based stones.  I am going to get a KUB today to see if the stone is visible.  I will also send him in some Toradol and start Urocit-K to see if I can dissolve the stone   Past Medical History:   Diagnosis Date    Chronic back pain     Chronic diarrhea     Hernia     Nephrolithiasis 7/14/2017    TBI (traumatic brain injury) 2010    fake wall fell on head (no brain or skull injury per pt)       Past Surgical History:   Procedure Laterality Date    COLONOSCOPY N/A 5/29/2017    Procedure: COLONOSCOPY;  Surgeon: Sukhi Scanlon MD;  Location: [facility] (4TH FLR);  Service: Endoscopy;  Laterality: N/A;    CYSTOSCOPY N/A 1/31/2020    Procedure: CYSTOSCOPY;  Surgeon: Parminder Craig MD;  Location: [facility] OR 1ST FLR;  Service: Urology;  Laterality: N/A;    CYSTOSCOPY W/ URETERAL STENT PLACEMENT Left 11/19/2018    Procedure: CYSTOSCOPY, WITH URETERAL STENT INSERTION;  Surgeon: Renee Garcia MD;  Location: [facility] OR 2ND FLR;  Service: Urology;  Laterality: Left;    CYSTOSCOPY W/ URETERAL STENT PLACEMENT  12/21/2018    Procedure: CYSTOSCOPY, WITH URETERAL STENT INSERTION;  Surgeon: Bhupinder Washington Jr., MD;  [facility];  Service: Urology;;    CYSTOSCOPY W/ URETERAL STENT REMOVAL Left 12/21/2018    Procedure: CYSTOSCOPY, WITH URETERAL STENT REMOVAL;  Surgeon: Bhupinder Washington Jr., MD;  [facility];  Service: Urology;  Laterality: Left;     HERNIA REPAIR  2017    umbilical    INCISION AND DRAINAGE OF PERIRECTAL REGION N/A 1/11/2022    Procedure: INCISION AND DRAINAGE, PERIRECTAL REGION (lithotomy);  Surgeon: Nomi Mora MD;  Location: [facility] OR 2ND FLR;  Service: Colon and Rectal;  Laterality: N/A;    INSERTION OF SETON STITCH  1/11/2022    Procedure: PLACEMENT, SETON STITCH;  Surgeon: Nomi Mora MD;  Location: [facility] OR 2ND FLR;  Service: Colon and Rectal;;    LASER LITHOTRIPSY Left 12/21/2018    Procedure: LITHOTRIPSY, USING LASER (mana);  Surgeon: Bhupinder Washington Jr., MD;  Location: [facility] OR 1ST FLR;  Service: Urology;  Laterality: Left;  90mins    LASER LITHOTRIPSY Left 1/31/2020    Procedure: LITHOTRIPSY, USING LASER;  Surgeon: Parminder Craig MD;  Location: [facility] OR 1ST FLR;  Service: Urology;  Laterality: Left;    LUMBAR EPIDURAL INJECTION      with steroid    RECTAL FISTULOTOMY N/A 2/18/2022    Procedure: FISTULOTOMY, RECTUM;  Surgeon: Nomi Mora MD;  Location: [facility] OR 2ND FLR;  Service: Colon and Rectal;  Laterality: N/A;    RETROGRADE PYELOGRAPHY Left 11/19/2018    Procedure: PYELOGRAM, RETROGRADE;  Surgeon: Renee Garcia MD;  Location: [facility] OR 2ND FLR;  Service: Urology;  Laterality: Left;    RETROGRADE PYELOGRAPHY Left 12/21/2018    Procedure: PYELOGRAM, RETROGRADE;  Surgeon: Bhupinder Washington Jr., MD;  Location: [facility] OR 1ST FLR;  Service: Urology;  Laterality: Left;    RETROGRADE PYELOGRAPHY Left 1/31/2020    Procedure: PYELOGRAM, RETROGRADE;  Surgeon: Parminder Craig MD;  Location: [facility] OR 1ST FLR;  Service: Urology;  Laterality: Left;    URETEROSCOPIC REMOVAL OF URETERIC CALCULUS Left 1/31/2020    Procedure: REMOVAL, CALCULUS, URETER, URETEROSCOPIC;  Surgeon: Parminder Craig MD;  Location: [facility] OR 1ST FLR;  Service: Urology;  Laterality: Left;    URETEROSCOPY Left 12/21/2018    Procedure: URETEROSCOPY;  Surgeon: Bhupinder Washington Jr., MD;  Location: [facility] OR 1ST FLR;  Service: Urology;  Laterality: Left;   90mins    URETEROSCOPY Left 2020    Procedure: URETEROSCOPY;  Surgeon: Parminder Craig MD;  Location: [facility] OR [address] [place] [address];  Service: Urology;  Laterality: Left;       Family History   Problem Relation Age of Onset    Diabetes Mother     Cancer Cousin 50        Cancer possibly stomach     Heart attack Neg Hx     Heart disease Neg Hx     Hypertension Neg Hx     Colon cancer Neg Hx     Esophageal cancer Neg Hx        Social History     Socioeconomic History    Marital status:     Number of children: 2   Occupational History    Occupation: self employed   Tobacco Use    Smoking status: Former     Types: Cigarettes     Quit date: 2007     Years since quittin.0    Smokeless tobacco: Never   Substance and Sexual Activity    Alcohol use: Yes     Comment: rare    Drug use: No    Sexual activity: Yes     Partners: Female       Allergies:  Patient has no known allergies.    Medications:    Current Outpatient Medications:     clobetasoL (TEMOVATE) 0.05 % external solution, APPLY TO AFFECTED AREAS ON SCALP THREE TO FOUR TIMES A WEEK AT NIGHT., Disp: 50 mL, Rfl: 3    HYDROcodone-acetaminophen (NORCO) 5-325 mg per tablet, Take 1 tablet by mouth every 6 (six) hours as needed for Pain., Disp: 12 tablet, Rfl: 0    ondansetron (ZOFRAN-ODT) 4 MG TbDL, Take 1 tablet (4 mg total) by mouth every 6 (six) hours as needed (nausea)., Disp: 15 tablet, Rfl: 0    tamsulosin (FLOMAX) 0.4 mg Cap, Take 1 capsule (0.4 mg total) by mouth once daily. for 10 days, Disp: 10 capsule, Rfl: 0    HYDROcodone-acetaminophen (NORCO)  mg per tablet, Take 1 tablet by mouth every 8 (eight) hours as needed for Pain., Disp: 15 tablet, Rfl: 0    ketorolac (TORADOL) 10 mg tablet, Take 1 tablet (10 mg total) by mouth every 6 (six) hours., Disp: 20 tablet, Rfl: 0    potassium citrate (UROCIT-K) 10 mEq (1,080 mg) TbSR, Take 2 tablets (20 mEq total) by mouth 3 (three) times daily with meals., Disp: 180 tablet, Rfl: 11  No current  facility-administered medications for this visit.    Facility-Administered Medications Ordered in Other Visits:     0.9%  NaCl infusion, , Intravenous, Continuous, Joelle Milligan NP    LIDOcaine (PF) 10 mg/ml (1%) injection 10 mg, 1 mL, Intradermal, Once, Joelle Milligan NP    mupirocin 2 % ointment, , Nasal, On Call Procedure, Joelle Milligan NP    Review of Systems   Genitourinary:  Positive for flank pain.     Objective:      Physical Exam  Constitutional:       Appearance: He is well-developed.   HENT:      Head: Normocephalic.   Cardiovascular:      Rate and Rhythm: Normal rate.   Pulmonary:      Effort: Pulmonary effort is normal.   Abdominal:      Palpations: Abdomen is soft.   Genitourinary:     Prostate: Normal.   Skin:     General: Skin is warm.   Neurological:      Mental Status: He is alert.       Assessment:       1. Uric acid renal calculus    2. Ureterolithiasis          Plan:       Sanchez was seen today for ureterolithiasis.    Diagnoses and all orders for this visit:    Uric acid renal calculus  -     US Retroperitoneal Complete; Future    Ureterolithiasis  -     Ambulatory referral/consult to Urology  -     Cancel: X-Ray Abdomen AP 1 View; Future  -     X-Ray Abdomen AP 1 View; Future  -     HYDROcodone-acetaminophen (NORCO)  mg per tablet; Take 1 tablet by mouth every 8 (eight) hours as needed for Pain.    Other orders  -     potassium citrate (UROCIT-K) 10 mEq (1,080 mg) TbSR; Take 2 tablets (20 mEq total) by mouth 3 (three) times daily with meals.  -     ketorolac (TORADOL) 10 mg tablet; Take 1 tablet (10 mg total) by mouth every 6 (six) hours.    Return to clinic 2 weeks for urinalysis to check pH with renal ultrasound.  Will do ureteroscopy p.r.n.

## 2023-01-16 ENCOUNTER — NURSE TRIAGE (OUTPATIENT)
Dept: ADMINISTRATIVE | Facility: CLINIC | Age: 48
End: 2023-01-16
Payer: COMMERCIAL

## 2023-01-16 ENCOUNTER — PATIENT MESSAGE (OUTPATIENT)
Dept: UROLOGY | Facility: CLINIC | Age: 48
End: 2023-01-16
Payer: COMMERCIAL

## 2023-01-16 DIAGNOSIS — N20.1 URETEROLITHIASIS: ICD-10-CM

## 2023-01-16 NOTE — TELEPHONE ENCOUNTER
Pt calling in for refill for prescription pain medication. States that he has appointment for January 25th, but states that to long to wait for refill. Asking if he can either get a sooner appointment or refill for prescription. Advised will send message to office. Verbalized understanding  Reason for Disposition   Caller requesting a CONTROLLED substance prescription refill (e.g., narcotics, ADHD medicines)    Protocols used: Medication Refill and Renewal Call-A-AH

## 2023-01-17 ENCOUNTER — PATIENT MESSAGE (OUTPATIENT)
Dept: UROLOGY | Facility: CLINIC | Age: 48
End: 2023-01-17
Payer: COMMERCIAL

## 2023-01-17 DIAGNOSIS — N20.1 URETERAL CALCULUS: Primary | ICD-10-CM

## 2023-01-17 RX ORDER — KETOROLAC TROMETHAMINE 10 MG/1
10 TABLET, FILM COATED ORAL EVERY 6 HOURS PRN
Qty: 20 TABLET | Refills: 0 | Status: CANCELLED | OUTPATIENT
Start: 2023-01-17

## 2023-01-17 RX ORDER — OXYCODONE AND ACETAMINOPHEN 10; 325 MG/1; MG/1
1 TABLET ORAL EVERY 6 HOURS PRN
Qty: 28 TABLET | Refills: 0 | Status: SHIPPED | OUTPATIENT
Start: 2023-01-17 | End: 2023-01-24

## 2023-01-17 RX ORDER — HYDROCODONE BITARTRATE AND ACETAMINOPHEN 10; 325 MG/1; MG/1
1 TABLET ORAL EVERY 8 HOURS PRN
Qty: 15 TABLET | Refills: 0 | Status: CANCELLED | OUTPATIENT
Start: 2023-01-17

## 2023-01-17 RX ORDER — NALOXONE HYDROCHLORIDE 4 MG/.1ML
SPRAY NASAL
Qty: 1 EACH | Refills: 11 | Status: SHIPPED | OUTPATIENT
Start: 2023-01-17 | End: 2023-04-27

## 2023-01-17 NOTE — TELEPHONE ENCOUNTER
----- Message from Vera Esquivel MA sent at 1/17/2023  3:10 PM CST -----  Contact: [phone]  Pt is calling in regards to previous messages sent. He states he has left messages but no response. He is requesting to speak with anyone that works with Dr. Washington. He reports he is out of meds and wants a sooner appt than 01/25/23. Pt states he may have to go to ER tonight. Pt would like a callback at [phone] and he also states to reply to his patient portal as well.

## 2023-01-19 ENCOUNTER — PATIENT MESSAGE (OUTPATIENT)
Dept: SURGERY | Facility: HOSPITAL | Age: 48
End: 2023-01-19
Payer: COMMERCIAL

## 2023-01-19 ENCOUNTER — OFFICE VISIT (OUTPATIENT)
Dept: UROLOGY | Facility: CLINIC | Age: 48
End: 2023-01-19
Payer: COMMERCIAL

## 2023-01-19 ENCOUNTER — TELEPHONE (OUTPATIENT)
Dept: UROLOGY | Facility: CLINIC | Age: 48
End: 2023-01-19

## 2023-01-19 VITALS — WEIGHT: 224.19 LBS | HEIGHT: 70 IN | BODY MASS INDEX: 32.1 KG/M2

## 2023-01-19 DIAGNOSIS — N20.1 LEFT URETERAL CALCULUS: Primary | ICD-10-CM

## 2023-01-19 PROCEDURE — 1160F PR REVIEW ALL MEDS BY PRESCRIBER/CLIN PHARMACIST DOCUMENTED: ICD-10-PCS | Mod: CPTII,S$GLB,, | Performed by: UROLOGY

## 2023-01-19 PROCEDURE — 99213 PR OFFICE/OUTPT VISIT, EST, LEVL III, 20-29 MIN: ICD-10-PCS | Mod: S$GLB,,, | Performed by: UROLOGY

## 2023-01-19 PROCEDURE — 1159F MED LIST DOCD IN RCRD: CPT | Mod: CPTII,S$GLB,, | Performed by: UROLOGY

## 2023-01-19 PROCEDURE — 1159F PR MEDICATION LIST DOCUMENTED IN MEDICAL RECORD: ICD-10-PCS | Mod: CPTII,S$GLB,, | Performed by: UROLOGY

## 2023-01-19 PROCEDURE — 99213 OFFICE O/P EST LOW 20 MIN: CPT | Mod: S$GLB,,, | Performed by: UROLOGY

## 2023-01-19 PROCEDURE — 99999 PR PBB SHADOW E&M-EST. PATIENT-LVL IV: CPT | Mod: PBBFAC,,, | Performed by: UROLOGY

## 2023-01-19 PROCEDURE — 1160F RVW MEDS BY RX/DR IN RCRD: CPT | Mod: CPTII,S$GLB,, | Performed by: UROLOGY

## 2023-01-19 PROCEDURE — 3008F PR BODY MASS INDEX (BMI) DOCUMENTED: ICD-10-PCS | Mod: CPTII,S$GLB,, | Performed by: UROLOGY

## 2023-01-19 PROCEDURE — 3008F BODY MASS INDEX DOCD: CPT | Mod: CPTII,S$GLB,, | Performed by: UROLOGY

## 2023-01-19 PROCEDURE — 99999 PR PBB SHADOW E&M-EST. PATIENT-LVL IV: ICD-10-PCS | Mod: PBBFAC,,, | Performed by: UROLOGY

## 2023-01-19 RX ORDER — OXYCODONE AND ACETAMINOPHEN 10; 325 MG/1; MG/1
1 TABLET ORAL EVERY 6 HOURS PRN
Qty: 28 TABLET | Refills: 0 | Status: SHIPPED | OUTPATIENT
Start: 2023-01-19 | End: 2023-02-27

## 2023-01-19 RX ORDER — KETOROLAC TROMETHAMINE 10 MG/1
10 TABLET, FILM COATED ORAL EVERY 6 HOURS
Qty: 10 TABLET | Refills: 0 | Status: ON HOLD | OUTPATIENT
Start: 2023-01-19 | End: 2023-01-27 | Stop reason: SDUPTHER

## 2023-01-19 RX ORDER — ONDANSETRON HYDROCHLORIDE 8 MG/1
8 TABLET, FILM COATED ORAL 2 TIMES DAILY
Qty: 15 TABLET | Refills: 1 | Status: SHIPPED | OUTPATIENT
Start: 2023-01-19 | End: 2023-04-27

## 2023-01-19 RX ORDER — ONDANSETRON HYDROCHLORIDE 8 MG/1
8 TABLET, FILM COATED ORAL 2 TIMES DAILY
Qty: 15 TABLET | Refills: 1 | Status: SHIPPED | OUTPATIENT
Start: 2023-01-19 | End: 2023-01-19 | Stop reason: SDUPTHER

## 2023-01-19 RX ORDER — ONDANSETRON HYDROCHLORIDE 8 MG/1
8 TABLET, FILM COATED ORAL 2 TIMES DAILY
Qty: 15 TABLET | Refills: 1 | Status: SHIPPED | OUTPATIENT
Start: 2023-01-19 | End: 2023-01-19

## 2023-01-19 NOTE — H&P (VIEW-ONLY)
Subjective:       Patient ID: Sanchez Drake is a 47 y.o. male.    Chief Complaint: uricacid renal calculus (C/o left flank pain, pain state he does not have anymore pain medication he state he just not feeling well on today visit.)    HPI patient is here with a left upper 3rd ureteral stone approximately 8-9 mm he has been having severe pain and was having trouble getting his pain medicines.  I sent them in but for some reason he was not able to obtain them.  I am going to refill his Percocet along with his Toradol and Zofran and give him 60 mg of Toradol IM.  He has been having some nausea no fever chills.  His urine is negative.    Past Medical History:   Diagnosis Date    Chronic back pain     Chronic diarrhea     Hernia     Nephrolithiasis 7/14/2017    TBI (traumatic brain injury) 2010    fake wall fell on head (no brain or skull injury per pt)       Past Surgical History:   Procedure Laterality Date    COLONOSCOPY N/A 5/29/2017    Procedure: COLONOSCOPY;  Surgeon: Sukhi Scanlon MD;  Location: [facility] (4TH FLR);  Service: Endoscopy;  Laterality: N/A;    CYSTOSCOPY N/A 1/31/2020    Procedure: CYSTOSCOPY;  Surgeon: Parminder Craig MD;  [facility];  Service: Urology;  Laterality: N/A;    CYSTOSCOPY W/ URETERAL STENT PLACEMENT Left 11/19/2018    Procedure: CYSTOSCOPY, WITH URETERAL STENT INSERTION;  Surgeon: Renee Garcia MD;  Location: [facility] OR 2ND FLR;  Service: Urology;  Laterality: Left;    CYSTOSCOPY W/ URETERAL STENT PLACEMENT  12/21/2018    Procedure: CYSTOSCOPY, WITH URETERAL STENT INSERTION;  Surgeon: Bhupinder Washington Jr., MD;  Location: [facility] OR 1ST FLR;  Service: Urology;;    CYSTOSCOPY W/ URETERAL STENT REMOVAL Left 12/21/2018    Procedure: CYSTOSCOPY, WITH URETERAL STENT REMOVAL;  Surgeon: Bhupinder Washington Jr., MD;  Location: [facility] OR [address] [place] [address];  Service: Urology;  Laterality: Left;    HERNIA REPAIR  2017    umbilical    INCISION AND DRAINAGE OF PERIRECTAL REGION N/A  1/11/2022    Procedure: INCISION AND DRAINAGE, PERIRECTAL REGION (lithotomy);  Surgeon: Nomi Mora MD;  Location: [facility] OR 2ND FLR;  Service: Colon and Rectal;  Laterality: N/A;    INSERTION OF SETON STITCH  1/11/2022    Procedure: PLACEMENT, SETON STITCH;  Surgeon: Nomi Mora MD;  Location: [facility] OR 2ND FLR;  Service: Colon and Rectal;;    LASER LITHOTRIPSY Left 12/21/2018    Procedure: LITHOTRIPSY, USING LASER (mana);  Surgeon: Bhupinder Washington Jr., MD;  Location: [facility] OR 1ST FLR;  Service: Urology;  Laterality: Left;  90mins    LASER LITHOTRIPSY Left 1/31/2020    Procedure: LITHOTRIPSY, USING LASER;  Surgeon: Parminder Craig MD;  Location: [facility] OR 1ST FLR;  Service: Urology;  Laterality: Left;    LUMBAR EPIDURAL INJECTION      with steroid    RECTAL FISTULOTOMY N/A 2/18/2022    Procedure: FISTULOTOMY, RECTUM;  Surgeon: Nomi Mora MD;  Location: [facility] OR 2ND FLR;  Service: Colon and Rectal;  Laterality: N/A;    RETROGRADE PYELOGRAPHY Left 11/19/2018    Procedure: PYELOGRAM, RETROGRADE;  Surgeon: Renee Garcia MD;  Location: [facility] OR 2ND FLR;  Service: Urology;  Laterality: Left;    RETROGRADE PYELOGRAPHY Left 12/21/2018    Procedure: PYELOGRAM, RETROGRADE;  Surgeon: Bhupinder Washington Jr., MD;  Location: [facility] OR 1ST FLR;  Service: Urology;  Laterality: Left;    RETROGRADE PYELOGRAPHY Left 1/31/2020    Procedure: PYELOGRAM, RETROGRADE;  Surgeon: Parminder Craig MD;  Location: [facility] OR 1ST FLR;  Service: Urology;  Laterality: Left;    URETEROSCOPIC REMOVAL OF URETERIC CALCULUS Left 1/31/2020    Procedure: REMOVAL, CALCULUS, URETER, URETEROSCOPIC;  Surgeon: Parminder Craig MD;  Location: [facility] OR 1ST FLR;  Service: Urology;  Laterality: Left;    URETEROSCOPY Left 12/21/2018    Procedure: URETEROSCOPY;  Surgeon: Bhupinder Washington Jr., MD;  Location: [facility] OR 1ST FLR;  Service: Urology;  Laterality: Left;  90mins    URETEROSCOPY Left 1/31/2020    Procedure: URETEROSCOPY;  Surgeon: Parminder ARIZMENDI  MD Rafa;  Location: [facility] OR [address] [place] [address];  Service: Urology;  Laterality: Left;       Family History   Problem Relation Age of Onset    Diabetes Mother     Cancer Cousin 50        Cancer possibly stomach     Heart attack Neg Hx     Heart disease Neg Hx     Hypertension Neg Hx     Colon cancer Neg Hx     Esophageal cancer Neg Hx        Social History     Socioeconomic History    Marital status:     Number of children: 2   Occupational History    Occupation: self employed   Tobacco Use    Smoking status: Former     Types: Cigarettes     Quit date: 2007     Years since quittin.0    Smokeless tobacco: Never   Substance and Sexual Activity    Alcohol use: Yes     Comment: rare    Drug use: No    Sexual activity: Yes     Partners: Female       Allergies:  Patient has no known allergies.    Medications:    Current Outpatient Medications:     clobetasoL (TEMOVATE) 0.05 % external solution, APPLY TO AFFECTED AREAS ON SCALP THREE TO FOUR TIMES A WEEK AT NIGHT., Disp: 50 mL, Rfl: 3    HYDROcodone-acetaminophen (NORCO)  mg per tablet, Take 1 tablet by mouth every 8 (eight) hours as needed for Pain., Disp: 15 tablet, Rfl: 0    HYDROcodone-acetaminophen (NORCO) 5-325 mg per tablet, Take 1 tablet by mouth every 6 (six) hours as needed for Pain., Disp: 12 tablet, Rfl: 0    ketorolac (TORADOL) 10 mg tablet, Take 1 tablet (10 mg total) by mouth every 6 (six) hours., Disp: 20 tablet, Rfl: 0    naloxone (NARCAN) 4 mg/actuation Spry, 4mg by nasal route as needed for opioid overdose; may repeat every 2-3 minutes in alternating nostrils until medical help arrives. Call 911, Disp: 1 each, Rfl: 11    ondansetron (ZOFRAN-ODT) 4 MG TbDL, Take 1 tablet (4 mg total) by mouth every 6 (six) hours as needed (nausea)., Disp: 15 tablet, Rfl: 0    oxyCODONE-acetaminophen (PERCOCET)  mg per tablet, Take 1 tablet by mouth every 6 (six) hours as needed for Pain., Disp: 28 tablet, Rfl: 0    potassium citrate (UROCIT-K) 10 mEq  (1,080 mg) TbSR, Take 2 tablets (20 mEq total) by mouth 3 (three) times daily with meals., Disp: 180 tablet, Rfl: 11    ketorolac (TORADOL) 10 mg tablet, Take 1 tablet (10 mg total) by mouth every 6 (six) hours., Disp: 10 tablet, Rfl: 0    oxyCODONE-acetaminophen (PERCOCET)  mg per tablet, Take 1 tablet by mouth every 6 (six) hours as needed for Pain., Disp: 28 tablet, Rfl: 0    tamsulosin (FLOMAX) 0.4 mg Cap, Take 1 capsule (0.4 mg total) by mouth once daily. for 10 days, Disp: 10 capsule, Rfl: 0  No current facility-administered medications for this visit.    Facility-Administered Medications Ordered in Other Visits:     0.9%  NaCl infusion, , Intravenous, Continuous, Joelle Milligan NP    LIDOcaine (PF) 10 mg/ml (1%) injection 10 mg, 1 mL, Intradermal, Once, Joelle Milligan NP    mupirocin 2 % ointment, , Nasal, On Call Procedure, Joelle Milligan NP    Review of Systems   Constitutional:  Negative for activity change, appetite change, chills, diaphoresis, fatigue, fever and unexpected weight change.   HENT:  Negative for congestion, dental problem, hearing loss, mouth sores, postnasal drip, rhinorrhea, sinus pressure and trouble swallowing.    Eyes:  Negative for pain, discharge and itching.   Respiratory:  Negative for apnea, cough, choking, chest tightness, shortness of breath and wheezing.    Cardiovascular:  Negative for chest pain, palpitations and leg swelling.   Gastrointestinal:  Negative for abdominal distention, abdominal pain, anal bleeding, blood in stool, constipation, diarrhea, nausea, rectal pain and vomiting.   Endocrine: Negative for polydipsia and polyuria.   Genitourinary:  Positive for flank pain. Negative for decreased urine volume, difficulty urinating, dysuria, enuresis, frequency, genital sores, hematuria, penile discharge, penile pain, penile swelling, scrotal swelling, testicular pain and urgency.   Musculoskeletal:  Negative for arthralgias, back pain and myalgias.    Skin:  Negative for color change, rash and wound.   Neurological:  Negative for dizziness, syncope, speech difficulty, light-headedness and headaches.   Hematological:  Negative for adenopathy. Does not bruise/bleed easily.   Psychiatric/Behavioral:  Negative for behavioral problems, confusion, hallucinations and sleep disturbance.      Objective:      Physical Exam    Assessment:       1. Left ureteral calculus          Plan:       Sanchez was seen today for uricacid renal calculus.    Diagnoses and all orders for this visit:    Left ureteral calculus  -     oxyCODONE-acetaminophen (PERCOCET)  mg per tablet; Take 1 tablet by mouth every 6 (six) hours as needed for Pain.  -     X-Ray Abdomen AP 1 View; Future  -     US Retroperitoneal Complete; Future    Other orders  -     ketorolac (TORADOL) 10 mg tablet; Take 1 tablet (10 mg total) by mouth every 6 (six) hours.  -     Discontinue: ondansetron (ZOFRAN) 8 MG tablet; Take 1 tablet (8 mg total) by mouth 2 (two) times daily.  -     Discontinue: ondansetron (ZOFRAN) 8 MG tablet; Take 1 tablet (8 mg total) by mouth 2 (two) times daily.    Sche left ureteroscopic stone removal and or dule left double-J stent placement  patient will call if he develops fever or chills

## 2023-01-19 NOTE — PROGRESS NOTES
Subjective:       Patient ID: Sanchez Drake is a 47 y.o. male.    Chief Complaint: uricacid renal calculus (C/o left flank pain, pain state he does not have anymore pain medication he state he just not feeling well on today visit.)    HPI patient is here with a left upper 3rd ureteral stone approximately 8-9 mm he has been having severe pain and was having trouble getting his pain medicines.  I sent them in but for some reason he was not able to obtain them.  I am going to refill his Percocet along with his Toradol and Zofran and give him 60 mg of Toradol IM.  He has been having some nausea no fever chills.  His urine is negative.    Past Medical History:   Diagnosis Date    Chronic back pain     Chronic diarrhea     Hernia     Nephrolithiasis 7/14/2017    TBI (traumatic brain injury) 2010    fake wall fell on head (no brain or skull injury per pt)       Past Surgical History:   Procedure Laterality Date    COLONOSCOPY N/A 5/29/2017    Procedure: COLONOSCOPY;  Surgeon: Sukhi Scanlon MD;  Location: [facility] (4TH FLR);  Service: Endoscopy;  Laterality: N/A;    CYSTOSCOPY N/A 1/31/2020    Procedure: CYSTOSCOPY;  Surgeon: Parminder Craig MD;  [facility];  Service: Urology;  Laterality: N/A;    CYSTOSCOPY W/ URETERAL STENT PLACEMENT Left 11/19/2018    Procedure: CYSTOSCOPY, WITH URETERAL STENT INSERTION;  Surgeon: Renee Garcia MD;  Location: [facility] OR 2ND FLR;  Service: Urology;  Laterality: Left;    CYSTOSCOPY W/ URETERAL STENT PLACEMENT  12/21/2018    Procedure: CYSTOSCOPY, WITH URETERAL STENT INSERTION;  Surgeon: Bhupinder Washington Jr., MD;  Location: [facility] OR 1ST FLR;  Service: Urology;;    CYSTOSCOPY W/ URETERAL STENT REMOVAL Left 12/21/2018    Procedure: CYSTOSCOPY, WITH URETERAL STENT REMOVAL;  Surgeon: Bhupinder Washington Jr., MD;  Location: [facility] OR [address] [place] [address];  Service: Urology;  Laterality: Left;    HERNIA REPAIR  2017    umbilical    INCISION AND DRAINAGE OF PERIRECTAL REGION N/A  1/11/2022    Procedure: INCISION AND DRAINAGE, PERIRECTAL REGION (lithotomy);  Surgeon: Nomi Mora MD;  Location: [facility] OR 2ND FLR;  Service: Colon and Rectal;  Laterality: N/A;    INSERTION OF SETON STITCH  1/11/2022    Procedure: PLACEMENT, SETON STITCH;  Surgeon: Nomi Mora MD;  Location: [facility] OR 2ND FLR;  Service: Colon and Rectal;;    LASER LITHOTRIPSY Left 12/21/2018    Procedure: LITHOTRIPSY, USING LASER (mana);  Surgeon: Bhupinder Washington Jr., MD;  Location: [facility] OR 1ST FLR;  Service: Urology;  Laterality: Left;  90mins    LASER LITHOTRIPSY Left 1/31/2020    Procedure: LITHOTRIPSY, USING LASER;  Surgeon: Parminder Craig MD;  Location: [facility] OR 1ST FLR;  Service: Urology;  Laterality: Left;    LUMBAR EPIDURAL INJECTION      with steroid    RECTAL FISTULOTOMY N/A 2/18/2022    Procedure: FISTULOTOMY, RECTUM;  Surgeon: Nomi Mora MD;  Location: [facility] OR 2ND FLR;  Service: Colon and Rectal;  Laterality: N/A;    RETROGRADE PYELOGRAPHY Left 11/19/2018    Procedure: PYELOGRAM, RETROGRADE;  Surgeon: Renee Garcia MD;  Location: [facility] OR 2ND FLR;  Service: Urology;  Laterality: Left;    RETROGRADE PYELOGRAPHY Left 12/21/2018    Procedure: PYELOGRAM, RETROGRADE;  Surgeon: Bhupinder Washington Jr., MD;  Location: [facility] OR 1ST FLR;  Service: Urology;  Laterality: Left;    RETROGRADE PYELOGRAPHY Left 1/31/2020    Procedure: PYELOGRAM, RETROGRADE;  Surgeon: Parminder Craig MD;  Location: [facility] OR 1ST FLR;  Service: Urology;  Laterality: Left;    URETEROSCOPIC REMOVAL OF URETERIC CALCULUS Left 1/31/2020    Procedure: REMOVAL, CALCULUS, URETER, URETEROSCOPIC;  Surgeon: Parminder Craig MD;  Location: [facility] OR 1ST FLR;  Service: Urology;  Laterality: Left;    URETEROSCOPY Left 12/21/2018    Procedure: URETEROSCOPY;  Surgeon: Bhupinder Washington Jr., MD;  Location: [facility] OR 1ST FLR;  Service: Urology;  Laterality: Left;  90mins    URETEROSCOPY Left 1/31/2020    Procedure: URETEROSCOPY;  Surgeon: Parminder ARIZMENDI  MD Rafa;  Location: [facility] OR [address] [place] [address];  Service: Urology;  Laterality: Left;       Family History   Problem Relation Age of Onset    Diabetes Mother     Cancer Cousin 50        Cancer possibly stomach     Heart attack Neg Hx     Heart disease Neg Hx     Hypertension Neg Hx     Colon cancer Neg Hx     Esophageal cancer Neg Hx        Social History     Socioeconomic History    Marital status:     Number of children: 2   Occupational History    Occupation: self employed   Tobacco Use    Smoking status: Former     Types: Cigarettes     Quit date: 2007     Years since quittin.0    Smokeless tobacco: Never   Substance and Sexual Activity    Alcohol use: Yes     Comment: rare    Drug use: No    Sexual activity: Yes     Partners: Female       Allergies:  Patient has no known allergies.    Medications:    Current Outpatient Medications:     clobetasoL (TEMOVATE) 0.05 % external solution, APPLY TO AFFECTED AREAS ON SCALP THREE TO FOUR TIMES A WEEK AT NIGHT., Disp: 50 mL, Rfl: 3    HYDROcodone-acetaminophen (NORCO)  mg per tablet, Take 1 tablet by mouth every 8 (eight) hours as needed for Pain., Disp: 15 tablet, Rfl: 0    HYDROcodone-acetaminophen (NORCO) 5-325 mg per tablet, Take 1 tablet by mouth every 6 (six) hours as needed for Pain., Disp: 12 tablet, Rfl: 0    ketorolac (TORADOL) 10 mg tablet, Take 1 tablet (10 mg total) by mouth every 6 (six) hours., Disp: 20 tablet, Rfl: 0    naloxone (NARCAN) 4 mg/actuation Spry, 4mg by nasal route as needed for opioid overdose; may repeat every 2-3 minutes in alternating nostrils until medical help arrives. Call 911, Disp: 1 each, Rfl: 11    ondansetron (ZOFRAN-ODT) 4 MG TbDL, Take 1 tablet (4 mg total) by mouth every 6 (six) hours as needed (nausea)., Disp: 15 tablet, Rfl: 0    oxyCODONE-acetaminophen (PERCOCET)  mg per tablet, Take 1 tablet by mouth every 6 (six) hours as needed for Pain., Disp: 28 tablet, Rfl: 0    potassium citrate (UROCIT-K) 10 mEq  (1,080 mg) TbSR, Take 2 tablets (20 mEq total) by mouth 3 (three) times daily with meals., Disp: 180 tablet, Rfl: 11    ketorolac (TORADOL) 10 mg tablet, Take 1 tablet (10 mg total) by mouth every 6 (six) hours., Disp: 10 tablet, Rfl: 0    oxyCODONE-acetaminophen (PERCOCET)  mg per tablet, Take 1 tablet by mouth every 6 (six) hours as needed for Pain., Disp: 28 tablet, Rfl: 0    tamsulosin (FLOMAX) 0.4 mg Cap, Take 1 capsule (0.4 mg total) by mouth once daily. for 10 days, Disp: 10 capsule, Rfl: 0  No current facility-administered medications for this visit.    Facility-Administered Medications Ordered in Other Visits:     0.9%  NaCl infusion, , Intravenous, Continuous, Joelle Milligan NP    LIDOcaine (PF) 10 mg/ml (1%) injection 10 mg, 1 mL, Intradermal, Once, Joelle Milligan NP    mupirocin 2 % ointment, , Nasal, On Call Procedure, Joelle Milligan NP    Review of Systems   Constitutional:  Negative for activity change, appetite change, chills, diaphoresis, fatigue, fever and unexpected weight change.   HENT:  Negative for congestion, dental problem, hearing loss, mouth sores, postnasal drip, rhinorrhea, sinus pressure and trouble swallowing.    Eyes:  Negative for pain, discharge and itching.   Respiratory:  Negative for apnea, cough, choking, chest tightness, shortness of breath and wheezing.    Cardiovascular:  Negative for chest pain, palpitations and leg swelling.   Gastrointestinal:  Negative for abdominal distention, abdominal pain, anal bleeding, blood in stool, constipation, diarrhea, nausea, rectal pain and vomiting.   Endocrine: Negative for polydipsia and polyuria.   Genitourinary:  Positive for flank pain. Negative for decreased urine volume, difficulty urinating, dysuria, enuresis, frequency, genital sores, hematuria, penile discharge, penile pain, penile swelling, scrotal swelling, testicular pain and urgency.   Musculoskeletal:  Negative for arthralgias, back pain and myalgias.    Skin:  Negative for color change, rash and wound.   Neurological:  Negative for dizziness, syncope, speech difficulty, light-headedness and headaches.   Hematological:  Negative for adenopathy. Does not bruise/bleed easily.   Psychiatric/Behavioral:  Negative for behavioral problems, confusion, hallucinations and sleep disturbance.      Objective:      Physical Exam    Assessment:       1. Left ureteral calculus          Plan:       Sacnhez was seen today for uricacid renal calculus.    Diagnoses and all orders for this visit:    Left ureteral calculus  -     oxyCODONE-acetaminophen (PERCOCET)  mg per tablet; Take 1 tablet by mouth every 6 (six) hours as needed for Pain.  -     X-Ray Abdomen AP 1 View; Future  -     US Retroperitoneal Complete; Future    Other orders  -     ketorolac (TORADOL) 10 mg tablet; Take 1 tablet (10 mg total) by mouth every 6 (six) hours.  -     Discontinue: ondansetron (ZOFRAN) 8 MG tablet; Take 1 tablet (8 mg total) by mouth 2 (two) times daily.  -     Discontinue: ondansetron (ZOFRAN) 8 MG tablet; Take 1 tablet (8 mg total) by mouth 2 (two) times daily.    Sche left ureteroscopic stone removal and or dule left double-J stent placement  patient will call if he develops fever or chills

## 2023-01-25 ENCOUNTER — TELEPHONE (OUTPATIENT)
Dept: UROLOGY | Facility: CLINIC | Age: 48
End: 2023-01-25
Payer: COMMERCIAL

## 2023-01-25 NOTE — TELEPHONE ENCOUNTER
Called pt to confirm arrival time of 8:30am for procedure on 01-. Gave pt NPO instructions and gave pt opportunity to ask questions. Pt verbalized understanding.

## 2023-01-26 NOTE — PRE-PROCEDURE INSTRUCTIONS
PreOp Instructions given:   - Verbal medication information (what to hold and what to take)   - NPO guidelines   - Arrival place directions given; time to be given the day before procedure by the   Surgeon's Office [address] [facility]  - Bathing with antibacterial soap   - Don't wear any jewelry or bring any valuables AM of surgery   - No makeup or moisturizer to face   - No perfume/cologne, powder, lotions or aftershave   Pt. verbalized understanding.   Pt denies any h/o Anesthesia/Sedation complications or side effects.

## 2023-01-27 ENCOUNTER — HOSPITAL ENCOUNTER (OUTPATIENT)
Facility: HOSPITAL | Age: 48
Discharge: HOME | End: 2023-01-27
Attending: UROLOGY | Admitting: UROLOGY
Payer: COMMERCIAL

## 2023-01-27 ENCOUNTER — ANESTHESIA (OUTPATIENT)
Dept: SURGERY | Facility: HOSPITAL | Age: 48
End: 2023-01-27
Payer: COMMERCIAL

## 2023-01-27 ENCOUNTER — ANESTHESIA EVENT (OUTPATIENT)
Dept: SURGERY | Facility: HOSPITAL | Age: 48
End: 2023-01-27
Payer: COMMERCIAL

## 2023-01-27 VITALS
TEMPERATURE: 98 F | BODY MASS INDEX: 31.5 KG/M2 | DIASTOLIC BLOOD PRESSURE: 87 MMHG | HEIGHT: 70 IN | WEIGHT: 220 LBS | HEART RATE: 65 BPM | OXYGEN SATURATION: 96 % | RESPIRATION RATE: 20 BRPM | SYSTOLIC BLOOD PRESSURE: 122 MMHG

## 2023-01-27 DIAGNOSIS — N20.1 URETERAL STONE: Primary | ICD-10-CM

## 2023-01-27 DIAGNOSIS — N20.9 UROLITHIASIS: ICD-10-CM

## 2023-01-27 PROCEDURE — D9220A PRA ANESTHESIA: ICD-10-PCS | Mod: CRNA,,, | Performed by: NURSE ANESTHETIST, CERTIFIED REGISTERED

## 2023-01-27 PROCEDURE — 71000045 HC DOSC ROUTINE RECOVERY EA ADD'L HR: Performed by: UROLOGY

## 2023-01-27 PROCEDURE — 52356 CYSTO/URETERO W/LITHOTRIPSY: CPT | Mod: LT,,, | Performed by: UROLOGY

## 2023-01-27 PROCEDURE — 37000009 HC ANESTHESIA EA ADD 15 MINS: Performed by: UROLOGY

## 2023-01-27 PROCEDURE — 36000706: Performed by: UROLOGY

## 2023-01-27 PROCEDURE — 36000707: Performed by: UROLOGY

## 2023-01-27 PROCEDURE — D9220A PRA ANESTHESIA: Mod: ANES,,, | Performed by: ANESTHESIOLOGY

## 2023-01-27 PROCEDURE — 71000015 HC POSTOP RECOV 1ST HR: Performed by: UROLOGY

## 2023-01-27 PROCEDURE — 71000044 HC DOSC ROUTINE RECOVERY FIRST HOUR: Performed by: UROLOGY

## 2023-01-27 PROCEDURE — C1758 CATHETER, URETERAL: HCPCS | Performed by: UROLOGY

## 2023-01-27 PROCEDURE — 52356 PR CYSTO/URETERO W/LITHOTRIPSY: ICD-10-PCS | Mod: LT,,, | Performed by: UROLOGY

## 2023-01-27 PROCEDURE — 82365 CALCULUS SPECTROSCOPY: CPT | Performed by: UROLOGY

## 2023-01-27 PROCEDURE — C1894 INTRO/SHEATH, NON-LASER: HCPCS | Performed by: UROLOGY

## 2023-01-27 PROCEDURE — 27201423 OPTIME MED/SURG SUP & DEVICES STERILE SUPPLY: Performed by: UROLOGY

## 2023-01-27 PROCEDURE — C2617 STENT, NON-COR, TEM W/O DEL: HCPCS | Performed by: UROLOGY

## 2023-01-27 PROCEDURE — 25000003 PHARM REV CODE 250: Performed by: NURSE ANESTHETIST, CERTIFIED REGISTERED

## 2023-01-27 PROCEDURE — 37000008 HC ANESTHESIA 1ST 15 MINUTES: Performed by: UROLOGY

## 2023-01-27 PROCEDURE — D9220A PRA ANESTHESIA: Mod: CRNA,,, | Performed by: NURSE ANESTHETIST, CERTIFIED REGISTERED

## 2023-01-27 PROCEDURE — C1769 GUIDE WIRE: HCPCS | Performed by: UROLOGY

## 2023-01-27 PROCEDURE — 63600175 PHARM REV CODE 636 W HCPCS: Performed by: NURSE ANESTHETIST, CERTIFIED REGISTERED

## 2023-01-27 PROCEDURE — D9220A PRA ANESTHESIA: ICD-10-PCS | Mod: ANES,,, | Performed by: ANESTHESIOLOGY

## 2023-01-27 DEVICE — STENT URETERAL UNIV 6FR 24CM: Type: IMPLANTABLE DEVICE | Site: URETER | Status: FUNCTIONAL

## 2023-01-27 RX ORDER — PROPOFOL 10 MG/ML
VIAL (ML) INTRAVENOUS
Status: DISCONTINUED | OUTPATIENT
Start: 2023-01-27 | End: 2023-01-27

## 2023-01-27 RX ORDER — HYDROMORPHONE HYDROCHLORIDE 1 MG/ML
0.2 INJECTION, SOLUTION INTRAMUSCULAR; INTRAVENOUS; SUBCUTANEOUS EVERY 5 MIN PRN
Status: DISCONTINUED | OUTPATIENT
Start: 2023-01-27 | End: 2023-01-27 | Stop reason: HOSPADM

## 2023-01-27 RX ORDER — SODIUM CHLORIDE 0.9 % (FLUSH) 0.9 %
3 SYRINGE (ML) INJECTION
Status: DISCONTINUED | OUTPATIENT
Start: 2023-01-27 | End: 2023-01-27 | Stop reason: HOSPADM

## 2023-01-27 RX ORDER — ONDANSETRON 2 MG/ML
INJECTION INTRAMUSCULAR; INTRAVENOUS
Status: DISCONTINUED | OUTPATIENT
Start: 2023-01-27 | End: 2023-01-27

## 2023-01-27 RX ORDER — OXYCODONE HYDROCHLORIDE 5 MG/1
5 TABLET ORAL EVERY 4 HOURS PRN
Qty: 5 TABLET | Refills: 0 | Status: SHIPPED | OUTPATIENT
Start: 2023-01-27 | End: 2023-02-27

## 2023-01-27 RX ORDER — LIDOCAINE HYDROCHLORIDE 20 MG/ML
INJECTION INTRAVENOUS
Status: DISCONTINUED | OUTPATIENT
Start: 2023-01-27 | End: 2023-01-27

## 2023-01-27 RX ORDER — MIDAZOLAM HYDROCHLORIDE 1 MG/ML
INJECTION, SOLUTION INTRAMUSCULAR; INTRAVENOUS
Status: DISCONTINUED | OUTPATIENT
Start: 2023-01-27 | End: 2023-01-27

## 2023-01-27 RX ORDER — DEXAMETHASONE SODIUM PHOSPHATE 4 MG/ML
INJECTION, SOLUTION INTRA-ARTICULAR; INTRALESIONAL; INTRAMUSCULAR; INTRAVENOUS; SOFT TISSUE
Status: DISCONTINUED | OUTPATIENT
Start: 2023-01-27 | End: 2023-01-27

## 2023-01-27 RX ORDER — OXYBUTYNIN CHLORIDE 5 MG/1
5 TABLET ORAL 2 TIMES DAILY
Qty: 14 TABLET | Refills: 0 | Status: SHIPPED | OUTPATIENT
Start: 2023-01-27 | End: 2023-04-27

## 2023-01-27 RX ORDER — FENTANYL CITRATE 50 UG/ML
INJECTION, SOLUTION INTRAMUSCULAR; INTRAVENOUS
Status: DISCONTINUED | OUTPATIENT
Start: 2023-01-27 | End: 2023-01-27

## 2023-01-27 RX ORDER — CEFAZOLIN SODIUM 1 G/3ML
INJECTION, POWDER, FOR SOLUTION INTRAMUSCULAR; INTRAVENOUS
Status: DISCONTINUED | OUTPATIENT
Start: 2023-01-27 | End: 2023-01-27

## 2023-01-27 RX ORDER — PHENAZOPYRIDINE HYDROCHLORIDE 200 MG/1
200 TABLET, FILM COATED ORAL 3 TIMES DAILY PRN
Qty: 21 TABLET | Refills: 0 | Status: SHIPPED | OUTPATIENT
Start: 2023-01-27 | End: 2023-02-03

## 2023-01-27 RX ORDER — ROCURONIUM BROMIDE 10 MG/ML
INJECTION, SOLUTION INTRAVENOUS
Status: DISCONTINUED | OUTPATIENT
Start: 2023-01-27 | End: 2023-01-27

## 2023-01-27 RX ORDER — TAMSULOSIN HYDROCHLORIDE 0.4 MG/1
0.4 CAPSULE ORAL NIGHTLY
Qty: 7 CAPSULE | Refills: 0 | Status: SHIPPED | OUTPATIENT
Start: 2023-01-27 | End: 2023-04-27

## 2023-01-27 RX ORDER — KETOROLAC TROMETHAMINE 10 MG/1
10 TABLET, FILM COATED ORAL EVERY 6 HOURS
Qty: 20 TABLET | Refills: 0 | Status: SHIPPED | OUTPATIENT
Start: 2023-01-27 | End: 2023-02-01

## 2023-01-27 RX ADMIN — ROCURONIUM BROMIDE 20 MG: 10 INJECTION INTRAVENOUS at 12:01

## 2023-01-27 RX ADMIN — DEXAMETHASONE SODIUM PHOSPHATE 4 MG: 4 INJECTION, SOLUTION INTRAMUSCULAR; INTRAVENOUS at 11:01

## 2023-01-27 RX ADMIN — FENTANYL CITRATE 50 MCG: 50 INJECTION, SOLUTION INTRAMUSCULAR; INTRAVENOUS at 01:01

## 2023-01-27 RX ADMIN — DEXAMETHASONE SODIUM PHOSPHATE 4 MG: 4 INJECTION, SOLUTION INTRAMUSCULAR; INTRAVENOUS at 12:01

## 2023-01-27 RX ADMIN — FENTANYL CITRATE 25 MCG: 50 INJECTION, SOLUTION INTRAMUSCULAR; INTRAVENOUS at 11:01

## 2023-01-27 RX ADMIN — PROPOFOL 200 MG: 10 INJECTION, EMULSION INTRAVENOUS at 11:01

## 2023-01-27 RX ADMIN — ONDANSETRON 4 MG: 2 INJECTION INTRAMUSCULAR; INTRAVENOUS at 11:01

## 2023-01-27 RX ADMIN — ROCURONIUM BROMIDE 50 MG: 10 INJECTION INTRAVENOUS at 11:01

## 2023-01-27 RX ADMIN — CEFAZOLIN 2 G: 1 INJECTION, POWDER, FOR SOLUTION INTRAMUSCULAR; INTRAVENOUS at 11:01

## 2023-01-27 RX ADMIN — SODIUM CHLORIDE: 0.9 INJECTION, SOLUTION INTRAVENOUS at 10:01

## 2023-01-27 RX ADMIN — MIDAZOLAM HYDROCHLORIDE 2 MG: 1 INJECTION, SOLUTION INTRAMUSCULAR; INTRAVENOUS at 11:01

## 2023-01-27 RX ADMIN — SUGAMMADEX 200 MG: 100 INJECTION, SOLUTION INTRAVENOUS at 12:01

## 2023-01-27 RX ADMIN — LIDOCAINE HYDROCHLORIDE 100 MG: 20 INJECTION INTRAVENOUS at 11:01

## 2023-01-27 RX ADMIN — FENTANYL CITRATE 75 MCG: 50 INJECTION, SOLUTION INTRAMUSCULAR; INTRAVENOUS at 11:01

## 2023-01-27 NOTE — OP NOTE
[facility]  Operative Note    Date: 01/27/2023    Pre-Op Diagnosis: left ureteral stone upper ureter, 2 left renal stones    Patient Active Problem List    Diagnosis Date Noted    Perianal abscess 01/09/2022    Vitamin D deficiency 11/29/2020    Urinary tract infectious disease 11/29/2020    Abdominal pain 11/19/2018    Lumbar disc herniation 04/09/2018    Chronic pain 08/17/2017    Renal stone 07/14/2017    DDD (degenerative disc disease), lumbar 06/12/2017    Peyronie's disease 05/04/2017    Situational syncope 01/04/2017    Chronic diarrhea 01/04/2017    Intervertebral disc extrusion (Left L5-S1) 01/19/2015    Neural foraminal stenosis of lumbar spine (bilateral L5-S1, L>R) 01/19/2015    Left lumbar radiculopathy 01/19/2015    Chronic low back pain 05/01/2014       Post-Op Diagnosis: Same    Procedure(s) Performed:   1. Left ureteroscopy with pyeloscopy  2. Cystoscopy  3. Laser lithotripsy  4. Stone basket extraction  5. Left ureteral stent insertion  6. Fluoro < 1 hr    Specimen(s): Stone for analysis    Staff Surgeon: Bhupinder Washington MD    Assistant Surgeon: MD To Wallace MD - Resident Assisting    Anesthesia: General endotracheal anesthesia    Indications: Sanchez Drake is a 47 y.o. male with a left renal stones and left proximal ureteral stone presenting for definitive stone management. He is not pre-stented.    Findings:  1. Stone radioopaque on  film.  2. Left ureteral stone fragmented and extracted  3. Two additional left renal stones removed with basket and sent for analysis  4. Left ureteral stent    Estimated Blood Loss: min    Drains:   1. Left 6 Fr x 24 cm JJ ureteral stent with strings    Procedure in detail:  After risks, benefits, and possible complications were explained, the patient elected to undergo the procedure and informed consent was obtained. All questions were answered in the jillian-operative area. The patient was transferred to the  cystoscopy suite and placed in the supine position. SCDs were applied and working. Anesthesia was administered. The patient was then placed in the dorsal lithotomy position and prepped and draped in the usual sterile fashion. Time out was performed, and jillian-procedural antibiotics were confirmed.     The stone was radioopaque on  film. A rigid cystoscope in a 22 Fr sheath was introduced into the patient's urethra. This passed easily. The entire urethra was visualized which showed no strictures or masses. Cystoscopy revealed the ureteral orifices in the normal anatomic location bilaterally.    A motion wire was passed up the left ureteral orifice and up into the kidney. This passed easily and placement was confirmed fluoroscopically. The cystoscope was removed keeping the wire in place.    An 8 Fr rigid ureteroscope was passed into the patient's bladder alongside the wire under direct vision. It was then passed through the left ureteral orifice alongside the wire. A stone was encountered at the level of the proximal ureter as expected.  A 365 micron laser fiber was passed through the ureteroscope. The stone was fragmented/dusted using the laser. The laser fiber was removed and an NCircle basket was introduced through the ureteroscope. Stone fragments were removed and placed in the bladder. The ureteroscope was reinserted and the entire length of the ureter was surveyed and all remaining stone fragments were deemed small enough to pass spontaneously, <1 mm.     A stiff glide wire was inserted through the ureteroscope and into the kidney with fluoroscopic confirmation. The ureteroscope was removed keeping both wires in place.    A 11/13 Fr 45 cm ureteral access sheath was advanced over the glide wire to the level of the renal pelvis under continuous fluoroscopy. This passed easily. The inner dilator and  wire were removed keeping the outer sheath in place. An 8 Fr flexible ureteroscope was advanced through the  access sheath and into the kidney. The motion wire was confirmed to remain within the kidney on fluoroscopy.    Stones were encountered in renal pelvis, believed to be fragments from the ureteral stone. Two additional small stones were seen within the lower pole of the left kidney. The laser fiber was inserted per the scope and the stones within the pelvis were dusted. An NCircle basket was inserted per the scope and the lower pole stone fragments were removed and passed off the field for analysis. Systematic pyeloscopy was performed and all remaining stone fragments were deemed small enough to pass spontaneously, <1 mm. The scope and ureteral access sheath were removed keeping the motion wire in place.    The cystoscope was reinserted and the bladder was irrigated to remove the stone fragments. The bladder was drained and the cystoscope removed keeping the wire in place.    A 6 Fr x 24 cm JJ ureteral stent with strings was passed over the wire and up into the renal pelvis using fluoro. When the coil appeared to be in good position in the kidney and the radio-opaque marker of the pusher was at the inferior pubis, the wire was removed under continuous fluoro. Good coils were seen in the kidney and the bladder using fluoro.    The patient tolerated the procedure well and was transferred to the recovery room in stable condition.    Disposition:  The patient will be discharged home from PACU. He follow up with Dr. Washington  in 4 weeks with a renal ultrasound prior. He was given written instructions to self-remove his ureteral stent with strings on Friday, 2/3/2023.    MD JASON Wallace was present entire case and agree  LPrcassi ROA

## 2023-01-27 NOTE — TRANSFER OF CARE
"Anesthesia Transfer of Care Note    Patient: Sanchez Drake    Procedure(s) Performed: Procedure(s) (LRB):  URETEROSCOPY (Left)  PLACEMENT-STENT (Left)  CYSTOSCOPY  LITHOTRIPSY, USING LASER (Left)  REMOVAL, CALCULUS, URETER, URETEROSCOPIC (Left)  PYELOSCOPY (Left)    Patient location: PACU    Anesthesia Type: general    Transport from OR: Transported from OR on 6-10 L/min O2 by face mask with adequate spontaneous ventilation    Post pain: adequate analgesia    Post assessment: no apparent anesthetic complications and tolerated procedure well    Post vital signs: stable    Level of consciousness: sedated    Nausea/Vomiting: no nausea/vomiting    Complications: none    Transfer of care protocol was followed      Last vitals:   Visit Vitals  /76 (BP Location: Left arm, Patient Position: Lying)   Pulse 79   Temp 36.6 °C (97.8 °F) (Temporal)   Resp 17   Ht 5' 10" (1.778 m)   Wt 99.8 kg (220 lb)   SpO2 96%   BMI 31.57 kg/m²     "

## 2023-01-27 NOTE — PATIENT INSTRUCTIONS
Post Cystoscopy Instructions  Do not strain to have a bowel movement  No strenuous exercise x 7 days  No driving while you are on narcotic pain medications or if your parikh  catheter is in place    You can expect:  To pass stone fragments if you had a stone procedure  Have pain when you void from your stent if you have a stent in place  See blood in your urine if you have a stent in place    If you have a catheter, please return to the ER if your catheter stops draining or you are having abdominal pain.    Call the doctor if:  Temperature is greater than 101F  Persistent vomiting and inability to keep food down  Inability to void if you do not have a catheter    Take Flomax daily.  You can take oxybutynin up to 3 times per day  Take pyridium up to 3 times per day  Follow up in 4 weeks with renal ultrasound  You may remove your stent with strings in 7 days.

## 2023-01-27 NOTE — ANESTHESIA POSTPROCEDURE EVALUATION
Anesthesia Post Evaluation    Patient: Sanchez Drake    Procedure(s) Performed: Procedure(s) (LRB):  URETEROSCOPY (Left)  PLACEMENT-STENT (Left)  CYSTOSCOPY  LITHOTRIPSY, USING LASER (Left)  REMOVAL, CALCULUS, URETER, URETEROSCOPIC (Left)  PYELOSCOPY (Left)    Final Anesthesia Type: general      Patient location during evaluation: PACU  Patient participation: Yes- Able to Participate  Level of consciousness: awake and alert  Post-procedure vital signs: reviewed and stable  Pain control: Pain has been treated.  Airway patency: patent    PONV status: PONV absent or treated.  Anesthetic complications: no      Cardiovascular status: hemodynamically stable  Respiratory status: spontaneous ventilation  Hydration status: euvolemic  Follow-up not needed.          Vitals Value Taken Time   /87 01/27/23 1417   Temp 36.6 °C (97.8 °F) 01/27/23 1320   Pulse 70 01/27/23 1427   Resp 17 01/27/23 1320   SpO2 94 % 01/27/23 1427   Vitals shown include unvalidated device data.      No case tracking events are documented in the log.      Pain/Tasha Score: Tasha Score: 9 (1/27/2023  1:45 PM)

## 2023-01-27 NOTE — INTERVAL H&P NOTE
The patient has been examined and the H&P has been reviewed:    I concur with the findings and no changes have occurred since H&P was written.    Surgery risks, benefits and alternative options discussed and understood by patient/family.    Interval h&p reviewed and unchanged from previous encounter. Urine dipped: Negative for all components. Nonconcerning for infection, pH of 5.    The patient has stopped all blood thinners, including aspirin for 7 days.    Patient consented and would like to proceed with the procedure.      Active Hospital Problems    Diagnosis  POA    *Renal stone [N20.0]  Yes      Resolved Hospital Problems   No resolved problems to display.

## 2023-01-27 NOTE — BRIEF OP NOTE
[facility] (1st Fl)  Brief Operative Note    Surgery Date: 1/27/2023     Surgeon(s) and Role:     * Bhupinder Washington Jr., MD - Primary     * Gregorio Thompson MD - Resident - Assisting     * To Araya MD - Resident - Assisting        Pre-op Diagnosis:  Left ureteral calculus [N20.1]    Post-op Diagnosis:  Post-Op Diagnosis Codes:     * Left ureteral calculus [N20.1]    Procedure(s) (LRB):  URETEROSCOPY (Left)  PLACEMENT-STENT (Left)  CYSTOSCOPY  LITHOTRIPSY, USING LASER (Left)  REMOVAL, CALCULUS, URETER, URETEROSCOPIC (Left)  PYELOSCOPY (Left)    Anesthesia: General    Operative Findings: Left ureteral stone, two renal stones removed, left ureteral stent placed    Estimated Blood Loss: * No values recorded between 1/27/2023 11:28 AM and 1/27/2023 12:48 PM *         Specimens:   Specimen (24h ago, onward)      None

## 2023-01-27 NOTE — DISCHARGE SUMMARY
[facility] (1st Fl)  Discharge Note  Short Stay    Procedure(s) (LRB):  URETEROSCOPY (Left)  PLACEMENT-STENT (Left)  CYSTOSCOPY  LITHOTRIPSY, USING LASER (Left)  REMOVAL, CALCULUS, URETER, URETEROSCOPIC (Left)  PYELOSCOPY (Left)      OUTCOME: Patient tolerated treatment/procedure well without complication and is now ready for discharge.    DISPOSITION: Home or Self Care    FINAL DIAGNOSIS:  Renal stone    FOLLOWUP: In clinic    DISCHARGE INSTRUCTIONS:    Discharge Procedure Orders   Notify your health care provider if you experience any of the following:  temperature >100.4     Notify your health care provider if you experience any of the following:  persistent nausea and vomiting or diarrhea     Notify your health care provider if you experience any of the following:  severe uncontrolled pain     Notify your health care provider if you experience any of the following:  redness, tenderness, or signs of infection (pain, swelling, redness, odor or green/yellow discharge around incision site)     Notify your health care provider if you experience any of the following:  worsening rash     Notify your health care provider if you experience any of the following:  persistent dizziness, light-headedness, or visual disturbances        TIME SPENT ON DISCHARGE: 10 minutes

## 2023-01-27 NOTE — ANESTHESIA PROCEDURE NOTES
Intubation    Date/Time: 1/27/2023 11:19 AM  Performed by: Harsh Mendez CRNA  Authorized by: Marky Garrison MD     Intubation:     Induction:  Intravenous    Intubated:  Postinduction    Mask Ventilation:  Easy with oral airway    Attempts:  1    Attempted By:  CRNA    Method of Intubation:  Video laryngoscopy    Blade:  Ayala 3    Laryngeal View Grade: Grade I - full view of cords      Difficult Airway Encountered?: No      Complications:  None    Airway Device:  Oral endotracheal tube    Style/Cuff Inflation:  Cuffed (inflated to minimal occlusive pressure)    Inflation Amount (mL):  8    Tube secured:  22    Secured at:  The lips    Placement Verified By:  Capnometry    Complicating Factors:  Large/floppy epiglottis    Findings Post-Intubation:  BS equal bilateral and atraumatic/condition of teeth unchanged

## 2023-01-27 NOTE — ANESTHESIA PREPROCEDURE EVALUATION
01/27/2023  Sanchez Drake is a 47 y.o., male.      Pre-op Assessment    I have reviewed the Patient Summary Reports.          Review of Systems  Anesthesia Hx:  No problems with previous Anesthesia    Social:  Non-Smoker    Hematology/Oncology:  Hematology Normal   Oncology Normal     EENT/Dental:EENT/Dental Normal   Cardiovascular:  Cardiovascular Normal     Pulmonary:  Pulmonary Normal    Renal/:  Renal/ Normal     Hepatic/GI:  Hepatic/GI Normal    Musculoskeletal:  Musculoskeletal Normal    Neurological:   Neuromuscular Disease, (TBI)    Endocrine:  Endocrine Normal    Dermatological:  Skin Normal    Psych:  Psychiatric Normal           Physical Exam  General: Alert and Oriented    Airway:  Mallampati: II / II  Mouth Opening: Normal  TM Distance: Normal  Tongue: Normal  Neck ROM: Normal ROM    Dental:  Intact    Chest/Lungs:  Clear to auscultation, Normal Respiratory Rate    Heart:  Rate: Normal  Rhythm: Regular Rhythm  Sounds: Normal        Anesthesia Plan  Type of Anesthesia, risks & benefits discussed:    Anesthesia Type: Gen ETT, MAC  Intra-op Monitoring Plan: Standard ASA Monitors  Post Op Pain Control Plan: multimodal analgesia  Airway Plan: Direct  Informed Consent: Informed consent signed with the Patient and all parties understand the risks and agree with anesthesia plan.  All questions answered.   ASA Score: 3    Ready For Surgery From Anesthesia Perspective.     .

## 2023-02-01 LAB
COMPN STONE: NORMAL
SPECIMEN SOURCE: NORMAL
STONE ANALYSIS IR-IMP: NORMAL

## 2023-02-27 ENCOUNTER — HOSPITAL ENCOUNTER (OUTPATIENT)
Dept: RADIOLOGY | Facility: HOSPITAL | Age: 48
Discharge: HOME | End: 2023-02-27
Attending: UROLOGY
Payer: COMMERCIAL

## 2023-02-27 ENCOUNTER — OFFICE VISIT (OUTPATIENT)
Dept: UROLOGY | Facility: CLINIC | Age: 48
End: 2023-02-27
Payer: COMMERCIAL

## 2023-02-27 VITALS
WEIGHT: 222.69 LBS | HEART RATE: 84 BPM | HEIGHT: 70 IN | BODY MASS INDEX: 31.88 KG/M2 | DIASTOLIC BLOOD PRESSURE: 87 MMHG | SYSTOLIC BLOOD PRESSURE: 126 MMHG

## 2023-02-27 DIAGNOSIS — N20.1 LEFT URETERAL CALCULUS: ICD-10-CM

## 2023-02-27 DIAGNOSIS — N20.0 RENAL CALCULI: Primary | ICD-10-CM

## 2023-02-27 PROCEDURE — 3008F BODY MASS INDEX DOCD: CPT | Mod: CPTII,S$GLB,, | Performed by: UROLOGY

## 2023-02-27 PROCEDURE — 3008F PR BODY MASS INDEX (BMI) DOCUMENTED: ICD-10-PCS | Mod: CPTII,S$GLB,, | Performed by: UROLOGY

## 2023-02-27 PROCEDURE — 74018 RADEX ABDOMEN 1 VIEW: CPT | Mod: 26,,, | Performed by: STUDENT IN AN ORGANIZED HEALTH CARE EDUCATION/TRAINING PROGRAM

## 2023-02-27 PROCEDURE — 74018 RADEX ABDOMEN 1 VIEW: CPT | Mod: TC

## 2023-02-27 PROCEDURE — 76770 US RETROPERITONEAL COMPLETE: ICD-10-PCS | Mod: 26,,, | Performed by: INTERNAL MEDICINE

## 2023-02-27 PROCEDURE — 99999 PR PBB SHADOW E&M-EST. PATIENT-LVL III: CPT | Mod: PBBFAC,,, | Performed by: UROLOGY

## 2023-02-27 PROCEDURE — 76770 US EXAM ABDO BACK WALL COMP: CPT | Mod: TC

## 2023-02-27 PROCEDURE — 1159F MED LIST DOCD IN RCRD: CPT | Mod: CPTII,S$GLB,, | Performed by: UROLOGY

## 2023-02-27 PROCEDURE — 99024 POSTOP FOLLOW-UP VISIT: CPT | Mod: S$GLB,,, | Performed by: UROLOGY

## 2023-02-27 PROCEDURE — 3074F PR MOST RECENT SYSTOLIC BLOOD PRESSURE < 130 MM HG: ICD-10-PCS | Mod: CPTII,S$GLB,, | Performed by: UROLOGY

## 2023-02-27 PROCEDURE — 1159F PR MEDICATION LIST DOCUMENTED IN MEDICAL RECORD: ICD-10-PCS | Mod: CPTII,S$GLB,, | Performed by: UROLOGY

## 2023-02-27 PROCEDURE — 74018 XR ABDOMEN AP 1 VIEW: ICD-10-PCS | Mod: 26,,, | Performed by: STUDENT IN AN ORGANIZED HEALTH CARE EDUCATION/TRAINING PROGRAM

## 2023-02-27 PROCEDURE — 76770 US EXAM ABDO BACK WALL COMP: CPT | Mod: 26,,, | Performed by: INTERNAL MEDICINE

## 2023-02-27 PROCEDURE — 99999 PR PBB SHADOW E&M-EST. PATIENT-LVL III: ICD-10-PCS | Mod: PBBFAC,,, | Performed by: UROLOGY

## 2023-02-27 PROCEDURE — 3079F PR MOST RECENT DIASTOLIC BLOOD PRESSURE 80-89 MM HG: ICD-10-PCS | Mod: CPTII,S$GLB,, | Performed by: UROLOGY

## 2023-02-27 PROCEDURE — 3079F DIAST BP 80-89 MM HG: CPT | Mod: CPTII,S$GLB,, | Performed by: UROLOGY

## 2023-02-27 PROCEDURE — 3074F SYST BP LT 130 MM HG: CPT | Mod: CPTII,S$GLB,, | Performed by: UROLOGY

## 2023-02-27 PROCEDURE — 99024 PR POST-OP FOLLOW-UP VISIT: ICD-10-PCS | Mod: S$GLB,,, | Performed by: UROLOGY

## 2023-02-27 RX ORDER — TRAMADOL HYDROCHLORIDE 50 MG/1
50-100 TABLET ORAL EVERY 6 HOURS PRN
COMMUNITY
Start: 2023-02-15 | End: 2023-04-27

## 2023-02-27 NOTE — PROGRESS NOTES
Subjective:       Patient ID: Sanchez Drake is a 47 y.o. male.    Chief Complaint: No chief complaint on file.    HPI patient is status post ureteroscopic stone extraction.  He has a 7 millimeter fragment still present.  The majority of the stone was removed.  It is a uric acid stone so hopefully we can dissolve it with Urocit-K.  He is presently taking for day and I am going to increase it to 8 per day     Past Medical History:   Diagnosis Date    Chronic back pain     Chronic diarrhea     Hernia     Nephrolithiasis 7/14/2017    TBI (traumatic brain injury) 2010    fake wall fell on head (no brain or skull injury per pt)       Past Surgical History:   Procedure Laterality Date    COLONOSCOPY N/A 5/29/2017    Procedure: COLONOSCOPY;  Surgeon: Sukhi Scanlon MD;  Location: [facility] (4TH FLR);  Service: Endoscopy;  Laterality: N/A;    CYSTOSCOPY N/A 1/31/2020    Procedure: CYSTOSCOPY;  Surgeon: Parminder Craig MD;  Location: [facility] OR 1ST FLR;  Service: Urology;  Laterality: N/A;    CYSTOSCOPY  1/27/2023    Procedure: CYSTOSCOPY;  Surgeon: Bhupinder Washington Jr., MD;  Location: [facility] OR 1ST FLR;  Service: Urology;;    CYSTOSCOPY W/ URETERAL STENT PLACEMENT Left 11/19/2018    Procedure: CYSTOSCOPY, WITH URETERAL STENT INSERTION;  Surgeon: Renee Garcia MD;  Location: [facility] OR 2ND FLR;  Service: Urology;  Laterality: Left;    CYSTOSCOPY W/ URETERAL STENT PLACEMENT  12/21/2018    Procedure: CYSTOSCOPY, WITH URETERAL STENT INSERTION;  Surgeon: Bhupinder Washington Jr., MD;  Location: [facility] OR 1ST FLR;  Service: Urology;;    CYSTOSCOPY W/ URETERAL STENT REMOVAL Left 12/21/2018    Procedure: CYSTOSCOPY, WITH URETERAL STENT REMOVAL;  Surgeon: Bhupinder Washington Jr., MD;  Location: [facility] OR 1ST FLR;  Service: Urology;  Laterality: Left;    HERNIA REPAIR  2017    umbilical    INCISION AND DRAINAGE OF PERIRECTAL REGION N/A 1/11/2022    Procedure: INCISION AND DRAINAGE, PERIRECTAL REGION (lithotomy);  Surgeon: Nomi SALDANA  MD Abby;  Location: [facility] OR 2ND FLR;  Service: Colon and Rectal;  Laterality: N/A;    INSERTION OF SETON STITCH  1/11/2022    Procedure: PLACEMENT, SETON STITCH;  Surgeon: Nomi Mora MD;  Location: [facility] OR 2ND FLR;  Service: Colon and Rectal;;    LASER LITHOTRIPSY Left 12/21/2018    Procedure: LITHOTRIPSY, USING LASER (mana);  Surgeon: Bhupinder Washington Jr., MD;  Location: [facility] OR 1ST FLR;  Service: Urology;  Laterality: Left;  90mins    LASER LITHOTRIPSY Left 1/31/2020    Procedure: LITHOTRIPSY, USING LASER;  Surgeon: Parminder Craig MD;  Location: [facility] OR 1ST FLR;  Service: Urology;  Laterality: Left;    LASER LITHOTRIPSY Left 1/27/2023    Procedure: LITHOTRIPSY, USING LASER;  Surgeon: Bhupinder Washington Jr., MD;  Location: [facility] OR 1ST FLR;  Service: Urology;  Laterality: Left;    LUMBAR EPIDURAL INJECTION      with steroid    PLACEMENT-STENT Left 1/27/2023    Procedure: PLACEMENT-STENT;  Surgeon: Bhupinder Washington Jr., MD;  Location: [facility] OR 1ST FLR;  Service: Urology;  Laterality: Left;    PYELOSCOPY Left 1/27/2023    Procedure: PYELOSCOPY;  Surgeon: Bhupinder Washington Jr., MD;  Location: [facility] OR 1ST FLR;  Service: Urology;  Laterality: Left;    RECTAL FISTULOTOMY N/A 2/18/2022    Procedure: FISTULOTOMY, RECTUM;  Surgeon: Nomi Mora MD;  Location: [facility] OR 2ND FLR;  Service: Colon and Rectal;  Laterality: N/A;    RETROGRADE PYELOGRAPHY Left 11/19/2018    Procedure: PYELOGRAM, RETROGRADE;  Surgeon: Renee Garcia MD;  Location: [facility] OR 2ND FLR;  Service: Urology;  Laterality: Left;    RETROGRADE PYELOGRAPHY Left 12/21/2018    Procedure: PYELOGRAM, RETROGRADE;  Surgeon: Bhupinder Washington Jr., MD;  Location: [facility] OR 1ST FLR;  Service: Urology;  Laterality: Left;    RETROGRADE PYELOGRAPHY Left 1/31/2020    Procedure: PYELOGRAM, RETROGRADE;  Surgeon: Parminder Craig MD;  Location: [facility] OR [address] [place] [address];  Service: Urology;  Laterality: Left;    URETEROSCOPIC REMOVAL OF URETERIC CALCULUS Left 1/31/2020     Procedure: REMOVAL, CALCULUS, URETER, URETEROSCOPIC;  Surgeon: Parminder Craig MD;  Location: [facility] OR [facility] FLR;  Service: Urology;  Laterality: Left;    URETEROSCOPIC REMOVAL OF URETERIC CALCULUS Left 2023    Procedure: REMOVAL, CALCULUS, URETER, URETEROSCOPIC;  Surgeon: Bhupinder Washington Jr., MD;  Location: [facility] OR [facility] FLR;  Service: Urology;  Laterality: Left;    URETEROSCOPY Left 2018    Procedure: URETEROSCOPY;  Surgeon: Bhupinder Washnigton Jr., MD;  Location: [facility] OR 1ST FLR;  Service: Urology;  Laterality: Left;  90mins    URETEROSCOPY Left 2020    Procedure: URETEROSCOPY;  Surgeon: Parminder Craig MD;  Location: [facility] OR [facility] FLR;  Service: Urology;  Laterality: Left;    URETEROSCOPY Left 2023    Procedure: URETEROSCOPY;  Surgeon: Bhupinder Washington Jr., MD;  [facility];  Service: Urology;  Laterality: Left;       Family History   Problem Relation Age of Onset    Diabetes Mother     Cancer Cousin 50        Cancer possibly stomach     Heart attack Neg Hx     Heart disease Neg Hx     Hypertension Neg Hx     Colon cancer Neg Hx     Esophageal cancer Neg Hx        Social History     Socioeconomic History    Marital status:     Number of children: 2   Occupational History    Occupation: self employed   Tobacco Use    Smoking status: Former     Types: Cigarettes     Quit date: 2007     Years since quittin.1    Smokeless tobacco: Never   Substance and Sexual Activity    Alcohol use: Yes     Comment: rare    Drug use: No    Sexual activity: Yes     Partners: Female       Allergies:  Patient has no known allergies.    Medications:    Current Outpatient Medications:     clobetasoL (TEMOVATE) 0.05 % external solution, APPLY TO AFFECTED AREAS ON SCALP THREE TO FOUR TIMES A WEEK AT NIGHT., Disp: 50 mL, Rfl: 3    ketorolac (TORADOL) 10 mg tablet, Take 1 tablet (10 mg total) by mouth every 6 (six) hours., Disp: 20 tablet, Rfl: 0    naloxone (NARCAN) 4 mg/actuation Spry, 4mg by  nasal route as needed for opioid overdose; may repeat every 2-3 minutes in alternating nostrils until medical help arrives. Call 911, Disp: 1 each, Rfl: 11    ondansetron (ZOFRAN) 8 MG tablet, Take 1 tablet (8 mg total) by mouth 2 (two) times daily., Disp: 15 tablet, Rfl: 1    ondansetron (ZOFRAN-ODT) 4 MG TbDL, Take 1 tablet (4 mg total) by mouth every 6 (six) hours as needed (nausea)., Disp: 15 tablet, Rfl: 0    potassium citrate (UROCIT-K) 10 mEq (1,080 mg) TbSR, Take 2 tablets (20 mEq total) by mouth 3 (three) times daily with meals., Disp: 180 tablet, Rfl: 11    traMADoL (ULTRAM) 50 mg tablet, Take  mg by mouth every 6 (six) hours as needed., Disp: , Rfl:     oxybutynin (DITROPAN) 5 MG Tab, Take 1 tablet (5 mg total) by mouth 2 (two) times daily. for 7 days, Disp: 14 tablet, Rfl: 0    tamsulosin (FLOMAX) 0.4 mg Cap, Take 1 capsule (0.4 mg total) by mouth every evening. for 7 days, Disp: 7 capsule, Rfl: 0  No current facility-administered medications for this visit.    Facility-Administered Medications Ordered in Other Visits:     0.9%  NaCl infusion, , Intravenous, Continuous, Joelle Milligan NP    LIDOcaine (PF) 10 mg/ml (1%) injection 10 mg, 1 mL, Intradermal, Once, Joelle Milligan NP    mupirocin 2 % ointment, , Nasal, On Call Procedure, Joelle Milligan NP    Review of Systems   Constitutional:  Negative for activity change, appetite change, chills, diaphoresis, fatigue, fever and unexpected weight change.   HENT:  Negative for congestion, dental problem, hearing loss, mouth sores, postnasal drip, rhinorrhea, sinus pressure and trouble swallowing.    Eyes:  Negative for pain, discharge and itching.   Respiratory:  Negative for apnea, cough, choking, chest tightness, shortness of breath and wheezing.    Cardiovascular:  Negative for chest pain, palpitations and leg swelling.   Gastrointestinal:  Negative for abdominal distention, abdominal pain, anal bleeding, blood in stool, constipation,  diarrhea, nausea, rectal pain and vomiting.   Endocrine: Negative for polydipsia and polyuria.   Genitourinary:  Negative for decreased urine volume, difficulty urinating, dysuria, enuresis, flank pain, frequency, genital sores, hematuria, penile discharge, penile pain, penile swelling, scrotal swelling, testicular pain and urgency.   Musculoskeletal:  Negative for arthralgias, back pain and myalgias.   Skin:  Negative for color change, rash and wound.   Neurological:  Negative for dizziness, syncope, speech difficulty, light-headedness and headaches.   Hematological:  Negative for adenopathy. Does not bruise/bleed easily.   Psychiatric/Behavioral:  Negative for behavioral problems, confusion, hallucinations and sleep disturbance.      Objective:      Physical Exam  Constitutional:       Appearance: He is well-developed.   HENT:      Head: Normocephalic.   Cardiovascular:      Rate and Rhythm: Normal rate.   Pulmonary:      Effort: Pulmonary effort is normal.   Abdominal:      Palpations: Abdomen is soft.   Skin:     General: Skin is warm.   Neurological:      Mental Status: He is alert.       Assessment:       1. Renal calculi          Plan:       Diagnoses and all orders for this visit:    Renal calculi  -     US Retroperitoneal Complete; Future        Increase Urocit-K to 2 pills q.i.d.  Return to clinic 2 months with a renal ultrasound

## 2023-03-23 ENCOUNTER — PATIENT MESSAGE (OUTPATIENT)
Dept: INTERNAL MEDICINE | Facility: CLINIC | Age: 48
End: 2023-03-23
Payer: COMMERCIAL

## 2023-03-23 RX ORDER — SELENIUM SULFIDE 22.5 MG/ML
SHAMPOO TOPICAL
Qty: 180 ML | Refills: 1 | Status: SHIPPED | OUTPATIENT
Start: 2023-03-23

## 2023-03-23 NOTE — TELEPHONE ENCOUNTER
Per patient;       I wanted to request  a refill for the solution for my scalp- clobetasol propionate topical solution, USP 0.05% w/w.   I also wanted to see if possible prescribing a medicated shampoo for my excessive dry scalp/dandruff.    I have been using T-Gel from [employer] and others but they don't do the trick

## 2023-04-10 PROBLEM — N39.0 URINARY TRACT INFECTIOUS DISEASE: Status: RESOLVED | Noted: 2020-11-29 | Resolved: 2023-04-10

## 2023-04-18 NOTE — TRANSFER OF CARE
"Anesthesia Transfer of Care Note    Patient: Sanchez Lomeli    Procedure(s) Performed: Procedure(s) (LRB):  REPAIR-HERNIA-UMBILICAL-OPEN w/ mesh (N/A)    Patient location: PACU    Anesthesia Type: general    Transport from OR: Transported from OR on 6-10 L/min O2 by face mask with adequate spontaneous ventilation    Post pain: adequate analgesia    Post assessment: no apparent anesthetic complications and tolerated procedure well    Post vital signs: stable    Level of consciousness: awake, alert and oriented    Nausea/Vomiting: no nausea/vomiting    Complications: none          Last vitals:   Visit Vitals    /72 (BP Location: Right arm, Patient Position: Lying, BP Method: Automatic)    Pulse 82    Temp 36.7 °C (98 °F) (Oral)    Resp 18    Ht 5' 10" (1.778 m)    Wt 96.6 kg (213 lb)    SpO2 100%    BMI 30.56 kg/m2     " 13:34

## 2023-04-27 ENCOUNTER — OFFICE VISIT (OUTPATIENT)
Dept: UROLOGY | Facility: CLINIC | Age: 48
End: 2023-04-27
Payer: COMMERCIAL

## 2023-04-27 ENCOUNTER — HOSPITAL ENCOUNTER (OUTPATIENT)
Dept: RADIOLOGY | Facility: HOSPITAL | Age: 48
Discharge: HOME | End: 2023-04-27
Attending: UROLOGY
Payer: COMMERCIAL

## 2023-04-27 VITALS
DIASTOLIC BLOOD PRESSURE: 87 MMHG | BODY MASS INDEX: 31.5 KG/M2 | SYSTOLIC BLOOD PRESSURE: 128 MMHG | HEIGHT: 70 IN | HEART RATE: 83 BPM | WEIGHT: 220 LBS

## 2023-04-27 DIAGNOSIS — N20.0 RENAL CALCULI: Primary | ICD-10-CM

## 2023-04-27 DIAGNOSIS — N20.0 URIC ACID RENAL CALCULUS: ICD-10-CM

## 2023-04-27 DIAGNOSIS — N20.0 RENAL CALCULI: ICD-10-CM

## 2023-04-27 LAB
BILIRUB SERPL-MCNC: NORMAL MG/DL
BLOOD URINE, POC: NORMAL
CLARITY, POC UA: CLEAR
COLOR, POC UA: YELLOW
GLUCOSE UR QL STRIP: NORMAL
KETONES UR QL STRIP: NORMAL
LEUKOCYTE ESTERASE URINE, POC: NORMAL
NITRITE, POC UA: NORMAL
PH, POC UA: 8
PROTEIN, POC: NORMAL
SPECIFIC GRAVITY, POC UA: 1
UROBILINOGEN, POC UA: NORMAL

## 2023-04-27 PROCEDURE — 1159F MED LIST DOCD IN RCRD: CPT | Mod: CPTII,S$GLB,, | Performed by: UROLOGY

## 2023-04-27 PROCEDURE — 3008F BODY MASS INDEX DOCD: CPT | Mod: CPTII,S$GLB,, | Performed by: UROLOGY

## 2023-04-27 PROCEDURE — 81002 URINALYSIS NONAUTO W/O SCOPE: CPT | Mod: S$GLB,,, | Performed by: UROLOGY

## 2023-04-27 PROCEDURE — 3079F DIAST BP 80-89 MM HG: CPT | Mod: CPTII,S$GLB,, | Performed by: UROLOGY

## 2023-04-27 PROCEDURE — 99213 OFFICE O/P EST LOW 20 MIN: CPT | Mod: S$GLB,,, | Performed by: UROLOGY

## 2023-04-27 PROCEDURE — 76770 US EXAM ABDO BACK WALL COMP: CPT | Mod: TC

## 2023-04-27 PROCEDURE — 81002 POCT URINE DIPSTICK WITHOUT MICROSCOPE: ICD-10-PCS | Mod: S$GLB,,, | Performed by: UROLOGY

## 2023-04-27 PROCEDURE — 99213 PR OFFICE/OUTPT VISIT, EST, LEVL III, 20-29 MIN: ICD-10-PCS | Mod: S$GLB,,, | Performed by: UROLOGY

## 2023-04-27 PROCEDURE — 1159F PR MEDICATION LIST DOCUMENTED IN MEDICAL RECORD: ICD-10-PCS | Mod: CPTII,S$GLB,, | Performed by: UROLOGY

## 2023-04-27 PROCEDURE — 99999 PR PBB SHADOW E&M-EST. PATIENT-LVL III: CPT | Mod: PBBFAC,,, | Performed by: UROLOGY

## 2023-04-27 PROCEDURE — 3074F SYST BP LT 130 MM HG: CPT | Mod: CPTII,S$GLB,, | Performed by: UROLOGY

## 2023-04-27 PROCEDURE — 3074F PR MOST RECENT SYSTOLIC BLOOD PRESSURE < 130 MM HG: ICD-10-PCS | Mod: CPTII,S$GLB,, | Performed by: UROLOGY

## 2023-04-27 PROCEDURE — 76770 US EXAM ABDO BACK WALL COMP: CPT | Mod: 26,,, | Performed by: STUDENT IN AN ORGANIZED HEALTH CARE EDUCATION/TRAINING PROGRAM

## 2023-04-27 PROCEDURE — 99999 PR PBB SHADOW E&M-EST. PATIENT-LVL III: ICD-10-PCS | Mod: PBBFAC,,, | Performed by: UROLOGY

## 2023-04-27 PROCEDURE — 3008F PR BODY MASS INDEX (BMI) DOCUMENTED: ICD-10-PCS | Mod: CPTII,S$GLB,, | Performed by: UROLOGY

## 2023-04-27 PROCEDURE — 76770 US RETROPERITONEAL COMPLETE: ICD-10-PCS | Mod: 26,,, | Performed by: STUDENT IN AN ORGANIZED HEALTH CARE EDUCATION/TRAINING PROGRAM

## 2023-04-27 PROCEDURE — 3079F PR MOST RECENT DIASTOLIC BLOOD PRESSURE 80-89 MM HG: ICD-10-PCS | Mod: CPTII,S$GLB,, | Performed by: UROLOGY

## 2023-04-27 NOTE — PROGRESS NOTES
Subjective:       Patient ID: Sanchez Drake is a 47 y.o. male.    Chief Complaint: Follow-up (2 mon f/u with renal u/s)    HPI patient has left uric acid stones.  He is on Urocit-K 8 pills per day.  His urine pH is 8 and I am going to keep him on these.  Will see him back in 3 months with a renal ultrasound.  No pain fever chills etc.    Past Medical History:   Diagnosis Date    Chronic back pain     Chronic diarrhea     Hernia     Nephrolithiasis 7/14/2017    TBI (traumatic brain injury) 2010    fake wall fell on head (no brain or skull injury per pt)       Past Surgical History:   Procedure Laterality Date    COLONOSCOPY N/A 5/29/2017    Procedure: COLONOSCOPY;  Surgeon: Sukhi Scanlon MD;  Location: [facility] (4TH FLR);  Service: Endoscopy;  Laterality: N/A;    CYSTOSCOPY N/A 1/31/2020    Procedure: CYSTOSCOPY;  Surgeon: Parminder Craig MD;  Location: [facility] OR 1ST FLR;  Service: Urology;  Laterality: N/A;    CYSTOSCOPY  1/27/2023    Procedure: CYSTOSCOPY;  Surgeon: Bhupinder Washington Jr., MD;  Location: [facility] OR 1ST FLR;  Service: Urology;;    CYSTOSCOPY W/ URETERAL STENT PLACEMENT Left 11/19/2018    Procedure: CYSTOSCOPY, WITH URETERAL STENT INSERTION;  Surgeon: Renee Garcia MD;  Location: [facility] OR 2ND FLR;  Service: Urology;  Laterality: Left;    CYSTOSCOPY W/ URETERAL STENT PLACEMENT  12/21/2018    Procedure: CYSTOSCOPY, WITH URETERAL STENT INSERTION;  Surgeon: Bhupinder Washington Jr., MD;  Location: [facility] OR 1ST FLR;  Service: Urology;;    CYSTOSCOPY W/ URETERAL STENT REMOVAL Left 12/21/2018    Procedure: CYSTOSCOPY, WITH URETERAL STENT REMOVAL;  Surgeon: Bhupinder Washington Jr., MD;  Location: [facility] OR 1ST FLR;  Service: Urology;  Laterality: Left;    HERNIA REPAIR  2017    umbilical    INCISION AND DRAINAGE OF PERIRECTAL REGION N/A 1/11/2022    Procedure: INCISION AND DRAINAGE, PERIRECTAL REGION (lithotomy);  Surgeon: Nomi Mora MD;  Location: [facility] OR 2ND FLR;  Service: Colon and Rectal;   Laterality: N/A;    INSERTION OF SETON STITCH  1/11/2022    Procedure: PLACEMENT, SETON STITCH;  Surgeon: Nomi Mora MD;  Location: [facility] OR 2ND FLR;  Service: Colon and Rectal;;    LASER LITHOTRIPSY Left 12/21/2018    Procedure: LITHOTRIPSY, USING LASER (mana);  Surgeon: Bhupinder Washington Jr., MD;  Location: [facility] OR 1ST FLR;  Service: Urology;  Laterality: Left;  90mins    LASER LITHOTRIPSY Left 1/31/2020    Procedure: LITHOTRIPSY, USING LASER;  Surgeon: Parminder Craig MD;  Location: [facility] OR 1ST FLR;  Service: Urology;  Laterality: Left;    LASER LITHOTRIPSY Left 1/27/2023    Procedure: LITHOTRIPSY, USING LASER;  Surgeon: Bhupinder Washington Jr., MD;  Location: [facility] OR 1ST FLR;  Service: Urology;  Laterality: Left;    LUMBAR EPIDURAL INJECTION      with steroid    PLACEMENT-STENT Left 1/27/2023    Procedure: PLACEMENT-STENT;  Surgeon: Bhupinder Washington Jr., MD;  Location: [facility] OR 1ST FLR;  Service: Urology;  Laterality: Left;    PYELOSCOPY Left 1/27/2023    Procedure: PYELOSCOPY;  Surgeon: Bhupinder Washington Jr., MD;  Location: [facility] OR 1ST FLR;  Service: Urology;  Laterality: Left;    RECTAL FISTULOTOMY N/A 2/18/2022    Procedure: FISTULOTOMY, RECTUM;  Surgeon: Nomi Mora MD;  Location: [facility] OR 2ND FLR;  Service: Colon and Rectal;  Laterality: N/A;    RETROGRADE PYELOGRAPHY Left 11/19/2018    Procedure: PYELOGRAM, RETROGRADE;  Surgeon: Renee Garcia MD;  Location: [facility] OR 2ND FLR;  Service: Urology;  Laterality: Left;    RETROGRADE PYELOGRAPHY Left 12/21/2018    Procedure: PYELOGRAM, RETROGRADE;  Surgeon: Bhupinder Washington Jr., MD;  Location: [facility] OR 1ST FLR;  Service: Urology;  Laterality: Left;    RETROGRADE PYELOGRAPHY Left 1/31/2020    Procedure: PYELOGRAM, RETROGRADE;  Surgeon: Parminder Craig MD;  Location: [facility] OR 1ST FLR;  Service: Urology;  Laterality: Left;    URETEROSCOPIC REMOVAL OF URETERIC CALCULUS Left 1/31/2020    Procedure: REMOVAL, CALCULUS, URETER, URETEROSCOPIC;  Surgeon: Parminder  UGO Craig MD;  [facility];  Service: Urology;  Laterality: Left;    URETEROSCOPIC REMOVAL OF URETERIC CALCULUS Left 2023    Procedure: REMOVAL, CALCULUS, URETER, URETEROSCOPIC;  Surgeon: Bhupinder Washington Jr., MD;  Location: [facility] OR [facility] FLR;  Service: Urology;  Laterality: Left;    URETEROSCOPY Left 2018    Procedure: URETEROSCOPY;  Surgeon: Bhupinder Washington Jr., MD;  Location: [facility] OR [facility] FLR;  Service: Urology;  Laterality: Left;  90mins    URETEROSCOPY Left 2020    Procedure: URETEROSCOPY;  Surgeon: Parminder Craig MD;  Location: [facility] OR [facility] FLR;  Service: Urology;  Laterality: Left;    URETEROSCOPY Left 2023    Procedure: URETEROSCOPY;  Surgeon: Bhupinder Washington Jr., MD;  [facility];  Service: Urology;  Laterality: Left;       Family History   Problem Relation Age of Onset    Diabetes Mother     Cancer Cousin 50        Cancer possibly stomach     Heart attack Neg Hx     Heart disease Neg Hx     Hypertension Neg Hx     Colon cancer Neg Hx     Esophageal cancer Neg Hx        Social History     Socioeconomic History    Marital status:     Number of children: 2   Occupational History    Occupation: self employed   Tobacco Use    Smoking status: Former     Types: Cigarettes     Quit date: 2007     Years since quittin.3    Smokeless tobacco: Never   Substance and Sexual Activity    Alcohol use: Yes     Comment: rare    Drug use: No    Sexual activity: Yes     Partners: Female       Allergies:  Patient has no known allergies.    Medications:    Current Outpatient Medications:     clobetasoL (TEMOVATE) 0.05 % external solution, APPLY TO AFFECTED AREAS ON SCALP THREE TO FOUR TIMES A WEEK AT NIGHT., Disp: 50 mL, Rfl: 3    potassium citrate (UROCIT-K) 10 mEq (1,080 mg) TbSR, Take 2 tablets (20 mEq total) by mouth 3 (three) times daily with meals., Disp: 180 tablet, Rfl: 11    selenium sulfide 2.25 % Sham, Massage ~5 to 10 mL into wet scalp; leave on scalp  for 2 to 3 minutes, then rinse scalp thoroughly. Repeat application and rinse thoroughly. Usually 2 applications each week for 2 weeks will provide control. After this, may repeat at less frequent intervals, Disp: 180 mL, Rfl: 1  No current facility-administered medications for this visit.    Facility-Administered Medications Ordered in Other Visits:     0.9%  NaCl infusion, , Intravenous, Continuous, Joelle Milligan NP    LIDOcaine (PF) 10 mg/ml (1%) injection 10 mg, 1 mL, Intradermal, Once, Joelle Milligan NP    mupirocin 2 % ointment, , Nasal, On Call Procedure, Joelle Milligan NP    Review of Systems   Constitutional:  Negative for activity change, appetite change, chills, diaphoresis, fatigue, fever and unexpected weight change.   HENT:  Negative for congestion, dental problem, hearing loss, mouth sores, postnasal drip, rhinorrhea, sinus pressure and trouble swallowing.    Eyes:  Negative for pain, discharge and itching.   Respiratory:  Negative for apnea, cough, choking, chest tightness, shortness of breath and wheezing.    Cardiovascular:  Negative for chest pain, palpitations and leg swelling.   Gastrointestinal:  Negative for abdominal distention, abdominal pain, anal bleeding, blood in stool, constipation, diarrhea, nausea, rectal pain and vomiting.   Endocrine: Negative for polydipsia and polyuria.   Genitourinary:  Negative for decreased urine volume, difficulty urinating, dysuria, enuresis, flank pain, frequency, genital sores, hematuria, penile discharge, penile pain, penile swelling, scrotal swelling, testicular pain and urgency.   Musculoskeletal:  Negative for arthralgias, back pain and myalgias.   Skin:  Negative for color change, rash and wound.   Neurological:  Negative for dizziness, syncope, speech difficulty, light-headedness and headaches.   Hematological:  Negative for adenopathy. Does not bruise/bleed easily.   Psychiatric/Behavioral:  Negative for behavioral problems,  confusion, hallucinations and sleep disturbance.      Objective:      Physical Exam  Constitutional:       Appearance: He is well-developed.   HENT:      Head: Normocephalic.   Cardiovascular:      Rate and Rhythm: Normal rate.   Pulmonary:      Effort: Pulmonary effort is normal.   Abdominal:      Palpations: Abdomen is soft.   Skin:     General: Skin is warm.   Neurological:      Mental Status: He is alert.       Assessment:       1. Renal calculi    2. Uric acid renal calculus        Plan:       Sanchez was seen today for follow-up.    Diagnoses and all orders for this visit:    Renal calculi  -     POCT URINE DIPSTICK WITHOUT MICROSCOPE    Uric acid renal calculus        Continue Urocit-K 2 pills q.i.d. and return to clinic 3 months with renal ultrasound

## 2023-05-18 ENCOUNTER — OFFICE VISIT (OUTPATIENT)
Dept: INTERNAL MEDICINE | Facility: CLINIC | Age: 48
End: 2023-05-18
Payer: COMMERCIAL

## 2023-05-18 VITALS
BODY MASS INDEX: 34.09 KG/M2 | TEMPERATURE: 97 F | RESPIRATION RATE: 18 BRPM | HEART RATE: 84 BPM | WEIGHT: 238.13 LBS | DIASTOLIC BLOOD PRESSURE: 76 MMHG | OXYGEN SATURATION: 96 % | HEIGHT: 70 IN | SYSTOLIC BLOOD PRESSURE: 118 MMHG

## 2023-05-18 DIAGNOSIS — E78.1 HYPERTRIGLYCERIDEMIA: ICD-10-CM

## 2023-05-18 DIAGNOSIS — M51.36 DDD (DEGENERATIVE DISC DISEASE), LUMBAR: ICD-10-CM

## 2023-05-18 DIAGNOSIS — E55.9 VITAMIN D DEFICIENCY: ICD-10-CM

## 2023-05-18 DIAGNOSIS — Z00.00 ANNUAL PHYSICAL EXAM: Primary | ICD-10-CM

## 2023-05-18 PROCEDURE — 3074F SYST BP LT 130 MM HG: CPT | Mod: CPTII,S$GLB,, | Performed by: HOSPITALIST

## 2023-05-18 PROCEDURE — 99999 PR PBB SHADOW E&M-EST. PATIENT-LVL IV: ICD-10-PCS | Mod: PBBFAC,,, | Performed by: HOSPITALIST

## 2023-05-18 PROCEDURE — 1159F MED LIST DOCD IN RCRD: CPT | Mod: CPTII,S$GLB,, | Performed by: HOSPITALIST

## 2023-05-18 PROCEDURE — 3078F PR MOST RECENT DIASTOLIC BLOOD PRESSURE < 80 MM HG: ICD-10-PCS | Mod: CPTII,S$GLB,, | Performed by: HOSPITALIST

## 2023-05-18 PROCEDURE — 99396 PR PREVENTIVE VISIT,EST,40-64: ICD-10-PCS | Mod: S$GLB,,, | Performed by: HOSPITALIST

## 2023-05-18 PROCEDURE — 1159F PR MEDICATION LIST DOCUMENTED IN MEDICAL RECORD: ICD-10-PCS | Mod: CPTII,S$GLB,, | Performed by: HOSPITALIST

## 2023-05-18 PROCEDURE — 3008F BODY MASS INDEX DOCD: CPT | Mod: CPTII,S$GLB,, | Performed by: HOSPITALIST

## 2023-05-18 PROCEDURE — 99999 PR PBB SHADOW E&M-EST. PATIENT-LVL IV: CPT | Mod: PBBFAC,,, | Performed by: HOSPITALIST

## 2023-05-18 PROCEDURE — 3074F PR MOST RECENT SYSTOLIC BLOOD PRESSURE < 130 MM HG: ICD-10-PCS | Mod: CPTII,S$GLB,, | Performed by: HOSPITALIST

## 2023-05-18 PROCEDURE — 1160F RVW MEDS BY RX/DR IN RCRD: CPT | Mod: CPTII,S$GLB,, | Performed by: HOSPITALIST

## 2023-05-18 PROCEDURE — 3008F PR BODY MASS INDEX (BMI) DOCUMENTED: ICD-10-PCS | Mod: CPTII,S$GLB,, | Performed by: HOSPITALIST

## 2023-05-18 PROCEDURE — 99396 PREV VISIT EST AGE 40-64: CPT | Mod: S$GLB,,, | Performed by: HOSPITALIST

## 2023-05-18 PROCEDURE — 3078F DIAST BP <80 MM HG: CPT | Mod: CPTII,S$GLB,, | Performed by: HOSPITALIST

## 2023-05-18 PROCEDURE — 1160F PR REVIEW ALL MEDS BY PRESCRIBER/CLIN PHARMACIST DOCUMENTED: ICD-10-PCS | Mod: CPTII,S$GLB,, | Performed by: HOSPITALIST

## 2023-05-18 RX ORDER — FINASTERIDE 1 MG/1
1 TABLET, FILM COATED ORAL DAILY
COMMUNITY

## 2023-05-18 RX ORDER — MINOXIDIL 2.5 MG/1
2.5 TABLET ORAL DAILY
COMMUNITY

## 2023-05-18 NOTE — PROGRESS NOTES
"Subjective:     @Patient ID: Sanchez Drake is a 47 y.o. male.    Chief Complaint: Annual Exam    HPI    HPI  46 yo male with  lumbar DDD, kidney stones, vitamin D def, hx of  perirectal abscess with associated fistula  s/p fistulotomy presents for annual exam.     Reports had health screen this year for life insurance. Dx with high TG and elevated urine creatinine. Would like to check levels again     Lipid disorders/ASCVD risk (ages >/= 45 or >/= 20 if increased risk ): ordered  DM (>45y yearly or if obese, HTN): A1c ordered  Eye exam: reading glasses at night.    Prostate (per discussion with patient starting at 50y or 45y if high risk): ordered       Vaccines:   Influenza (yearly):   Tetanus (every 10 yrs - 1st tdap) done  Covid19: due       Exercise: walking, physical exercise with job  Diet: regular    Review of Systems   Constitutional:  Negative for chills and fever.   HENT:  Negative for congestion and sore throat.    Eyes:  Negative for pain and visual disturbance.   Respiratory:  Negative for cough and shortness of breath.    Cardiovascular:  Negative for chest pain and leg swelling.   Gastrointestinal:  Negative for abdominal pain, nausea and vomiting.   Endocrine: Negative for polydipsia and polyuria.   Genitourinary:  Negative for difficulty urinating and dysuria.   Musculoskeletal:  Negative for arthralgias and back pain.   Skin:  Negative for rash and wound.   Neurological:  Negative for weakness and headaches.   Psychiatric/Behavioral:  Negative for agitation and confusion.    Past medical history, surgical history, and family medical history reviewed and updated as appropriate.    Medications and allergies reviewed.     Objective:     Vitals:    05/18/23 1424   BP: 118/76   BP Location: Left arm   Patient Position: Sitting   BP Method: Medium (Manual)   Pulse: 84   Resp: 18   Temp: 97.1 °F (36.2 °C)   TempSrc: Temporal   SpO2: 96%   Weight: 108 kg (238 lb 1.6 oz)   Height: 5' 10" (1.778 " m)     Body mass index is 34.16 kg/m².  Physical Exam  Vitals reviewed.   Constitutional:       General: He is not in acute distress.     Appearance: He is well-developed.   HENT:      Head: Normocephalic and atraumatic.      Right Ear: Tympanic membrane normal.      Left Ear: Tympanic membrane normal.      Ears:      Comments: Cerumen debris of R ear canal     Mouth/Throat:      Mouth: Mucous membranes are moist.      Pharynx: No oropharyngeal exudate.   Eyes:      General:         Right eye: No discharge.         Left eye: No discharge.      Conjunctiva/sclera: Conjunctivae normal.   Cardiovascular:      Rate and Rhythm: Normal rate and regular rhythm.      Heart sounds: No murmur heard.    No friction rub.   Pulmonary:      Effort: Pulmonary effort is normal.      Breath sounds: Normal breath sounds.   Abdominal:      General: Bowel sounds are normal. There is no distension.      Palpations: Abdomen is soft.      Tenderness: There is no abdominal tenderness.   Musculoskeletal:         General: Normal range of motion.      Cervical back: Normal range of motion and neck supple.      Right lower leg: No edema.      Left lower leg: No edema.   Lymphadenopathy:      Cervical: No cervical adenopathy.   Skin:     General: Skin is warm and dry.   Neurological:      Mental Status: He is alert and oriented to person, place, and time.   Psychiatric:         Mood and Affect: Mood normal.         Behavior: Behavior normal.       Lab Results   Component Value Date    WBC 9.19 01/05/2023    HGB 16.1 01/05/2023    HCT 46.5 01/05/2023     01/05/2023    CHOL 151 04/26/2022    TRIG 159 (H) 04/26/2022    HDL 27 (L) 04/26/2022    ALT 49 (H) 01/05/2023    AST 69 (H) 01/05/2023     01/05/2023    K 3.5 01/05/2023     01/05/2023    CREATININE 1.2 01/05/2023    BUN 19 01/05/2023    CO2 22 (L) 01/05/2023    TSH 1.769 04/26/2022    PSA 0.29 04/26/2022    INR 1.0 02/09/2017    HGBA1C 5.0 04/26/2022       Assessment:     1.  Annual physical exam    2. DDD (degenerative disc disease), lumbar    3. Hypertriglyceridemia    4. Vitamin D deficiency      Plan:   Sanchez was seen today for annual exam.    Diagnoses and all orders for this visit:    Annual physical exam  -     Comprehensive Metabolic Panel; Future  -     CBC Auto Differential; Future  -     Lipid Panel; Future  -     TSH; Future  -     Hemoglobin A1C; Future  -     PSA, Screening; Future  -     Cancel: Creatinine, urine, random  -     Creatinine, urine, random; Future  -     Vitamin D; Future    Hypertriglyceridemia  -     Lipid Panel; Future    Vitamin D deficiency  -     Vitamin D; Future    DDD (degenerative disc disease), lumbar  - chronic. Continue to monitor     BMI 34.0-34.9,adult  - counseled on healthy diet and exercise        Rtc 1 year and prn     Anna Moore MD  Internal Medicine    5/18/2023

## 2023-05-19 ENCOUNTER — LAB VISIT (OUTPATIENT)
Dept: LAB | Facility: HOSPITAL | Age: 48
End: 2023-05-19
Attending: HOSPITALIST
Payer: COMMERCIAL

## 2023-05-19 DIAGNOSIS — Z00.00 ANNUAL PHYSICAL EXAM: ICD-10-CM

## 2023-05-19 DIAGNOSIS — E78.1 HYPERTRIGLYCERIDEMIA: ICD-10-CM

## 2023-05-19 DIAGNOSIS — E55.9 VITAMIN D DEFICIENCY: ICD-10-CM

## 2023-05-19 LAB
25(OH)D3+25(OH)D2 SERPL-MCNC: 33 NG/ML (ref 30–96)
ALBUMIN SERPL BCP-MCNC: 4.3 G/DL (ref 3.5–5.2)
ALP SERPL-CCNC: 74 U/L (ref 55–135)
ALT SERPL W/O P-5'-P-CCNC: 26 U/L (ref 10–44)
ANION GAP SERPL CALC-SCNC: 6 MMOL/L (ref 8–16)
AST SERPL-CCNC: 20 U/L (ref 10–40)
BASOPHILS # BLD AUTO: 0.06 K/UL (ref 0–0.2)
BASOPHILS NFR BLD: 0.9 % (ref 0–1.9)
BILIRUB SERPL-MCNC: 1 MG/DL (ref 0.1–1)
BUN SERPL-MCNC: 16 MG/DL (ref 6–20)
CALCIUM SERPL-MCNC: 9.5 MG/DL (ref 8.7–10.5)
CHLORIDE SERPL-SCNC: 107 MMOL/L (ref 95–110)
CHOLEST SERPL-MCNC: 199 MG/DL (ref 120–199)
CHOLEST/HDLC SERPL: 6.4 {RATIO} (ref 2–5)
CO2 SERPL-SCNC: 25 MMOL/L (ref 23–29)
COMPLEXED PSA SERPL-MCNC: 0.26 NG/ML (ref 0–4)
CREAT SERPL-MCNC: 1 MG/DL (ref 0.5–1.4)
DIFFERENTIAL METHOD: NORMAL
EOSINOPHIL # BLD AUTO: 0.1 K/UL (ref 0–0.5)
EOSINOPHIL NFR BLD: 1.7 % (ref 0–8)
ERYTHROCYTE [DISTWIDTH] IN BLOOD BY AUTOMATED COUNT: 13.2 % (ref 11.5–14.5)
EST. GFR  (NO RACE VARIABLE): >60 ML/MIN/1.73 M^2
ESTIMATED AVG GLUCOSE: 85 MG/DL (ref 68–131)
GLUCOSE SERPL-MCNC: 86 MG/DL (ref 70–110)
HBA1C MFR BLD: 4.6 % (ref 4–5.6)
HCT VFR BLD AUTO: 44 % (ref 40–54)
HDLC SERPL-MCNC: 31 MG/DL (ref 40–75)
HDLC SERPL: 15.6 % (ref 20–50)
HGB BLD-MCNC: 14.4 G/DL (ref 14–18)
IMM GRANULOCYTES # BLD AUTO: 0.02 K/UL (ref 0–0.04)
IMM GRANULOCYTES NFR BLD AUTO: 0.3 % (ref 0–0.5)
LDLC SERPL CALC-MCNC: 134.4 MG/DL (ref 63–159)
LYMPHOCYTES # BLD AUTO: 2.3 K/UL (ref 1–4.8)
LYMPHOCYTES NFR BLD: 35.2 % (ref 18–48)
MCH RBC QN AUTO: 30 PG (ref 27–31)
MCHC RBC AUTO-ENTMCNC: 32.7 G/DL (ref 32–36)
MCV RBC AUTO: 92 FL (ref 82–98)
MONOCYTES # BLD AUTO: 0.4 K/UL (ref 0.3–1)
MONOCYTES NFR BLD: 5.5 % (ref 4–15)
NEUTROPHILS # BLD AUTO: 3.7 K/UL (ref 1.8–7.7)
NEUTROPHILS NFR BLD: 56.4 % (ref 38–73)
NONHDLC SERPL-MCNC: 168 MG/DL
NRBC BLD-RTO: 0 /100 WBC
PLATELET # BLD AUTO: 269 K/UL (ref 150–450)
PMV BLD AUTO: 9.5 FL (ref 9.2–12.9)
POTASSIUM SERPL-SCNC: 4.2 MMOL/L (ref 3.5–5.1)
PROT SERPL-MCNC: 7.7 G/DL (ref 6–8.4)
RBC # BLD AUTO: 4.8 M/UL (ref 4.6–6.2)
SODIUM SERPL-SCNC: 138 MMOL/L (ref 136–145)
TRIGL SERPL-MCNC: 168 MG/DL (ref 30–150)
TSH SERPL DL<=0.005 MIU/L-ACNC: 1.9 UIU/ML (ref 0.4–4)
WBC # BLD AUTO: 6.53 K/UL (ref 3.9–12.7)

## 2023-05-19 PROCEDURE — 80053 COMPREHEN METABOLIC PANEL: CPT | Performed by: HOSPITALIST

## 2023-05-19 PROCEDURE — 84153 ASSAY OF PSA TOTAL: CPT | Performed by: HOSPITALIST

## 2023-05-19 PROCEDURE — 85025 COMPLETE CBC W/AUTO DIFF WBC: CPT | Performed by: HOSPITALIST

## 2023-05-19 PROCEDURE — 80061 LIPID PANEL: CPT | Performed by: HOSPITALIST

## 2023-05-19 PROCEDURE — 36415 COLL VENOUS BLD VENIPUNCTURE: CPT | Mod: PO | Performed by: HOSPITALIST

## 2023-05-19 PROCEDURE — 82306 VITAMIN D 25 HYDROXY: CPT | Performed by: HOSPITALIST

## 2023-05-19 PROCEDURE — 84443 ASSAY THYROID STIM HORMONE: CPT | Performed by: HOSPITALIST

## 2023-05-19 PROCEDURE — 83036 HEMOGLOBIN GLYCOSYLATED A1C: CPT | Performed by: HOSPITALIST

## 2023-05-24 ENCOUNTER — PATIENT MESSAGE (OUTPATIENT)
Dept: INTERNAL MEDICINE | Facility: CLINIC | Age: 48
End: 2023-05-24
Payer: COMMERCIAL

## 2023-07-27 ENCOUNTER — PATIENT MESSAGE (OUTPATIENT)
Dept: INTERNAL MEDICINE | Facility: CLINIC | Age: 48
End: 2023-07-27
Payer: COMMERCIAL

## 2023-07-27 DIAGNOSIS — E78.1 HYPERTRIGLYCERIDEMIA: Primary | ICD-10-CM

## 2023-08-03 ENCOUNTER — LAB VISIT (OUTPATIENT)
Dept: LAB | Facility: HOSPITAL | Age: 48
End: 2023-08-03
Attending: HOSPITALIST
Payer: COMMERCIAL

## 2023-08-03 DIAGNOSIS — E78.1 HYPERTRIGLYCERIDEMIA: ICD-10-CM

## 2023-08-03 LAB
ALBUMIN SERPL BCP-MCNC: 4.1 G/DL (ref 3.5–5.2)
ALP SERPL-CCNC: 84 U/L (ref 55–135)
ALT SERPL W/O P-5'-P-CCNC: 21 U/L (ref 10–44)
ANION GAP SERPL CALC-SCNC: 6 MMOL/L (ref 8–16)
AST SERPL-CCNC: 17 U/L (ref 10–40)
BILIRUB SERPL-MCNC: 0.9 MG/DL (ref 0.1–1)
BUN SERPL-MCNC: 14 MG/DL (ref 6–20)
CALCIUM SERPL-MCNC: 9.2 MG/DL (ref 8.7–10.5)
CHLORIDE SERPL-SCNC: 111 MMOL/L (ref 95–110)
CHOLEST SERPL-MCNC: 141 MG/DL (ref 120–199)
CHOLEST/HDLC SERPL: 4.9 {RATIO} (ref 2–5)
CO2 SERPL-SCNC: 23 MMOL/L (ref 23–29)
CREAT SERPL-MCNC: 0.8 MG/DL (ref 0.5–1.4)
EST. GFR  (NO RACE VARIABLE): >60 ML/MIN/1.73 M^2
GLUCOSE SERPL-MCNC: 91 MG/DL (ref 70–110)
HDLC SERPL-MCNC: 29 MG/DL (ref 40–75)
HDLC SERPL: 20.6 % (ref 20–50)
LDLC SERPL CALC-MCNC: 92.6 MG/DL (ref 63–159)
NONHDLC SERPL-MCNC: 112 MG/DL
POTASSIUM SERPL-SCNC: 4.1 MMOL/L (ref 3.5–5.1)
PROT SERPL-MCNC: 6.9 G/DL (ref 6–8.4)
SODIUM SERPL-SCNC: 140 MMOL/L (ref 136–145)
TRIGL SERPL-MCNC: 97 MG/DL (ref 30–150)

## 2023-08-03 PROCEDURE — 80061 LIPID PANEL: CPT | Performed by: HOSPITALIST

## 2023-08-03 PROCEDURE — 80053 COMPREHEN METABOLIC PANEL: CPT | Performed by: HOSPITALIST

## 2023-08-03 PROCEDURE — 36415 COLL VENOUS BLD VENIPUNCTURE: CPT | Mod: PO | Performed by: HOSPITALIST

## 2023-10-13 ENCOUNTER — TELEPHONE (OUTPATIENT)
Dept: INTERNAL MEDICINE | Facility: CLINIC | Age: 48
End: 2023-10-13
Payer: COMMERCIAL

## 2023-10-13 NOTE — TELEPHONE ENCOUNTER
Reason for visit: Ear and sinus      The patient was called, no answer. Will send [employer] message.

## 2024-01-18 ENCOUNTER — OFFICE VISIT (OUTPATIENT)
Dept: UROLOGY | Facility: CLINIC | Age: 49
End: 2024-01-18
Payer: COMMERCIAL

## 2024-01-18 VITALS
BODY MASS INDEX: 34.07 KG/M2 | HEART RATE: 88 BPM | DIASTOLIC BLOOD PRESSURE: 81 MMHG | WEIGHT: 238 LBS | SYSTOLIC BLOOD PRESSURE: 124 MMHG | HEIGHT: 70 IN

## 2024-01-18 DIAGNOSIS — N40.0 BENIGN NON-NODULAR PROSTATIC HYPERPLASIA WITHOUT LOWER URINARY TRACT SYMPTOMS: Primary | ICD-10-CM

## 2024-01-18 PROCEDURE — 99213 OFFICE O/P EST LOW 20 MIN: CPT | Mod: S$GLB,,, | Performed by: UROLOGY

## 2024-01-18 PROCEDURE — 1160F RVW MEDS BY RX/DR IN RCRD: CPT | Mod: CPTII,S$GLB,, | Performed by: UROLOGY

## 2024-01-18 PROCEDURE — 1159F MED LIST DOCD IN RCRD: CPT | Mod: CPTII,S$GLB,, | Performed by: UROLOGY

## 2024-01-18 PROCEDURE — 3079F DIAST BP 80-89 MM HG: CPT | Mod: CPTII,S$GLB,, | Performed by: UROLOGY

## 2024-01-18 PROCEDURE — 99999 PR PBB SHADOW E&M-EST. PATIENT-LVL III: CPT | Mod: PBBFAC,,, | Performed by: UROLOGY

## 2024-01-18 PROCEDURE — 3008F BODY MASS INDEX DOCD: CPT | Mod: CPTII,S$GLB,, | Performed by: UROLOGY

## 2024-01-18 PROCEDURE — 3074F SYST BP LT 130 MM HG: CPT | Mod: CPTII,S$GLB,, | Performed by: UROLOGY

## 2024-01-18 NOTE — PROGRESS NOTES
Subjective:       Patient ID: Sanchez Drake is a 48 y.o. male.    Chief Complaint: Follow-up (Pt here for annual f/u. )    HPI patient is here for prostate check.  His PSA is normal.  He is considering having a vasectomy but he has not sure I answered his questions and he will speak to his wife patient is not on anticoagulation    Past Medical History:   Diagnosis Date    Chronic back pain     Chronic diarrhea     Hernia     Nephrolithiasis 7/14/2017    TBI (traumatic brain injury) 2010    fake wall fell on head (no brain or skull injury per pt)       Past Surgical History:   Procedure Laterality Date    COLONOSCOPY N/A 5/29/2017    Procedure: COLONOSCOPY;  Surgeon: Sukhi Scanlon MD;  Location: [facility] ENDO (4TH FLR);  Service: Endoscopy;  Laterality: N/A;    CYSTOSCOPY N/A 1/31/2020    Procedure: CYSTOSCOPY;  Surgeon: Parminder Craig MD;  Location: [facility] OR 1ST FLR;  Service: Urology;  Laterality: N/A;    CYSTOSCOPY  1/27/2023    Procedure: CYSTOSCOPY;  Surgeon: Bhupinder Washington Jr., MD;  Location: [facility] OR 1ST FLR;  Service: Urology;;    CYSTOSCOPY W/ URETERAL STENT PLACEMENT Left 11/19/2018    Procedure: CYSTOSCOPY, WITH URETERAL STENT INSERTION;  Surgeon: Renee Garcia MD;  Location: [facility] OR 2ND FLR;  Service: Urology;  Laterality: Left;    CYSTOSCOPY W/ URETERAL STENT PLACEMENT  12/21/2018    Procedure: CYSTOSCOPY, WITH URETERAL STENT INSERTION;  Surgeon: Bhupinder Washington Jr., MD;  Location: [facility] OR 1ST FLR;  Service: Urology;;    CYSTOSCOPY W/ URETERAL STENT REMOVAL Left 12/21/2018    Procedure: CYSTOSCOPY, WITH URETERAL STENT REMOVAL;  Surgeon: Bhupinder Washington Jr., MD;  Location: [facility] OR 1ST FLR;  Service: Urology;  Laterality: Left;    HERNIA REPAIR  2017    umbilical    INCISION AND DRAINAGE OF PERIRECTAL REGION N/A 1/11/2022    Procedure: INCISION AND DRAINAGE, PERIRECTAL REGION (lithotomy);  Surgeon: Nomi Mora MD;  Location: [facility] OR 2ND FLR;  Service: Colon and Rectal;   Laterality: N/A;    INSERTION OF SETON STITCH  1/11/2022    Procedure: PLACEMENT, SETON STITCH;  Surgeon: Nomi Mora MD;  Location: [facility] OR 2ND FLR;  Service: Colon and Rectal;;    LASER LITHOTRIPSY Left 12/21/2018    Procedure: LITHOTRIPSY, USING LASER (mana);  Surgeon: Bhupinder Washington Jr., MD;  Location: [facility] OR 1ST FLR;  Service: Urology;  Laterality: Left;  90mins    LASER LITHOTRIPSY Left 1/31/2020    Procedure: LITHOTRIPSY, USING LASER;  Surgeon: Parminder Craig MD;  Location: [facility] OR 1ST FLR;  Service: Urology;  Laterality: Left;    LASER LITHOTRIPSY Left 1/27/2023    Procedure: LITHOTRIPSY, USING LASER;  Surgeon: Bhupinder Washington Jr., MD;  Location: [facility] OR 1ST FLR;  Service: Urology;  Laterality: Left;    LUMBAR EPIDURAL INJECTION      with steroid    PLACEMENT-STENT Left 1/27/2023    Procedure: PLACEMENT-STENT;  Surgeon: Bhupinder Washington Jr., MD;  Location: [facility] OR 1ST FLR;  Service: Urology;  Laterality: Left;    PYELOSCOPY Left 1/27/2023    Procedure: PYELOSCOPY;  Surgeon: Bhupinder Washington Jr., MD;  Location: [facility] OR 1ST FLR;  Service: Urology;  Laterality: Left;    RECTAL FISTULOTOMY N/A 2/18/2022    Procedure: FISTULOTOMY, RECTUM;  Surgeon: Nomi Mora MD;  Location: [facility] OR 2ND FLR;  Service: Colon and Rectal;  Laterality: N/A;    RETROGRADE PYELOGRAPHY Left 11/19/2018    Procedure: PYELOGRAM, RETROGRADE;  Surgeon: Renee Garcia MD;  Location: [facility] OR 2ND FLR;  Service: Urology;  Laterality: Left;    RETROGRADE PYELOGRAPHY Left 12/21/2018    Procedure: PYELOGRAM, RETROGRADE;  Surgeon: Bhupinder Washington Jr., MD;  Location: [facility] OR 1ST FLR;  Service: Urology;  Laterality: Left;    RETROGRADE PYELOGRAPHY Left 1/31/2020    Procedure: PYELOGRAM, RETROGRADE;  Surgeon: Parminder Craig MD;  Location: [facility] OR 1ST FLR;  Service: Urology;  Laterality: Left;    URETEROSCOPIC REMOVAL OF URETERIC CALCULUS Left 1/31/2020    Procedure: REMOVAL, CALCULUS, URETER, URETEROSCOPIC;  Surgeon: Parminder  UGO Craig MD;  [facility];  Service: Urology;  Laterality: Left;    URETEROSCOPIC REMOVAL OF URETERIC CALCULUS Left 2023    Procedure: REMOVAL, CALCULUS, URETER, URETEROSCOPIC;  Surgeon: Bhupinder Washington Jr., MD;  Location: [facility] OR [facility] FLR;  Service: Urology;  Laterality: Left;    URETEROSCOPY Left 2018    Procedure: URETEROSCOPY;  Surgeon: Bhupinder Washington Jr., MD;  Location: [facility] OR [facility] FLR;  Service: Urology;  Laterality: Left;  90mins    URETEROSCOPY Left 2020    Procedure: URETEROSCOPY;  Surgeon: Parminder Craig MD;  Location: [facility] OR [facility] FLR;  Service: Urology;  Laterality: Left;    URETEROSCOPY Left 2023    Procedure: URETEROSCOPY;  Surgeon: Bhupinder Washington Jr., MD;  [facility];  Service: Urology;  Laterality: Left;       Family History   Problem Relation Age of Onset    Diabetes Mother     Cancer Cousin 50        Cancer possibly stomach     Heart attack Neg Hx     Heart disease Neg Hx     Hypertension Neg Hx     Colon cancer Neg Hx     Esophageal cancer Neg Hx        Social History     Socioeconomic History    Marital status:     Number of children: 2   Occupational History    Occupation: self employed   Tobacco Use    Smoking status: Former     Current packs/day: 0.00     Types: Cigarettes     Quit date: 2007     Years since quittin.0    Smokeless tobacco: Never   Substance and Sexual Activity    Alcohol use: Yes     Comment: rare    Drug use: No    Sexual activity: Yes     Partners: Female       Allergies:  Patient has no known allergies.    Medications:    Current Outpatient Medications:     clobetasoL (TEMOVATE) 0.05 % external solution, APPLY TO AFFECTED AREAS ON SCALP THREE TO FOUR TIMES A WEEK AT NIGHT., Disp: 50 mL, Rfl: 3    finasteride (PROPECIA) 1 mg tablet, Take 1 mg by mouth once daily., Disp: , Rfl:     minoxidiL (LONITEN) 2.5 MG tablet, Take 2.5 mg by mouth once daily., Disp: , Rfl:     potassium citrate (UROCIT-K) 10 mEq  (1,080 mg) TbSR, Take 2 tablets (20 mEq total) by mouth 3 (three) times daily with meals., Disp: 180 tablet, Rfl: 11    selenium sulfide 2.25 % Sham, Massage ~5 to 10 mL into wet scalp; leave on scalp for 2 to 3 minutes, then rinse scalp thoroughly. Repeat application and rinse thoroughly. Usually 2 applications each week for 2 weeks will provide control. After this, may repeat at less frequent intervals (Patient not taking: Reported on 1/18/2024), Disp: 180 mL, Rfl: 1    Review of Systems   Constitutional:  Negative for activity change, appetite change, chills, diaphoresis, fatigue, fever and unexpected weight change.   HENT:  Negative for congestion, dental problem, hearing loss, mouth sores, postnasal drip, rhinorrhea, sinus pressure and trouble swallowing.    Eyes:  Negative for pain, discharge and itching.   Respiratory:  Negative for apnea, cough, choking, chest tightness, shortness of breath and wheezing.    Cardiovascular:  Negative for chest pain, palpitations and leg swelling.   Gastrointestinal:  Negative for abdominal distention, abdominal pain, anal bleeding, blood in stool, constipation, diarrhea, nausea, rectal pain and vomiting.   Endocrine: Negative for polydipsia and polyuria.   Genitourinary:  Negative for decreased urine volume, difficulty urinating, dysuria, enuresis, flank pain, frequency, genital sores, hematuria, penile discharge, penile pain, penile swelling, scrotal swelling, testicular pain and urgency.   Musculoskeletal:  Negative for arthralgias, back pain and myalgias.   Skin:  Negative for color change, rash and wound.   Neurological:  Negative for dizziness, syncope, speech difficulty, light-headedness and headaches.   Hematological:  Negative for adenopathy. Does not bruise/bleed easily.   Psychiatric/Behavioral:  Negative for behavioral problems, confusion, hallucinations and sleep disturbance.        Objective:      Physical Exam  Constitutional:       Appearance: He is  well-developed.   HENT:      Head: Normocephalic.   Cardiovascular:      Rate and Rhythm: Normal rate.   Pulmonary:      Effort: Pulmonary effort is normal.   Abdominal:      Palpations: Abdomen is soft.   Genitourinary:     Prostate: Normal.   Skin:     General: Skin is warm.   Neurological:      Mental Status: He is alert.         Assessment:       1. Benign non-nodular prostatic hyperplasia without lower urinary tract symptoms        Plan:       Sanchez was seen today for follow-up.    Diagnoses and all orders for this visit:    Benign non-nodular prostatic hyperplasia without lower urinary tract symptoms        Return to clinic 1 year for ELENA

## 2024-02-08 ENCOUNTER — OFFICE VISIT (OUTPATIENT)
Dept: INTERNAL MEDICINE | Facility: CLINIC | Age: 49
End: 2024-02-08
Payer: COMMERCIAL

## 2024-02-08 ENCOUNTER — LAB VISIT (OUTPATIENT)
Dept: LAB | Facility: HOSPITAL | Age: 49
End: 2024-02-08
Payer: COMMERCIAL

## 2024-02-08 VITALS
HEIGHT: 70 IN | SYSTOLIC BLOOD PRESSURE: 104 MMHG | RESPIRATION RATE: 18 BRPM | DIASTOLIC BLOOD PRESSURE: 72 MMHG | HEART RATE: 86 BPM | WEIGHT: 205.25 LBS | BODY MASS INDEX: 29.38 KG/M2 | OXYGEN SATURATION: 96 % | TEMPERATURE: 97 F

## 2024-02-08 DIAGNOSIS — M51.36 DDD (DEGENERATIVE DISC DISEASE), LUMBAR: ICD-10-CM

## 2024-02-08 DIAGNOSIS — Z23 NEED FOR VACCINATION: ICD-10-CM

## 2024-02-08 DIAGNOSIS — Z00.00 ENCOUNTER FOR ANNUAL HEALTH EXAMINATION: ICD-10-CM

## 2024-02-08 DIAGNOSIS — Z00.00 ENCOUNTER FOR ANNUAL HEALTH EXAMINATION: Primary | ICD-10-CM

## 2024-02-08 DIAGNOSIS — E66.3 OVERWEIGHT (BMI 25.0-29.9): ICD-10-CM

## 2024-02-08 LAB
ALBUMIN SERPL BCP-MCNC: 4.1 G/DL (ref 3.5–5.2)
ALP SERPL-CCNC: 82 U/L (ref 55–135)
ALT SERPL W/O P-5'-P-CCNC: 18 U/L (ref 10–44)
ANION GAP SERPL CALC-SCNC: 8 MMOL/L (ref 8–16)
AST SERPL-CCNC: 18 U/L (ref 10–40)
BASOPHILS # BLD AUTO: 0.06 K/UL (ref 0–0.2)
BASOPHILS NFR BLD: 0.7 % (ref 0–1.9)
BILIRUB SERPL-MCNC: 1.2 MG/DL (ref 0.1–1)
BUN SERPL-MCNC: 15 MG/DL (ref 6–20)
CALCIUM SERPL-MCNC: 9.4 MG/DL (ref 8.7–10.5)
CHLORIDE SERPL-SCNC: 109 MMOL/L (ref 95–110)
CHOLEST SERPL-MCNC: 148 MG/DL (ref 120–199)
CHOLEST/HDLC SERPL: 4.6 {RATIO} (ref 2–5)
CO2 SERPL-SCNC: 24 MMOL/L (ref 23–29)
COMPLEXED PSA SERPL-MCNC: 0.36 NG/ML (ref 0–4)
CREAT SERPL-MCNC: 0.8 MG/DL (ref 0.5–1.4)
DIFFERENTIAL METHOD BLD: ABNORMAL
EOSINOPHIL # BLD AUTO: 0.1 K/UL (ref 0–0.5)
EOSINOPHIL NFR BLD: 0.6 % (ref 0–8)
ERYTHROCYTE [DISTWIDTH] IN BLOOD BY AUTOMATED COUNT: 13.2 % (ref 11.5–14.5)
EST. GFR  (NO RACE VARIABLE): >60 ML/MIN/1.73 M^2
ESTIMATED AVG GLUCOSE: 91 MG/DL (ref 68–131)
GLUCOSE SERPL-MCNC: 84 MG/DL (ref 70–110)
HBA1C MFR BLD: 4.8 % (ref 4–5.6)
HCT VFR BLD AUTO: 45.4 % (ref 40–54)
HDLC SERPL-MCNC: 32 MG/DL (ref 40–75)
HDLC SERPL: 21.6 % (ref 20–50)
HGB BLD-MCNC: 15.1 G/DL (ref 14–18)
IMM GRANULOCYTES # BLD AUTO: 0.04 K/UL (ref 0–0.04)
IMM GRANULOCYTES NFR BLD AUTO: 0.5 % (ref 0–0.5)
LDLC SERPL CALC-MCNC: 87.4 MG/DL (ref 63–159)
LYMPHOCYTES # BLD AUTO: 2.2 K/UL (ref 1–4.8)
LYMPHOCYTES NFR BLD: 26.1 % (ref 18–48)
MCH RBC QN AUTO: 31.2 PG (ref 27–31)
MCHC RBC AUTO-ENTMCNC: 33.3 G/DL (ref 32–36)
MCV RBC AUTO: 94 FL (ref 82–98)
MONOCYTES # BLD AUTO: 0.4 K/UL (ref 0.3–1)
MONOCYTES NFR BLD: 4.9 % (ref 4–15)
NEUTROPHILS # BLD AUTO: 5.6 K/UL (ref 1.8–7.7)
NEUTROPHILS NFR BLD: 67.2 % (ref 38–73)
NONHDLC SERPL-MCNC: 116 MG/DL
NRBC BLD-RTO: 0 /100 WBC
PLATELET # BLD AUTO: 248 K/UL (ref 150–450)
PMV BLD AUTO: 9.8 FL (ref 9.2–12.9)
POTASSIUM SERPL-SCNC: 4.4 MMOL/L (ref 3.5–5.1)
PROT SERPL-MCNC: 7.4 G/DL (ref 6–8.4)
RBC # BLD AUTO: 4.84 M/UL (ref 4.6–6.2)
SODIUM SERPL-SCNC: 141 MMOL/L (ref 136–145)
TRIGL SERPL-MCNC: 143 MG/DL (ref 30–150)
TSH SERPL DL<=0.005 MIU/L-ACNC: 1.05 UIU/ML (ref 0.4–4)
WBC # BLD AUTO: 8.34 K/UL (ref 3.9–12.7)

## 2024-02-08 PROCEDURE — 3074F SYST BP LT 130 MM HG: CPT | Mod: CPTII,S$GLB,, | Performed by: NURSE PRACTITIONER

## 2024-02-08 PROCEDURE — 3008F BODY MASS INDEX DOCD: CPT | Mod: CPTII,S$GLB,, | Performed by: NURSE PRACTITIONER

## 2024-02-08 PROCEDURE — 1160F RVW MEDS BY RX/DR IN RCRD: CPT | Mod: CPTII,S$GLB,, | Performed by: NURSE PRACTITIONER

## 2024-02-08 PROCEDURE — 90677 PCV20 VACCINE IM: CPT | Mod: S$GLB,,, | Performed by: NURSE PRACTITIONER

## 2024-02-08 PROCEDURE — 83036 HEMOGLOBIN GLYCOSYLATED A1C: CPT | Performed by: NURSE PRACTITIONER

## 2024-02-08 PROCEDURE — 80061 LIPID PANEL: CPT | Performed by: NURSE PRACTITIONER

## 2024-02-08 PROCEDURE — 85025 COMPLETE CBC W/AUTO DIFF WBC: CPT | Performed by: NURSE PRACTITIONER

## 2024-02-08 PROCEDURE — 36415 COLL VENOUS BLD VENIPUNCTURE: CPT | Mod: PO | Performed by: NURSE PRACTITIONER

## 2024-02-08 PROCEDURE — 99396 PREV VISIT EST AGE 40-64: CPT | Mod: 25,S$GLB,, | Performed by: NURSE PRACTITIONER

## 2024-02-08 PROCEDURE — 84153 ASSAY OF PSA TOTAL: CPT | Performed by: NURSE PRACTITIONER

## 2024-02-08 PROCEDURE — 80053 COMPREHEN METABOLIC PANEL: CPT | Performed by: NURSE PRACTITIONER

## 2024-02-08 PROCEDURE — 1159F MED LIST DOCD IN RCRD: CPT | Mod: CPTII,S$GLB,, | Performed by: NURSE PRACTITIONER

## 2024-02-08 PROCEDURE — 84443 ASSAY THYROID STIM HORMONE: CPT | Performed by: NURSE PRACTITIONER

## 2024-02-08 PROCEDURE — 90471 IMMUNIZATION ADMIN: CPT | Mod: S$GLB,,, | Performed by: NURSE PRACTITIONER

## 2024-02-08 PROCEDURE — 3078F DIAST BP <80 MM HG: CPT | Mod: CPTII,S$GLB,, | Performed by: NURSE PRACTITIONER

## 2024-02-08 PROCEDURE — 99999 PR PBB SHADOW E&M-EST. PATIENT-LVL IV: CPT | Mod: PBBFAC,,, | Performed by: NURSE PRACTITIONER

## 2024-02-08 NOTE — PROGRESS NOTES
Subjective:       Patient ID: Sanchez Drake is a 48 y.o. male.    Chief Complaint: Annual Wellness Visit      Sanchez Drake is a 48 y.o. male who presents today for an annual wellness visit.     He voices no concerns or complaints.     Review of patient's allergies indicates:  No Known Allergies   Medication List with Changes/Refills   Current Medications    CLOBETASOL (TEMOVATE) 0.05 % EXTERNAL SOLUTION    APPLY TO AFFECTED AREAS ON SCALP THREE TO FOUR TIMES A WEEK AT NIGHT.    FINASTERIDE (PROPECIA) 1 MG TABLET    Take 1 mg by mouth once daily.    MINOXIDIL (LONITEN) 2.5 MG TABLET    Take 2.5 mg by mouth once daily.    POTASSIUM CITRATE (UROCIT-K) 10 MEQ (1,080 MG) TBSR    Take 2 tablets (20 mEq total) by mouth 3 (three) times daily with meals.    SELENIUM SULFIDE 2.25 % SHAM    Massage ~5 to 10 mL into wet scalp; leave on scalp for 2 to 3 minutes, then rinse scalp thoroughly. Repeat application and rinse thoroughly. Usually 2 applications each week for 2 weeks will provide control. After this, may repeat at less frequent intervals       Health Maintenance    Colonoscopy: 05/09/2017  (due at age 50)  Flu Vaccine: Declined   Pneumonia 20 Vaccine: 02/08/2024   Tetanus/Tdap: 11/02/2015  Hepatitis C Screen: 01/05/2023  HIV Screen: 01/05/2023  PSA: 05/19/2023      Patient ambulates on his own without an assistive device.     On average, how many days per week do you do moderate to strenuous exercise:  (like a brisk walk or jog; this does not include your job/work)  5-6   On average, how many minutes do you exercise at this level each day? 30  We encourage you to start exercising if you do not already, continue at your current level or increase your level of activity.     Do you eat fruits and vegetable every day?  Yes    Do you go to the dentist regularly? Yes  When was you last exam:     Do you go to the eye doctor regularly? Yes  When was your last exam:    Do you wear contacts, glasses,  "reading glasses? No    Have you often been bothered by feeling down, depressed, or hopeless?  Not at all      Review of Systems   Constitutional:  Negative for activity change and unexpected weight change.   HENT:  Negative for hearing loss, rhinorrhea and trouble swallowing.    Eyes:  Negative for discharge and visual disturbance.   Respiratory:  Negative for chest tightness and wheezing.    Cardiovascular:  Negative for chest pain and palpitations.   Gastrointestinal:  Negative for blood in stool, constipation, diarrhea and vomiting.   Endocrine: Negative for polydipsia and polyuria.   Genitourinary:  Negative for difficulty urinating, hematuria and urgency.   Musculoskeletal:  Negative for arthralgias, joint swelling and neck pain.   Neurological:  Negative for weakness and headaches.   Psychiatric/Behavioral:  Negative for confusion and dysphoric mood.       Objective:     /72 (BP Location: Right arm, Patient Position: Sitting, BP Method: Large (Manual))   Pulse 86   Temp 97.2 °F (36.2 °C) (Temporal)   Resp 18   Ht 5' 10" (1.778 m)   Wt 93.1 kg (205 lb 4 oz)   SpO2 96%   BMI 29.45 kg/m²     Physical Exam  Vitals reviewed.   Constitutional:       Appearance: Normal appearance.   HENT:      Head: Normocephalic and atraumatic.   Cardiovascular:      Rate and Rhythm: Normal rate and regular rhythm.      Heart sounds: Normal heart sounds. No murmur heard.  Pulmonary:      Effort: Pulmonary effort is normal.      Breath sounds: Normal breath sounds. No wheezing.   Skin:     General: Skin is warm and dry.   Neurological:      Mental Status: He is alert and oriented to person, place, and time.       BP Readings from Last 3 Encounters:   02/08/24 104/72   01/18/24 124/81   05/18/23 118/76     Wt Readings from Last 3 Encounters:   02/08/24 93.1 kg (205 lb 4 oz)   01/18/24 108 kg (238 lb)   05/18/23 108 kg (238 lb 1.6 oz)       I reviewed and independently interpreted the labs and imaging below:  Last " Labs:  Glucose   Date Value Ref Range Status   08/03/2023 91 70 - 110 mg/dL Final   05/19/2023 86 70 - 110 mg/dL Final     BUN   Date Value Ref Range Status   08/03/2023 14 6 - 20 mg/dL Final   05/19/2023 16 6 - 20 mg/dL Final     Creatinine   Date Value Ref Range Status   08/03/2023 0.8 0.5 - 1.4 mg/dL Final   05/19/2023 1.0 0.5 - 1.4 mg/dL Final     Cholesterol   Date Value Ref Range Status   08/03/2023 141 120 - 199 mg/dL Final     Comment:     The National Cholesterol Education Program (NCEP) has set the  following guidelines (reference ranges) for Cholesterol:  Optimal.....................<200 mg/dL  Borderline High.............200-239 mg/dL  High........................> or = 240 mg/dL     05/19/2023 199 120 - 199 mg/dL Final     Comment:     The National Cholesterol Education Program (NCEP) has set the  following guidelines (reference ranges) for Cholesterol:  Optimal.....................<200 mg/dL  Borderline High.............200-239 mg/dL  High........................> or = 240 mg/dL       Hemoglobin A1C   Date Value Ref Range Status   05/19/2023 4.6 4.0 - 5.6 % Final     Comment:     ADA Screening Guidelines:  5.7-6.4%  Consistent with prediabetes  >or=6.5%  Consistent with diabetes    High levels of fetal hemoglobin interfere with the HbA1C  assay. Heterozygous hemoglobin variants (HbS, HgC, etc)do  not significantly interfere with this assay.   However, presence of multiple variants may affect accuracy.     04/26/2022 5.0 4.0 - 5.6 % Final     Comment:     ADA Screening Guidelines:  5.7-6.4%  Consistent with prediabetes  >or=6.5%  Consistent with diabetes    High levels of fetal hemoglobin interfere with the HbA1C  assay. Heterozygous hemoglobin variants (HbS, HgC, etc)do  not significantly interfere with this assay.   However, presence of multiple variants may affect accuracy.       Hemoglobin   Date Value Ref Range Status   05/19/2023 14.4 14.0 - 18.0 g/dL Final   01/05/2023 16.1 14.0 - 18.0 g/dL Final      Hematocrit   Date Value Ref Range Status   05/19/2023 44.0 40.0 - 54.0 % Final   01/05/2023 46.5 40.0 - 54.0 % Final     Vit D, 25-Hydroxy   Date Value Ref Range Status   05/19/2023 33 30 - 96 ng/mL Final     Comment:     Vitamin D deficiency.........<10 ng/mL                              Vitamin D insufficiency......10-29 ng/mL       Vitamin D sufficiency........> or equal to 30 ng/mL  Vitamin D toxicity............>100 ng/mL     01/10/2019 26 (L) 30 - 96 ng/mL Final     Comment:     Vitamin D deficiency.........<10 ng/mL                              Vitamin D insufficiency......10-29 ng/mL       Vitamin D sufficiency........> or equal to 30 ng/mL  Vitamin D toxicity............>100 ng/mL         Assessment and Plan:     1. Encounter for annual health examination    --Nutrition: Discussed the importance of moderate caffeine intake. Adhering to a low sodium, low saturated fat/low cholesterol diet.   --Exercise: Discussed the importance of regular exercise. At least 30 minutes 5 days per week.   --Immunizations reviewed.  --Discussed benefits of maintaining up to date health maintenance.    - CBC Auto Differential; Future  - Comprehensive Metabolic Panel; Future  - Lipid Panel; Future  - TSH; Future  - PSA, Screening; Future  - Hemoglobin A1C; Future    2. DDD (degenerative disc disease), lumbar    Chronic, stable    3. Overweight (BMI 25.0-29.9)    Chronic, improved, down 33 lbs in 1 year.     4. Need for vaccination  - (In Office Administered) Pneumococcal Conjugate Vaccine (20 Valent) (IM) (Preferred)

## 2024-03-22 NOTE — TELEPHONE ENCOUNTER
No care due was identified.  [facility] Embedded Care Due Messages. Reference number: [id number].   3/22/2024 1:03:17 PM CDT

## 2024-03-22 NOTE — TELEPHONE ENCOUNTER
Last filled 50ml x3  refills 3/23/23 DR DIAMOND.    Lov 2/8/24 nidhi vela.  Lov with Dr MOJICA  5/18/23 .

## 2024-03-24 NOTE — TELEPHONE ENCOUNTER
Refill Routing Note   Refill Routing Note   Medication(s) are not appropriate for processing by [facility] for the following reason(s):        Due for refill >6 months ago    [facility] action(s):  Defer             Appointments  past 12m or future 3m with PCP    Date Provider   Last Visit   5/18/2023 Anna Moore MD   Next Visit   Visit date not found Anna Moore MD   ED visits in past 90 days: 0        Note composed:8:46 PM 03/23/2024

## 2024-03-25 RX ORDER — CLOBETASOL PROPIONATE 0.46 MG/ML
SOLUTION TOPICAL
Qty: 50 ML | Refills: 3 | Status: SHIPPED | OUTPATIENT
Start: 2024-03-25

## 2024-06-26 ENCOUNTER — PATIENT MESSAGE (OUTPATIENT)
Dept: OPTOMETRY | Facility: CLINIC | Age: 49
End: 2024-06-26
Payer: COMMERCIAL

## 2024-10-01 ENCOUNTER — PATIENT MESSAGE (OUTPATIENT)
Dept: INTERNAL MEDICINE | Facility: CLINIC | Age: 49
End: 2024-10-01
Payer: COMMERCIAL

## 2024-10-09 ENCOUNTER — OFFICE VISIT (OUTPATIENT)
Dept: OPTOMETRY | Facility: CLINIC | Age: 49
End: 2024-10-09
Payer: COMMERCIAL

## 2024-10-09 DIAGNOSIS — H16.223 KERATOCONJUNCTIVITIS SICCA, NOT SPECIFIED AS SJÖGREN'S, BILATERAL: Primary | ICD-10-CM

## 2024-10-09 DIAGNOSIS — H52.4 PRESBYOPIA OF BOTH EYES: ICD-10-CM

## 2024-10-09 PROCEDURE — 92014 COMPRE OPH EXAM EST PT 1/>: CPT | Mod: S$GLB,,, | Performed by: OPTOMETRIST

## 2024-10-09 PROCEDURE — 1159F MED LIST DOCD IN RCRD: CPT | Mod: CPTII,S$GLB,, | Performed by: OPTOMETRIST

## 2024-10-09 PROCEDURE — 3044F HG A1C LEVEL LT 7.0%: CPT | Mod: CPTII,S$GLB,, | Performed by: OPTOMETRIST

## 2024-10-09 PROCEDURE — 99999 PR PBB SHADOW E&M-EST. PATIENT-LVL III: CPT | Mod: PBBFAC,,, | Performed by: OPTOMETRIST

## 2024-10-09 NOTE — PROGRESS NOTES
HPI    Annual Exam   Pt states vision is poor @ near   OTC +1.50/+2.00     Pt denies F/F     Pt denies Dry/ Itchy/ Burning  Gtt: No    Last edited by Laisha Stein on 10/9/2024  1:07 PM.            Assessment /Plan     For exam results, see Encounter Report.    Keratoconjunctivitis sicca, not specified as Sjögren's, bilateral  -ATs PRN    Presbyopia of both eyes  -+1.75 OTC ok    RTC 1 yr

## 2025-01-05 NOTE — TELEPHONE ENCOUNTER
No care due was identified.  [facility] Embedded Care Due Messages. Reference number: [id number].   1/05/2025 12:53:37 PM CST

## 2025-01-06 NOTE — TELEPHONE ENCOUNTER
Refill Routing Note   Medication(s) are not appropriate for processing by [facility] for the following reason(s):        Patient not seen by provider within 15 months    [facility] action(s):  Defer               Appointments  past 12m or future 3m with PCP    Date Provider   Last Visit   5/18/2023 Anna Moore MD   Next Visit   Visit date not found Anna Moore MD   ED visits in past 90 days: 0        Note composed:12:43 PM 01/06/2025

## 2025-01-07 RX ORDER — CLOBETASOL PROPIONATE 0.5 MG/ML
SOLUTION TOPICAL
Qty: 50 ML | Refills: 0 | Status: SHIPPED | OUTPATIENT
Start: 2025-01-07

## 2025-01-13 ENCOUNTER — OFFICE VISIT (OUTPATIENT)
Dept: INTERNAL MEDICINE | Facility: CLINIC | Age: 50
End: 2025-01-13
Payer: COMMERCIAL

## 2025-01-13 VITALS
TEMPERATURE: 98 F | WEIGHT: 227.06 LBS | HEART RATE: 113 BPM | OXYGEN SATURATION: 96 % | BODY MASS INDEX: 32.51 KG/M2 | SYSTOLIC BLOOD PRESSURE: 120 MMHG | HEIGHT: 70 IN | RESPIRATION RATE: 12 BRPM | DIASTOLIC BLOOD PRESSURE: 64 MMHG

## 2025-01-13 DIAGNOSIS — E78.1 HYPERTRIGLYCERIDEMIA: ICD-10-CM

## 2025-01-13 DIAGNOSIS — Z00.00 ANNUAL PHYSICAL EXAM: Primary | ICD-10-CM

## 2025-01-13 DIAGNOSIS — L21.9 SEBORRHEIC DERMATITIS OF SCALP: ICD-10-CM

## 2025-01-13 PROCEDURE — 1160F RVW MEDS BY RX/DR IN RCRD: CPT | Mod: CPTII,S$GLB,, | Performed by: HOSPITALIST

## 2025-01-13 PROCEDURE — 3078F DIAST BP <80 MM HG: CPT | Mod: CPTII,S$GLB,, | Performed by: HOSPITALIST

## 2025-01-13 PROCEDURE — 3008F BODY MASS INDEX DOCD: CPT | Mod: CPTII,S$GLB,, | Performed by: HOSPITALIST

## 2025-01-13 PROCEDURE — 1159F MED LIST DOCD IN RCRD: CPT | Mod: CPTII,S$GLB,, | Performed by: HOSPITALIST

## 2025-01-13 PROCEDURE — 99999 PR PBB SHADOW E&M-EST. PATIENT-LVL IV: CPT | Mod: PBBFAC,,, | Performed by: HOSPITALIST

## 2025-01-13 PROCEDURE — 3074F SYST BP LT 130 MM HG: CPT | Mod: CPTII,S$GLB,, | Performed by: HOSPITALIST

## 2025-01-13 PROCEDURE — 99396 PREV VISIT EST AGE 40-64: CPT | Mod: S$GLB,,, | Performed by: HOSPITALIST

## 2025-01-13 RX ORDER — SELENIUM SULFIDE 22.5 MG/ML
SHAMPOO TOPICAL
Qty: 180 ML | Refills: 1 | Status: SHIPPED | OUTPATIENT
Start: 2025-01-13

## 2025-01-13 NOTE — PROGRESS NOTES
Subjective:     @Patient ID: Sanchez Drake is a 49 y.o. male.    Chief Complaint: Annual Exam    HPI    50 yo male with  lumbar DDD, kidney stones, vitamin D def, hx of  perirectal abscess with associated fistula  s/p fistulotomy presents for annual exam.       Pt reports would like to see a dermatologist for recurrent seborrheic dermatitis     Lipid disorders/ASCVD risk (ages >/= 45 or >/= 20 if increased risk ): ordered  DM (>45y yearly or if obese, HTN): A1c ordered  Eye exam: reading glasses at night.    Prostate (per discussion with patient starting at 50y or 45y if high risk): ordered       Vaccines:   Influenza (yearly): utd   Tetanus (every 10 yrs - 1st tdap) done  Covid19: due       Exercise: walking, physical exercise with job  Diet: regular    Review of Systems   Constitutional:  Negative for chills and fever.     Past medical history, surgical history, and family medical history reviewed and updated as appropriate.    Medications and allergies reviewed.     Objective:     There were no vitals filed for this visit.  There is no height or weight on file to calculate BMI.  Physical Exam  Vitals reviewed.   Constitutional:       General: He is not in acute distress.     Appearance: He is well-developed.   HENT:      Head: Normocephalic and atraumatic.      Right Ear: Tympanic membrane normal.      Left Ear: Tympanic membrane normal.      Mouth/Throat:      Mouth: Mucous membranes are moist.      Pharynx: No oropharyngeal exudate.   Eyes:      General:         Right eye: No discharge.         Left eye: No discharge.      Conjunctiva/sclera: Conjunctivae normal.   Cardiovascular:      Rate and Rhythm: Regular rhythm. Tachycardia present.      Heart sounds: No murmur heard.     No friction rub.   Pulmonary:      Effort: Pulmonary effort is normal.      Breath sounds: Normal breath sounds.   Abdominal:      General: Bowel sounds are normal. There is no distension.      Palpations: Abdomen is soft.       Tenderness: There is no abdominal tenderness.   Musculoskeletal:         General: Normal range of motion.      Cervical back: Normal range of motion and neck supple.      Right lower leg: No edema.      Left lower leg: No edema.   Lymphadenopathy:      Cervical: No cervical adenopathy.   Skin:     General: Skin is warm and dry.   Neurological:      Mental Status: He is alert and oriented to person, place, and time.   Psychiatric:         Mood and Affect: Mood normal.         Behavior: Behavior normal.         Lab Results   Component Value Date    WBC 8.34 02/08/2024    HGB 15.1 02/08/2024    HCT 45.4 02/08/2024     02/08/2024    CHOL 148 02/08/2024    TRIG 143 02/08/2024    HDL 32 (L) 02/08/2024    ALT 18 02/08/2024    AST 18 02/08/2024     02/08/2024    K 4.4 02/08/2024     02/08/2024    CREATININE 0.8 02/08/2024    BUN 15 02/08/2024    CO2 24 02/08/2024    TSH 1.049 02/08/2024    PSA 0.36 02/08/2024    INR 1.0 02/09/2017    HGBA1C 4.8 02/08/2024       Assessment:     1. Annual physical exam    2. Hypertriglyceridemia    3. BMI 34.0-34.9,adult    4. Seborrheic dermatitis of scalp      Plan:   Sanchez was seen today for annual exam.    Diagnoses and all orders for this visit:    Annual physical exam  -     Comprehensive Metabolic Panel; Future  -     CBC Auto Differential; Future  -     Lipid Panel; Future  -     TSH; Future  -     Urinalysis; Future  -     Hemoglobin A1C; Future  -     PSA, Screening; Future    Hypertriglyceridemia  -     Comprehensive Metabolic Panel; Future  -     CBC Auto Differential; Future  -     Lipid Panel; Future  -     TSH; Future  -     Urinalysis; Future  -     Hemoglobin A1C; Future  -     PSA, Screening; Future    BMI 34.0-34.9,adult  -     Comprehensive Metabolic Panel; Future  -     CBC Auto Differential; Future  -     Lipid Panel; Future  -     TSH; Future  -     Urinalysis; Future  -     Hemoglobin A1C; Future  -     PSA, Screening; Future    Seborrheic  dermatitis of scalp  -     Comprehensive Metabolic Panel; Future  -     CBC Auto Differential; Future  -     Lipid Panel; Future  -     TSH; Future  -     Urinalysis; Future  -     Hemoglobin A1C; Future  -     PSA, Screening; Future  -     Ambulatory referral/consult to Dermatology; Future    Other orders  -     selenium sulfide 2.25 % Sham; Massage ~5 to 10 mL into wet scalp; leave on scalp for 2 to 3 minutes, then rinse scalp thoroughly. Repeat application and rinse thoroughly. Usually 2 applications each week for 2 weeks will provide control. After this, may repeat at less frequent intervals        Rtc 1 year and salome Moore MD  Internal Medicine    1/13/2025

## 2025-01-14 ENCOUNTER — LAB VISIT (OUTPATIENT)
Dept: LAB | Facility: HOSPITAL | Age: 50
End: 2025-01-14
Attending: HOSPITALIST
Payer: COMMERCIAL

## 2025-01-14 DIAGNOSIS — L21.9 SEBORRHEIC DERMATITIS OF SCALP: ICD-10-CM

## 2025-01-14 DIAGNOSIS — Z00.00 ANNUAL PHYSICAL EXAM: ICD-10-CM

## 2025-01-14 DIAGNOSIS — E78.1 HYPERTRIGLYCERIDEMIA: ICD-10-CM

## 2025-01-14 LAB
ALBUMIN SERPL BCP-MCNC: 4.3 G/DL (ref 3.5–5.2)
ALP SERPL-CCNC: 86 U/L (ref 40–150)
ALT SERPL W/O P-5'-P-CCNC: 19 U/L (ref 10–44)
ANION GAP SERPL CALC-SCNC: 10 MMOL/L (ref 8–16)
AST SERPL-CCNC: 17 U/L (ref 10–40)
BASOPHILS # BLD AUTO: 0.06 K/UL (ref 0–0.2)
BASOPHILS NFR BLD: 0.7 % (ref 0–1.9)
BILIRUB SERPL-MCNC: 0.7 MG/DL (ref 0.1–1)
BUN SERPL-MCNC: 19 MG/DL (ref 6–20)
CALCIUM SERPL-MCNC: 9.1 MG/DL (ref 8.7–10.5)
CHLORIDE SERPL-SCNC: 107 MMOL/L (ref 95–110)
CHOLEST SERPL-MCNC: 154 MG/DL (ref 120–199)
CHOLEST/HDLC SERPL: 4.7 {RATIO} (ref 2–5)
CO2 SERPL-SCNC: 22 MMOL/L (ref 23–29)
COMPLEXED PSA SERPL-MCNC: 0.22 NG/ML (ref 0–4)
CREAT SERPL-MCNC: 0.9 MG/DL (ref 0.5–1.4)
DIFFERENTIAL METHOD BLD: ABNORMAL
EOSINOPHIL # BLD AUTO: 0.1 K/UL (ref 0–0.5)
EOSINOPHIL NFR BLD: 0.7 % (ref 0–8)
ERYTHROCYTE [DISTWIDTH] IN BLOOD BY AUTOMATED COUNT: 13.4 % (ref 11.5–14.5)
EST. GFR  (NO RACE VARIABLE): >60 ML/MIN/1.73 M^2
ESTIMATED AVG GLUCOSE: 94 MG/DL (ref 68–131)
GLUCOSE SERPL-MCNC: 92 MG/DL (ref 70–110)
HBA1C MFR BLD: 4.9 % (ref 4–5.6)
HCT VFR BLD AUTO: 46.7 % (ref 40–54)
HDLC SERPL-MCNC: 33 MG/DL (ref 40–75)
HDLC SERPL: 21.4 % (ref 20–50)
HGB BLD-MCNC: 15.7 G/DL (ref 14–18)
IMM GRANULOCYTES # BLD AUTO: 0.04 K/UL (ref 0–0.04)
IMM GRANULOCYTES NFR BLD AUTO: 0.5 % (ref 0–0.5)
LDLC SERPL CALC-MCNC: 88.8 MG/DL (ref 63–159)
LYMPHOCYTES # BLD AUTO: 2.2 K/UL (ref 1–4.8)
LYMPHOCYTES NFR BLD: 26.1 % (ref 18–48)
MCH RBC QN AUTO: 31.1 PG (ref 27–31)
MCHC RBC AUTO-ENTMCNC: 33.6 G/DL (ref 32–36)
MCV RBC AUTO: 93 FL (ref 82–98)
MONOCYTES # BLD AUTO: 0.4 K/UL (ref 0.3–1)
MONOCYTES NFR BLD: 5.1 % (ref 4–15)
NEUTROPHILS # BLD AUTO: 5.7 K/UL (ref 1.8–7.7)
NEUTROPHILS NFR BLD: 66.9 % (ref 38–73)
NONHDLC SERPL-MCNC: 121 MG/DL
NRBC BLD-RTO: 0 /100 WBC
PLATELET # BLD AUTO: 217 K/UL (ref 150–450)
PMV BLD AUTO: 9.7 FL (ref 9.2–12.9)
POTASSIUM SERPL-SCNC: 4.1 MMOL/L (ref 3.5–5.1)
PROT SERPL-MCNC: 7.8 G/DL (ref 6–8.4)
RBC # BLD AUTO: 5.05 M/UL (ref 4.6–6.2)
SODIUM SERPL-SCNC: 139 MMOL/L (ref 136–145)
TRIGL SERPL-MCNC: 161 MG/DL (ref 30–150)
TSH SERPL DL<=0.005 MIU/L-ACNC: 1.31 UIU/ML (ref 0.4–4)
WBC # BLD AUTO: 8.55 K/UL (ref 3.9–12.7)

## 2025-01-14 PROCEDURE — 84153 ASSAY OF PSA TOTAL: CPT | Performed by: HOSPITALIST

## 2025-01-14 PROCEDURE — 83036 HEMOGLOBIN GLYCOSYLATED A1C: CPT | Performed by: HOSPITALIST

## 2025-01-14 PROCEDURE — 85025 COMPLETE CBC W/AUTO DIFF WBC: CPT | Performed by: HOSPITALIST

## 2025-01-14 PROCEDURE — 84443 ASSAY THYROID STIM HORMONE: CPT | Performed by: HOSPITALIST

## 2025-01-14 PROCEDURE — 36415 COLL VENOUS BLD VENIPUNCTURE: CPT | Performed by: HOSPITALIST

## 2025-01-14 PROCEDURE — 80053 COMPREHEN METABOLIC PANEL: CPT | Performed by: HOSPITALIST

## 2025-01-14 PROCEDURE — 80061 LIPID PANEL: CPT | Performed by: HOSPITALIST

## 2025-04-22 ENCOUNTER — TELEPHONE (OUTPATIENT)
Dept: DERMATOLOGY | Facility: CLINIC | Age: 50
End: 2025-04-22
Payer: COMMERCIAL

## 2025-04-23 ENCOUNTER — OFFICE VISIT (OUTPATIENT)
Dept: DERMATOLOGY | Facility: CLINIC | Age: 50
End: 2025-04-23
Payer: COMMERCIAL

## 2025-04-23 DIAGNOSIS — L70.0 OPEN COMEDONE: ICD-10-CM

## 2025-04-23 DIAGNOSIS — L21.9 SEBORRHEIC DERMATITIS OF SCALP: Primary | ICD-10-CM

## 2025-04-23 PROCEDURE — 99204 OFFICE O/P NEW MOD 45 MIN: CPT | Mod: S$GLB,,, | Performed by: PHYSICIAN ASSISTANT

## 2025-04-23 PROCEDURE — 99999 PR PBB SHADOW E&M-EST. PATIENT-LVL III: CPT | Mod: PBBFAC,,, | Performed by: PHYSICIAN ASSISTANT

## 2025-04-23 PROCEDURE — G2211 COMPLEX E/M VISIT ADD ON: HCPCS | Mod: S$GLB,,, | Performed by: PHYSICIAN ASSISTANT

## 2025-04-23 PROCEDURE — 1160F RVW MEDS BY RX/DR IN RCRD: CPT | Mod: CPTII,S$GLB,, | Performed by: PHYSICIAN ASSISTANT

## 2025-04-23 PROCEDURE — 1159F MED LIST DOCD IN RCRD: CPT | Mod: CPTII,S$GLB,, | Performed by: PHYSICIAN ASSISTANT

## 2025-04-23 PROCEDURE — 3044F HG A1C LEVEL LT 7.0%: CPT | Mod: CPTII,S$GLB,, | Performed by: PHYSICIAN ASSISTANT

## 2025-04-23 RX ORDER — KETOCONAZOLE 20 MG/ML
SHAMPOO, SUSPENSION TOPICAL
Qty: 120 ML | Refills: 5 | Status: SHIPPED | OUTPATIENT
Start: 2025-04-23

## 2025-04-23 NOTE — PATIENT INSTRUCTIONS
Seborrheic dermatitis of scalp  -     Ambulatory referral/consult to Dermatology  -     ketoconazole (NIZORAL) 2 % shampoo; Wash hair with medicated shampoo at least 2x/week - let sit on scalp at least 5 minutes prior to rinsing  Dispense: 120 mL; Refill: 5      SCALP:  Recommend OTC medicated shampoo containing active ingredients of either selenium sulfide, zinc pyrithione, tar, salicylic acid, or ketoconazole. Patient should rotate the active ingredients to avoid building tolerance. It is recommended that patient wash hair at least 2 times per week and allow shampoo to sit on scalp at least 5 minutes prior to rinsing.      Open comedone  -Wash face with sal acid wash (sample given today)- be careful to avoid eyes, can use warm compresses on the area            Follow up in about 3 months (around 7/23/2025).

## 2025-04-23 NOTE — PROGRESS NOTES
Subjective:      Patient ID:  Sanchez Drake is a 49 y.o. male who presents for   Chief Complaint   Patient presents with    Seborrheic Dermatitis     scalp     Pt is present for dandruff.     Patient with new area of concern:   Location: scalp  Duration: a yr  S/S: itching  Previous treatments: OTC shampoo (Head and Shoulders), selenium sulfide solution (didn't work)    Also has tags on right inner eye area that he would like addressed. Went to outside provider and they were doing a procedure for them. Reports having somewhat oily skin.         Review of Systems   Skin:  Negative for itching and rash.       Objective:   Physical Exam   Constitutional: He appears well-developed and well-nourished. No distress.   Neurological: He is alert and oriented to person, place, and time. He is not disoriented.   Psychiatric: He has a normal mood and affect.   Skin:   Areas Examined (abnormalities noted in diagram):   Scalp / Hair Palpated and Inspected  Head / Face Inspection Performed                  Diagram Legend     Erythematous scaling macule/papule c/w actinic keratosis       Vascular papule c/w angioma      Pigmented verrucoid papule/plaque c/w seborrheic keratosis      Yellow umbilicated papule c/w sebaceous hyperplasia      Irregularly shaped tan macule c/w lentigo     1-2 mm smooth white papules consistent with Milia      Movable subcutaneous cyst with punctum c/w epidermal inclusion cyst      Subcutaneous movable cyst c/w pilar cyst      Firm pink to brown papule c/w dermatofibroma      Pedunculated fleshy papule(s) c/w skin tag(s)      Evenly pigmented macule c/w junctional nevus     Mildly variegated pigmented, slightly irregular-bordered macule c/w mildly atypical nevus      Flesh colored to evenly pigmented papule c/w intradermal nevus       Pink pearly papule/plaque c/w basal cell carcinoma      Erythematous hyperkeratotic cursted plaque c/w SCC      Surgical scar with no sign of skin cancer  recurrence      Open and closed comedones      Inflammatory papules and pustules      Verrucoid papule consistent consistent with wart     Erythematous eczematous patches and plaques     Dystrophic onycholytic nail with subungual debris c/w onychomycosis     Umbilicated papule    Erythematous-base heme-crusted tan verrucoid plaque consistent with inflamed seborrheic keratosis     Erythematous Silvery Scaling Plaque c/w Psoriasis     See annotation      Assessment / Plan:        Seborrheic dermatitis of scalp  -     Ambulatory referral/consult to Dermatology  -     ketoconazole (NIZORAL) 2 % shampoo; Wash hair with medicated shampoo at least 2x/week - let sit on scalp at least 5 minutes prior to rinsing  Dispense: 120 mL; Refill: 5      SCALP:  Recommend OTC medicated shampoo containing active ingredients of either selenium sulfide, zinc pyrithione, tar, salicylic acid, or ketoconazole. Patient should rotate the active ingredients to avoid building tolerance. It is recommended that patient wash hair at least 2 times per week and allow shampoo to sit on scalp at least 5 minutes prior to rinsing.      Open comedone  -Wash face with sal acid wash (sample given today)- be careful to avoid eyes, can use warm compresses on the area            Follow up in about 3 months (around 7/23/2025).

## (undated) DEVICE — GAUZE SPONGE 4X4 12PLY

## (undated) DEVICE — SUT STRATAFIX 3-0 30CM

## (undated) DEVICE — TRAY MINOR GEN SURG

## (undated) DEVICE — SOL IRR WATER STRL 3000 ML

## (undated) DEVICE — PACK CYSTO

## (undated) DEVICE — TRAY CYSTO BASIN

## (undated) DEVICE — SET CYSTO IRR DRP CHMBR 84IN

## (undated) DEVICE — SET CYSTO IRRIGATION UNIV SPIK

## (undated) DEVICE — SET IRR URLGY 2LINE UNIV SPIKE

## (undated) DEVICE — DRESSING MEPILEX BORDER 4 X 4

## (undated) DEVICE — GUIDEWIRE PTFE .038INX145CM

## (undated) DEVICE — DRESSING SURGICAL 1/2X1/2

## (undated) DEVICE — SYR ONLY LUER LOCK 20CC

## (undated) DEVICE — BANDAID STRIP PLASTIC 3/4X3

## (undated) DEVICE — SHEATH URET FLEXOR 12FR 45CM

## (undated) DEVICE — DRAPE C ARM 42 X 120 10/BX

## (undated) DEVICE — SEE MEDLINE ITEM 156905

## (undated) DEVICE — DRESSING ABSRBNT ISLAND 3.6X8

## (undated) DEVICE — KIT EVACUATOR 3-SPRING 1/8 DRN

## (undated) DEVICE — GUIDE WIRE MOTION .035 X 150CM

## (undated) DEVICE — CATH 5FR OPEN END URETERAL

## (undated) DEVICE — DRAPE ABDOMINAL TIBURON 14X11

## (undated) DEVICE — CARTRIDGE OIL

## (undated) DEVICE — SYR 10CC LUER LOCK

## (undated) DEVICE — SEE MEDLINE ITEM 154981

## (undated) DEVICE — GOWN X-LG STERILE BACK

## (undated) DEVICE — SEE MEDLINE ITEM 157181

## (undated) DEVICE — LEGGINGS 48X31 INCH

## (undated) DEVICE — UNDERGLOVES BIOGEL PI SIZE 7.5

## (undated) DEVICE — BOWL STERILE LARGE 32OZ

## (undated) DEVICE — APPLICATOR CHLORAPREP ORN 26ML

## (undated) DEVICE — BANDAGE ADHESIVE

## (undated) DEVICE — SYS CLSR DERMABOND PRINEO 22CM

## (undated) DEVICE — SOL IRR NACL .9% 3000ML

## (undated) DEVICE — SEE MEDLINE ITEM 157131

## (undated) DEVICE — TIP YANKAUERS BULB NO VENT

## (undated) DEVICE — Device

## (undated) DEVICE — CORD BIPOLAR 12 FOOT

## (undated) DEVICE — PANTIES FEMININE NAPKIN LG/XLG

## (undated) DEVICE — SEE MEDLINE ITEM 157117

## (undated) DEVICE — NDL HYPO REG 25G X 1 1/2

## (undated) DEVICE — GOWN X-LARGE

## (undated) DEVICE — SEE MEDLINE ITEM 146417

## (undated) DEVICE — SPONGE GAUZE 16PLY 4X4

## (undated) DEVICE — LUBRICANT SURGILUBE 2 OZ

## (undated) DEVICE — KIT SURGIFLO EVITHROM

## (undated) DEVICE — FIBER MOSES 200 DFL

## (undated) DEVICE — ADAPTER HOSE 10FT 8MM

## (undated) DEVICE — SUT 0 VICRYL / CT-1

## (undated) DEVICE — SPONGE DERMACEA 4X4IN 12PLY

## (undated) DEVICE — BRIEF MESH LARGE

## (undated) DEVICE — SUT VICRYL+ 1 CT1 18IN

## (undated) DEVICE — WIRE GUIDE 0.038OLD

## (undated) DEVICE — TRAY CYSTO BASIN OMC

## (undated) DEVICE — CLOSURE SKIN 1X5 STERI-STRIP

## (undated) DEVICE — GOWN SMARTGOWN LVL4 X-LONG XL

## (undated) DEVICE — GUIDEWIRE STR TIP HIWIRE 150CM

## (undated) DEVICE — SOL NS 1000CC

## (undated) DEVICE — DRESSING AQUACEL FOAM 5 X 5

## (undated) DEVICE — ELECTRODE REM PLYHSV RETURN 9

## (undated) DEVICE — SPONGE LAP 4X18 PREWASHED

## (undated) DEVICE — EXTRACTOR TIPLESS 2.4FRX1115CM

## (undated) DEVICE — SET TUR BLADDER IRRIG Y TYPE

## (undated) DEVICE — SEE MEDLINE ITEM 157150

## (undated) DEVICE — DRESSING SPONGE 16PLY 4X4 NS

## (undated) DEVICE — ELECTRODE BLADE INSULATED 1 IN

## (undated) DEVICE — DRAPE STERI-DRAPE 1000 17X11IN

## (undated) DEVICE — GLOVE BIOGEL 7.5

## (undated) DEVICE — FIBER QUANTA OPT SDT 272UM

## (undated) DEVICE — DIFFUSER

## (undated) DEVICE — DRESSING AQUACEL FOAM 3 X 3

## (undated) DEVICE — SUT VICRYL 3-0 27 SH

## (undated) DEVICE — GOWN SURGICAL X-LARGE

## (undated) DEVICE — FIBER LASER HOLMIUM 365 MICRON

## (undated) DEVICE — SUT VICRYL PLUS 2-0 CT1 18

## (undated) DEVICE — SUT VICRYL PLUS 4-0 P3 18IN

## (undated) DEVICE — DRESSING TRANS 4X4 TEGADERM

## (undated) DEVICE — SUT VICRYL CTD 2-0 GI 27 SH

## (undated) DEVICE — SUTURE STRATAFIX PGA PCL 3-0

## (undated) DEVICE — SEE MEDLINE ITEM 152622

## (undated) DEVICE — MARKER SKIN STND TIP BLUE BARR

## (undated) DEVICE — DRAPE STERI INSTRUMENT 1018

## (undated) DEVICE — WIRE GD LUB STD 3CM .038 150CM

## (undated) DEVICE — CATH POLLACK OPEN-END FLEXI-TI

## (undated) DEVICE — NDL SPINAL 18GX3.5 SPINOCAN

## (undated) DEVICE — SEE MEDLINE ITEM 157148

## (undated) DEVICE — BUR BONE CUT MICRO TPS 3X3.8MM

## (undated) DEVICE — SEE MEDLINE ITEM 146347